# Patient Record
Sex: MALE | Race: BLACK OR AFRICAN AMERICAN | NOT HISPANIC OR LATINO | Employment: PART TIME | URBAN - METROPOLITAN AREA
[De-identification: names, ages, dates, MRNs, and addresses within clinical notes are randomized per-mention and may not be internally consistent; named-entity substitution may affect disease eponyms.]

---

## 2019-06-10 PROBLEM — D57.1 SICKLE CELL ANEMIA: Status: ACTIVE | Noted: 2019-06-10

## 2019-06-13 PROBLEM — M25.511 ACUTE PAIN OF RIGHT SHOULDER DUE TO TRAUMA: Status: ACTIVE | Noted: 2019-06-13

## 2019-06-13 PROBLEM — G89.11 ACUTE PAIN OF RIGHT SHOULDER DUE TO TRAUMA: Status: ACTIVE | Noted: 2019-06-13

## 2019-06-19 PROBLEM — M25.511 ACUTE PAIN OF RIGHT SHOULDER DUE TO TRAUMA: Status: RESOLVED | Noted: 2019-06-13 | Resolved: 2019-06-19

## 2019-06-19 PROBLEM — G89.11 ACUTE PAIN OF RIGHT SHOULDER DUE TO TRAUMA: Status: RESOLVED | Noted: 2019-06-13 | Resolved: 2019-06-19

## 2019-07-07 ENCOUNTER — HOSPITAL ENCOUNTER (INPATIENT)
Facility: HOSPITAL | Age: 24
LOS: 1 days | Discharge: HOME | DRG: 812 | End: 2019-07-08
Attending: EMERGENCY MEDICINE | Admitting: INTERNAL MEDICINE
Payer: MEDICAID

## 2019-07-07 DIAGNOSIS — Z79.64 ON HYDROXYUREA THERAPY: ICD-10-CM

## 2019-07-07 DIAGNOSIS — M79.605 BILATERAL LOWER EXTREMITY PAIN: ICD-10-CM

## 2019-07-07 DIAGNOSIS — M25.559 HIP PAIN: ICD-10-CM

## 2019-07-07 DIAGNOSIS — M87.052 AVASCULAR NECROSIS OF BONES OF BOTH HIPS: Chronic | ICD-10-CM

## 2019-07-07 DIAGNOSIS — M87.051 AVASCULAR NECROSIS OF BONES OF BOTH HIPS: Chronic | ICD-10-CM

## 2019-07-07 DIAGNOSIS — J45.20 MILD INTERMITTENT ASTHMA WITHOUT COMPLICATION: Chronic | ICD-10-CM

## 2019-07-07 DIAGNOSIS — D57.00 SICKLE CELL CRISIS: Primary | ICD-10-CM

## 2019-07-07 DIAGNOSIS — M25.551 RIGHT HIP PAIN: ICD-10-CM

## 2019-07-07 DIAGNOSIS — D57.00 SICKLE CELL ANEMIA WITH PAIN: ICD-10-CM

## 2019-07-07 DIAGNOSIS — M79.604 BILATERAL LOWER EXTREMITY PAIN: ICD-10-CM

## 2019-07-07 DIAGNOSIS — F11.10 NARCOTIC ABUSE: ICD-10-CM

## 2019-07-07 DIAGNOSIS — M25.551 PAIN OF RIGHT HIP JOINT: ICD-10-CM

## 2019-07-07 DIAGNOSIS — Z76.5 DRUG-SEEKING BEHAVIOR: ICD-10-CM

## 2019-07-07 LAB
ALBUMIN SERPL BCP-MCNC: 4 G/DL (ref 3.5–5.2)
ALP SERPL-CCNC: 55 U/L (ref 55–135)
ALT SERPL W/O P-5'-P-CCNC: 12 U/L (ref 10–44)
ANION GAP SERPL CALC-SCNC: 11 MMOL/L (ref 8–16)
ANISOCYTOSIS BLD QL SMEAR: SLIGHT
AST SERPL-CCNC: 21 U/L (ref 10–40)
BASOPHILS # BLD AUTO: 0.11 K/UL (ref 0–0.2)
BASOPHILS NFR BLD: 0.9 % (ref 0–1.9)
BILIRUB SERPL-MCNC: 3.2 MG/DL (ref 0.1–1)
BUN SERPL-MCNC: 5 MG/DL (ref 6–20)
BURR CELLS BLD QL SMEAR: ABNORMAL
CALCIUM SERPL-MCNC: 9 MG/DL (ref 8.7–10.5)
CHLORIDE SERPL-SCNC: 109 MMOL/L (ref 95–110)
CO2 SERPL-SCNC: 20 MMOL/L (ref 23–29)
CREAT SERPL-MCNC: 0.8 MG/DL (ref 0.5–1.4)
DACRYOCYTES BLD QL SMEAR: ABNORMAL
DIFFERENTIAL METHOD: ABNORMAL
EOSINOPHIL # BLD AUTO: 0.7 K/UL (ref 0–0.5)
EOSINOPHIL NFR BLD: 5.4 % (ref 0–8)
ERYTHROCYTE [DISTWIDTH] IN BLOOD BY AUTOMATED COUNT: 22.5 % (ref 11.5–14.5)
EST. GFR  (AFRICAN AMERICAN): >60 ML/MIN/1.73 M^2
EST. GFR  (NON AFRICAN AMERICAN): >60 ML/MIN/1.73 M^2
GIANT PLATELETS BLD QL SMEAR: PRESENT
GLUCOSE SERPL-MCNC: 68 MG/DL (ref 70–110)
HCT VFR BLD AUTO: 31.4 % (ref 40–54)
HGB BLD-MCNC: 10.1 G/DL (ref 14–18)
HYPOCHROMIA BLD QL SMEAR: ABNORMAL
IMM GRANULOCYTES # BLD AUTO: 0.21 K/UL (ref 0–0.04)
IMM GRANULOCYTES NFR BLD AUTO: 1.7 % (ref 0–0.5)
LDH SERPL L TO P-CCNC: 335 U/L (ref 110–260)
LYMPHOCYTES # BLD AUTO: 3.5 K/UL (ref 1–4.8)
LYMPHOCYTES NFR BLD: 28.6 % (ref 18–48)
MCH RBC QN AUTO: 29.4 PG (ref 27–31)
MCHC RBC AUTO-ENTMCNC: 32.2 G/DL (ref 32–36)
MCV RBC AUTO: 92 FL (ref 82–98)
MONOCYTES # BLD AUTO: 1.3 K/UL (ref 0.3–1)
MONOCYTES NFR BLD: 10.9 % (ref 4–15)
NEUTROPHILS # BLD AUTO: 6.4 K/UL (ref 1.8–7.7)
NEUTROPHILS NFR BLD: 52.5 % (ref 38–73)
NRBC BLD-RTO: 1 /100 WBC
OVALOCYTES BLD QL SMEAR: ABNORMAL
PLATELET # BLD AUTO: 299 K/UL (ref 150–350)
PLATELET BLD QL SMEAR: ABNORMAL
PMV BLD AUTO: 9.8 FL (ref 9.2–12.9)
POIKILOCYTOSIS BLD QL SMEAR: SLIGHT
POLYCHROMASIA BLD QL SMEAR: ABNORMAL
POTASSIUM SERPL-SCNC: 4.5 MMOL/L (ref 3.5–5.1)
PROT SERPL-MCNC: 7.3 G/DL (ref 6–8.4)
RBC # BLD AUTO: 3.43 M/UL (ref 4.6–6.2)
RETICS/RBC NFR AUTO: 11.9 % (ref 0.4–2)
SICKLE CELLS BLD QL SMEAR: ABNORMAL
SODIUM SERPL-SCNC: 140 MMOL/L (ref 136–145)
TARGETS BLD QL SMEAR: ABNORMAL
WBC # BLD AUTO: 12.27 K/UL (ref 3.9–12.7)

## 2019-07-07 PROCEDURE — 99285 EMERGENCY DEPT VISIT HI MDM: CPT | Mod: 25

## 2019-07-07 PROCEDURE — 25000003 PHARM REV CODE 250: Performed by: STUDENT IN AN ORGANIZED HEALTH CARE EDUCATION/TRAINING PROGRAM

## 2019-07-07 PROCEDURE — 83615 LACTATE (LD) (LDH) ENZYME: CPT

## 2019-07-07 PROCEDURE — 96375 TX/PRO/DX INJ NEW DRUG ADDON: CPT

## 2019-07-07 PROCEDURE — 99223 PR INITIAL HOSPITAL CARE,LEVL III: ICD-10-PCS | Mod: ,,, | Performed by: INTERNAL MEDICINE

## 2019-07-07 PROCEDURE — 99285 EMERGENCY DEPT VISIT HI MDM: CPT | Mod: ,,, | Performed by: EMERGENCY MEDICINE

## 2019-07-07 PROCEDURE — 85025 COMPLETE CBC W/AUTO DIFF WBC: CPT

## 2019-07-07 PROCEDURE — 11000001 HC ACUTE MED/SURG PRIVATE ROOM

## 2019-07-07 PROCEDURE — 96376 TX/PRO/DX INJ SAME DRUG ADON: CPT

## 2019-07-07 PROCEDURE — 99285 PR EMERGENCY DEPT VISIT,LEVEL V: ICD-10-PCS | Mod: ,,, | Performed by: EMERGENCY MEDICINE

## 2019-07-07 PROCEDURE — 25000003 PHARM REV CODE 250: Performed by: EMERGENCY MEDICINE

## 2019-07-07 PROCEDURE — 99223 1ST HOSP IP/OBS HIGH 75: CPT | Mod: ,,, | Performed by: INTERNAL MEDICINE

## 2019-07-07 PROCEDURE — 96374 THER/PROPH/DIAG INJ IV PUSH: CPT

## 2019-07-07 PROCEDURE — 80053 COMPREHEN METABOLIC PANEL: CPT

## 2019-07-07 PROCEDURE — 85045 AUTOMATED RETICULOCYTE COUNT: CPT

## 2019-07-07 PROCEDURE — 63600175 PHARM REV CODE 636 W HCPCS: Performed by: EMERGENCY MEDICINE

## 2019-07-07 RX ORDER — MORPHINE SULFATE 15 MG/1
30 TABLET, FILM COATED, EXTENDED RELEASE ORAL EVERY 12 HOURS
Status: DISCONTINUED | OUTPATIENT
Start: 2019-07-08 | End: 2019-07-07

## 2019-07-07 RX ORDER — DIPHENHYDRAMINE HCL 25 MG
25 CAPSULE ORAL EVERY 6 HOURS PRN
Status: DISCONTINUED | OUTPATIENT
Start: 2019-07-07 | End: 2019-07-08 | Stop reason: HOSPADM

## 2019-07-07 RX ORDER — MORPHINE SULFATE 15 MG/1
15 TABLET, FILM COATED, EXTENDED RELEASE ORAL EVERY 12 HOURS
Status: DISCONTINUED | OUTPATIENT
Start: 2019-07-07 | End: 2019-07-07

## 2019-07-07 RX ORDER — ENOXAPARIN SODIUM 100 MG/ML
40 INJECTION SUBCUTANEOUS EVERY 24 HOURS
Status: DISCONTINUED | OUTPATIENT
Start: 2019-07-07 | End: 2019-07-08 | Stop reason: HOSPADM

## 2019-07-07 RX ORDER — AMOXICILLIN 250 MG
1 CAPSULE ORAL 2 TIMES DAILY
Status: DISCONTINUED | OUTPATIENT
Start: 2019-07-07 | End: 2019-07-08 | Stop reason: HOSPADM

## 2019-07-07 RX ORDER — DIPHENHYDRAMINE HYDROCHLORIDE 50 MG/ML
12.5 INJECTION INTRAMUSCULAR; INTRAVENOUS
Status: COMPLETED | OUTPATIENT
Start: 2019-07-07 | End: 2019-07-07

## 2019-07-07 RX ORDER — HYDROMORPHONE HYDROCHLORIDE 1 MG/ML
2 INJECTION, SOLUTION INTRAMUSCULAR; INTRAVENOUS; SUBCUTANEOUS
Status: COMPLETED | OUTPATIENT
Start: 2019-07-07 | End: 2019-07-07

## 2019-07-07 RX ORDER — SODIUM CHLORIDE 450 MG/100ML
1000 INJECTION, SOLUTION INTRAVENOUS
Status: COMPLETED | OUTPATIENT
Start: 2019-07-07 | End: 2019-07-07

## 2019-07-07 RX ORDER — SODIUM CHLORIDE 0.9 % (FLUSH) 0.9 %
10 SYRINGE (ML) INJECTION
Status: DISCONTINUED | OUTPATIENT
Start: 2019-07-07 | End: 2019-07-08 | Stop reason: HOSPADM

## 2019-07-07 RX ORDER — SODIUM CHLORIDE, SODIUM LACTATE, POTASSIUM CHLORIDE, CALCIUM CHLORIDE 600; 310; 30; 20 MG/100ML; MG/100ML; MG/100ML; MG/100ML
INJECTION, SOLUTION INTRAVENOUS CONTINUOUS
Status: DISCONTINUED | OUTPATIENT
Start: 2019-07-07 | End: 2019-07-08 | Stop reason: HOSPADM

## 2019-07-07 RX ORDER — ONDANSETRON 2 MG/ML
4 INJECTION INTRAMUSCULAR; INTRAVENOUS
Status: COMPLETED | OUTPATIENT
Start: 2019-07-07 | End: 2019-07-07

## 2019-07-07 RX ORDER — FOLIC ACID 1 MG/1
1 TABLET ORAL DAILY
Status: DISCONTINUED | OUTPATIENT
Start: 2019-07-08 | End: 2019-07-08 | Stop reason: HOSPADM

## 2019-07-07 RX ORDER — MORPHINE SULFATE 15 MG/1
30 TABLET, FILM COATED, EXTENDED RELEASE ORAL EVERY 12 HOURS
Status: DISCONTINUED | OUTPATIENT
Start: 2019-07-07 | End: 2019-07-08

## 2019-07-07 RX ORDER — HYDROXYUREA 500 MG/1
1500 CAPSULE ORAL DAILY
Status: DISCONTINUED | OUTPATIENT
Start: 2019-07-08 | End: 2019-07-08 | Stop reason: HOSPADM

## 2019-07-07 RX ADMIN — HYDROMORPHONE HYDROCHLORIDE 2 MG: 1 INJECTION, SOLUTION INTRAMUSCULAR; INTRAVENOUS; SUBCUTANEOUS at 03:07

## 2019-07-07 RX ADMIN — SODIUM CHLORIDE 1000 ML: 0.45 INJECTION, SOLUTION INTRAVENOUS at 09:07

## 2019-07-07 RX ADMIN — MORPHINE SULFATE 30 MG: 15 TABLET, EXTENDED RELEASE ORAL at 08:07

## 2019-07-07 RX ADMIN — SODIUM CHLORIDE, SODIUM LACTATE, POTASSIUM CHLORIDE, AND CALCIUM CHLORIDE: .6; .31; .03; .02 INJECTION, SOLUTION INTRAVENOUS at 06:07

## 2019-07-07 RX ADMIN — HYDROMORPHONE HYDROCHLORIDE 2 MG: 1 INJECTION, SOLUTION INTRAMUSCULAR; INTRAVENOUS; SUBCUTANEOUS at 09:07

## 2019-07-07 RX ADMIN — DIPHENHYDRAMINE HYDROCHLORIDE 25 MG: 25 CAPSULE ORAL at 08:07

## 2019-07-07 RX ADMIN — HYDROMORPHONE HYDROCHLORIDE 2 MG: 1 INJECTION, SOLUTION INTRAMUSCULAR; INTRAVENOUS; SUBCUTANEOUS at 12:07

## 2019-07-07 RX ADMIN — HYDROMORPHONE HYDROCHLORIDE 2 MG: 1 INJECTION, SOLUTION INTRAMUSCULAR; INTRAVENOUS; SUBCUTANEOUS at 11:07

## 2019-07-07 RX ADMIN — DIPHENHYDRAMINE HYDROCHLORIDE 12.5 MG: 50 INJECTION, SOLUTION INTRAMUSCULAR; INTRAVENOUS at 09:07

## 2019-07-07 RX ADMIN — ONDANSETRON 4 MG: 2 INJECTION INTRAMUSCULAR; INTRAVENOUS at 09:07

## 2019-07-07 RX ADMIN — DIPHENHYDRAMINE HYDROCHLORIDE 12.5 MG: 50 INJECTION, SOLUTION INTRAMUSCULAR; INTRAVENOUS at 12:07

## 2019-07-07 NOTE — ASSESSMENT & PLAN NOTE
Patient was taking Ms-Contin at home along with hydroxyurea 500 mg/folic acid and has been having uncontrolled severe pain in his right hip X 3 days which was limiting his ROM and was hearing a popping sound as he moved his right hip. It was accompanied by generalized body pain.   -Continue Hyrdoxy urea/FA  -Start oral Morphine 30mg q12   -Oral Benedryl 25 mg Prn for allergic reaction to morphine  - monitor for pain  -Consult Hematology- appreciate reccomendations

## 2019-07-07 NOTE — ASSESSMENT & PLAN NOTE
Patient was taking Ms-Contin at home along with hydroxyurea 500 mg/folic acid and has been having uncontrolled severe pain in his right hip X 3 days which was limiting his ROM and was hearing a popping sound as he moved his right hip. It was accompanied by generalized body pain.   -Continue Hyrdoxy urea/FA  -Start oral Morphine 30mg q12   -Oral Benedryl 25 mg Prn for allergic reaction to morphine  - monitor for pain

## 2019-07-07 NOTE — ED NOTES
.  Patient identifiers for Paddy Stevenson 24 y.o. male checked and correct.  Chief Complaint   Patient presents with    Sickle Cell Pain Crisis     Past Medical History:   Diagnosis Date    Asthma     AVN (avascular necrosis of bone)     Encounter for blood transfusion     Irregular heart rhythm 2010    Mild intermittent asthma without complication 11/16/2015    Seasonal allergies     Sickle cell anemia     Stroke      Allergies reported:   Review of patient's allergies indicates:   Allergen Reactions    Adhesive Itching    Contrast media Shortness Of Breath    Rocephin [ceftriaxone] Hives    Toradol [ketorolac] Shortness Of Breath    Vancomycin analogues Shortness Of Breath    Fentanyl Hives    Morphine Hives     Needs to get benadryl with it         LOC: Patient is awake, alert, and aware of environment with an appropriate affect. Patient is oriented x 3 and speaking appropriately.  APPEARANCE: Patient resting comfortably and in no acute distress. Patient is clean and well groomed, patient's clothing is properly fastened.  SKIN: The skin is warm and dry. Patient has normal skin turgor and moist mucus membranes. Skin is intact; no bruising or breakdown noted.  MUSKULOSKELETAL: Patient is moving all extremities well, no obvious deformities noted. Pulses intact. Pt reports generalized body pain, worse in R hip, no injury pt reports pain is from sickle cell pain x 3 days, took MS contin around 3 am.   RESPIRATORY: Airway is open and patent. Respirations are spontaneous and non-labored with normal effort and rate, BBS=clear  CARDIAC: Patient has a normal rate and rhythm.No peripheral edema noted.   ABDOMEN: No distention noted. Bowel sounds active in all 4 quadrants. Soft and non-tender upon palpation.  NEUROLOGICAL: GCS 15. PERRL. Facial expression is symmetrical. Hand grasps are equal bilaterally. Normal sensation in all extremities when touched with finger.

## 2019-07-07 NOTE — HPI
23 Y/O M with known Sickle cell disease on home pain medication and hydroxyurea, history of CVA, avascular necrosis s/p left hip replacement reports atraumatic, gradual worsening of generalized pain more pronounced in his right hip X 3 d (since his last ED visit) with no associated numbness or weakness. he c/o hearing a popping sound from his right hip with any movement. He reports symptoms typical to his previous sickle cell disease flare-up pain. He has had multiple visits to multiple hospitals and EDs in the last 2-3 months for similar complaints. He has been taking MS-Contin at home for his pain and says his doctor at [facility] prescribes his pain medication. He basically is here for getting his pain under control. He has been taking his folic acid/hydroxyurea at home.  He denies any headache, dizziness or confusion. He denies any chest pain or shortness of breath or cough. He denies any recent illness, fever/chills.

## 2019-07-07 NOTE — ASSESSMENT & PLAN NOTE
Patient was taking Ms-Contin at home along with hydroxyurea 500 mg/folic acid and has been having uncontrolled severe pain in his right hip X 3 days which was limiting his ROM and was hearing a popping sound as he moved his right hip. It was accompanied by generalized body pain.   -Continue Hyrdoxy urea/FA  -start IV hydration with LR 150ml/hr continuous infusion  -Start oral Morphine 30mg q12   -Incentive spirometry PRN  -Oral Benedryl 25 mg Prn for allergic reaction to morphine  - monitor for pain  -Consult Hematology- appreciate recomendations

## 2019-07-07 NOTE — SUBJECTIVE & OBJECTIVE
Past Medical History:   Diagnosis Date    Asthma     AVN (avascular necrosis of bone)     Encounter for blood transfusion     Irregular heart rhythm 2010    Malingering 3/11/2016    Mild intermittent asthma without complication 11/16/2015    Seasonal allergies     Sickle cell anemia     Stroke        Past Surgical History:   Procedure Laterality Date    ABDOMINAL SURGERY      CHOLECYSTECTOMY      HIP SURGERY Left 2014    Hip Decompression    OTHER SURGICAL HISTORY      left rotater cuff repair    PORTACATH PLACEMENT      portacath removed   2/2014    SHOULDER ARTHROSCOPY W/ SUBACROMIAL DECOMPRESSION AND DISTAL CLAVICLE EXCISION         Review of patient's allergies indicates:   Allergen Reactions    Adhesive Itching    Contrast media Shortness Of Breath    Rocephin [ceftriaxone] Hives    Toradol [ketorolac] Shortness Of Breath    Vancomycin analogues Shortness Of Breath    Fentanyl Hives       No current facility-administered medications on file prior to encounter.      Current Outpatient Medications on File Prior to Encounter   Medication Sig    folic acid (FOLVITE) 1 MG tablet Take 1 mg by mouth once daily.    hydroxyurea (HYDREA) 500 mg Cap Take 1,500 mg by mouth once daily.    oxyCODONE-acetaminophen (PERCOCET)  mg per tablet Take 1 tablet by mouth every 6 (six) hours as needed.     Family History     Problem Relation (Age of Onset)    Diabetes Father    Mental illness Brother    Sickle cell anemia Mother        Tobacco Use    Smoking status: Never Smoker    Smokeless tobacco: Never Used   Substance and Sexual Activity    Alcohol use: No    Drug use: No    Sexual activity: Yes     Partners: Female     Birth control/protection: Condom     Review of Systems   Constitutional: Negative for appetite change and fever.   Respiratory: Negative for cough and shortness of breath.    Cardiovascular: Negative for chest pain and leg swelling.   Gastrointestinal: Negative for nausea and  vomiting.   Musculoskeletal: Positive for myalgias.        Generalized body aches  Pain more pronounced in his right hip   Neurological: Negative for dizziness.     Objective:     Vital Signs (Most Recent):  Temp: 98.4 °F (36.9 °C) (07/07/19 1543)  Pulse: 60 (07/07/19 1548)  Resp: 18 (07/07/19 1548)  BP: 114/60 (07/07/19 1548)  SpO2: 99 % (07/07/19 1548) Vital Signs (24h Range):  Temp:  [97.9 °F (36.6 °C)-98.4 °F (36.9 °C)] 98.4 °F (36.9 °C)  Pulse:  [58-80] 60  Resp:  [12-18] 18  SpO2:  [97 %-100 %] 99 %  BP: (112-115)/(59-61) 114/60     Weight: 72.6 kg (160 lb)  Body mass index is 23.63 kg/m².    Physical Exam   Constitutional: He is oriented to person, place, and time. He appears well-developed and well-nourished.   Eyes: Conjunctivae are normal.   Neck:   Limited movements consistent with his generalized body pain   Cardiovascular: Normal rate and normal heart sounds.   Pulmonary/Chest: Effort normal and breath sounds normal.   Abdominal: Soft. He exhibits no distension.   Musculoskeletal: He exhibits tenderness (B/L UE and LE).   Severely decreased range of movement in his right hip and moderately decreased ROM on left hip, B/L shoulder joints    Neurological: He is alert and oriented to person, place, and time.   Skin: Skin is warm and dry.           Significant Labs:   CBC:   Recent Labs   Lab 07/07/19  1519   WBC 12.27   HGB 10.1*   HCT 31.4*        CMP:   Recent Labs   Lab 07/07/19  1534      K 4.5      CO2 20*   GLU 68*   BUN 5*   CREATININE 0.8   CALCIUM 9.0   PROT 7.3   ALBUMIN 4.0   BILITOT 3.2*   ALKPHOS 55   AST 21   ALT 12   ANIONGAP 11   EGFRNONAA >60.0     Lactic Acid: No results for input(s): LACTATE in the last 48 hours.  POCT Glucose: No results for input(s): POCTGLUCOSE in the last 48 hours.  All pertinent labs within the past 24 hours have been reviewed.    Significant Imaging: No imaging ordered yet

## 2019-07-07 NOTE — H&P
[facility] Hospital Medicine  History & Physical    Patient Name: Paddy Stevenson  MRN: [id number]  Admission Date: 7/7/2019  Attending Physician: Elliott Beckwith MD   Primary Care Provider: Primary Doctor [facility] Medicine Team: [facility] 3 Jared Gama MD     Patient information was obtained from patient and ER records.     Subjective:     Principal Problem:Sickle cell anemia with pain    Chief Complaint:   Chief Complaint   Patient presents with    Sickle Cell Pain Crisis        HPI: 23 Y/O M with known Sickle cell disease on home pain medication and hydroxyurea, history of CVA, avascular necrosis s/p left hip replacement reports atraumatic, gradual worsening of generalized pain more pronounced in his right hip X 3 d (since his last ED visit) with no associated numbness or weakness.  he c/o hearing a popping sound from his right hip with any movement. He reports symptoms typical to his previous sickle cell disease flare-up pain. He has had multiple visits to multiple hospitals and EDs in the last 2-3 months for similar complaints. He has been taking MS-Contin at home for his pain and says his doctor at [facility] prescribes his pain medication. He basically is here for getting his pain under control. He has been taking his folic acid/hydroxyurea at home.  He denies any headache, dizziness or confusion. He denies any chest pain or shortness of breath or cough. He denies any recent illness, fever/chills.     No new subjective & objective note has been filed under this hospital service since the last note was generated.    Assessment/Plan:     Sickle cell crisis  Patient was taking Ms-Contin at home along with hydroxyurea 500 mg/folic acid and has been having uncontrolled severe pain in his right hip X 3 days which was limiting his ROM and was hearing a popping sound as he moved his right hip. It was accompanied by generalized body pain.   -Continue Hyrdoxy urea/FA  -start IV  hydration with LR 150ml/hr continuous infusion  -Start oral Morphine 30mg q12   -Incentive spirometry PRN  -Oral Benedryl 25 mg Prn for allergic reaction to morphine  - monitor for pain  -Consult Hematology- appreciate recomendations        Sickle cell anemia  See Sickle cell crisis above      Mild intermittent asthma without complication  Patient does not take any home medications      VTE Risk Mitigation (From admission, onward)        Ordered     enoxaparin injection 40 mg  Daily      07/07/19 5960             Jared Gama MD  Department of Hospital Medicine   [facility]

## 2019-07-07 NOTE — HOSPITAL COURSE
Admitted to [facility] with sickle cell crisis pain not controlled by his home medications and possible AVN of right hip. Consulted Hematology. As per the attending provider's note-   Malingering / Drug Seeking Behavior  7-8-19:  Patient was admitted to hospital on 7-7-19. Stated that he was taking MS Contin 45 mg BID PRN prescribed by Dr. Rei Greer. He stated he refilled this last month.  He also stated that he was taking hydroxyurea, refilled last 2 months.  He gave us 2 pharmacies ([facility] and [facility] in [place]).  We checked both pharmacies.  [facility] had one fill of MS Contin 15 mg tablet for 6 tablets on 5/17/19 (no other meds filled).  [facility].   check confirmed the same MS Contin fill only.  He was lying to us.  Apparently he has been getting pain meds through numerous ED visits.  DC to home 7-8-19 with .

## 2019-07-07 NOTE — ED TRIAGE NOTES
Pt presents with generalized pain worse in R hip x 3 days, pt reports pain is from sickle cell pain.

## 2019-07-07 NOTE — ED PROVIDER NOTES
Encounter Date: 7/7/2019       History     Chief Complaint   Patient presents with    Sickle Cell Pain Crisis     HPI   23 Y/O M with know SS disease, history of avascular necrosis status post left hip replacement reports atraumatic, gradual worsening of pain to bilateral knees and right hip with no associated numbness or weakness. He denies any swelling. He reports symptoms typical to his previous sickle cell disease flare-up pain. He denies any headache, dizziness, visual changes neck pain or neck stiffness. He denies any recent illness, fever/chills or any cough, dyspnea, dyspnea on exertion, chest pain, chest pain on exertion or any other back/abdominal complaints. He reports p.o. intake and BMs within normal limits with no black or bloody stool.    Review of patient's allergies indicates:   Allergen Reactions    Adhesive Itching    Contrast media Shortness Of Breath    Rocephin [ceftriaxone] Hives    Toradol [ketorolac] Shortness Of Breath    Vancomycin analogues Shortness Of Breath    Fentanyl Hives    Morphine Hives     Needs to get benadryl with it     Past Medical History:   Diagnosis Date    Asthma     AVN (avascular necrosis of bone)     Encounter for blood transfusion     Irregular heart rhythm 2010    Mild intermittent asthma without complication 11/16/2015    Seasonal allergies     Sickle cell anemia     Stroke      Past Surgical History:   Procedure Laterality Date    ABDOMINAL SURGERY      CHOLECYSTECTOMY      HIP SURGERY Left 2014    Hip Decompression    OTHER SURGICAL HISTORY      left rotater cuff repair    PORTACATH PLACEMENT      portacath removed   2/2014    SHOULDER ARTHROSCOPY W/ SUBACROMIAL DECOMPRESSION AND DISTAL CLAVICLE EXCISION       Family History   Problem Relation Age of Onset    Diabetes Father     Mental illness Brother     Sickle cell anemia Mother      Social History     Tobacco Use    Smoking status: Never Smoker    Smokeless tobacco: Never Used    Substance Use Topics    Alcohol use: No    Drug use: No     Review of Systems  Constitutional: Negative for fever, chills, fatigues   HENT: Negative for sore throat, nasal congestion, visual changes, voice changes  Respiratory: Negative for shortness of breath, cough, Hx of clots   Cardiovascular: Negative for chest pain, palpitation, orthopnea  Gastrointestinal: Negative for diarrhea, nausea and vomiting.   Genitourinary: Negative for dysuria, discharge or high risk sexual encounters  Musculoskeletal: Positive for arthralgias (right hip pain). Negative for back pain.        + bilateral leg pain   Skin: Negative for rash, tears/injuries  Neurological: Negative for weakness, numbness or tremors  Hematological: Does not bruise/bleed easily.   All other systems reviewed and are negative.  Physical Exam     Initial Vitals [07/07/19 0905]   BP Pulse Resp Temp SpO2   (!) 115/59 80 17 97.9 °F (36.6 °C) 97 %      MAP       --         Physical Exam  Nursing note and vitals reviewed.  Constitutional: He appears well-developed. No distress.   HENT: Head: Normocephalic.  Mouth/Throat: Oropharynx is clear and moist.   Eyes: Anicteric; Conjunctivae are anicteric and non-injected. PERRL; EOMI.  Neck: Neck supple with FROM and no meningismus  Cardiovascular: Atraumatic thorax; Normal rate, regular rhythm, normal heart sounds and intact distal pulses. Exam reveals no gallop and no friction rub.  No murmur heard.  Pulmonary/Chest: Breath sounds normal. No respiratory distress. He has no wheezes. He has no rhonchi. He has no rales. He exhibits no tenderness.   Abdominal: +Old healed Lap Scars; Soft. Bowel sounds are normal. He exhibits no distension. There is no tenderness. There is no rebound and no guarding.   Musculoskeletal: +DRM to R hip 2/2 pain; + Right hip tenderness to palpation with no crepitus.   Neurological: AAOx3 with no focal deficits.  Skin: +diffuse tattoos; Skin is warm. Capillary refill takes less than 2  seconds. No rash noted.     ED Course   Procedures  Labs Reviewed - No data to display       Imaging Results    None          Medical Decision Making:   History:   Old Medical Records: I decided to obtain old medical records.  Initial Assessment:   AFEBRILE, ATRAUMATIC AND HEMODYNAMICALLY STABLE MALE WITH NO FOCAL NEUROLOGICAL DEFICITS presents with signs and symptoms of typical right hip and lower extremity pain. No appreciated trauma or asymmetry/edema that would indicate thrombosis either arterial or venous.  Will treat with analgesia and closely monitor his symptom improvement.  No chest pain or respiratory complaints that would indicate acute chest syndrome in today's visit.  Nonetheless will closely monitor while in the ED.  ____________________  Kimo Hannon MD, [facility]  Emergency Medicine Staff  9:36 AM 7/7/2019    F/U:  Patient continues in pain despite initial dose of analgesia.  Will provide 2nd and 3rd opioid all monitoring his pain. He does report subjective improvement, but no complete resolution.  Will closely continue monitoring his symptom resolution.  ____________________  Kimo Hannon MD, [facility]  Emergency Medicine Staff  12:26 PM 7/7/2019                          Clinical Impression:       ICD-10-CM ICD-9-CM   1. Sickle cell crisis D57.00 282.62   2. Right hip pain M25.551 719.45   3. Bilateral lower extremity pain M79.604 729.5    M79.605    4. Hip pain M25.559 719.45                                Clay Hannon MD  07/07/19 1958

## 2019-07-08 VITALS
TEMPERATURE: 98 F | BODY MASS INDEX: 23.7 KG/M2 | OXYGEN SATURATION: 96 % | HEART RATE: 69 BPM | SYSTOLIC BLOOD PRESSURE: 133 MMHG | WEIGHT: 160 LBS | HEIGHT: 69 IN | DIASTOLIC BLOOD PRESSURE: 60 MMHG | RESPIRATION RATE: 17 BRPM

## 2019-07-08 PROBLEM — Z76.5 MALINGERING: Status: ACTIVE | Noted: 2019-07-08

## 2019-07-08 LAB
BASOPHILS # BLD AUTO: 0.1 K/UL (ref 0–0.2)
BASOPHILS NFR BLD: 0.7 % (ref 0–1.9)
DIFFERENTIAL METHOD: ABNORMAL
EOSINOPHIL # BLD AUTO: 0.8 K/UL (ref 0–0.5)
EOSINOPHIL NFR BLD: 5.6 % (ref 0–8)
ERYTHROCYTE [DISTWIDTH] IN BLOOD BY AUTOMATED COUNT: 22 % (ref 11.5–14.5)
HCT VFR BLD AUTO: 30 % (ref 40–54)
HGB BLD-MCNC: 10 G/DL (ref 14–18)
IMM GRANULOCYTES # BLD AUTO: 0.35 K/UL (ref 0–0.04)
IMM GRANULOCYTES NFR BLD AUTO: 2.6 % (ref 0–0.5)
LYMPHOCYTES # BLD AUTO: 3.5 K/UL (ref 1–4.8)
LYMPHOCYTES NFR BLD: 25.9 % (ref 18–48)
MCH RBC QN AUTO: 29.6 PG (ref 27–31)
MCHC RBC AUTO-ENTMCNC: 33.3 G/DL (ref 32–36)
MCV RBC AUTO: 89 FL (ref 82–98)
MONOCYTES # BLD AUTO: 1.4 K/UL (ref 0.3–1)
MONOCYTES NFR BLD: 10.4 % (ref 4–15)
NEUTROPHILS # BLD AUTO: 7.4 K/UL (ref 1.8–7.7)
NEUTROPHILS NFR BLD: 54.8 % (ref 38–73)
NRBC BLD-RTO: 1 /100 WBC
PLATELET # BLD AUTO: 336 K/UL (ref 150–350)
PMV BLD AUTO: 10.6 FL (ref 9.2–12.9)
RBC # BLD AUTO: 3.38 M/UL (ref 4.6–6.2)
WBC # BLD AUTO: 13.46 K/UL (ref 3.9–12.7)

## 2019-07-08 PROCEDURE — 36415 COLL VENOUS BLD VENIPUNCTURE: CPT

## 2019-07-08 PROCEDURE — 25000003 PHARM REV CODE 250: Performed by: STUDENT IN AN ORGANIZED HEALTH CARE EDUCATION/TRAINING PROGRAM

## 2019-07-08 PROCEDURE — 63600175 PHARM REV CODE 636 W HCPCS: Performed by: STUDENT IN AN ORGANIZED HEALTH CARE EDUCATION/TRAINING PROGRAM

## 2019-07-08 PROCEDURE — 99239 PR HOSPITAL DISCHARGE DAY,>30 MIN: ICD-10-PCS | Mod: ,,, | Performed by: INTERNAL MEDICINE

## 2019-07-08 PROCEDURE — 99239 HOSP IP/OBS DSCHRG MGMT >30: CPT | Mod: ,,, | Performed by: INTERNAL MEDICINE

## 2019-07-08 PROCEDURE — 85025 COMPLETE CBC W/AUTO DIFF WBC: CPT

## 2019-07-08 RX ORDER — HYDROMORPHONE HYDROCHLORIDE 1 MG/ML
1 INJECTION, SOLUTION INTRAMUSCULAR; INTRAVENOUS; SUBCUTANEOUS ONCE
Status: COMPLETED | OUTPATIENT
Start: 2019-07-08 | End: 2019-07-08

## 2019-07-08 RX ORDER — OXYCODONE HYDROCHLORIDE 5 MG/1
5 TABLET ORAL EVERY 4 HOURS PRN
Status: DISCONTINUED | OUTPATIENT
Start: 2019-07-08 | End: 2019-07-08 | Stop reason: HOSPADM

## 2019-07-08 RX ADMIN — DIPHENHYDRAMINE HYDROCHLORIDE 25 MG: 25 CAPSULE ORAL at 08:07

## 2019-07-08 RX ADMIN — HYDROXYUREA 1500 MG: 500 CAPSULE ORAL at 08:07

## 2019-07-08 RX ADMIN — FOLIC ACID 1 MG: 1 TABLET ORAL at 08:07

## 2019-07-08 RX ADMIN — MORPHINE SULFATE 30 MG: 15 TABLET, EXTENDED RELEASE ORAL at 08:07

## 2019-07-08 RX ADMIN — OXYCODONE HYDROCHLORIDE 5 MG: 5 TABLET ORAL at 09:07

## 2019-07-08 RX ADMIN — HYDROMORPHONE HYDROCHLORIDE 1 MG: 1 INJECTION, SOLUTION INTRAMUSCULAR; INTRAVENOUS; SUBCUTANEOUS at 04:07

## 2019-07-08 RX ADMIN — SODIUM CHLORIDE, SODIUM LACTATE, POTASSIUM CHLORIDE, AND CALCIUM CHLORIDE: .6; .31; .03; .02 INJECTION, SOLUTION INTRAVENOUS at 09:07

## 2019-07-08 NOTE — SUBJECTIVE & OBJECTIVE
Oncology Treatment Plan:   [No treatment plan]    Medications:  Continuous Infusions:   lactated ringers 150 mL/hr at 07/08/19 0908     Scheduled Meds:   enoxaparin  40 mg Subcutaneous Daily    folic acid  1 mg Oral Daily    hydroxyurea  1,500 mg Oral Daily    morphine  30 mg Oral Q12H    senna-docusate 8.6-50 mg  1 tablet Oral BID     PRN Meds:diphenhydrAMINE, oxyCODONE, sodium chloride 0.9%     Review of patient's allergies indicates:   Allergen Reactions    Adhesive Itching    Contrast media Shortness Of Breath    Rocephin [ceftriaxone] Hives    Toradol [ketorolac] Shortness Of Breath    Vancomycin analogues Shortness Of Breath    Fentanyl Hives        Past Medical History:   Diagnosis Date    Asthma     AVN (avascular necrosis of bone)     Encounter for blood transfusion     Irregular heart rhythm 2010    Malingering 3/11/2016    Mild intermittent asthma without complication 11/16/2015    Seasonal allergies     Sickle cell anemia     Stroke      Past Surgical History:   Procedure Laterality Date    ABDOMINAL SURGERY      CHOLECYSTECTOMY      HIP SURGERY Left 2014    Hip Decompression    OTHER SURGICAL HISTORY      left rotater cuff repair    PORTACATH PLACEMENT      portacath removed   2/2014    SHOULDER ARTHROSCOPY W/ SUBACROMIAL DECOMPRESSION AND DISTAL CLAVICLE EXCISION       Family History     Problem Relation (Age of Onset)    Diabetes Father    Mental illness Brother    Sickle cell anemia Mother        Tobacco Use    Smoking status: Never Smoker    Smokeless tobacco: Never Used   Substance and Sexual Activity    Alcohol use: No    Drug use: No    Sexual activity: Yes     Partners: Female     Birth control/protection: Condom       Review of Systems   Constitutional: Negative for chills and fever.   HENT: Negative for congestion and sore throat.    Eyes: Negative for photophobia and visual disturbance.   Respiratory: Negative for cough and shortness of breath.     Cardiovascular: Negative for chest pain and leg swelling.   Gastrointestinal: Negative for abdominal pain and nausea.   Genitourinary: Negative for difficulty urinating and dysuria.   Musculoskeletal: Positive for arthralgias and back pain.   Neurological: Negative for dizziness and headaches.   Psychiatric/Behavioral: Negative for agitation and confusion.     Objective:     Vital Signs (Most Recent):  Temp: 97.7 °F (36.5 °C) (07/08/19 0753)  Pulse: 60 (07/08/19 0753)  Resp: 16 (07/08/19 0753)  BP: 120/63 (07/08/19 0753)  SpO2: 99 % (07/08/19 0753) Vital Signs (24h Range):  Temp:  [97.6 °F (36.4 °C)-98.4 °F (36.9 °C)] 97.7 °F (36.5 °C)  Pulse:  [57-68] 60  Resp:  [12-18] 16  SpO2:  [94 %-100 %] 99 %  BP: (108-120)/(54-63) 120/63     Weight: 72.6 kg (160 lb)  Body mass index is 23.63 kg/m².  Body surface area is 1.88 meters squared.      Intake/Output Summary (Last 24 hours) at 7/8/2019 1007  Last data filed at 7/7/2019 1656  Gross per 24 hour   Intake 1000 ml   Output --   Net 1000 ml       Physical Exam   Constitutional: He is oriented to person, place, and time. He appears well-developed. No distress.   HENT:   Head: Normocephalic and atraumatic.   Eyes: EOM are normal.   Neck: Normal range of motion. Neck supple.   Cardiovascular: Normal rate and regular rhythm.   Pulmonary/Chest: Effort normal and breath sounds normal. No respiratory distress.   Abdominal: Soft. He exhibits no distension. There is no tenderness.   Musculoskeletal: He exhibits tenderness. He exhibits no edema.   R hip and leg ROM limited due to pain   Neurological: He is alert and oriented to person, place, and time.   Skin: Skin is warm and dry.   Psychiatric: He has a normal mood and affect. His behavior is normal.       Significant Labs:   CBC:   Recent Labs   Lab 07/07/19  1519 07/08/19  0655   WBC 12.27 13.46*   HGB 10.1* 10.0*   HCT 31.4* 30.0*    336   , CMP:   Recent Labs   Lab 07/07/19  1534      K 4.5      CO2 20*    GLU 68*   BUN 5*   CREATININE 0.8   CALCIUM 9.0   PROT 7.3   ALBUMIN 4.0   BILITOT 3.2*   ALKPHOS 55   AST 21   ALT 12   ANIONGAP 11   EGFRNONAA >60.0   , Reticulocytes:   Recent Labs   Lab 07/07/19  1519   RETIC 11.9*       Diagnostic Results:  X-Ray Hip (07/07/19):  - Slight sclerotic change of the right femoral head suggesting AVN similar to previous exam. No evidence of subchondral collapse  - Post left hip total arthroplasty with stable, satisfactory alignment.    CXR (07/07/19):  - The lungs are clear, with normal appearance of pulmonary vasculature and no pleural effusion or pneumothorax.  - The cardiac silhouette is normal in size. The hilar and mediastinal contours are unremarkable.  - Bones are intact.

## 2019-07-08 NOTE — CONSULTS
"[facility]  Hematology/Oncology  Consult Note    Patient Name: Paddy Stevenson  MRN: [id number]  Admission Date: 7/7/2019  Hospital Length of Stay: 1 days  Code Status: Full Code   Attending Provider: No att. providers found  Consulting Provider: Daylin Ugalde MD  Primary Care Physician: Primary Doctor No  Principal Problem:Sickle cell anemia with pain    Inpatient consult to Hematology  Consult performed by: Daylin Ugalde MD  Consult ordered by: Jared Gama MD        Subjective:     HPI:  Mr. Stevenson is a 24 year old man with medical history of sickle cell disease, avascular necrosis s/p left hip replacement (~2 years ago) who presented to the ED on 07/07/19 with right hip pain. He recently was seen in the ED on 07/03/19 with right hip pain and was discharged. He states that the pain started about 3 days ago in his right hip and radiated to the rest of his body including his legs, arms, back. He denies recent trauma. He says he noticed "clicking" and "popping" while walking, and it continued to get worse to the point where he couldn't bear weight on that right hip. His home regimen of MS Contin 45mg BID was not alleviating the pain. He says he has been trying heat packs and heat pads, which have not been helpful as well. He denies fevers, chills, chest pain, SOB, cough, numbness, tingling. He reports taking his Hydroxyurea and folic acid everyday. He follows with a Hematologist at [facility].    In the ED, patient received about 6mg IV Dilaudid. CXR was negative for acute abnormality. Right hip X-ray demonstrated "slight sclerotic change of the right femoral head suggesting AVN similar to previous exam". He is on continuous fluids. He reports that current pain regimen is not working for his pain.     Hematology was consulted for concern of AVN given patient's right hip pain and X-ray findings.     Oncology Treatment Plan:   [No treatment plan]    Medications:  Continuous Infusions:   lactated " ringers 150 mL/hr at 07/08/19 0908     Scheduled Meds:   enoxaparin  40 mg Subcutaneous Daily    folic acid  1 mg Oral Daily    hydroxyurea  1,500 mg Oral Daily    morphine  30 mg Oral Q12H    senna-docusate 8.6-50 mg  1 tablet Oral BID     PRN Meds:diphenhydrAMINE, oxyCODONE, sodium chloride 0.9%     Review of patient's allergies indicates:   Allergen Reactions    Adhesive Itching    Contrast media Shortness Of Breath    Rocephin [ceftriaxone] Hives    Toradol [ketorolac] Shortness Of Breath    Vancomycin analogues Shortness Of Breath    Fentanyl Hives        Past Medical History:   Diagnosis Date    Asthma     AVN (avascular necrosis of bone)     Encounter for blood transfusion     Irregular heart rhythm 2010    Malingering 3/11/2016    Mild intermittent asthma without complication 11/16/2015    Seasonal allergies     Sickle cell anemia     Stroke      Past Surgical History:   Procedure Laterality Date    ABDOMINAL SURGERY      CHOLECYSTECTOMY      HIP SURGERY Left 2014    Hip Decompression    OTHER SURGICAL HISTORY      left rotater cuff repair    PORTACATH PLACEMENT      portacath removed   2/2014    SHOULDER ARTHROSCOPY W/ SUBACROMIAL DECOMPRESSION AND DISTAL CLAVICLE EXCISION       Family History     Problem Relation (Age of Onset)    Diabetes Father    Mental illness Brother    Sickle cell anemia Mother        Tobacco Use    Smoking status: Never Smoker    Smokeless tobacco: Never Used   Substance and Sexual Activity    Alcohol use: No    Drug use: No    Sexual activity: Yes     Partners: Female     Birth control/protection: Condom       Review of Systems   Constitutional: Negative for chills and fever.   HENT: Negative for congestion and sore throat.    Eyes: Negative for photophobia and visual disturbance.   Respiratory: Negative for cough and shortness of breath.    Cardiovascular: Negative for chest pain and leg swelling.   Gastrointestinal: Negative for abdominal pain and  nausea.   Genitourinary: Negative for difficulty urinating and dysuria.   Musculoskeletal: Positive for arthralgias and back pain.   Neurological: Negative for dizziness and headaches.   Psychiatric/Behavioral: Negative for agitation and confusion.     Objective:     Vital Signs (Most Recent):  Temp: 97.7 °F (36.5 °C) (07/08/19 0753)  Pulse: 60 (07/08/19 0753)  Resp: 16 (07/08/19 0753)  BP: 120/63 (07/08/19 0753)  SpO2: 99 % (07/08/19 0753) Vital Signs (24h Range):  Temp:  [97.6 °F (36.4 °C)-98.4 °F (36.9 °C)] 97.7 °F (36.5 °C)  Pulse:  [57-68] 60  Resp:  [12-18] 16  SpO2:  [94 %-100 %] 99 %  BP: (108-120)/(54-63) 120/63     Weight: 72.6 kg (160 lb)  Body mass index is 23.63 kg/m².  Body surface area is 1.88 meters squared.      Intake/Output Summary (Last 24 hours) at 7/8/2019 1007  Last data filed at 7/7/2019 1656  Gross per 24 hour   Intake 1000 ml   Output --   Net 1000 ml       Physical Exam   Constitutional: He is oriented to person, place, and time. He appears well-developed. No distress.   HENT:   Head: Normocephalic and atraumatic.   Eyes: EOM are normal.   Neck: Normal range of motion. Neck supple.   Cardiovascular: Normal rate and regular rhythm.   Pulmonary/Chest: Effort normal and breath sounds normal. No respiratory distress.   Abdominal: Soft. He exhibits no distension. There is no tenderness.   Musculoskeletal: He exhibits tenderness. He exhibits no edema.   R hip and leg ROM limited due to pain   Neurological: He is alert and oriented to person, place, and time.   Skin: Skin is warm and dry.   Psychiatric: He has a normal mood and affect. His behavior is normal.       Significant Labs:   CBC:   Recent Labs   Lab 07/07/19  1519 07/08/19  0655   WBC 12.27 13.46*   HGB 10.1* 10.0*   HCT 31.4* 30.0*    336   , CMP:   Recent Labs   Lab 07/07/19  1534      K 4.5      CO2 20*   GLU 68*   BUN 5*   CREATININE 0.8   CALCIUM 9.0   PROT 7.3   ALBUMIN 4.0   BILITOT 3.2*   ALKPHOS 55   AST 21    ALT 12   ANIONGAP 11   EGFRNONAA >60.0   , Reticulocytes:   Recent Labs   Lab 07/07/19  1519   RETIC 11.9*       Diagnostic Results:  X-Ray Hip (07/07/19):  - Slight sclerotic change of the right femoral head suggesting AVN similar to previous exam. No evidence of subchondral collapse  - Post left hip total arthroplasty with stable, satisfactory alignment.    CXR (07/07/19):  - The lungs are clear, with normal appearance of pulmonary vasculature and no pleural effusion or pneumothorax.  - The cardiac silhouette is normal in size. The hilar and mediastinal contours are unremarkable.  - Bones are intact.    Assessment/Plan:     Sickle cell pain crisis  Patient is a 24 year old man with sickle cell disease who presented with right hip pain. X-ray showed findings concerning for AVN. Hematology was consulted for assistance in regards to further management.     Per primary team, patient was lying about re-filling pain medications and going from ED to ED receive them. He was discharged home before Hematology staff could assess patient.    Recommendations:  - Please have patient follow up closely with Hematologist at [facility]  - If concern for AVN remains, recommend further imaging w/ MRI as this is the more sensitive imaging modality. If findings consistent with AVN, would recommend Orthopedic Surgery consultation.    Patient was discharged prior to Hematology staff evaluation.    Thank you for your consult. I will sign off. Please contact us if you have any additional questions.       Daylin Ugalde MD PGY2  Hematology/Oncology  [facility]

## 2019-07-08 NOTE — PLAN OF CARE
07/08/19 1440   Final Note   Assessment Type Final Discharge Note   Anticipated Discharge Disposition Home   What phone number can be called within the next 1-3 days to see how you are doing after discharge? [phone]   Right Care Referral Info   Post Acute Recommendation No Care

## 2019-07-08 NOTE — ASSESSMENT & PLAN NOTE
Patient is a 24 year old man with sickle cell disease who presents with right hip pain, concerning for avascular necrosis. Right hip x-ray suggested changes consistent with AVN. Patient has been hemodynamically stable.    Hematology was consulted for concern of avascular necrosis and further management options.    Recommendations:  - Please obtain MRI right hip as this imaging is more sensitive than others for detecting AVN  - Please continue maintaining good hydration and pain control

## 2019-07-08 NOTE — PROGRESS NOTES
Written and verbal discharge instructions given with teach back. IV discontinued with catheter intact and hemostasis obtained. NO visi, tele, or I-pad in room. Patient denied wheelchair transportation and Dc'd with all belongings.

## 2019-07-08 NOTE — SUBJECTIVE & OBJECTIVE
Interval History: Severe generalized pain overnight (10/10). Received 1mg IV Dilaudid at 4.00 am. Started him on PRN oxycodone IR 5mg q4. Awaiting Hematology input. Persistent difficulty moving his right hip. Denies SOB, Confusion, Chest pain. His H/H is low but stable      Review of Systems   Constitutional: Negative for chills, fatigue and fever.   Respiratory: Negative for shortness of breath.    Gastrointestinal: Negative for abdominal pain, constipation, diarrhea, nausea and vomiting.   Musculoskeletal: Positive for back pain and neck pain.        Right hip pain limiting his ROM, generalized body pain   Psychiatric/Behavioral: Negative for confusion.     Objective:     Vital Signs (Most Recent):  Temp: 97.7 °F (36.5 °C) (07/08/19 0753)  Pulse: 60 (07/08/19 0753)  Resp: 16 (07/08/19 0753)  BP: 120/63 (07/08/19 0753)  SpO2: 99 % (07/08/19 0753) Vital Signs (24h Range):  Temp:  [97.6 °F (36.4 °C)-98.4 °F (36.9 °C)] 97.7 °F (36.5 °C)  Pulse:  [57-68] 60  Resp:  [12-18] 16  SpO2:  [94 %-100 %] 99 %  BP: (108-120)/(54-63) 120/63     Weight: 72.6 kg (160 lb)  Body mass index is 23.63 kg/m².    Intake/Output Summary (Last 24 hours) at 7/8/2019 0916  Last data filed at 7/7/2019 1656  Gross per 24 hour   Intake 1000 ml   Output --   Net 1000 ml      Physical Exam   Constitutional: He is oriented to person, place, and time. He appears well-developed and well-nourished.   HENT:   Head: Normocephalic and atraumatic.   Eyes: Conjunctivae are normal.   Neck:   Decreased ROM on both sides   Cardiovascular: Normal rate and normal heart sounds.   Pulmonary/Chest: Effort normal and breath sounds normal. He exhibits no tenderness.   Abdominal: Soft. Bowel sounds are normal. He exhibits no distension. There is no tenderness.   Musculoskeletal:   Severely decreased ROM on right hip, decreased ROM on both shoulders and left hip   Neurological: He is alert and oriented to person, place, and time.       Significant Labs:   CBC:    Recent Labs   Lab 07/07/19  1519 07/08/19  0655   WBC 12.27 13.46*   HGB 10.1* 10.0*   HCT 31.4* 30.0*    336     CMP:   Recent Labs   Lab 07/07/19  1534      K 4.5      CO2 20*   GLU 68*   BUN 5*   CREATININE 0.8   CALCIUM 9.0   PROT 7.3   ALBUMIN 4.0   BILITOT 3.2*   ALKPHOS 55   AST 21   ALT 12   ANIONGAP 11   EGFRNONAA >60.0     All pertinent labs within the past 24 hours have been reviewed.    Significant Imaging: I have reviewed all pertinent imaging results/findings within the past 24 hours.

## 2019-07-08 NOTE — PLAN OF CARE
SW following for D/C needs. SW is in communication with patient's CM, and patient's Care Team - IM3. SW will continue to follow.      07/08/19 0913   Post-Acute Status   Post-Acute Authorization Other   Other Status No Post-Acute Service Needs     Jackie Johnson LMSW   - [facility]  Ext. [phone]

## 2019-07-08 NOTE — PLAN OF CARE
CM at bedside to discuss discharge planning assessment.   Patient lives with his mother in a 1-story home with no steps to entrance.  Independent with ADL and does not use medical equipment.CM name and phone # written on board and explained CM services during patient's stay in hospital.   My Health packet discussed and left with patient.       07/08/19 1023   Discharge Assessment   Assessment Type Discharge Planning Assessment   Confirmed/corrected address and phone number on facesheet? Yes   Assessment information obtained from? Patient   Expected Length of Stay (days) 3   Communicated expected length of stay with patient/caregiver yes   Prior to hospitilization cognitive status: Alert/Oriented   Prior to hospitalization functional status: Independent   Current cognitive status: Alert/Oriented   Current Functional Status: Independent   Facility Arrived From: home   Lives With parent(s)   Able to Return to Prior Arrangements yes   Is patient able to care for self after discharge? Yes   Who are your caregiver(s) and their phone number(s)? self   Patient's perception of discharge disposition home or selfcare   Readmission Within the Last 30 Days previous discharge plan unsuccessful   If yes, most [place] [facility]    Patient currently being followed by outpatient case management? No   Patient currently receives any other outside agency services? No   Equipment Currently Used at Home none   Do you have any problems affording any of your prescribed medications? No   Is the patient taking medications as prescribed? yes   Does the patient have transportation home? Yes   Transportation Anticipated family or friend will provide   Dialysis Name and Scheduled days n/a   Does the patient receive services at the Coumadin Clinic? No   Discharge Plan A Home with family   Discharge Plan B Home with family;Home Health   DME Needed Upon Discharge    (TBD)   Patient/Family in Agreement with Plan yes   Readmission  Questionnaire   At the time of your discharge, did someone talk to you about what your health problems were? Yes   At the time of discharge, did someone talk to you about what to watch out for regarding worsening of your health problem? Yes   At the time of discharge, did someone talk to you about what to do if you experienced worsening of your health problem? Yes   At the time of discharge, did someone talk to you about which medication to take when you left the hospital and which ones to stop taking? Yes   At the time of discharge, did someone talk to you about when and where to follow up with a doctor after you left the hospital? Yes   What do you believe caused you to be sick enough to be re-admitted? pain   How often do you need to have someone help you when you read instructions, pamphlets, or other written material from your doctor or pharmacy? Never   Do you have problems taking your medications as prescribed? No   Do you have any problems affording any of  your prescribed medications? No   Do you have problems obtaining/receiving your medications? No   Does the patient have transportation to healthcare appointments? Yes   Living Arrangements house   Does the patient have family/friends to help with healtcare needs after discharge? yes   Are you currently feeling confused? No   Are you currently having problems thinking? No   Are you currently having memory problems? No     Primary Doctor No  None    [email or web address] [address] [place] [address] [address] [facility] [address]  [address] [address]  Phone: [phone] Fax: [phone]    Extended Emergency Contact Information  Primary Emergency Contact: Marcella Casanova  Address: [address] [place] [address] of [facility]  Home Phone: [phone]  Mobile Phone: [phone]  Relation: Mother

## 2019-07-08 NOTE — MEDICAL/APP STUDENT
Subjective:       Patient ID: Paddy Stevenson is a 24 y.o. male.    Chief Complaint: Sickle Cell Pain Crisis    Paddy Stevenson is our 25 yo sickle cell patient who has come in for sickle cell crisis.    He currently feels 10/10 pain. The pain is the most severe in his right hip, and he cannot move his right hip. He wants to scream, but he can't. He would like IV dilaudid. He feels like people don't care about him in the hospital.         Review of Systems   Constitutional: Positive for activity change.   Respiratory: Negative for chest tightness and shortness of breath.    Cardiovascular: Negative for chest pain.   Musculoskeletal:        Pain in right hip and reduced ROS. Generalized body pain.       Objective:      Physical Exam   Constitutional: He is oriented to person, place, and time.   HENT:   Head: Normocephalic and atraumatic.   Cardiovascular: Normal rate, regular rhythm and normal heart sounds.   Pulmonary/Chest: Effort normal and breath sounds normal.   Abdominal: He exhibits no distension. There is tenderness. There is no guarding.   Tenderness on light palpation   Neurological: He is alert and oriented to person, place, and time.   Skin: Skin is warm and dry.   Psychiatric: He has a normal mood and affect.       Assessment:       1. Sickle cell crisis    2. Right hip pain    3. Bilateral lower extremity pain    4. Hip pain        Plan:         1. Sickle cell crisis  - Continue hydroxyurea/folic acid 1500  - IVF with  ml/hr  - Oral morphine 30 mg q12h, oxycodone 5 mg PRN q4h  - Oral benadryl 25 mg prn for allergy to morphine  - Discharge soon with a prescription of 21 percocet to pharmacy. - Follow up with doctor at [facility].

## 2019-07-08 NOTE — PROGRESS NOTES
Malingering / Drug Seeking Behavior  7-8-19:  Patient was admitted to hospital on 7-7-19. Stated that he was taking MS Contin 45 mg BID PRN prescribed by Dr. Rei Greer. He stated he refilled this last month.  He also stated that he was taking hydroxyurea, refilled last 2 months.  He gave us 2 pharmacies ([facility] and [facility] in [place]).  We checked both pharmacies.  [facility] had one fill of MS Contin 15 mg tablet for 6 tablets on 5/17/19 (no other meds filled).  [facility] no fill there.   check confirmed the same MS Contin fill only.  He was lying to us.  Apparently he has been getting pain meds through numerous ED visits.  DC to home 7-8-19 with .

## 2019-07-08 NOTE — ASSESSMENT & PLAN NOTE
Patient was taking Ms-Contin at home along with hydroxyurea 500 mg/folic acid and has been having uncontrolled severe pain in his right hip X 3 days which was limiting his ROM and was hearing a popping sound as he moved his right hip. It was accompanied by generalized body pain. His Hgb and Hct is low but stable.  -Continue Hyrdoxy urea/FA  -start IV hydration with LR 150ml/hr continuous infusion  -Start oral Morphine 30mg q12   -Incentive spirometry PRN-  -Oral Benedryl 25 mg Prn for allergic reaction to morphine  - monitor for pain  -Consult Hematology- appreciate recommendations  -Started on Oxycodone IR 5mg q4 PRN

## 2019-07-08 NOTE — HPI
"Mr. Stevenson is a 24 year old man with medical history of sickle cell disease, avascular necrosis s/p left hip replacement (~2 years ago) who presented to the ED on 07/07/19 with right hip pain. He recently was seen in the ED on 07/03/19 with right hip pain and was discharged. He states that the pain started about 3 days ago in his right hip and radiated to the rest of his body including his legs, arms, back. He denies recent trauma. He says he noticed "clicking" and "popping" while walking, and it continued to get worse to the point where he couldn't bear weight on that right hip. His home regimen of MS Contin 45mg BID was not alleviating the pain. He says he has been trying heat packs and heat pads, which have not been helpful as well. He denies fevers, chills, chest pain, SOB, cough, numbness, tingling. He reports taking his Hydroxyurea and folic acid everyday. He follows with a Hematologist at [facility].    In the ED, patient received about 6mg IV Dilaudid. CXR was negative for acute abnormality. Right hip X-ray demonstrated "slight sclerotic change of the right femoral head suggesting AVN similar to previous exam". He is on continuous fluids. He reports that current pain regimen is not working for his pain.     Hematology was consulted for concern of AVN given patient's right hip pain and X-ray findings.   "

## 2019-07-09 NOTE — DISCHARGE SUMMARY
[facility] Hospital Medicine  Discharge Summary      Patient Name: Paddy Stevenson  MRN: [id number]  Admission Date: 7/7/2019  Hospital Length of Stay: 1 days  Discharge Date and Time: 7/8/2019  3:02 PM  Attending Physician: Marcella salcido. providers found   Discharging Provider: Jared Gama MD  Primary Care Provider: Primary Doctor Marcella  [facility] Medicine Team: [facility] 3 Jared Gama MD    HPI:   25 Y/O M with known Sickle cell disease on home pain medication and hydroxyurea, history of CVA, avascular necrosis s/p left hip replacement reports atraumatic, gradual worsening of generalized pain more pronounced in his right hip X 3 d (since his last ED visit) with no associated numbness or weakness.  he c/o hearing a popping sound from his right hip with any movement. He reports symptoms typical to his previous sickle cell disease flare-up pain. He has had multiple visits to multiple hospitals and EDs in the last 2-3 months for similar complaints. He has been taking MS-Contin at home for his pain and says his doctor at [facility] prescribes his pain medication. He basically is here for getting his pain under control. He has been taking his folic acid/hydroxyurea at home.  He denies any headache, dizziness or confusion. He denies any chest pain or shortness of breath or cough. He denies any recent illness, fever/chills.     * No surgery found *      Hospital Course:   Admitted to [facility] with sickle cell crisis pain not controlled by his home medications and possible AVN of right hip. Consulted Hematology. As per the attending provider's note-   Malingering / Drug Seeking Behavior  7-8-19:  Patient was admitted to hospital on 7-7-19. Stated that he was taking MS Contin 45 mg BID PRN prescribed by Dr. Rei Greer. He stated he refilled this last month.  He also stated that he was taking hydroxyurea, refilled last 2 months.  He gave us 2 pharmacies ([facility] and [facility] in [place]).  We  checked both pharmacies.  [facility] had one fill of MS Contin 15 mg tablet for 6 tablets on 5/17/19 (no other meds filled).  [facility] no fill there.   check confirmed the same MS Contin fill only.  He was lying to us.  Apparently he has been getting pain meds through numerous ED visits.  DC to home 7-8-19 with .           Consults:   Consults (From admission, onward)        Status Ordering Provider     Inpatient consult to Hematology  Once     Provider:  (Not yet assigned)    SILVIA Gutierrez          * Sickle cell anemia with pain  Patient was taking Ms-Contin at home along with hydroxyurea 500 mg/folic acid and has been having uncontrolled severe pain in his right hip X 3 days which was limiting his ROM and was hearing a popping sound as he moved his right hip. It was accompanied by generalized body pain. His Hgb and Hct is low but stable.  -Continue Hyrdoxy urea/FA  -start IV hydration with LR 150ml/hr continuous infusion  -Start oral Morphine 30mg q12   -Incentive spirometry PRN-  -Oral Benedryl 25 mg Prn for allergic reaction to morphine  - monitor for pain  -Consult Hematology- appreciate recommendations  -Started on Oxycodone IR 5mg q4 PRN      Sickle cell anemia  See Sickle cell anemia with pain        Final Active Diagnoses:    Diagnosis Date Noted POA    PRINCIPAL PROBLEM:  Sickle cell anemia with pain [D57.00] 03/11/2016 Yes    Malingering [Z76.5] 07/08/2019 Not Applicable    Bilateral lower extremity pain [M79.604, M79.605]  Yes    Pain of right hip joint [M25.551]  Yes    On hydroxyurea therapy [Z79.899] 07/07/2019 Not Applicable    Sickle cell anemia [D57.1] 06/10/2019 Yes    Mild intermittent asthma without complication [J45.20] 03/20/2016 Yes     Chronic    Avascular necrosis of bones of both hips [M87.051, M87.052] 03/20/2016 Yes     Chronic    Drug-seeking behavior [Z76.5] 03/11/2016 Yes    Narcotic abuse [F11.10] 03/11/2016 Yes    Sickle cell crisis  [D57.00] 03/06/2016 Yes    Sickle cell pain crisis [D57.00] 09/06/2015 Yes      Problems Resolved During this Admission:       Discharged Condition: good    Disposition: Home or Self Care    Follow Up:    Patient Instructions:   No discharge procedures on file.    Significant Diagnostic Studies: Labs: All labs within the past 24 hours have been reviewed    Pending Diagnostic Studies:     None         Medications:  Reconciled Home Medications:      Medication List      CONTINUE taking these medications    folic acid 1 MG tablet  Commonly known as:  FOLVITE  Take 1 mg by mouth once daily.     hydroxyurea 500 mg Cap  Commonly known as:  HYDREA  Take 1,500 mg by mouth once daily.        STOP taking these medications    oxyCODONE-acetaminophen  mg per tablet  Commonly known as:  PERCOCET            Indwelling Lines/Drains at time of discharge:   Lines/Drains/Airways          None          Time spent on the discharge of patient: 36 minutes  Patient was seen and examined on the date of discharge and determined to be suitable for discharge.         Jared Gama MD  Department of Hospital Medicine  [facility]

## 2019-07-16 ENCOUNTER — HOSPITAL ENCOUNTER (INPATIENT)
Facility: HOSPITAL | Age: 24
LOS: 9 days | Discharge: HOME | DRG: 812 | End: 2019-07-25
Attending: EMERGENCY MEDICINE | Admitting: EMERGENCY MEDICINE
Payer: MEDICAID

## 2019-07-16 DIAGNOSIS — M87.052 AVASCULAR NECROSIS OF BONES OF BOTH HIPS: Chronic | ICD-10-CM

## 2019-07-16 DIAGNOSIS — Z76.5 DRUG-SEEKING BEHAVIOR: ICD-10-CM

## 2019-07-16 DIAGNOSIS — J45.20 MILD INTERMITTENT ASTHMA WITHOUT COMPLICATION: Chronic | ICD-10-CM

## 2019-07-16 DIAGNOSIS — M25.559 HIP PAIN: ICD-10-CM

## 2019-07-16 DIAGNOSIS — D57.00 SICKLE CELL PAIN CRISIS: Primary | ICD-10-CM

## 2019-07-16 DIAGNOSIS — D72.829 LEUKOCYTOSIS, UNSPECIFIED TYPE: ICD-10-CM

## 2019-07-16 DIAGNOSIS — M87.051 AVASCULAR NECROSIS OF BONES OF BOTH HIPS: Chronic | ICD-10-CM

## 2019-07-16 PROCEDURE — 99285 PR EMERGENCY DEPT VISIT,LEVEL V: ICD-10-PCS | Mod: ,,, | Performed by: PHYSICIAN ASSISTANT

## 2019-07-16 PROCEDURE — 96374 THER/PROPH/DIAG INJ IV PUSH: CPT

## 2019-07-16 PROCEDURE — 99285 EMERGENCY DEPT VISIT HI MDM: CPT | Mod: ,,, | Performed by: PHYSICIAN ASSISTANT

## 2019-07-16 PROCEDURE — 99285 EMERGENCY DEPT VISIT HI MDM: CPT | Mod: 25

## 2019-07-16 PROCEDURE — 12000002 HC ACUTE/MED SURGE SEMI-PRIVATE ROOM

## 2019-07-16 PROCEDURE — 96376 TX/PRO/DX INJ SAME DRUG ADON: CPT

## 2019-07-17 LAB
ALBUMIN SERPL BCP-MCNC: 4.1 G/DL (ref 3.5–5.2)
ALP SERPL-CCNC: 62 U/L (ref 55–135)
ALT SERPL W/O P-5'-P-CCNC: 25 U/L (ref 10–44)
AMPHET+METHAMPHET UR QL: NEGATIVE
ANION GAP SERPL CALC-SCNC: 10 MMOL/L (ref 8–16)
AST SERPL-CCNC: 32 U/L (ref 10–40)
BARBITURATES UR QL SCN>200 NG/ML: NEGATIVE
BASOPHILS # BLD AUTO: 0.11 K/UL (ref 0–0.2)
BASOPHILS NFR BLD: 1 % (ref 0–1.9)
BENZODIAZ UR QL SCN>200 NG/ML: NEGATIVE
BILIRUB SERPL-MCNC: 2.2 MG/DL (ref 0.1–1)
BUN SERPL-MCNC: 7 MG/DL (ref 6–20)
BZE UR QL SCN: NEGATIVE
CALCIUM SERPL-MCNC: 9.5 MG/DL (ref 8.7–10.5)
CANNABINOIDS UR QL SCN: NEGATIVE
CHLORIDE SERPL-SCNC: 110 MMOL/L (ref 95–110)
CO2 SERPL-SCNC: 20 MMOL/L (ref 23–29)
CREAT SERPL-MCNC: 0.8 MG/DL (ref 0.5–1.4)
CREAT UR-MCNC: 122 MG/DL (ref 23–375)
CRP SERPL-MCNC: 10.3 MG/L (ref 0–8.2)
DIFFERENTIAL METHOD: ABNORMAL
EOSINOPHIL # BLD AUTO: 0.6 K/UL (ref 0–0.5)
EOSINOPHIL NFR BLD: 5 % (ref 0–8)
ERYTHROCYTE [DISTWIDTH] IN BLOOD BY AUTOMATED COUNT: 22.5 % (ref 11.5–14.5)
ERYTHROCYTE [SEDIMENTATION RATE] IN BLOOD BY WESTERGREN METHOD: 7 MM/HR (ref 0–23)
EST. GFR  (AFRICAN AMERICAN): >60 ML/MIN/1.73 M^2
EST. GFR  (NON AFRICAN AMERICAN): >60 ML/MIN/1.73 M^2
GLUCOSE SERPL-MCNC: 88 MG/DL (ref 70–110)
HAPTOGLOB SERPL-MCNC: <10 MG/DL (ref 30–250)
HCT VFR BLD AUTO: 29.7 % (ref 40–54)
HGB BLD-MCNC: 9.9 G/DL (ref 14–18)
HGB S BLD QL SOLY: POSITIVE
IMM GRANULOCYTES # BLD AUTO: 0.25 K/UL (ref 0–0.04)
IMM GRANULOCYTES NFR BLD AUTO: 2.2 % (ref 0–0.5)
LDH SERPL L TO P-CCNC: 314 U/L (ref 110–260)
LYMPHOCYTES # BLD AUTO: 4 K/UL (ref 1–4.8)
LYMPHOCYTES NFR BLD: 34.7 % (ref 18–48)
MAGNESIUM SERPL-MCNC: 2 MG/DL (ref 1.6–2.6)
MCH RBC QN AUTO: 29.7 PG (ref 27–31)
MCHC RBC AUTO-ENTMCNC: 33.3 G/DL (ref 32–36)
MCV RBC AUTO: 89 FL (ref 82–98)
METHADONE UR QL SCN>300 NG/ML: NEGATIVE
MONOCYTES # BLD AUTO: 1.3 K/UL (ref 0.3–1)
MONOCYTES NFR BLD: 10.9 % (ref 4–15)
NEUTROPHILS # BLD AUTO: 5.3 K/UL (ref 1.8–7.7)
NEUTROPHILS NFR BLD: 46.2 % (ref 38–73)
NRBC BLD-RTO: 1 /100 WBC
OPIATES UR QL SCN: NORMAL
PCP UR QL SCN>25 NG/ML: NEGATIVE
PLATELET # BLD AUTO: 315 K/UL (ref 150–350)
PMV BLD AUTO: 9.8 FL (ref 9.2–12.9)
POTASSIUM SERPL-SCNC: 3.7 MMOL/L (ref 3.5–5.1)
PROT SERPL-MCNC: 7.4 G/DL (ref 6–8.4)
RBC # BLD AUTO: 3.33 M/UL (ref 4.6–6.2)
RETICS/RBC NFR AUTO: 13 % (ref 0.4–2)
SODIUM SERPL-SCNC: 140 MMOL/L (ref 136–145)
TOXICOLOGY INFORMATION: NORMAL
WBC # BLD AUTO: 11.45 K/UL (ref 3.9–12.7)

## 2019-07-17 PROCEDURE — 85660 RBC SICKLE CELL TEST: CPT

## 2019-07-17 PROCEDURE — 63600175 PHARM REV CODE 636 W HCPCS: Performed by: PHYSICIAN ASSISTANT

## 2019-07-17 PROCEDURE — 99223 1ST HOSP IP/OBS HIGH 75: CPT | Mod: ,,, | Performed by: PHYSICIAN ASSISTANT

## 2019-07-17 PROCEDURE — 99223 PR INITIAL HOSPITAL CARE,LEVL III: ICD-10-PCS | Mod: ,,, | Performed by: PHYSICIAN ASSISTANT

## 2019-07-17 PROCEDURE — 85652 RBC SED RATE AUTOMATED: CPT

## 2019-07-17 PROCEDURE — 25000003 PHARM REV CODE 250: Performed by: PHYSICIAN ASSISTANT

## 2019-07-17 PROCEDURE — 86140 C-REACTIVE PROTEIN: CPT

## 2019-07-17 PROCEDURE — 83010 ASSAY OF HAPTOGLOBIN QUANT: CPT

## 2019-07-17 PROCEDURE — 83735 ASSAY OF MAGNESIUM: CPT

## 2019-07-17 PROCEDURE — S5010 5% DEXTROSE AND 0.45% SALINE: HCPCS | Performed by: PHYSICIAN ASSISTANT

## 2019-07-17 PROCEDURE — 80307 DRUG TEST PRSMV CHEM ANLYZR: CPT

## 2019-07-17 PROCEDURE — 83615 LACTATE (LD) (LDH) ENZYME: CPT

## 2019-07-17 PROCEDURE — 85045 AUTOMATED RETICULOCYTE COUNT: CPT

## 2019-07-17 PROCEDURE — 80053 COMPREHEN METABOLIC PANEL: CPT

## 2019-07-17 PROCEDURE — 85025 COMPLETE CBC W/AUTO DIFF WBC: CPT

## 2019-07-17 PROCEDURE — 11000001 HC ACUTE MED/SURG PRIVATE ROOM

## 2019-07-17 RX ORDER — MORPHINE SULFATE 4 MG/ML
8 INJECTION, SOLUTION INTRAMUSCULAR; INTRAVENOUS
Status: DISCONTINUED | OUTPATIENT
Start: 2019-07-17 | End: 2019-07-17

## 2019-07-17 RX ORDER — NALOXONE HCL 0.4 MG/ML
0.02 VIAL (ML) INJECTION
Status: DISCONTINUED | OUTPATIENT
Start: 2019-07-17 | End: 2019-07-25 | Stop reason: HOSPADM

## 2019-07-17 RX ORDER — ACETAMINOPHEN 325 MG/1
650 TABLET ORAL EVERY 8 HOURS
Status: DISCONTINUED | OUTPATIENT
Start: 2019-07-17 | End: 2019-07-25 | Stop reason: HOSPADM

## 2019-07-17 RX ORDER — FOLIC ACID 1 MG/1
1 TABLET ORAL DAILY
Status: DISCONTINUED | OUTPATIENT
Start: 2019-07-17 | End: 2019-07-25 | Stop reason: HOSPADM

## 2019-07-17 RX ORDER — DEXTROSE MONOHYDRATE AND SODIUM CHLORIDE 5; .45 G/100ML; G/100ML
INJECTION, SOLUTION INTRAVENOUS CONTINUOUS
Status: DISCONTINUED | OUTPATIENT
Start: 2019-07-17 | End: 2019-07-17

## 2019-07-17 RX ORDER — ONDANSETRON 2 MG/ML
4 INJECTION INTRAMUSCULAR; INTRAVENOUS EVERY 12 HOURS PRN
Status: DISCONTINUED | OUTPATIENT
Start: 2019-07-17 | End: 2019-07-25 | Stop reason: HOSPADM

## 2019-07-17 RX ORDER — PROMETHAZINE HYDROCHLORIDE 12.5 MG/1
12.5 TABLET ORAL EVERY 6 HOURS PRN
Status: DISCONTINUED | OUTPATIENT
Start: 2019-07-17 | End: 2019-07-25 | Stop reason: HOSPADM

## 2019-07-17 RX ORDER — SODIUM CHLORIDE 0.9 % (FLUSH) 0.9 %
10 SYRINGE (ML) INJECTION
Status: DISCONTINUED | OUTPATIENT
Start: 2019-07-17 | End: 2019-07-25 | Stop reason: HOSPADM

## 2019-07-17 RX ORDER — HYDROMORPHONE HYDROCHLORIDE 1 MG/ML
1 INJECTION, SOLUTION INTRAMUSCULAR; INTRAVENOUS; SUBCUTANEOUS
Status: DISPENSED | OUTPATIENT
Start: 2019-07-17 | End: 2019-07-18

## 2019-07-17 RX ORDER — GADOBUTROL 604.72 MG/ML
7 INJECTION INTRAVENOUS
Status: DISCONTINUED | OUTPATIENT
Start: 2019-07-17 | End: 2019-07-25 | Stop reason: HOSPADM

## 2019-07-17 RX ORDER — HYDROMORPHONE HYDROCHLORIDE 1 MG/ML
2 INJECTION, SOLUTION INTRAMUSCULAR; INTRAVENOUS; SUBCUTANEOUS
Status: COMPLETED | OUTPATIENT
Start: 2019-07-17 | End: 2019-07-17

## 2019-07-17 RX ORDER — AMOXICILLIN 250 MG
1 CAPSULE ORAL 2 TIMES DAILY
Status: DISCONTINUED | OUTPATIENT
Start: 2019-07-17 | End: 2019-07-25 | Stop reason: HOSPADM

## 2019-07-17 RX ORDER — SODIUM CHLORIDE, SODIUM LACTATE, POTASSIUM CHLORIDE, CALCIUM CHLORIDE 600; 310; 30; 20 MG/100ML; MG/100ML; MG/100ML; MG/100ML
INJECTION, SOLUTION INTRAVENOUS CONTINUOUS
Status: ACTIVE | OUTPATIENT
Start: 2019-07-17 | End: 2019-07-18

## 2019-07-17 RX ADMIN — HYDROMORPHONE HYDROCHLORIDE 1 MG: 1 INJECTION, SOLUTION INTRAMUSCULAR; INTRAVENOUS; SUBCUTANEOUS at 11:07

## 2019-07-17 RX ADMIN — ACETAMINOPHEN 650 MG: 325 TABLET ORAL at 01:07

## 2019-07-17 RX ADMIN — HYDROMORPHONE HYDROCHLORIDE 2 MG: 1 INJECTION, SOLUTION INTRAMUSCULAR; INTRAVENOUS; SUBCUTANEOUS at 03:07

## 2019-07-17 RX ADMIN — HYDROMORPHONE HYDROCHLORIDE 1 MG: 1 INJECTION, SOLUTION INTRAMUSCULAR; INTRAVENOUS; SUBCUTANEOUS at 05:07

## 2019-07-17 RX ADMIN — SODIUM CHLORIDE, SODIUM LACTATE, POTASSIUM CHLORIDE, AND CALCIUM CHLORIDE: .6; .31; .03; .02 INJECTION, SOLUTION INTRAVENOUS at 01:07

## 2019-07-17 RX ADMIN — ACETAMINOPHEN 650 MG: 325 TABLET ORAL at 08:07

## 2019-07-17 RX ADMIN — DEXTROSE AND SODIUM CHLORIDE: 5; .45 INJECTION, SOLUTION INTRAVENOUS at 08:07

## 2019-07-17 RX ADMIN — FOLIC ACID 1 MG: 1 TABLET ORAL at 08:07

## 2019-07-17 RX ADMIN — HYDROMORPHONE HYDROCHLORIDE 1 MG: 1 INJECTION, SOLUTION INTRAMUSCULAR; INTRAVENOUS; SUBCUTANEOUS at 08:07

## 2019-07-17 RX ADMIN — SENNOSIDES,DOCUSATE SODIUM 1 TABLET: 8.6; 5 TABLET, FILM COATED ORAL at 08:07

## 2019-07-17 RX ADMIN — HYDROMORPHONE HYDROCHLORIDE 2 MG: 1 INJECTION, SOLUTION INTRAMUSCULAR; INTRAVENOUS; SUBCUTANEOUS at 01:07

## 2019-07-17 RX ADMIN — HYDROMORPHONE HYDROCHLORIDE 1 MG: 1 INJECTION, SOLUTION INTRAMUSCULAR; INTRAVENOUS; SUBCUTANEOUS at 02:07

## 2019-07-17 RX ADMIN — SODIUM CHLORIDE, SODIUM LACTATE, POTASSIUM CHLORIDE, AND CALCIUM CHLORIDE: .6; .31; .03; .02 INJECTION, SOLUTION INTRAVENOUS at 09:07

## 2019-07-17 NOTE — H&P
"[facility] Hospital Medicine  History & Physical    Patient Name: Paddy Stevenson  MRN: [id number]  Admission Date: 7/16/2019  Attending Physician: Keily Canchola MD   Primary Care Provider: [facility] Medicine Team: [facility] Y Alexa Servin PA-C     Patient information was obtained from patient, past medical records and ER records.     Subjective:     Principal Problem:<principal problem not specified>    Chief Complaint:   Chief Complaint   Patient presents with    Sickle Cell Pain Crisis     Pt arrives c/o sickle cell pain x 3 days. Pt c/o pelvic pain and lower extremity pain.        HPI: 25 y/o M with PMH sickle cell anemia and AVN of bilateral hips s/p L hip replacement 2 years ago presents c/o R hip, back, and thigh pain. The pain started 6 days ago but has progressively worsened since onset. Pain is described as aching/throbbing. Pt has taken MS Contin 45 mg as prescribed by his hematologist (Dr. Minaya at [facility]) without relief of pain. He also has tried heat/ice and hot showers with no relief. Pt reports the right sided pain is similar to pain he experiences with left AVN prior to hip replacement. Typical sickle cell crisis pain is generalized pain (not localized), which he is not experiencing at this time. He does report working yesterday at [employer] requiring excessive standing and climbing of ladders. He has seen 2 different orthopedic surgeons regarding AVN with both awaiting further progression of AVN to operate. Pt reports last MRI in [place]. Per records (12/2018), MRI showed avascular necrosis of the right femoral head without subchondral collapse. He follows with hematology regularly, last appointment 2 weeks ago but reports he has to get a new hematologist because hematologist has "too many patients." He reports compliance with hydroxyurea.  He denies fever/chills, N/V/D, abdominal pain, CP, SOB, dizziness, recent illness or sick " contacts, and alcohol use.     Of note, pt has flag in chart due to suspected drug seeking and malingering behavior dating back to 2015. Pt seen at 4 different facilities on 7 different occasions for complaints of pain since 7/1 as listed below. He reports last MS contin prescribed by hematologist in June. He has 38 pills remaining. Denies daily use (only uses when he needs it) and reports going several days without medication when not in pain. Pt last admitted to [facility] on 7/7/19 for sickle cell anemia with pain. Hematology consulted but pt discharged before seen. Discharged on 7/8/19 with .      7/1: [facility]  7/2: [facility]  7/3: [facility]  7/7: [facility]  7/7: [facility]  7/13: [facility]  7/15: [facility]    In the ED: VSS. Afebrile without leukocytosis. Hgb 9.9 (baseline), retic (13) and Tbili (2.2) mildly elevated. R hip XR with no acute fracture.  Diffuse sclerosis proximal right femur, unchanged. Pt refused morphine, states that he is allergic and gets hives. Given dilaudid with temporary improvement of pain. PO fluids encouraged (ED had difficulty obtaining IV).      Past Medical History:   Diagnosis Date    Asthma     AVN (avascular necrosis of bone)     Encounter for blood transfusion     Irregular heart rhythm 2010    Malingering 3/11/2016    Mild intermittent asthma without complication 11/16/2015    Seasonal allergies     Sickle cell anemia     Stroke        Past Surgical History:   Procedure Laterality Date    ABDOMINAL SURGERY      CHOLECYSTECTOMY      HIP SURGERY Left 2014    Hip Decompression    OTHER SURGICAL HISTORY      left rotater cuff repair    PORTACATH PLACEMENT      portacath removed   2/2014    SHOULDER ARTHROSCOPY W/ SUBACROMIAL DECOMPRESSION AND DISTAL CLAVICLE EXCISION         Review of patient's allergies indicates:   Allergen Reactions    Adhesive Itching    Contrast media Shortness  Of Breath    Rocephin [ceftriaxone] Hives    Sulfa (sulfonamide antibiotics) Anaphylaxis    Toradol [ketorolac] Shortness Of Breath    Vancomycin analogues Shortness Of Breath    Fentanyl Hives       No current facility-administered medications on file prior to encounter.      Current Outpatient Medications on File Prior to Encounter   Medication Sig    folic acid (FOLVITE) 1 MG tablet Take 1 mg by mouth once daily.    hydroxyurea (HYDREA) 500 mg Cap Take 1,500 mg by mouth once daily.    morphine sulfate (MS CONTIN ORAL) Take 45 mg by mouth 2 (two) times daily as needed.     Family History     Problem Relation (Age of Onset)    Diabetes Father    Mental illness Brother    Sickle cell anemia Mother        Tobacco Use    Smoking status: Never Smoker    Smokeless tobacco: Never Used   Substance and Sexual Activity    Alcohol use: No    Drug use: Yes     Types: Marijuana    Sexual activity: Yes     Partners: Female     Birth control/protection: Condom     Review of Systems   Constitutional: Positive for activity change. Negative for chills, fatigue, fever and unexpected weight change.   HENT: Negative for congestion, drooling, sore throat and trouble swallowing.    Eyes: Negative for photophobia, pain, redness and visual disturbance.   Respiratory: Negative for cough and shortness of breath.    Cardiovascular: Negative for chest pain, palpitations and leg swelling.   Gastrointestinal: Negative for abdominal distention, abdominal pain, constipation, diarrhea, nausea and vomiting.   Endocrine: Negative for cold intolerance, heat intolerance, polydipsia, polyphagia and polyuria.   Genitourinary: Negative for difficulty urinating, dysuria, frequency and hematuria.   Musculoskeletal: Positive for arthralgias, back pain, gait problem and myalgias. Negative for joint swelling and neck pain.   Skin: Negative for color change, pallor, rash and wound.   Neurological: Negative for dizziness, syncope, speech difficulty  and light-headedness.   Psychiatric/Behavioral: Negative for agitation, confusion and decreased concentration. The patient is not nervous/anxious.      Objective:     Vital Signs (Most Recent):  Temp: 97.4 °F (36.3 °C) (07/16/19 2336)  Pulse: 72 (07/17/19 1010)  Resp: 14 (07/17/19 1010)  BP: (!) 105/56 (07/17/19 1010)  SpO2: 96 % (07/17/19 1010) Vital Signs (24h Range):  Temp:  [97.4 °F (36.3 °C)] 97.4 °F (36.3 °C)  Pulse:  [58-94] 72  Resp:  [14-22] 14  SpO2:  [96 %-99 %] 96 %  BP: (105-130)/(56-80) 105/56        There is no height or weight on file to calculate BMI.    Physical Exam   Constitutional: He is oriented to person, place, and time. He appears well-developed and well-nourished. He appears distressed (in pain, worse with movement).   HENT:   Head: Normocephalic and atraumatic.   Right Ear: External ear normal.   Left Ear: External ear normal.   Eyes: Pupils are equal, round, and reactive to light. Conjunctivae and EOM are normal.   Neck: Normal range of motion. Neck supple.   Cardiovascular: Normal rate and regular rhythm.   No murmur heard.  Pulmonary/Chest: Effort normal and breath sounds normal. No respiratory distress. He has no wheezes.   Abdominal: Soft. Bowel sounds are normal. He exhibits no distension. There is no tenderness.   Musculoskeletal: He exhibits tenderness. He exhibits no edema.   Significant right-sided lower back pain with waist flexion  Unable to fully access ROM and strength of RLE due to pain on exam  ROM of LLE limited due to pain, pain elicited with hip flexion   Full ROM and strength of b/l RUE     Neurological: He is alert and oriented to person, place, and time. No cranial nerve deficit.   Skin: Skin is warm and dry. He is not diaphoretic. No erythema.   Psychiatric: He has a normal mood and affect. His behavior is normal. Thought content normal.         CRANIAL NERVES     CN III, IV, VI   Pupils are equal, round, and reactive to light.  Extraocular motions are normal.         Significant Labs: All pertinent labs within the past 24 hours have been reviewed.    Significant Imaging: I have reviewed all pertinent imaging results/findings within the past 24 hours.    Assessment/Plan:     Avascular necrosis of bones of both hips  S/p L hip replacement ~2 years ago  MRI R hip (12/2018) at OSH: Avascular necrosis of the right femoral head without subchondral collapse. 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  R hip xray: No acute fracture.  Diffuse sclerosis proximal right femur, unchanged.  Pt reports discussing AVN with 2 orthopedic surgeons with no intent to operate at this time until, awaiting worsening of condition.  ESR/CRP: 7/10.3, low suspicion for infection  Repeat MRI R. hip  Last PT session June of last year. PT/OT eval.     Mild intermittent asthma without complication  Sats 98-99% on RA  Controlled  Will order prn breathing treatments      Drug-seeking behavior  See above, does appear patient in mild crisis  Drug screen ordered  ER visits July 2019 7/1: [facility]  7/2: [facility]  7/3: [facility]  7/7: [facility]  7/7: [facility]  7/13: [facility]  7/15: [facility]  7/16: [facility]    Sickle cell pain crisis  Retic count elevated at 13  LDH elevated at 314, T bili elevated at 2.2  Hgb S, Haptoglobin added on  Hgb baseline 8-10, on admission Hgb 9.9  Based on elevated LDH and T bili, appears patient in mild crisis. Will proceed with sickle cell pain management and protocol.       VTE Risk Mitigation (From admission, onward)        Ordered     IP VTE LOW RISK PATIENT  Once      07/17/19 0732     Place BONG hose  Until discontinued      07/17/19 0732             Alexa Servin PA-C  Department of Hospital Medicine   [facility]

## 2019-07-17 NOTE — SUBJECTIVE & OBJECTIVE
Past Medical History:   Diagnosis Date    Asthma     AVN (avascular necrosis of bone)     Encounter for blood transfusion     Irregular heart rhythm 2010    Malingering 3/11/2016    Mild intermittent asthma without complication 11/16/2015    Seasonal allergies     Sickle cell anemia     Stroke        Past Surgical History:   Procedure Laterality Date    ABDOMINAL SURGERY      CHOLECYSTECTOMY      HIP SURGERY Left 2014    Hip Decompression    OTHER SURGICAL HISTORY      left rotater cuff repair    PORTACATH PLACEMENT      portacath removed   2/2014    SHOULDER ARTHROSCOPY W/ SUBACROMIAL DECOMPRESSION AND DISTAL CLAVICLE EXCISION         Review of patient's allergies indicates:   Allergen Reactions    Adhesive Itching    Contrast media Shortness Of Breath    Rocephin [ceftriaxone] Hives    Sulfa (sulfonamide antibiotics) Anaphylaxis    Toradol [ketorolac] Shortness Of Breath    Vancomycin analogues Shortness Of Breath    Fentanyl Hives       No current facility-administered medications on file prior to encounter.      Current Outpatient Medications on File Prior to Encounter   Medication Sig    folic acid (FOLVITE) 1 MG tablet Take 1 mg by mouth once daily.    hydroxyurea (HYDREA) 500 mg Cap Take 1,500 mg by mouth once daily.    morphine sulfate (MS CONTIN ORAL) Take 45 mg by mouth 2 (two) times daily as needed.     Family History     Problem Relation (Age of Onset)    Diabetes Father    Mental illness Brother    Sickle cell anemia Mother        Tobacco Use    Smoking status: Never Smoker    Smokeless tobacco: Never Used   Substance and Sexual Activity    Alcohol use: No    Drug use: Yes     Types: Marijuana    Sexual activity: Yes     Partners: Female     Birth control/protection: Condom     Review of Systems   Constitutional: Positive for activity change. Negative for chills, fatigue, fever and unexpected weight change.   HENT: Negative for congestion, drooling, sore throat and trouble  swallowing.    Eyes: Negative for photophobia, pain, redness and visual disturbance.   Respiratory: Negative for cough and shortness of breath.    Cardiovascular: Negative for chest pain, palpitations and leg swelling.   Gastrointestinal: Negative for abdominal distention, abdominal pain, constipation, diarrhea, nausea and vomiting.   Endocrine: Negative for cold intolerance, heat intolerance, polydipsia, polyphagia and polyuria.   Genitourinary: Negative for difficulty urinating, dysuria, frequency and hematuria.   Musculoskeletal: Positive for arthralgias, back pain, gait problem and myalgias. Negative for joint swelling and neck pain.   Skin: Negative for color change, pallor, rash and wound.   Neurological: Negative for dizziness, syncope, speech difficulty and light-headedness.   Psychiatric/Behavioral: Negative for agitation, confusion and decreased concentration. The patient is not nervous/anxious.      Objective:     Vital Signs (Most Recent):  Temp: 97.4 °F (36.3 °C) (07/16/19 2336)  Pulse: 72 (07/17/19 1010)  Resp: 14 (07/17/19 1010)  BP: (!) 105/56 (07/17/19 1010)  SpO2: 96 % (07/17/19 1010) Vital Signs (24h Range):  Temp:  [97.4 °F (36.3 °C)] 97.4 °F (36.3 °C)  Pulse:  [58-94] 72  Resp:  [14-22] 14  SpO2:  [96 %-99 %] 96 %  BP: (105-130)/(56-80) 105/56        There is no height or weight on file to calculate BMI.    Physical Exam   Constitutional: He is oriented to person, place, and time. He appears well-developed and well-nourished. He appears distressed (in pain, worse with movement).   HENT:   Head: Normocephalic and atraumatic.   Right Ear: External ear normal.   Left Ear: External ear normal.   Eyes: Pupils are equal, round, and reactive to light. Conjunctivae and EOM are normal.   Neck: Normal range of motion. Neck supple.   Cardiovascular: Normal rate and regular rhythm.   No murmur heard.  Pulmonary/Chest: Effort normal and breath sounds normal. No respiratory distress. He has no wheezes.    Abdominal: Soft. Bowel sounds are normal. He exhibits no distension. There is no tenderness.   Musculoskeletal: He exhibits tenderness. He exhibits no edema.   Significant right-sided lower back pain with waist flexion  Unable to fully access ROM and strength of RLE due to pain on exam  ROM of LLE limited due to pain, pain elicited with hip flexion   Full ROM and strength of b/l RUE     Neurological: He is alert and oriented to person, place, and time. No cranial nerve deficit.   Skin: Skin is warm and dry. He is not diaphoretic. No erythema.   Psychiatric: He has a normal mood and affect. His behavior is normal. Thought content normal.         CRANIAL NERVES     CN III, IV, VI   Pupils are equal, round, and reactive to light.  Extraocular motions are normal.        Significant Labs: All pertinent labs within the past 24 hours have been reviewed.    Significant Imaging: I have reviewed all pertinent imaging results/findings within the past 24 hours.

## 2019-07-17 NOTE — ED NOTES
While waiting for transport pt taken to MRI by MRI nurse, pt then to go to room. Gaby BOUDREAUX on 6th floor aware.

## 2019-07-17 NOTE — ASSESSMENT & PLAN NOTE
S/p L hip replacement ~2 years ago  MRI R hip (12/2018) at OSH: Avascular necrosis of the right femoral head without subchondral collapse. 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  R hip xray: No acute fracture.  Diffuse sclerosis proximal right femur, unchanged.  Pt reports discussing AVN with 2 orthopedic surgeons with no intent to operate at this time until, awaiting worsening of condition.  ESR/CRP: 7/10.3, low suspicion for infection  Repeat MRI R. hip  Last PT session June of last year. PT/OT eval.

## 2019-07-17 NOTE — ASSESSMENT & PLAN NOTE
See above, does appear patient in mild crisis  Drug screen ordered  ER visits July 2019 7/1: [facility]  7/2: [facility]  7/3: [facility]  7/7: [facility]  7/7: [facility]  7/13: [facility]  7/15: [facility]  7/16: [facility]

## 2019-07-17 NOTE — PLAN OF CARE
Problem: Adult Inpatient Plan of Care  Goal: Plan of Care Review  Outcome: Ongoing (interventions implemented as appropriate)  POC reviewed with pt. AAO x4.  C/o pain relieved with PRN pain meds. Pt remained free from falls. Questions and concerns addressed. Pt progressing towards goals. Will continue to monitor. See flow sheets for full assessment and VS

## 2019-07-17 NOTE — ASSESSMENT & PLAN NOTE
Retic count elevated at 13  LDH elevated at 314, T bili elevated at 2.2  Hgb S, Haptoglobin added on  Hgb baseline 8-10, on admission Hgb 9.9  Based on elevated LDH and T bili, appears patient in mild crisis. Will proceed with sickle cell pain management and protocol.

## 2019-07-17 NOTE — ED PROVIDER NOTES
Encounter Date: 7/16/2019       History     Chief Complaint   Patient presents with    Sickle Cell Pain Crisis     Pt arrives c/o sickle cell pain x 3 days. Pt c/o pelvic pain and lower extremity pain.     24-year-old male with sickle cell anemia and avascular necrosis of the right hip presents for back, bilateral leg and hip pain. Patient reports he has been in pain for weeks, however worse today.  He has been taking MS Contin at home without relief.  Hip pain is made worse with weight-bearing.  He reports associated difficulty ambulating.  He denies chest pain, shortness of breath, fevers, abdominal pain or nausea/vomiting.        Review of patient's allergies indicates:   Allergen Reactions    Adhesive Itching    Contrast media Shortness Of Breath    Rocephin [ceftriaxone] Hives    Sulfa (sulfonamide antibiotics) Anaphylaxis    Toradol [ketorolac] Shortness Of Breath    Vancomycin analogues Shortness Of Breath    Fentanyl Hives     Past Medical History:   Diagnosis Date    Asthma     AVN (avascular necrosis of bone)     Encounter for blood transfusion     Irregular heart rhythm 2010    Malingering 3/11/2016    Mild intermittent asthma without complication 11/16/2015    Seasonal allergies     Sickle cell anemia     Stroke      Past Surgical History:   Procedure Laterality Date    ABDOMINAL SURGERY      CHOLECYSTECTOMY      HIP SURGERY Left 2014    Hip Decompression    OTHER SURGICAL HISTORY      left rotater cuff repair    PORTACATH PLACEMENT      portacath removed   2/2014    SHOULDER ARTHROSCOPY W/ SUBACROMIAL DECOMPRESSION AND DISTAL CLAVICLE EXCISION       Family History   Problem Relation Age of Onset    Diabetes Father     Mental illness Brother     Sickle cell anemia Mother      Social History     Tobacco Use    Smoking status: Never Smoker    Smokeless tobacco: Never Used   Substance Use Topics    Alcohol use: No    Drug use: Yes     Types: Marijuana     Review of Systems    Constitutional: Negative for fatigue and fever.   Respiratory: Negative for cough and shortness of breath.    Cardiovascular: Negative for chest pain and palpitations.   Gastrointestinal: Negative for abdominal pain, diarrhea, nausea and vomiting.   Endocrine: Negative for polydipsia and polyuria.   Genitourinary: Negative for dysuria and hematuria.   Musculoskeletal: Positive for arthralgias, back pain, gait problem and myalgias. Negative for joint swelling, neck pain and neck stiffness.   Skin: Negative for color change, pallor, rash and wound.   Neurological: Negative for weakness, light-headedness, numbness and headaches.   Hematological: Negative for adenopathy. Does not bruise/bleed easily.       Physical Exam     Initial Vitals [07/16/19 2336]   BP Pulse Resp Temp SpO2   130/80 94 (!) 22 97.4 °F (36.3 °C) 98 %      MAP       --         Physical Exam    Vitals reviewed.  Constitutional: He appears well-developed and well-nourished. He is not diaphoretic. No distress.   HENT:   Head: Normocephalic and atraumatic.   Eyes: EOM are normal. Pupils are equal, round, and reactive to light. No scleral icterus.   Neck: Normal range of motion. Neck supple.   Cardiovascular: Normal rate, regular rhythm, normal heart sounds and intact distal pulses. Exam reveals no gallop and no friction rub.    No murmur heard.  Pulmonary/Chest: Breath sounds normal. No respiratory distress. He has no wheezes. He has no rhonchi. He has no rales. He exhibits no tenderness.   Abdominal: Soft. Bowel sounds are normal. He exhibits no distension. There is no tenderness.   Musculoskeletal: He exhibits tenderness.        Right hip: He exhibits decreased range of motion, tenderness and bony tenderness. He exhibits normal strength, no swelling, no crepitus, no deformity and no laceration.        Left hip: Normal.        Right knee: Normal.        Left knee: Normal.        Cervical back: Normal.        Thoracic back: Normal.        Lumbar back:  He exhibits tenderness and pain. He exhibits normal range of motion, no bony tenderness, no swelling, no edema, no deformity, no laceration, no spasm and normal pulse.        Right upper leg: He exhibits tenderness. He exhibits no bony tenderness, no swelling, no edema, no deformity and no laceration.        Left upper leg: He exhibits tenderness. He exhibits no bony tenderness, no swelling, no edema, no deformity and no laceration.   Bilateral lumbar paraspinous muscles exquisitely tender palpation. No midline tenderness   Neurological: He is alert and oriented to person, place, and time. He has normal strength.   Skin: Skin is warm and dry.   Psychiatric: He has a normal mood and affect.         ED Course   Procedures  Labs Reviewed   CBC W/ AUTO DIFFERENTIAL - Abnormal; Notable for the following components:       Result Value    RBC 3.33 (*)     Hemoglobin 9.9 (*)     Hematocrit 29.7 (*)     RDW 22.5 (*)     Immature Granulocytes 2.2 (*)     Immature Grans (Abs) 0.25 (*)     Mono # 1.3 (*)     Eos # 0.6 (*)     nRBC 1 (*)     All other components within normal limits   COMPREHENSIVE METABOLIC PANEL - Abnormal; Notable for the following components:    CO2 20 (*)     Total Bilirubin 2.2 (*)     All other components within normal limits   RETICULOCYTES - Abnormal; Notable for the following components:    Retic 13.0 (*)     All other components within normal limits          Imaging Results          X-Ray Hip 2 View Right (Final result)  Result time 07/17/19 01:16:24    Final result by Angel Joiner MD (07/17/19 01:16:24)                 Impression:      No acute fracture.  Diffuse sclerosis proximal right femur, unchanged.    No significant change from prior study.      Electronically signed by: Angel Joiner MD  Date:    07/17/2019  Time:    01:16             Narrative:    EXAMINATION:  XR HIP 2 VIEW RIGHT    CLINICAL HISTORY:  Pain in unspecified hip    TECHNIQUE:  AP pelvis and frog leg lateral view of the  right hip were performed.    COMPARISON:  July 7, 2019.    FINDINGS:  No fracture or dislocation of the right hip.  Diffuse sclerosis of the proximal right femur similar to prior study.  Right femoral head remains spherical.  Hip cartilage space is maintained.  Partially imaged left hip long-stem prosthesis.                                 Medical Decision Making:   History:   Old Medical Records: I decided to obtain old medical records.  Clinical Tests:   Lab Tests: Ordered and Reviewed  Radiological Study: Ordered and Reviewed       APC / Resident Notes:   24-year-old male for sickle cell pain crisis and right hip pain. Per chart review, patient has a history of malingering and drug-seeking and has had multiple ED visits at multiple different facilities over the past several weeks as listed below:    7/1: [facility]  7/2: [facility]  7/3: [facility]  7/7: [facility]  7/7: [facility]  7/13: [facility]  7/15: [facility]    His vitals are normal he appears uncomfortable.  Differential diagnosis includes vaso-occlusive crisis, avascular necrosis, malingering.  Will check labs, give fluids, morphine and reassess.    Patient refusing morphine, states that he is allergic and gets hives.  Will switch to Dilaudid.    Patient reports improvement pain after Dilaudid.  Difficulty maintaining IV access, unable to administer fluids IV.  Patient is drinking p.o. Fluids    Labs notable for mild anemia with hemoglobin of 9.9.  Reticulocyte elevated to 13.  T bili 2.2, CO2 mildly decreased at 20.  Hip x-ray is unchanged from prior. Patient states pain is returning.  Will give additional dose Dilaudid and discuss admission.  Patient states he feels he needs to be admitted.  I do have some concerns that the patient is malingering or drug-seeking, however given possibility of true pain crisis will admit for further management.  Patient comfortable with admission.  I  discussed this patient with my supervising physician.    Velma Nguyen PA-C                   Clinical Impression:       ICD-10-CM ICD-9-CM   1. Sickle cell pain crisis D57.00 282.62   2. Hip pain M25.559 719.45         Disposition:   Disposition: Admitted  Condition: Fair                        Velma Nguyen PA-C  07/17/19 0456

## 2019-07-17 NOTE — HPI
"25 y/o M with PMH sickle cell anemia and AVN of bilateral hips s/p L hip replacement 2 years ago presents c/o R hip, back, and thigh pain. The pain started 6 days ago but has progressively worsened since onset. Pain is described as aching/throbbing. Pt has taken MS Contin 45 mg as prescribed by his hematologist (Dr. Minaya at [facility]) without relief of pain. He also has tried heat/ice and hot showers with no relief. Pt reports the right sided pain is similar to pain he experiences with left AVN prior to hip replacement. Typical sickle cell crisis pain is generalized pain (not localized), which he is not experiencing at this time. He does report working yesterday at [employer] requiring excessive standing and climbing of ladders. He has seen 2 different orthopedic surgeons regarding AVN with both awaiting further progression of AVN to operate. Pt reports last MRI in [place]. Per records (12/2018), MRI showed avascular necrosis of the right femoral head without subchondral collapse. He follows with hematology regularly, last appointment 2 weeks ago but reports he has to get a new hematologist because hematologist has "too many patients." He reports compliance with hydroxyurea.  He denies fever/chills, N/V/D, abdominal pain, CP, SOB, dizziness, recent illness or sick contacts, and alcohol use.     Of note, pt has flag in chart due to suspected drug seeking and malingering behavior dating back to 2015. Pt seen at 4 different facilities on 7 different occasions for complaints of pain since 7/1 as listed below. He reports last MS contin prescribed by hematologist in June. He has 38 pills remaining. Denies daily use (only uses when he needs it) and reports going several days without medication when not in pain. Pt [facility] 7/[facility]19 for sickle cell anemia with pain. Hematology consulted but pt discharged before seen. Discharged on 7/8/19 with .      7/1: [facility]  7/2: " [facility]  7/3: [facility]  7/7: [facility]  7/7: [facility]  7/13: [facility]  7/15: [facility]    In the ED: VSS. Afebrile without leukocytosis. Hgb 9.9 (baseline), retic (13) and Tbili (2.2) mildly elevated. R hip XR with no acute fracture.  Diffuse sclerosis proximal right femur, unchanged. Pt refused morphine, states that he is allergic and gets hives. Given dilaudid with temporary improvement of pain. PO fluids encouraged (ED had difficulty obtaining IV).

## 2019-07-18 LAB
ALBUMIN SERPL BCP-MCNC: 3.7 G/DL (ref 3.5–5.2)
ALP SERPL-CCNC: 60 U/L (ref 55–135)
ALT SERPL W/O P-5'-P-CCNC: 18 U/L (ref 10–44)
ANION GAP SERPL CALC-SCNC: 10 MMOL/L (ref 8–16)
ANISOCYTOSIS BLD QL SMEAR: SLIGHT
AST SERPL-CCNC: 29 U/L (ref 10–40)
BASOPHILS # BLD AUTO: 0.12 K/UL (ref 0–0.2)
BASOPHILS NFR BLD: 1.1 % (ref 0–1.9)
BILIRUB SERPL-MCNC: 2.3 MG/DL (ref 0.1–1)
BUN SERPL-MCNC: 6 MG/DL (ref 6–20)
CALCIUM SERPL-MCNC: 9.4 MG/DL (ref 8.7–10.5)
CHLORIDE SERPL-SCNC: 108 MMOL/L (ref 95–110)
CO2 SERPL-SCNC: 21 MMOL/L (ref 23–29)
CREAT SERPL-MCNC: 0.8 MG/DL (ref 0.5–1.4)
DIFFERENTIAL METHOD: ABNORMAL
EOSINOPHIL # BLD AUTO: 0.7 K/UL (ref 0–0.5)
EOSINOPHIL NFR BLD: 6.5 % (ref 0–8)
ERYTHROCYTE [DISTWIDTH] IN BLOOD BY AUTOMATED COUNT: 22 % (ref 11.5–14.5)
EST. GFR  (AFRICAN AMERICAN): >60 ML/MIN/1.73 M^2
EST. GFR  (NON AFRICAN AMERICAN): >60 ML/MIN/1.73 M^2
GLUCOSE SERPL-MCNC: 88 MG/DL (ref 70–110)
HCT VFR BLD AUTO: 28.7 % (ref 40–54)
HGB BLD-MCNC: 9.8 G/DL (ref 14–18)
HOWELL-JOLLY BOD BLD QL SMEAR: ABNORMAL
IMM GRANULOCYTES # BLD AUTO: 0.28 K/UL (ref 0–0.04)
IMM GRANULOCYTES NFR BLD AUTO: 2.6 % (ref 0–0.5)
LYMPHOCYTES # BLD AUTO: 3.3 K/UL (ref 1–4.8)
LYMPHOCYTES NFR BLD: 30.5 % (ref 18–48)
MCH RBC QN AUTO: 29.3 PG (ref 27–31)
MCHC RBC AUTO-ENTMCNC: 34.1 G/DL (ref 32–36)
MCV RBC AUTO: 86 FL (ref 82–98)
MONOCYTES # BLD AUTO: 1.1 K/UL (ref 0.3–1)
MONOCYTES NFR BLD: 10.4 % (ref 4–15)
NEUTROPHILS # BLD AUTO: 5.2 K/UL (ref 1.8–7.7)
NEUTROPHILS NFR BLD: 48.9 % (ref 38–73)
NRBC BLD-RTO: 1 /100 WBC
PAPPENHEIMER BOD BLD QL SMEAR: PRESENT
PLATELET # BLD AUTO: ABNORMAL K/UL (ref 150–350)
PLATELET BLD QL SMEAR: ABNORMAL
PMV BLD AUTO: ABNORMAL FL (ref 9.2–12.9)
POLYCHROMASIA BLD QL SMEAR: ABNORMAL
POTASSIUM SERPL-SCNC: 4.4 MMOL/L (ref 3.5–5.1)
PROT SERPL-MCNC: 6.7 G/DL (ref 6–8.4)
RBC # BLD AUTO: 3.34 M/UL (ref 4.6–6.2)
RETICS/RBC NFR AUTO: 14.6 % (ref 0.4–2)
SODIUM SERPL-SCNC: 139 MMOL/L (ref 136–145)
WBC # BLD AUTO: 10.66 K/UL (ref 3.9–12.7)

## 2019-07-18 PROCEDURE — 97161 PT EVAL LOW COMPLEX 20 MIN: CPT

## 2019-07-18 PROCEDURE — 80053 COMPREHEN METABOLIC PANEL: CPT

## 2019-07-18 PROCEDURE — 99900038 HC OT GENERIC THERAPY SCREENING (STAT)

## 2019-07-18 PROCEDURE — 63600175 PHARM REV CODE 636 W HCPCS: Performed by: PHYSICIAN ASSISTANT

## 2019-07-18 PROCEDURE — 25000003 PHARM REV CODE 250: Performed by: PHYSICIAN ASSISTANT

## 2019-07-18 PROCEDURE — 36415 COLL VENOUS BLD VENIPUNCTURE: CPT

## 2019-07-18 PROCEDURE — 99233 SBSQ HOSP IP/OBS HIGH 50: CPT | Mod: ,,, | Performed by: PHYSICIAN ASSISTANT

## 2019-07-18 PROCEDURE — 85045 AUTOMATED RETICULOCYTE COUNT: CPT

## 2019-07-18 PROCEDURE — 85025 COMPLETE CBC W/AUTO DIFF WBC: CPT

## 2019-07-18 PROCEDURE — 11000001 HC ACUTE MED/SURG PRIVATE ROOM

## 2019-07-18 PROCEDURE — 99233 PR SUBSEQUENT HOSPITAL CARE,LEVL III: ICD-10-PCS | Mod: ,,, | Performed by: PHYSICIAN ASSISTANT

## 2019-07-18 RX ORDER — MORPHINE SULFATE 30 MG/1
30 TABLET, FILM COATED, EXTENDED RELEASE ORAL EVERY 12 HOURS
Status: DISCONTINUED | OUTPATIENT
Start: 2019-07-18 | End: 2019-07-19

## 2019-07-18 RX ORDER — HYDROMORPHONE HYDROCHLORIDE 1 MG/ML
1 INJECTION, SOLUTION INTRAMUSCULAR; INTRAVENOUS; SUBCUTANEOUS
Status: DISCONTINUED | OUTPATIENT
Start: 2019-07-18 | End: 2019-07-19

## 2019-07-18 RX ORDER — MORPHINE SULFATE 15 MG/1
15 TABLET ORAL EVERY 6 HOURS PRN
Status: DISCONTINUED | OUTPATIENT
Start: 2019-07-18 | End: 2019-07-18

## 2019-07-18 RX ADMIN — MORPHINE SULFATE 30 MG: 30 TABLET, EXTENDED RELEASE ORAL at 09:07

## 2019-07-18 RX ADMIN — HYDROMORPHONE HYDROCHLORIDE 1 MG: 1 INJECTION, SOLUTION INTRAMUSCULAR; INTRAVENOUS; SUBCUTANEOUS at 01:07

## 2019-07-18 RX ADMIN — FOLIC ACID 1 MG: 1 TABLET ORAL at 08:07

## 2019-07-18 RX ADMIN — ACETAMINOPHEN 650 MG: 325 TABLET ORAL at 01:07

## 2019-07-18 RX ADMIN — HYDROMORPHONE HYDROCHLORIDE 1 MG: 1 INJECTION, SOLUTION INTRAMUSCULAR; INTRAVENOUS; SUBCUTANEOUS at 05:07

## 2019-07-18 RX ADMIN — ACETAMINOPHEN 650 MG: 325 TABLET ORAL at 09:07

## 2019-07-18 RX ADMIN — SODIUM CHLORIDE, SODIUM LACTATE, POTASSIUM CHLORIDE, AND CALCIUM CHLORIDE: .6; .31; .03; .02 INJECTION, SOLUTION INTRAVENOUS at 05:07

## 2019-07-18 RX ADMIN — SODIUM CHLORIDE, SODIUM LACTATE, POTASSIUM CHLORIDE, AND CALCIUM CHLORIDE: .6; .31; .03; .02 INJECTION, SOLUTION INTRAVENOUS at 01:07

## 2019-07-18 RX ADMIN — HYDROMORPHONE HYDROCHLORIDE 1 MG: 1 INJECTION, SOLUTION INTRAMUSCULAR; INTRAVENOUS; SUBCUTANEOUS at 08:07

## 2019-07-18 RX ADMIN — ACETAMINOPHEN 650 MG: 325 TABLET ORAL at 05:07

## 2019-07-18 RX ADMIN — SENNOSIDES,DOCUSATE SODIUM 1 TABLET: 8.6; 5 TABLET, FILM COATED ORAL at 08:07

## 2019-07-18 RX ADMIN — HYDROMORPHONE HYDROCHLORIDE 1 MG: 1 INJECTION, SOLUTION INTRAMUSCULAR; INTRAVENOUS; SUBCUTANEOUS at 02:07

## 2019-07-18 RX ADMIN — MORPHINE SULFATE 30 MG: 30 TABLET, EXTENDED RELEASE ORAL at 12:07

## 2019-07-18 NOTE — PT/OT/SLP EVAL
Occupational Therapy   Evaluation and Discharge Note    Name: Paddy Stevenson  MRN: [id number]  Admitting Diagnosis:  Sickle cell pain crisis      Recommendations:     Discharge Recommendations: home  Discharge Equipment Recommendations:  none  Barriers to discharge:  None    Assessment:     Paddy Stevenson is a 24 y.o. male with a medical diagnosis of Sickle cell pain crisis. At this time, patient is functioning at their prior level of function and does not require further acute OT services.     Plan:     During this hospitalization, patient does not require further acute OT services.  Please re-consult if situation changes.    · Plan of Care Reviewed with: patient    Subjective     Chief Complaint: Pain  Patient/Family Comments/goals: Medical management and discharge.       Occupational Profile:  Living Environment Comment: Pt lives with 2 roommates in a 2 story apartment with 4 FAUSTO, bed on 2nd floor (14 steps to 2nd floor). 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 Resulted in immediate pain, co-workers assisted him home where he gradually became more immobile at home until having to call EMS. 2 roommates work during the day and can't assist him.     Equipment Currently Used at Home: none  DME owned (not currently used): none        Pain/Comfort:  · Pain Rating 1: 0/10  · Pain Rating Post-Intervention 1: 0/10    Patients cultural, spiritual, Anabaptist conflicts given the current situation: no    Objective:     Communicated with: RN prior to session.  Patient found HOB elevated with peripheral IV upon OT entry to room.    General Precautions: Standard, fall   Orthopedic Precautions:N/A   Braces: N/A     Occupational Performance:      Cognitive/Visual Perceptual:  Completely cognitively intact adult w/ no glasses worn or present during eval.       Physical Exam:  Physically capable to resume  normal activities of daily living.       [facility] 6 Click ADL:  AMPAC Total Score: 24    Treatment & Education:  -Pt edu on OT role/POC  -Importance of OOB activity with staff assistance  -Safety during functional t/f and mobility  - White board updated  - Multiple self care tasks/functional mobility completed-- assistance level noted above  - All questions/concerns answered within OT scope of practice    Education:    Patient left HOB elevated with all lines intact, call button in reach and RN notified    GOALS:   Multidisciplinary Problems     Occupational Therapy Goals     Not on file                History:     Past Medical History:   Diagnosis Date    Asthma     AVN (avascular necrosis of bone)     Encounter for blood transfusion     Irregular heart rhythm 2010    Malingering 3/11/2016    Mild intermittent asthma without complication 11/16/2015    Seasonal allergies     Sickle cell anemia     Stroke        Past Surgical History:   Procedure Laterality Date    ABDOMINAL SURGERY      CHOLECYSTECTOMY      HIP SURGERY Left 2014    Hip Decompression    OTHER SURGICAL HISTORY      left rotater cuff repair    PORTACATH PLACEMENT      portacath removed   2/2014    SHOULDER ARTHROSCOPY W/ SUBACROMIAL DECOMPRESSION AND DISTAL CLAVICLE EXCISION         Time Tracking:     OT Date of Treatment: 07/18/19  OT Start Time: 1304  OT Stop Time: 1307  OT Total Time (min): 3 min    Billable Minutes:Total Time 0 mins billable    Renny Brian, OT  7/18/2019

## 2019-07-18 NOTE — HOSPITAL COURSE
Pt admitted for sickle cell crisis and R hip pain.  LDH, T bili, HgbS all elevated and low haptoglobin consistent with sickle cell crisis.  Pt does have strong history of drug seeking behavior.  PT/OT ordered and home no needs (very limited participation).  MRI R hip focal area of avascular necrosis involving at least 50% of the articular surface.  No imaging findings to suggest subchondral plate collapse.  Additional focal area of avascular necrosis within the right iliac crest and possibly within the right proximal femoral shaft, the latter which is not well evaluated secondary to partial visualization.  Osseous stigmata of sickle cell disease, similar when compared to multiple previous exams.  MS Contin bid, dilaudid prn, and multimodal management for pain.  Day 3 patient reported hitting his R hip while walking to the bathroom, no falls.  Resistant to weaning IV pain medications.  WBC increased, CXR and UA ordered without noted infection, trended down the following day. Day 5-6 sickle cell lab improvement. Pain out of proportion to exam findings. Proceeded with weaning IV pain medications. Discharging home on home pain medications. Instructed to f/u with PCP, Hematology, and Orthopedics.

## 2019-07-18 NOTE — ASSESSMENT & PLAN NOTE
Retic count elevated at 13  LDH elevated at 314, T bili elevated at 2.2  Hgb S, Haptoglobin added on  Hgb baseline 8-10, on admission Hgb 9.9  Based on elevated LDH and T bili, appears patient in mild crisis. Will proceed with sickle cell pain management and protocol.   7/18 Dr. Canchola at bedside today.  Reviewed with pt we will schedule MS Contin and continue dilaudid 1 mg prn (hold for sedation) with plan to wean IV pain medication tomorrow.

## 2019-07-18 NOTE — PLAN OF CARE
Payor: MEDICAID / Plan: [facility] COMMUNITY PLAN [facility] ([place] MEDICAID) / Product Type: Managed Medicaid /      [id number] - [place] - [address] [facility] [address]  [address] [address]  Phone: [phone] Fax: [phone]         07/18/19 1324   Discharge Assessment   Assessment Type Discharge Planning Assessment   Confirmed/corrected address and phone number on facesheet? Yes   Assessment information obtained from? Patient   Expected Length of Stay (days) 3   Prior to hospitilization cognitive status: Alert/Oriented   Prior to hospitalization functional status: Independent   Current cognitive status: Alert/Oriented   Current Functional Status: Independent   Lives With parent(s)   Able to Return to Prior Arrangements yes   Is patient able to care for self after discharge? Yes   Who are your caregiver(s) and their phone number(s)? Mother Marcella [phone]   Patient's perception of discharge disposition home or selfcare   Readmission Within the Last 30 Days unable to assess   Patient currently being followed by outpatient case management? No   Patient currently receives any other outside agency services? No   Equipment Currently Used at Home none   Do you have any problems affording any of your prescribed medications? No   Is the patient taking medications as prescribed? yes   Does the patient have transportation home? Yes   Transportation Anticipated family or friend will provide   Does the patient receive services at the [facility]? No   Discharge Plan A Home;Home with family   Discharge Plan B Home   DME Needed Upon Discharge  none

## 2019-07-18 NOTE — PLAN OF CARE
Problem: Physical Therapy Goal  Goal: Physical Therapy Goal  Outcome: Outcome(s) achieved Date Met: 07/18/19  No goals set, pt does not require additional acute PT services at this time.     Pt educated on asking medical staff for PT consult if changes in functional status occurs.     - Jean Hopson, PT, DPT  7/18/2019

## 2019-07-18 NOTE — PROGRESS NOTES
Reported to Med Team Judith DRIVER, That Platelets clumped and were not conclusive. Pt refused re-collect. Will continue to monitor patient.

## 2019-07-18 NOTE — PT/OT/SLP EVAL
"Physical Therapy Evaluation and Discharge Note    Patient Name:  Paddy Stevenson   MRN:  [id number]    Recommendations:     Discharge Recommendations:  home   Discharge Equipment Recommendations: none   Barriers to discharge: pain limiting functional mobility.     Assessment:     Paddy Stevenson is a 24 y.o. male admitted with a medical diagnosis of <principal problem not specified>. At this time, patient is functioning at their prior level of function and does not require further acute PT services. Upon entry into the room pt reports 9/10 pain reporting this is chronic. Pt states "my nurse is talking to my doctor because they decided to discontinue my pain meds. I'm like a 9/10 right now so it's highly unlikely I'll get up right now". Pt states he has been able to access bathroom at baseline levels only limited by pain. Pt given opportunity to have therapy attempt formal OOB assessment on a later date. Pt declined this offer stating "I really don't think I need that. It's the pain, that's all it is".     Recent Surgery: * No surgery found *      Plan:     During this hospitalization, patient does not require further acute PT services.  Please re-consult if situation changes.      Subjective     Chief Complaint: 9/10 R hip pain  Patient/Family Comments/goals: decrease pain  Pain/Comfort:  · Pain Rating 1: 9/10  · Location - Side 1: Right  · Location 1: hip    Patients cultural, spiritual, Taoism conflicts given the current situation: no    Living Environment:  Pt lives with mother and father in [facility] with 0 FAUSTO.   Prior to admission, patients level of function was reported Ind.  Equipment used at home: walker, rolling, rollator, bedside commode, wheelchair, crutches, axillary.  DME owned (not currently used): none.  Upon discharge, patient will have assistance from family.    Objective:     Communicated with RN prior to session.  Patient found HOB elevated with   upon PT entry to room.    General Precautions: " Standard, fall   Orthopedic Precautions:N/A   Braces: N/A     Functional Mobility:  · Declined all OOB activity on this date    AM-PAC 6 CLICK MOBILITY  Total Score:        Therapeutic Activities and Exercises:   - Pt educated on:   -PT roles, expectations, and POC    -Safety with mobility   -Benefits of OOB activities to increase strength and functional mobility    -Discharge recommendations     AM-PAC 6 CLICK MOBILITY  Total Score:      Patient left HOB elevated with call button in reach.    GOALS:   Multidisciplinary Problems     Physical Therapy Goals     Not on file          Multidisciplinary Problems (Resolved)        Problem: Physical Therapy Goal    Goal Priority Disciplines Outcome Goal Variances Interventions   Physical Therapy Goal   (Resolved)     PT, PT/OT Outcome(s) achieved                     History:     Past Medical History:   Diagnosis Date    Asthma     AVN (avascular necrosis of bone)     Encounter for blood transfusion     Irregular heart rhythm 2010    Malingering 3/11/2016    Mild intermittent asthma without complication 11/16/2015    Seasonal allergies     Sickle cell anemia     Stroke        Past Surgical History:   Procedure Laterality Date    ABDOMINAL SURGERY      CHOLECYSTECTOMY      HIP SURGERY Left 2014    Hip Decompression    OTHER SURGICAL HISTORY      left rotater cuff repair    PORTACATH PLACEMENT      portacath removed   2/2014    SHOULDER ARTHROSCOPY W/ SUBACROMIAL DECOMPRESSION AND DISTAL CLAVICLE EXCISION         Time Tracking:     PT Received On: 07/18/19  PT Start Time: 0930     PT Stop Time: 0937  PT Total Time (min): 7 min     Billable Minutes: Evaluation 7      Eyal Hopson, PT  07/18/2019

## 2019-07-18 NOTE — PLAN OF CARE
Problem: Adult Inpatient Plan of Care  Goal: Plan of Care Review  Outcome: Ongoing (interventions implemented as appropriate)  POC reviewed with pt. AAO x4.  C/o pain relieved with PRN and scheduled pain medicine. IVF infusing @ 100ml/hr. Pt remained free from falls. Questions and concerns addressed. Pt progressing towards goals. Will continue to monitor. See flow sheets for full assessment and VS

## 2019-07-18 NOTE — ASSESSMENT & PLAN NOTE
S/p L hip replacement ~2 years ago  MRI R hip (12/2018) at [facility]: Avascular necrosis of the right femoral head without subchondral collapse. 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  R hip xray: No acute fracture.  Diffuse sclerosis proximal right femur, unchanged.  Pt reports discussing AVN with 2 orthopedic surgeons with no intent to operate at this time until, awaiting worsening of condition.  ESR/CRP: 7/10.3, low suspicion for infection  Repeat MRI R. hip  Last PT session June of last year. PT/OT eval: no needs, refused OT eval.

## 2019-07-18 NOTE — PROGRESS NOTES
"[facility] Hospital Medicine  Progress Note    Patient Name: Paddy Stevenson  MRN: [id number]  Patient Class: IP- Inpatient   Admission Date: 7/16/2019  Length of Stay: 1 days  Attending Physician: Keily Canchola MD  Primary Care Provider: Primary Doctor [facility] Medicine Team: [facility] Y Alexa Servin PA-C    Subjective:     Principal Problem:Sickle cell pain crisis      HPI:  23 y/o M with PMH sickle cell anemia and AVN of bilateral hips s/p L hip replacement 2 years ago presents c/o R hip, back, and thigh pain. The pain started 6 days ago but has progressively worsened since onset. Pain is described as aching/throbbing. Pt has taken MS Contin 45 mg as prescribed by his hematologist (Dr. Minaya at [facility]) without relief of pain. He also has tried heat/ice and hot showers with no relief. Pt reports the right sided pain is similar to pain he experiences with left AVN prior to hip replacement. Typical sickle cell crisis pain is generalized pain (not localized), which he is not experiencing at this time. He does report working yesterday at [employer] requiring excessive standing and climbing of ladders. He has seen 2 different orthopedic surgeons regarding AVN with both awaiting further progression of AVN to operate. Pt reports last MRI in [place]. Per records (12/2018), MRI showed avascular necrosis of the right femoral head without subchondral collapse. He follows with hematology regularly, last appointment 2 weeks ago but reports he has to get a new hematologist because hematologist has "too many patients." He reports compliance with hydroxyurea.  He denies fever/chills, N/V/D, abdominal pain, CP, SOB, dizziness, recent illness or sick contacts, and alcohol use.     Of note, pt has flag in chart due to suspected drug seeking and malingering behavior dating back to 2015. Pt seen at 4 different facilities on 7 different occasions for complaints of pain since 7/1 as listed " below. He reports last MS contin prescribed by hematologist in June. He has 38 pills remaining. Denies daily use (only uses when he needs it) and reports going several days without medication when not in pain. Pt [place] [facility] [place] for sickle cell anemia with pain. Hematology consulted but pt discharged before seen. Discharged on 7/8/19 with .      7/1: [facility]  7/2: [facility]  7/3: [facility]  7/7: [facility]  7/7: [facility]  7/13: [facility]  7/15: [facility]    In the ED: VSS. Afebrile without leukocytosis. Hgb 9.9 (baseline), retic (13) and Tbili (2.2) mildly elevated. R hip XR with no acute fracture.  Diffuse sclerosis proximal right femur, unchanged. Pt refused morphine, states that he is allergic and gets hives. Given dilaudid with temporary improvement of pain. PO fluids encouraged (ED had difficulty obtaining IV).      Overview/Hospital Course:  Pt admitted for sickle cell crisis and R hip pain.  LDH, T bili, HgbS all elevated and low haptoglobin consistent with sickle cell crisis.  Pt does have strong history of drug seeking behavior.  PT/OT ordered and home no needs.  MRI R hip focal area of avascular necrosis involving at least 50% of the articular surface.  No imaging findings to suggest subchondral plate collapse.  Additional focal area of avascular necrosis within the right iliac crest and possibly within the right proximal femoral shaft, the latter which is not well evaluated secondary to partial visualization.  Osseous stigmata of sickle cell disease, similar when compared to multiple previous exams.  MS Contin bid and dilaudid prn.    Interval History:  no acute events overnight, no new complaints.  Dr. Canchola at bedside today.  Discussed with pt we will schedule MS Contin and continue dilaudid 1 mg q 3 hrs prn with plan to wean IV dosing tomorrow.    Review of Systems   Constitutional: Positive for  activity change. Negative for chills, fatigue, fever and unexpected weight change.   HENT: Negative for congestion, drooling, sore throat and trouble swallowing.    Respiratory: Negative for cough, chest tightness, shortness of breath and wheezing.    Cardiovascular: Negative for chest pain, palpitations and leg swelling.   Gastrointestinal: Negative for abdominal distention, abdominal pain, constipation, diarrhea, nausea and vomiting.   Genitourinary: Negative for difficulty urinating, dysuria, frequency and hematuria.   Musculoskeletal: Positive for arthralgias, back pain, gait problem and myalgias. Negative for joint swelling and neck pain.   Skin: Negative for color change, pallor, rash and wound.   Neurological: Negative for dizziness, syncope, speech difficulty and light-headedness.   Psychiatric/Behavioral: Negative for agitation, confusion and decreased concentration. The patient is not nervous/anxious.      Objective:     Vital Signs (Most Recent):  Temp: 98 °F (36.7 °C) (07/18/19 1139)  Pulse: 79 (07/18/19 1139)  Resp: 18 (07/18/19 1139)  BP: (!) 116/58 (07/18/19 1139)  SpO2: 100 % (07/18/19 1139) Vital Signs (24h Range):  Temp:  [96.5 °F (35.8 °C)-98 °F (36.7 °C)] 98 °F (36.7 °C)  Pulse:  [60-89] 79  Resp:  [18-20] 18  SpO2:  [97 %-100 %] 100 %  BP: (106-132)/(54-73) 116/58        There is no height or weight on file to calculate BMI.    Intake/Output Summary (Last 24 hours) at 7/18/2019 1433  Last data filed at 7/18/2019 1205  Gross per 24 hour   Intake 1940 ml   Output 1950 ml   Net -10 ml      Physical Exam   Constitutional: He is oriented to person, place, and time. He appears well-developed and well-nourished. No distress.   HENT:   Head: Normocephalic and atraumatic.   Right Ear: External ear normal.   Left Ear: External ear normal.   Eyes: Pupils are equal, round, and reactive to light. Conjunctivae and EOM are normal.   Neck: Normal range of motion. Neck supple.   Cardiovascular: Normal rate and  regular rhythm.   Pulmonary/Chest: Effort normal and breath sounds normal. He has no wheezes.   Abdominal: Soft. Bowel sounds are normal. He exhibits no distension. There is no tenderness.   Musculoskeletal: He exhibits tenderness. He exhibits no edema.   Significant right-sided lower back pain with waist flexion  Unable to fully access ROM and strength of RLE due to pain on exam  ROM of LLE limited due to pain, pain elicited with hip flexion   Full ROM and strength of b/l RUE     Neurological: He is alert and oriented to person, place, and time. No cranial nerve deficit.   Skin: Skin is warm and dry. He is not diaphoretic. No erythema.   Psychiatric: He has a normal mood and affect. His behavior is normal. Thought content normal.       Significant Labs: All pertinent labs within the past 24 hours have been reviewed.    Significant Imaging: I have reviewed all pertinent imaging results/findings within the past 24 hours.      Assessment/Plan:      * Sickle cell pain crisis  Retic count elevated at 13  LDH elevated at 314, T bili elevated at 2.2  Hgb S, Haptoglobin added on  Hgb baseline 8-10, on admission Hgb 9.9  Based on elevated LDH and T bili, appears patient in mild crisis. Will proceed with sickle cell pain management and protocol.   7/18 Dr. Canchola at bedside today.  Reviewed with pt we will schedule MS Contin and continue dilaudid 1 mg prn (hold for sedation) with plan to wean IV pain medication tomorrow.    Avascular necrosis of bones of both hips  S/p L hip replacement ~2 years ago  MRI R hip (12/2018) at OSH: Avascular necrosis of the right femoral head without subchondral collapse. 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  R hip xray: No acute fracture.  Diffuse sclerosis proximal right femur, unchanged.  Pt reports discussing AVN with 2 orthopedic surgeons with no intent to operate at this time until, awaiting worsening of condition.  ESR/CRP: 7/10.3, low suspicion for infection  Repeat  MRI R. hip  Last PT session June of last year. PT/OT eval: no needs, refused OT eval.    Drug-seeking behavior  See above, does appear patient in mild crisis  Drug screen ordered: presumptive positive for opioids  ER visits July 2019 7/1: [facility]  7/2: [facility]  7/3: [facility]  7/7: [facility]  7/7: [facility]  7/13: [facility]  7/15: [facility]  7/16: [facility]    Mild intermittent asthma without complication  Sats 98-99% on RA  Controlled  Will order prn breathing treatments        VTE Risk Mitigation (From admission, onward)        Ordered     IP VTE LOW RISK PATIENT  Once      07/17/19 0732     Place BONG hose  Until discontinued      07/17/19 0732                Alexa Servin PA-C  Department of Hospital Medicine   [facility]

## 2019-07-19 LAB
ALBUMIN SERPL BCP-MCNC: 3.8 G/DL (ref 3.5–5.2)
ALP SERPL-CCNC: 65 U/L (ref 55–135)
ALT SERPL W/O P-5'-P-CCNC: 18 U/L (ref 10–44)
ANION GAP SERPL CALC-SCNC: 10 MMOL/L (ref 8–16)
ANISOCYTOSIS BLD QL SMEAR: SLIGHT
AST SERPL-CCNC: 26 U/L (ref 10–40)
BASOPHILS # BLD AUTO: 0.14 K/UL (ref 0–0.2)
BASOPHILS NFR BLD: 1 % (ref 0–1.9)
BILIRUB SERPL-MCNC: 3.5 MG/DL (ref 0.1–1)
BILIRUB UR QL STRIP: NEGATIVE
BUN SERPL-MCNC: 8 MG/DL (ref 6–20)
CALCIUM SERPL-MCNC: 9.2 MG/DL (ref 8.7–10.5)
CHLORIDE SERPL-SCNC: 107 MMOL/L (ref 95–110)
CLARITY UR REFRACT.AUTO: CLEAR
CO2 SERPL-SCNC: 24 MMOL/L (ref 23–29)
COLOR UR AUTO: YELLOW
CREAT SERPL-MCNC: 0.8 MG/DL (ref 0.5–1.4)
DIFFERENTIAL METHOD: ABNORMAL
EOSINOPHIL # BLD AUTO: 0.8 K/UL (ref 0–0.5)
EOSINOPHIL NFR BLD: 5.3 % (ref 0–8)
ERYTHROCYTE [DISTWIDTH] IN BLOOD BY AUTOMATED COUNT: 22.5 % (ref 11.5–14.5)
EST. GFR  (AFRICAN AMERICAN): >60 ML/MIN/1.73 M^2
EST. GFR  (NON AFRICAN AMERICAN): >60 ML/MIN/1.73 M^2
GLUCOSE SERPL-MCNC: 88 MG/DL (ref 70–110)
GLUCOSE UR QL STRIP: NEGATIVE
HCT VFR BLD AUTO: 29.5 % (ref 40–54)
HGB BLD-MCNC: 9.9 G/DL (ref 14–18)
HGB UR QL STRIP: NEGATIVE
HOWELL-JOLLY BOD BLD QL SMEAR: ABNORMAL
HYPOCHROMIA BLD QL SMEAR: ABNORMAL
IMM GRANULOCYTES # BLD AUTO: 0.52 K/UL (ref 0–0.04)
IMM GRANULOCYTES NFR BLD AUTO: 3.7 % (ref 0–0.5)
KETONES UR QL STRIP: NEGATIVE
LEUKOCYTE ESTERASE UR QL STRIP: NEGATIVE
LYMPHOCYTES # BLD AUTO: 2.8 K/UL (ref 1–4.8)
LYMPHOCYTES NFR BLD: 20 % (ref 18–48)
MCH RBC QN AUTO: 30.1 PG (ref 27–31)
MCHC RBC AUTO-ENTMCNC: 33.6 G/DL (ref 32–36)
MCV RBC AUTO: 90 FL (ref 82–98)
MONOCYTES # BLD AUTO: 1.2 K/UL (ref 0.3–1)
MONOCYTES NFR BLD: 8.4 % (ref 4–15)
NEUTROPHILS # BLD AUTO: 8.7 K/UL (ref 1.8–7.7)
NEUTROPHILS NFR BLD: 61.6 % (ref 38–73)
NITRITE UR QL STRIP: NEGATIVE
NRBC BLD-RTO: 1 /100 WBC
OVALOCYTES BLD QL SMEAR: ABNORMAL
PAPPENHEIMER BOD BLD QL SMEAR: PRESENT
PH UR STRIP: 7 [PH] (ref 5–8)
PLATELET # BLD AUTO: 319 K/UL (ref 150–350)
PMV BLD AUTO: 10.3 FL (ref 9.2–12.9)
POIKILOCYTOSIS BLD QL SMEAR: SLIGHT
POLYCHROMASIA BLD QL SMEAR: ABNORMAL
POTASSIUM SERPL-SCNC: 3.6 MMOL/L (ref 3.5–5.1)
PROCALCITONIN SERPL IA-MCNC: 0.27 NG/ML
PROT SERPL-MCNC: 7 G/DL (ref 6–8.4)
PROT UR QL STRIP: NEGATIVE
RBC # BLD AUTO: 3.29 M/UL (ref 4.6–6.2)
RETICS/RBC NFR AUTO: 12.1 % (ref 0.4–2)
SICKLE CELLS BLD QL SMEAR: ABNORMAL
SODIUM SERPL-SCNC: 141 MMOL/L (ref 136–145)
SP GR UR STRIP: 1.01 (ref 1–1.03)
TARGETS BLD QL SMEAR: ABNORMAL
URN SPEC COLLECT METH UR: NORMAL
WBC # BLD AUTO: 14.12 K/UL (ref 3.9–12.7)

## 2019-07-19 PROCEDURE — 63600175 PHARM REV CODE 636 W HCPCS: Performed by: PHYSICIAN ASSISTANT

## 2019-07-19 PROCEDURE — 99233 SBSQ HOSP IP/OBS HIGH 50: CPT | Mod: ,,, | Performed by: PHYSICIAN ASSISTANT

## 2019-07-19 PROCEDURE — 99233 PR SUBSEQUENT HOSPITAL CARE,LEVL III: ICD-10-PCS | Mod: ,,, | Performed by: PHYSICIAN ASSISTANT

## 2019-07-19 PROCEDURE — 80053 COMPREHEN METABOLIC PANEL: CPT

## 2019-07-19 PROCEDURE — 81003 URINALYSIS AUTO W/O SCOPE: CPT

## 2019-07-19 PROCEDURE — 85025 COMPLETE CBC W/AUTO DIFF WBC: CPT

## 2019-07-19 PROCEDURE — 94761 N-INVAS EAR/PLS OXIMETRY MLT: CPT

## 2019-07-19 PROCEDURE — 36415 COLL VENOUS BLD VENIPUNCTURE: CPT

## 2019-07-19 PROCEDURE — 11000001 HC ACUTE MED/SURG PRIVATE ROOM

## 2019-07-19 PROCEDURE — 25000003 PHARM REV CODE 250: Performed by: PHYSICIAN ASSISTANT

## 2019-07-19 PROCEDURE — 85045 AUTOMATED RETICULOCYTE COUNT: CPT

## 2019-07-19 PROCEDURE — 84145 PROCALCITONIN (PCT): CPT

## 2019-07-19 RX ORDER — SODIUM CHLORIDE 9 MG/ML
INJECTION, SOLUTION INTRAVENOUS CONTINUOUS
Status: DISCONTINUED | OUTPATIENT
Start: 2019-07-19 | End: 2019-07-25 | Stop reason: HOSPADM

## 2019-07-19 RX ORDER — HYDROMORPHONE HYDROCHLORIDE 1 MG/ML
1 INJECTION, SOLUTION INTRAMUSCULAR; INTRAVENOUS; SUBCUTANEOUS EVERY 6 HOURS PRN
Status: DISCONTINUED | OUTPATIENT
Start: 2019-07-19 | End: 2019-07-20

## 2019-07-19 RX ADMIN — MORPHINE SULFATE 45 MG: 30 TABLET, EXTENDED RELEASE ORAL at 09:07

## 2019-07-19 RX ADMIN — ACETAMINOPHEN 650 MG: 325 TABLET ORAL at 05:07

## 2019-07-19 RX ADMIN — HYDROMORPHONE HYDROCHLORIDE 1 MG: 1 INJECTION, SOLUTION INTRAMUSCULAR; INTRAVENOUS; SUBCUTANEOUS at 05:07

## 2019-07-19 RX ADMIN — HYDROMORPHONE HYDROCHLORIDE 1 MG: 1 INJECTION, SOLUTION INTRAMUSCULAR; INTRAVENOUS; SUBCUTANEOUS at 11:07

## 2019-07-19 RX ADMIN — ACETAMINOPHEN 650 MG: 325 TABLET ORAL at 01:07

## 2019-07-19 RX ADMIN — ACETAMINOPHEN 650 MG: 325 TABLET ORAL at 09:07

## 2019-07-19 RX ADMIN — SODIUM CHLORIDE: 0.9 INJECTION, SOLUTION INTRAVENOUS at 05:07

## 2019-07-19 RX ADMIN — FOLIC ACID 1 MG: 1 TABLET ORAL at 09:07

## 2019-07-19 RX ADMIN — HYDROMORPHONE HYDROCHLORIDE 1 MG: 1 INJECTION, SOLUTION INTRAMUSCULAR; INTRAVENOUS; SUBCUTANEOUS at 02:07

## 2019-07-19 RX ADMIN — SODIUM CHLORIDE: 0.9 INJECTION, SOLUTION INTRAVENOUS at 08:07

## 2019-07-19 NOTE — SUBJECTIVE & OBJECTIVE
Interval History: overnight pt reports hitting his R hip while on the way to the bathroom, he did not fall, however his pain is worse today b/c of this.  He is resistant to weaning IV pain medications, discussed increasing dose of oral pain medication.  Explained again rationale of managing pain from multiple angles (IV, po, scheduled tylenol).  Pt reports he has tried gabapentin in the past and does not want to try again.    Review of Systems   Constitutional: Positive for activity change. Negative for chills, fatigue, fever and unexpected weight change.   HENT: Negative for congestion, drooling, sore throat and trouble swallowing.    Respiratory: Negative for cough, chest tightness, shortness of breath and wheezing.    Cardiovascular: Negative for chest pain, palpitations and leg swelling.   Gastrointestinal: Negative for abdominal distention, abdominal pain, constipation, diarrhea, nausea and vomiting.   Genitourinary: Negative for difficulty urinating, dysuria, frequency and hematuria.   Musculoskeletal: Positive for arthralgias, back pain, gait problem and myalgias. Negative for joint swelling and neck pain.   Skin: Negative for color change, pallor, rash and wound.   Neurological: Negative for dizziness, syncope, speech difficulty and light-headedness.   Psychiatric/Behavioral: Negative for agitation, confusion and decreased concentration. The patient is not nervous/anxious.      Objective:     Vital Signs (Most Recent):  Temp: 98 °F (36.7 °C) (07/19/19 0755)  Pulse: 70 (07/19/19 0755)  Resp: 18 (07/19/19 0755)  BP: 118/67 (07/19/19 0755)  SpO2: (!) 93 % (07/19/19 0755) Vital Signs (24h Range):  Temp:  [96.3 °F (35.7 °C)-98.3 °F (36.8 °C)] 98 °F (36.7 °C)  Pulse:  [62-79] 70  Resp:  [18-20] 18  SpO2:  [93 %-100 %] 93 %  BP: (116-126)/(58-71) 118/67     Weight: 76.8 kg (169 lb 6.4 oz)  Body mass index is 25.02 kg/m².    Intake/Output Summary (Last 24 hours) at 7/19/2019 0958  Last data filed at 7/19/2019  0229  Gross per 24 hour   Intake 750 ml   Output 1850 ml   Net -1100 ml      Physical Exam   Constitutional: He is oriented to person, place, and time. He appears well-developed and well-nourished. No distress.   HENT:   Head: Normocephalic and atraumatic.   Right Ear: External ear normal.   Left Ear: External ear normal.   Eyes: Pupils are equal, round, and reactive to light. Conjunctivae and EOM are normal.   Neck: Normal range of motion. Neck supple.   Cardiovascular: Normal rate and regular rhythm.   Pulmonary/Chest: Effort normal and breath sounds normal. He has no wheezes.   Abdominal: Soft. Bowel sounds are normal. He exhibits no distension. There is no tenderness.   Musculoskeletal: He exhibits tenderness. He exhibits no edema.   Significant right-sided lower back pain with waist flexion  Unable to fully access ROM and strength of RLE due to pain on exam  Full ROM and strength of b/l UE     Neurological: He is alert and oriented to person, place, and time. No cranial nerve deficit.   Skin: Skin is warm and dry. He is not diaphoretic. No erythema.   Psychiatric: He has a normal mood and affect. His behavior is normal. Thought content normal.       Significant Labs: All pertinent labs within the past 24 hours have been reviewed.    Significant Imaging: I have reviewed all pertinent imaging results/findings within the past 24 hours.

## 2019-07-19 NOTE — PROGRESS NOTES
"[facility] Hospital Medicine  Progress Note    Patient Name: Paddy Stevenson  MRN: [id number]  Patient Class: IP- Inpatient   Admission Date: 7/16/2019  Length of Stay: 2 days  Attending Physician: Keily Canchola MD  Primary Care Provider: Primary Doctor [facility] Medicine Team: [facility] MED Y Alexa Servin PA-C    Subjective:     Principal Problem:Sickle cell pain crisis      HPI:  25 y/o M with PMH sickle cell anemia and AVN of bilateral hips s/p L hip replacement 2 years ago presents c/o R hip, back, and thigh pain. The pain started 6 days ago but has progressively worsened since onset. Pain is described as aching/throbbing. Pt has taken MS Contin 45 mg as prescribed by his hematologist (Dr. Minaya at [facility]) without relief of pain. He also has tried heat/ice and hot showers with no relief. Pt reports the right sided pain is similar to pain he experiences with left AVN prior to hip replacement. Typical sickle cell crisis pain is generalized pain (not localized), which he is not experiencing at this time. He does report working yesterday at [employer] requiring excessive standing and climbing of ladders. He has seen 2 different orthopedic surgeons regarding AVN with both awaiting further progression of AVN to operate. Pt reports last MRI in [place]. Per records (12/2018), MRI showed avascular necrosis of the right femoral head without subchondral collapse. He follows with hematology regularly, last appointment 2 weeks ago but reports he has to get a new hematologist because hematologist has "too many patients." He reports compliance with hydroxyurea.  He denies fever/chills, N/V/D, abdominal pain, CP, SOB, dizziness, recent illness or sick contacts, and alcohol use.     Of note, pt has flag in chart due to suspected drug seeking and malingering behavior dating back to 2015. Pt seen at 4 different facilities on 7 different occasions for complaints of pain since 7/1 as listed " below. He reports last MS contin prescribed by hematologist in June. He has 38 pills remaining. Denies daily use (only uses when he needs it) and reports going several days without medication when not in pain. Pt last admitted to [facility] on 7/7/19 for sickle cell anemia with pain. Hematology consulted but pt discharged before seen. Discharged on 7/8/19 with .      7/1: [facility]  7/2: [facility]  7/3: [facility]  7/7: [facility]  7/7: [facility]  7/13: [facility]  7/15: [facility]    In the ED: VSS. Afebrile without leukocytosis. Hgb 9.9 (baseline), retic (13) and Tbili (2.2) mildly elevated. R hip XR with no acute fracture.  Diffuse sclerosis proximal right femur, unchanged. Pt refused morphine, states that he is allergic and gets hives. Given dilaudid with temporary improvement of pain. PO fluids encouraged (ED had difficulty obtaining IV).      Overview/Hospital Course:  Pt admitted for sickle cell crisis and R hip pain.  LDH, T bili, HgbS all elevated and low haptoglobin consistent with sickle cell crisis.  Pt does have strong history of drug seeking behavior.  PT/OT ordered and home no needs (very limited participation).  MRI R hip focal area of avascular necrosis involving at least 50% of the articular surface.  No imaging findings to suggest subchondral plate collapse.  Additional focal area of avascular necrosis within the right iliac crest and possibly within the right proximal femoral shaft, the latter which is not well evaluated secondary to partial visualization.  Osseous stigmata of sickle cell disease, similar when compared to multiple previous exams.  MS Contin bid and dilaudid prn.  Day 3 patient reported hitting his R hip while walking to the bathroom, no falls.  Resistant to weaning IV pain medications.  WBC increased, CXR and UA ordered.    Interval History: overnight pt reports hitting his R hip while on the way to the  bathroom, he did not fall, however his pain is worse today b/c of this.  He is resistant to weaning IV pain medications, discussed increasing dose of oral pain medication.  Explained again rationale of managing pain from multiple angles (IV, po, scheduled tylenol).  Pt reports he has tried gabapentin in the past and does not want to try again.    Review of Systems   Constitutional: Positive for activity change. Negative for chills, fatigue, fever and unexpected weight change.   HENT: Negative for congestion, drooling, sore throat and trouble swallowing.    Respiratory: Negative for cough, chest tightness, shortness of breath and wheezing.    Cardiovascular: Negative for chest pain, palpitations and leg swelling.   Gastrointestinal: Negative for abdominal distention, abdominal pain, constipation, diarrhea, nausea and vomiting.   Genitourinary: Negative for difficulty urinating, dysuria, frequency and hematuria.   Musculoskeletal: Positive for arthralgias, back pain, gait problem and myalgias. Negative for joint swelling and neck pain.   Skin: Negative for color change, pallor, rash and wound.   Neurological: Negative for dizziness, syncope, speech difficulty and light-headedness.   Psychiatric/Behavioral: Negative for agitation, confusion and decreased concentration. The patient is not nervous/anxious.      Objective:     Vital Signs (Most Recent):  Temp: 98 °F (36.7 °C) (07/19/19 0755)  Pulse: 70 (07/19/19 0755)  Resp: 18 (07/19/19 0755)  BP: 118/67 (07/19/19 0755)  SpO2: (!) 93 % (07/19/19 0755) Vital Signs (24h Range):  Temp:  [96.3 °F (35.7 °C)-98.3 °F (36.8 °C)] 98 °F (36.7 °C)  Pulse:  [62-79] 70  Resp:  [18-20] 18  SpO2:  [93 %-100 %] 93 %  BP: (116-126)/(58-71) 118/67     Weight: 76.8 kg (169 lb 6.4 oz)  Body mass index is 25.02 kg/m².    Intake/Output Summary (Last 24 hours) at 7/19/2019 0928  Last data filed at 7/19/2019 0229  Gross per 24 hour   Intake 750 ml   Output 1850 ml   Net -1100 ml      Physical  Exam   Constitutional: He is oriented to person, place, and time. He appears well-developed and well-nourished. No distress.   HENT:   Head: Normocephalic and atraumatic.   Right Ear: External ear normal.   Left Ear: External ear normal.   Eyes: Pupils are equal, round, and reactive to light. Conjunctivae and EOM are normal.   Neck: Normal range of motion. Neck supple.   Cardiovascular: Normal rate and regular rhythm.   Pulmonary/Chest: Effort normal and breath sounds normal. He has no wheezes.   Abdominal: Soft. Bowel sounds are normal. He exhibits no distension. There is no tenderness.   Musculoskeletal: He exhibits tenderness. He exhibits no edema.   Significant right-sided lower back pain with waist flexion  Unable to fully access ROM and strength of RLE due to pain on exam  Full ROM and strength of b/l UE     Neurological: He is alert and oriented to person, place, and time. No cranial nerve deficit.   Skin: Skin is warm and dry. He is not diaphoretic. No erythema.   Psychiatric: He has a normal mood and affect. His behavior is normal. Thought content normal.       Significant Labs: All pertinent labs within the past 24 hours have been reviewed.    Significant Imaging: I have reviewed all pertinent imaging results/findings within the past 24 hours.      Assessment/Plan:      * Sickle cell pain crisis  Retic count elevated at 13  LDH elevated at 314, T bili elevated at 2.2  Hgb S, Haptoglobin added on  Hgb baseline 8-10, on admission Hgb 9.9  Based on elevated LDH and T bili, appears patient in mild crisis. Will proceed with sickle cell pain management and protocol.   7/18 Dr. Cnachola at bedside today.  Reviewed with pt we will schedule MS Contin and continue dilaudid 1 mg prn (hold for sedation) with plan to wean IV pain medication tomorrow.  Very limited participation with PT/OT.  Pt hit R hip overnight while walking to the bathroom, no falls.  7/19 increased MS contin from 30 to 45 mg bid (home dose) and  decreased dilaudid 1 mg from q 3 hrs prn to q 6 hrs prn.  Scheduled tylenol.  Refused gabapentin (no relief after 6 month trial in the past) and lidocaine patches (allergic)    Avascular necrosis of bones of both hips  S/p L hip replacement ~2 years ago  MRI R hip (12/2018) at OSH: Avascular necrosis of the right femoral head without subchondral collapse. 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  R hip xray: No acute fracture.  Diffuse sclerosis proximal right femur, unchanged.  Pt reports discussing AVN with 2 orthopedic surgeons with no intent to operate at this time until, awaiting worsening of condition.  ESR/CRP: 7/10.3, low suspicion for infection  Repeat MRI R. hip  Last PT session June of last year. PT/OT eval: no needs, refused OT eval.    Leukocytosis  WBC 10.66->14.12, left shift, procal slightly elevated  Ordered CXR and UA  No chest pain      Drug-seeking behavior  See above, does appear patient in mild crisis  Drug screen ordered: presumptive positive for opioids  ER visits July 2019 7/1: [facility]  7/2: [facility]  7/3: [facility]  7/7: [facility]  7/7: [facility]  7/13: [facility]  7/15: [facility]  7/16: [facility]    Mild intermittent asthma without complication  Sats 98-99% on RA  Controlled  Will order prn breathing treatments        VTE Risk Mitigation (From admission, onward)        Ordered     IP VTE LOW RISK PATIENT  Once      07/17/19 0732     Place BONG hose  Until discontinued      07/17/19 0732                Alexa Servin PA-C  Department of Hospital Medicine   [facility]

## 2019-07-19 NOTE — PLAN OF CARE
Per hudalejandra, pt is medically stable for discharge and will need to follow w/[facility] for a primary care appointment.       07/19/19 1022   Post-Acute Status   Post-Acute Authorization Other   Other Status No Post-Acute Service Needs  (Pt is ready for discharge w/no needs by SW)

## 2019-07-19 NOTE — ASSESSMENT & PLAN NOTE
See above, does appear patient in mild crisis  Drug screen ordered: presumptive positive for opioids  ER visits July 2019 7/1: [facility]  7/2: [facility]  7/3: [facility]  7/7: [facility]  7/7: [facility]  7/13: [facility]  7/15: [facility]  7/16: [facility]

## 2019-07-19 NOTE — ASSESSMENT & PLAN NOTE
Retic count elevated at 13  LDH elevated at 314, T bili elevated at 2.2  Hgb S, Haptoglobin added on  Hgb baseline 8-10, on admission Hgb 9.9  Based on elevated LDH and T bili, appears patient in mild crisis. Will proceed with sickle cell pain management and protocol.   7/18 Dr. Canchola at bedside today.  Reviewed with pt we will schedule MS Contin and continue dilaudid 1 mg prn (hold for sedation) with plan to wean IV pain medication tomorrow.  Very limited participation with PT/OT.  Pt hit R hip overnight while walking to the bathroom, no falls.  7/19 increased MS contin from 30 to 45 mg bid (home dose) and decreased dilaudid 1 mg from q 3 hrs prn to q 6 hrs prn.  Scheduled tylenol.  Refused gabapentin (no relief after 6 month trial in the past) and lidocaine patches (allergic)

## 2019-07-20 LAB
ALBUMIN SERPL BCP-MCNC: 3.5 G/DL (ref 3.5–5.2)
ALP SERPL-CCNC: 60 U/L (ref 55–135)
ALT SERPL W/O P-5'-P-CCNC: 18 U/L (ref 10–44)
ANION GAP SERPL CALC-SCNC: 9 MMOL/L (ref 8–16)
ANISOCYTOSIS BLD QL SMEAR: SLIGHT
AST SERPL-CCNC: 34 U/L (ref 10–40)
BASOPHILS # BLD AUTO: 0.1 K/UL (ref 0–0.2)
BASOPHILS NFR BLD: 0.9 % (ref 0–1.9)
BILIRUB SERPL-MCNC: 2.6 MG/DL (ref 0.1–1)
BUN SERPL-MCNC: 6 MG/DL (ref 6–20)
BURR CELLS BLD QL SMEAR: ABNORMAL
CALCIUM SERPL-MCNC: 9.2 MG/DL (ref 8.7–10.5)
CHLORIDE SERPL-SCNC: 110 MMOL/L (ref 95–110)
CO2 SERPL-SCNC: 22 MMOL/L (ref 23–29)
CREAT SERPL-MCNC: 0.8 MG/DL (ref 0.5–1.4)
DACRYOCYTES BLD QL SMEAR: ABNORMAL
DIFFERENTIAL METHOD: ABNORMAL
EOSINOPHIL # BLD AUTO: 0.7 K/UL (ref 0–0.5)
EOSINOPHIL NFR BLD: 6.1 % (ref 0–8)
ERYTHROCYTE [DISTWIDTH] IN BLOOD BY AUTOMATED COUNT: 23.1 % (ref 11.5–14.5)
EST. GFR  (AFRICAN AMERICAN): >60 ML/MIN/1.73 M^2
EST. GFR  (NON AFRICAN AMERICAN): >60 ML/MIN/1.73 M^2
GLUCOSE SERPL-MCNC: 111 MG/DL (ref 70–110)
HCT VFR BLD AUTO: 26.7 % (ref 40–54)
HGB BLD-MCNC: 8.9 G/DL (ref 14–18)
HOWELL-JOLLY BOD BLD QL SMEAR: ABNORMAL
HYPOCHROMIA BLD QL SMEAR: ABNORMAL
IMM GRANULOCYTES # BLD AUTO: 0.45 K/UL (ref 0–0.04)
IMM GRANULOCYTES NFR BLD AUTO: 4.2 % (ref 0–0.5)
LYMPHOCYTES # BLD AUTO: 2.8 K/UL (ref 1–4.8)
LYMPHOCYTES NFR BLD: 26.2 % (ref 18–48)
MCH RBC QN AUTO: 29.9 PG (ref 27–31)
MCHC RBC AUTO-ENTMCNC: 33.3 G/DL (ref 32–36)
MCV RBC AUTO: 90 FL (ref 82–98)
MONOCYTES # BLD AUTO: 0.9 K/UL (ref 0.3–1)
MONOCYTES NFR BLD: 8.5 % (ref 4–15)
NEUTROPHILS # BLD AUTO: 5.9 K/UL (ref 1.8–7.7)
NEUTROPHILS NFR BLD: 54.1 % (ref 38–73)
NRBC BLD-RTO: 2 /100 WBC
OVALOCYTES BLD QL SMEAR: ABNORMAL
PAPPENHEIMER BOD BLD QL SMEAR: PRESENT
PLATELET # BLD AUTO: 232 K/UL (ref 150–350)
PLATELET BLD QL SMEAR: ABNORMAL
PMV BLD AUTO: 11 FL (ref 9.2–12.9)
POIKILOCYTOSIS BLD QL SMEAR: SLIGHT
POLYCHROMASIA BLD QL SMEAR: ABNORMAL
POTASSIUM SERPL-SCNC: 4 MMOL/L (ref 3.5–5.1)
PROT SERPL-MCNC: 6.6 G/DL (ref 6–8.4)
RBC # BLD AUTO: 2.98 M/UL (ref 4.6–6.2)
RETICS/RBC NFR AUTO: 16.5 % (ref 0.4–2)
SCHISTOCYTES BLD QL SMEAR: ABNORMAL
SCHISTOCYTES BLD QL SMEAR: PRESENT
SICKLE CELLS BLD QL SMEAR: ABNORMAL
SODIUM SERPL-SCNC: 141 MMOL/L (ref 136–145)
SPHEROCYTES BLD QL SMEAR: ABNORMAL
TARGETS BLD QL SMEAR: ABNORMAL
WBC # BLD AUTO: 10.82 K/UL (ref 3.9–12.7)

## 2019-07-20 PROCEDURE — 63600175 PHARM REV CODE 636 W HCPCS: Performed by: PHYSICIAN ASSISTANT

## 2019-07-20 PROCEDURE — 36415 COLL VENOUS BLD VENIPUNCTURE: CPT

## 2019-07-20 PROCEDURE — 99233 PR SUBSEQUENT HOSPITAL CARE,LEVL III: ICD-10-PCS | Mod: ,,, | Performed by: PHYSICIAN ASSISTANT

## 2019-07-20 PROCEDURE — 25000003 PHARM REV CODE 250: Performed by: PHYSICIAN ASSISTANT

## 2019-07-20 PROCEDURE — 85045 AUTOMATED RETICULOCYTE COUNT: CPT

## 2019-07-20 PROCEDURE — 85025 COMPLETE CBC W/AUTO DIFF WBC: CPT

## 2019-07-20 PROCEDURE — 80053 COMPREHEN METABOLIC PANEL: CPT

## 2019-07-20 PROCEDURE — 99233 SBSQ HOSP IP/OBS HIGH 50: CPT | Mod: ,,, | Performed by: PHYSICIAN ASSISTANT

## 2019-07-20 PROCEDURE — 11000001 HC ACUTE MED/SURG PRIVATE ROOM

## 2019-07-20 RX ORDER — HYDROMORPHONE HYDROCHLORIDE 1 MG/ML
1 INJECTION, SOLUTION INTRAMUSCULAR; INTRAVENOUS; SUBCUTANEOUS EVERY 8 HOURS PRN
Status: DISCONTINUED | OUTPATIENT
Start: 2019-07-20 | End: 2019-07-20

## 2019-07-20 RX ORDER — IBUPROFEN 400 MG/1
400 TABLET ORAL EVERY 6 HOURS
Status: DISCONTINUED | OUTPATIENT
Start: 2019-07-20 | End: 2019-07-25 | Stop reason: HOSPADM

## 2019-07-20 RX ORDER — HYDROMORPHONE HYDROCHLORIDE 1 MG/ML
1 INJECTION, SOLUTION INTRAMUSCULAR; INTRAVENOUS; SUBCUTANEOUS EVERY 4 HOURS PRN
Status: DISCONTINUED | OUTPATIENT
Start: 2019-07-20 | End: 2019-07-21

## 2019-07-20 RX ADMIN — ACETAMINOPHEN 650 MG: 325 TABLET ORAL at 09:07

## 2019-07-20 RX ADMIN — IBUPROFEN 400 MG: 400 TABLET, FILM COATED ORAL at 11:07

## 2019-07-20 RX ADMIN — SODIUM CHLORIDE: 0.9 INJECTION, SOLUTION INTRAVENOUS at 06:07

## 2019-07-20 RX ADMIN — HYDROMORPHONE HYDROCHLORIDE 1 MG: 1 INJECTION, SOLUTION INTRAMUSCULAR; INTRAVENOUS; SUBCUTANEOUS at 02:07

## 2019-07-20 RX ADMIN — HYDROMORPHONE HYDROCHLORIDE 1 MG: 1 INJECTION, SOLUTION INTRAMUSCULAR; INTRAVENOUS; SUBCUTANEOUS at 06:07

## 2019-07-20 RX ADMIN — FOLIC ACID 1 MG: 1 TABLET ORAL at 09:07

## 2019-07-20 RX ADMIN — SODIUM CHLORIDE: 0.9 INJECTION, SOLUTION INTRAVENOUS at 01:07

## 2019-07-20 RX ADMIN — SODIUM CHLORIDE: 0.9 INJECTION, SOLUTION INTRAVENOUS at 10:07

## 2019-07-20 RX ADMIN — ACETAMINOPHEN 650 MG: 325 TABLET ORAL at 02:07

## 2019-07-20 RX ADMIN — MORPHINE SULFATE 45 MG: 30 TABLET, EXTENDED RELEASE ORAL at 09:07

## 2019-07-20 RX ADMIN — IBUPROFEN 400 MG: 400 TABLET, FILM COATED ORAL at 06:07

## 2019-07-20 RX ADMIN — HYDROMORPHONE HYDROCHLORIDE 1 MG: 1 INJECTION, SOLUTION INTRAMUSCULAR; INTRAVENOUS; SUBCUTANEOUS at 10:07

## 2019-07-20 NOTE — PLAN OF CARE
Problem: Adult Inpatient Plan of Care  Goal: Plan of Care Review  POC reviewed with pt. AAO x4.  C/o pain controlled with pain PRN and scheduled pain medicine. IVF infusing.  Pt remained free from falls. Questions and concerns addressed. Pt progressing towards goals. Will continue to monitor. See flow sheets for full assessment and VS

## 2019-07-20 NOTE — SUBJECTIVE & OBJECTIVE
"Interval History: Pt sound asleep upon arrival without noted distress. Once woken up, pt gave appearance of distress and pain presenting as grunting and fist clenching. Pt continues to discuss dissatisfaction with pain management with night team even after lengthy discussion yesterday about discussing complaints with the day provider between 7 am- 7 pm. Pt reports hip pain is mildly improved but current regimen is not enough to resolve pain to a point of daily functioning. His desired regimen that has worked in the past is dilaudid 2 mg every 3 hours. He states he did not reveal this upon admission and for the past 3 days because he did not want to appear "pushy and demanding." Despite reported persistent severe pain, pt refuses scheduled tylenol. Discussed multimodal therapy with pt and encouraged compliance. Pt ambulating to the restroom (unwitnessed) but screams out in severe pain with R knee flexion. Discussed pain management regimen again with patient and emphasized importance of contacting day shift for complaints. RN present for discussion of care plan.  Tachycardic at 137 noted last night increasing MEWS score alerting rapid response but repeat pulse in the 80s.     Review of Systems   Constitutional: Positive for activity change. Negative for chills, fatigue, fever and unexpected weight change.   HENT: Negative for congestion, drooling, sore throat and trouble swallowing.    Respiratory: Negative for cough, chest tightness, shortness of breath and wheezing.    Cardiovascular: Negative for chest pain, palpitations and leg swelling.   Gastrointestinal: Negative for abdominal distention, abdominal pain, constipation, diarrhea, nausea and vomiting.   Genitourinary: Negative for difficulty urinating, dysuria, frequency and hematuria.   Musculoskeletal: Positive for arthralgias, back pain, gait problem and myalgias. Negative for joint swelling and neck pain.   Skin: Negative for color change, pallor, rash and " wound.   Neurological: Negative for dizziness, syncope, speech difficulty and light-headedness.   Psychiatric/Behavioral: Negative for agitation, confusion and decreased concentration. The patient is not nervous/anxious.      Objective:     Vital Signs (Most Recent):  Temp: 98.2 °F (36.8 °C) (07/20/19 0731)  Pulse: 70 (07/20/19 0731)  Resp: 18 (07/20/19 0731)  BP: (!) 115/56 (07/20/19 0731)  SpO2: 97 % (07/20/19 0731) Vital Signs (24h Range):  Temp:  [96.4 °F (35.8 °C)-98.9 °F (37.2 °C)] 98.2 °F (36.8 °C)  Pulse:  [67-84] 70  Resp:  [18-20] 18  SpO2:  [93 %-97 %] 97 %  BP: (115-129)/(56-70) 115/56     Weight: 76.8 kg (169 lb 6.4 oz)  Body mass index is 25.02 kg/m².    Intake/Output Summary (Last 24 hours) at 7/20/2019 1008  Last data filed at 7/20/2019 0630  Gross per 24 hour   Intake 450 ml   Output 1200 ml   Net -750 ml      Physical Exam   Constitutional: He is oriented to person, place, and time. He appears well-developed and well-nourished. No distress.   HENT:   Head: Normocephalic and atraumatic.   Right Ear: External ear normal.   Left Ear: External ear normal.   Eyes: Pupils are equal, round, and reactive to light. Conjunctivae and EOM are normal.   Neck: Normal range of motion. Neck supple.   Cardiovascular: Normal rate and regular rhythm.   Pulmonary/Chest: Effort normal and breath sounds normal. He has no wheezes.   Abdominal: Soft. Bowel sounds are normal. He exhibits no distension. There is no tenderness.   Musculoskeletal: He exhibits tenderness. He exhibits no edema.   Significant right-sided lower back pain with waist flexion  Unable to fully access ROM and strength of RLE due to pain on exam - Pt screams out in pain with passive R knee flexion but knee flexes without difficulty   Full ROM and strength of b/l UE     Neurological: He is alert and oriented to person, place, and time. No cranial nerve deficit.   Skin: Skin is warm and dry. He is not diaphoretic. No erythema.   Psychiatric: He has a  normal mood and affect. His behavior is normal. Thought content normal.       Significant Labs: All pertinent labs within the past 24 hours have been reviewed.    Significant Imaging: I have reviewed all pertinent imaging results/findings within the past 24 hours.

## 2019-07-20 NOTE — ASSESSMENT & PLAN NOTE
WBC 10.66->14.12, left shift, procal slightly elevated  Ordered CXR and UA - no signs of infection  No chest pain  Continue to monitor

## 2019-07-20 NOTE — CARE UPDATE
"RAPID RESPONSE NURSE PROACTIVE ROUNDING NOTE     Time of Visit: 0120    Admit Date: 2019  LOS: 3  Code Status: Full Code   Date of Visit: 2019  : 1995  Age: 24 y.o.  Sex: male  Race: Black or   Bed: [address] [place] [address] A:   MRN: [id number]  Was the patient discharged from an ICU this admission? no   Was the patient discharged from a PACU within last 24 hours?  no  Did the patient receive conscious sedation/general anesthesia in last 24 hours?  no  Was the patient in the ED within the past 24 hours?  no  Was the patient started on NIPPV within the past 24 hours?  no  Attending Physician: Keily Canchola MD  Primary Service: [facility] MED Y    ASSESSMENT     Diagnosis: Sickle cell pain crisis    Abnormal Vital Signs: /61   Pulse (!) 137   Temp 96.4 °F (35.8 °C)   Resp (!) 24   Ht 5' 9" (1.753 m)   Wt 76.8 kg (169 lb 6.4 oz)   SpO2 100%   BMI 25.02 kg/m²      Clinical Issues: Circulatory    Patient  has a past medical history of Asthma, AVN (avascular necrosis of bone), Encounter for blood transfusion, Irregular heart rhythm, Malingering, Mild intermittent asthma without complication, Seasonal allergies, Sickle cell anemia, and Stroke.    Proactive rounding for MEWS 6, Hr 137. Pt resting quietly. BS RN VS re-check HR 80. No acute distress     INTERVENTIONS/ RECOMMENDATIONS     None    Discussed plan of care with Pranav BOUDREAUX.    PHYSICIAN ESCALATION     Yes/No  no    Orders received and case discussed with NA.    Disposition: Remain in room 653.    FOLLOW-UP     Call back the Rapid Response Nurse, Osiris Rivas RN at [phone] for additional questions or concerns.          "

## 2019-07-20 NOTE — PLAN OF CARE
Problem: Adult Inpatient Plan of Care  Goal: Plan of Care Review  Outcome: Ongoing (interventions implemented as appropriate)     07/20/19 0624   Plan of Care Review   Plan of Care Reviewed With patient     Pain management ongoing, IVF ongoing at 125 ml/hr, pain meds administered as requested, all precautions maintained, will continue to monitor pt.

## 2019-07-20 NOTE — PROGRESS NOTES
"[facility] Hospital Medicine  Progress Note    Patient Name: Paddy Stevenson  MRN: [id number]  Patient Class: IP- Inpatient   Admission Date: 7/16/2019  Length of Stay: 3 days  Attending Physician: Keily Canchola MD  Primary Care Provider: Primary Doctor [facility] Medicine Team: [facility] MED Y Alexa Servin PA-C    Subjective:     Principal Problem:Sickle cell pain crisis      HPI:  23 y/o M with PMH sickle cell anemia and AVN of bilateral hips s/p L hip replacement 2 years ago presents c/o R hip, back, and thigh pain. The pain started 6 days ago but has progressively worsened since onset. Pain is described as aching/throbbing. Pt has taken MS Contin 45 mg as prescribed by his hematologist (Dr. Minaya at [facility]) without relief of pain. He also has tried heat/ice and hot showers with no relief. Pt reports the right sided pain is similar to pain he experiences with left AVN prior to hip replacement. Typical sickle cell crisis pain is generalized pain (not localized), which he is not experiencing at this time. He does report working yesterday at [employer] requiring excessive standing and climbing of ladders. He has seen 2 different orthopedic surgeons regarding AVN with both awaiting further progression of AVN to operate. Pt reports last MRI in [place]. Per records (12/2018), MRI showed avascular necrosis of the right femoral head without subchondral collapse. He follows with hematology regularly, last appointment 2 weeks ago but reports he has to get a new hematologist because hematologist has "too many patients." He reports compliance with hydroxyurea.  He denies fever/chills, N/V/D, abdominal pain, CP, SOB, dizziness, recent illness or sick contacts, and alcohol use.     Of note, pt has flag in chart due to suspected drug seeking and malingering behavior dating back to 2015. Pt seen at 4 different facilities on 7 different occasions for complaints of pain since 7/1 as listed " below. He reports last MS contin prescribed by hematologist in June. He has 38 pills remaining. Denies daily use (only uses when he needs it) and reports going several days without medication when not in pain. Pt last admitted to [facility] on 7/7/19 for sickle cell anemia with pain. Hematology consulted but pt discharged before seen. Discharged on 7/8/19 with .      7/1: [facility]  7/2: [facility]  7/3:   7/7: [facility]  7/7: [facility]  7/13: [facility]  7/15: [facility]    In the ED: VSS. Afebrile without leukocytosis. Hgb 9.9 (baseline), retic (13) and Tbili (2.2) mildly elevated. R hip XR with no acute fracture.  Diffuse sclerosis proximal right femur, unchanged. Pt refused morphine, states that he is allergic and gets hives. Given dilaudid with temporary improvement of pain. PO fluids encouraged (ED had difficulty obtaining IV).      Overview/Hospital Course:  Pt admitted for sickle cell crisis and R hip pain.  LDH, T bili, HgbS all elevated and low haptoglobin consistent with sickle cell crisis.  Pt does have strong history of drug seeking behavior.  PT/OT ordered and home no needs (very limited participation).  MRI R hip focal area of avascular necrosis involving at least 50% of the articular surface.  No imaging findings to suggest subchondral plate collapse.  Additional focal area of avascular necrosis within the right iliac crest and possibly within the right proximal femoral shaft, the latter which is not well evaluated secondary to partial visualization.  Osseous stigmata of sickle cell disease, similar when compared to multiple previous exams.  MS Contin bid and dilaudid prn.  Day 3 patient reported hitting his R hip while walking to the bathroom, no falls.  Resistant to weaning IV pain medications.  WBC increased, CXR and UA ordered without noted infection, trended down the following day.    Interval History: Pt  "sound asleep upon arrival without noted distress. Once woken up, pt gave appearance of distress and pain presenting as grunting and fist clenching. Pt continues to discuss dissatisfaction with pain management with night team even after lengthy discussion yesterday about discussing complaints with the day provider between 7 am- 7 pm. Pt reports hip pain is mildly improved but current regimen is not enough to resolve pain to a point of daily functioning. His desired regimen that has worked in the past is dilaudid 2 mg every 3 hours. He states he did not reveal this upon admission and for the past 3 days because he did not want to appear "pushy and demanding." Despite reported persistent severe pain, pt refuses scheduled tylenol. Discussed multimodal therapy with pt and encouraged compliance. Pt ambulating to the restroom (unwitnessed) but screams out in severe pain with R knee flexion. Discussed pain management regimen again with patient and emphasized importance of contacting day shift for complaints. RN present for discussion of care plan.  Tachycardic at 137 noted last night increasing MEWS score alerting rapid response but repeat pulse in the 80s.     Review of Systems   Constitutional: Positive for activity change. Negative for chills, fatigue, fever and unexpected weight change.   HENT: Negative for congestion, drooling, sore throat and trouble swallowing.    Respiratory: Negative for cough, chest tightness, shortness of breath and wheezing.    Cardiovascular: Negative for chest pain, palpitations and leg swelling.   Gastrointestinal: Negative for abdominal distention, abdominal pain, constipation, diarrhea, nausea and vomiting.   Genitourinary: Negative for difficulty urinating, dysuria, frequency and hematuria.   Musculoskeletal: Positive for arthralgias, back pain, gait problem and myalgias. Negative for joint swelling and neck pain.   Skin: Negative for color change, pallor, rash and wound.   Neurological: " Negative for dizziness, syncope, speech difficulty and light-headedness.   Psychiatric/Behavioral: Negative for agitation, confusion and decreased concentration. The patient is not nervous/anxious.      Objective:     Vital Signs (Most Recent):  Temp: 98.2 °F (36.8 °C) (07/20/19 0731)  Pulse: 70 (07/20/19 0731)  Resp: 18 (07/20/19 0731)  BP: (!) 115/56 (07/20/19 0731)  SpO2: 97 % (07/20/19 0731) Vital Signs (24h Range):  Temp:  [96.4 °F (35.8 °C)-98.9 °F (37.2 °C)] 98.2 °F (36.8 °C)  Pulse:  [67-84] 70  Resp:  [18-20] 18  SpO2:  [93 %-97 %] 97 %  BP: (115-129)/(56-70) 115/56     Weight: 76.8 kg (169 lb 6.4 oz)  Body mass index is 25.02 kg/m².    Intake/Output Summary (Last 24 hours) at 7/20/2019 1008  Last data filed at 7/20/2019 0630  Gross per 24 hour   Intake 450 ml   Output 1200 ml   Net -750 ml      Physical Exam   Constitutional: He is oriented to person, place, and time. He appears well-developed and well-nourished. No distress.   HENT:   Head: Normocephalic and atraumatic.   Right Ear: External ear normal.   Left Ear: External ear normal.   Eyes: Pupils are equal, round, and reactive to light. Conjunctivae and EOM are normal.   Neck: Normal range of motion. Neck supple.   Cardiovascular: Normal rate and regular rhythm.   Pulmonary/Chest: Effort normal and breath sounds normal. He has no wheezes.   Abdominal: Soft. Bowel sounds are normal. He exhibits no distension. There is no tenderness.   Musculoskeletal: He exhibits tenderness. He exhibits no edema.   Significant right-sided lower back pain with waist flexion  Unable to fully access ROM and strength of RLE due to pain on exam - Pt screams out in pain with passive R knee flexion but knee flexes without difficulty   Full ROM and strength of b/l UE     Neurological: He is alert and oriented to person, place, and time. No cranial nerve deficit.   Skin: Skin is warm and dry. He is not diaphoretic. No erythema.   Psychiatric: He has a normal mood and affect.  "His behavior is normal. Thought content normal.       Significant Labs: All pertinent labs within the past 24 hours have been reviewed.    Significant Imaging: I have reviewed all pertinent imaging results/findings within the past 24 hours.      Assessment/Plan:      * Sickle cell pain crisis  Retic count elevated at 13  LDH elevated at 314, T bili elevated at 2.2  Hgb S, Haptoglobin added on  Hgb baseline 8-10, on admission Hgb 9.9  Based on elevated LDH and T bili, appears patient in mild crisis. Will proceed with sickle cell pain management and protocol.   7/18 Dr. Canchola at bedside today.  Reviewed with pt we will schedule MS Contin and continue dilaudid 1 mg prn (hold for sedation) with plan to wean IV pain medication tomorrow.  Very limited participation with PT/OT.  Pt hit R hip overnight while walking to the bathroom, no falls.  7/19 increased MS contin from 30 to 45 mg bid (home dose) and decreased dilaudid 1 mg from q 3 hrs prn to q 6 hrs prn.  Scheduled tylenol.  Refused gabapentin (no relief after 6 month trial in the past) and lidocaine patches (allergic)  7/20 See interval history for documentation of discussion of care plan with RN present.  Pt reports hip pain mildly improved but still experiences pain "everywhere else" to the point of not being able to return to daily functioning. MS contin 45 mg bid continued and dilaudid 1 mg increased to q 4 hrs prn. Pt has been refusing scheduled tylenol. Added ibuprofen for multimodal therapy, encouraged compliance.    Avascular necrosis of bones of both hips  S/p L hip replacement ~2 years ago  MRI R hip (12/2018) at OSH: Avascular necrosis of the right femoral head without subchondral collapse. 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  R hip xray: No acute fracture.  Diffuse sclerosis proximal right femur, unchanged.  Pt reports discussing AVN with 2 orthopedic surgeons with no intent to operate at this time until, awaiting worsening of " condition.  ESR/CRP: 7/10.3, low suspicion for infection  Repeat MRI R. hip  Last PT session June of last year. PT/OT eval: no needs, refused OT eval.    Leukocytosis  WBC 10.66->14.12, left shift, procal slightly elevated  Ordered CXR and UA - no signs of infection  No chest pain  Continue to monitor    Drug-seeking behavior  See above, does appear patient in mild crisis  Drug screen ordered: presumptive positive for opioids  ER visits July 2019 7/1: [facility]  7/2: [facility]  [place]: [place]  7/7: [facility]  7/7: [facility]  7/13: [facility]  7/15: [facility]  7/16: [facility]    Mild intermittent asthma without complication  Sats 98-99% on RA  Controlled  Will order prn breathing treatments        VTE Risk Mitigation (From admission, onward)        Ordered     IP VTE LOW RISK PATIENT  Once      07/17/19 0732     Place BONG hose  Until discontinued      07/17/19 0732                Alexa Servin PA-C  Department of Hospital Medicine   [facility]

## 2019-07-20 NOTE — ASSESSMENT & PLAN NOTE
See above, does appear patient in mild crisis  Drug screen ordered: presumptive positive for opioids  ER visits July 2019 7/1: [facility]  7/2: [facility]  7/3: [facility]  7/7: [facility]  7/7: [facility]  7/13: [facility]  7/15: [facility]  7/16: [facility]

## 2019-07-20 NOTE — ASSESSMENT & PLAN NOTE
"Retic count elevated at 13  LDH elevated at 314, T bili elevated at 2.2  Hgb S, Haptoglobin added on  Hgb baseline 8-10, on admission Hgb 9.9  Based on elevated LDH and T bili, appears patient in mild crisis. Will proceed with sickle cell pain management and protocol.   7/18 Dr. Canchola at bedside today.  Reviewed with pt we will schedule MS Contin and continue dilaudid 1 mg prn (hold for sedation) with plan to wean IV pain medication tomorrow.  Very limited participation with PT/OT.  Pt hit R hip overnight while walking to the bathroom, no falls.  7/19 increased MS contin from 30 to 45 mg bid (home dose) and decreased dilaudid 1 mg from q 3 hrs prn to q 6 hrs prn.  Scheduled tylenol.  Refused gabapentin (no relief after 6 month trial in the past) and lidocaine patches (allergic)  7/20 See interval history for documentation of discussion of care plan with RN present.  Pt reports hip pain mildly improved but still experiences pain "everywhere else" to the point of not being able to return to daily functioning. MS contin 45 mg bid continued and dilaudid 1 mg increased to q 4 hrs prn. Pt has been refusing scheduled tylenol. Added ibuprofen for multimodal therapy, encouraged compliance.  "

## 2019-07-21 LAB
ALBUMIN SERPL BCP-MCNC: 3.7 G/DL (ref 3.5–5.2)
ALP SERPL-CCNC: 58 U/L (ref 55–135)
ALT SERPL W/O P-5'-P-CCNC: 19 U/L (ref 10–44)
ANION GAP SERPL CALC-SCNC: 10 MMOL/L (ref 8–16)
AST SERPL-CCNC: 33 U/L (ref 10–40)
BASOPHILS # BLD AUTO: 0.11 K/UL (ref 0–0.2)
BASOPHILS NFR BLD: 1 % (ref 0–1.9)
BILIRUB SERPL-MCNC: 2.3 MG/DL (ref 0.1–1)
BUN SERPL-MCNC: 7 MG/DL (ref 6–20)
CALCIUM SERPL-MCNC: 8.8 MG/DL (ref 8.7–10.5)
CHLORIDE SERPL-SCNC: 109 MMOL/L (ref 95–110)
CO2 SERPL-SCNC: 21 MMOL/L (ref 23–29)
CREAT SERPL-MCNC: 0.7 MG/DL (ref 0.5–1.4)
DIFFERENTIAL METHOD: ABNORMAL
EOSINOPHIL # BLD AUTO: 0.9 K/UL (ref 0–0.5)
EOSINOPHIL NFR BLD: 8 % (ref 0–8)
ERYTHROCYTE [DISTWIDTH] IN BLOOD BY AUTOMATED COUNT: 23.3 % (ref 11.5–14.5)
EST. GFR  (AFRICAN AMERICAN): >60 ML/MIN/1.73 M^2
EST. GFR  (NON AFRICAN AMERICAN): >60 ML/MIN/1.73 M^2
GLUCOSE SERPL-MCNC: 95 MG/DL (ref 70–110)
HCT VFR BLD AUTO: 27.7 % (ref 40–54)
HGB BLD-MCNC: 9.2 G/DL (ref 14–18)
IMM GRANULOCYTES # BLD AUTO: 0.5 K/UL (ref 0–0.04)
IMM GRANULOCYTES NFR BLD AUTO: 4.4 % (ref 0–0.5)
LYMPHOCYTES # BLD AUTO: 3.3 K/UL (ref 1–4.8)
LYMPHOCYTES NFR BLD: 29 % (ref 18–48)
MCH RBC QN AUTO: 29.6 PG (ref 27–31)
MCHC RBC AUTO-ENTMCNC: 33.2 G/DL (ref 32–36)
MCV RBC AUTO: 89 FL (ref 82–98)
MONOCYTES # BLD AUTO: 1.3 K/UL (ref 0.3–1)
MONOCYTES NFR BLD: 11.1 % (ref 4–15)
NEUTROPHILS # BLD AUTO: 5.3 K/UL (ref 1.8–7.7)
NEUTROPHILS NFR BLD: 46.5 % (ref 38–73)
NRBC BLD-RTO: 2 /100 WBC
PLATELET # BLD AUTO: 294 K/UL (ref 150–350)
PMV BLD AUTO: 10.8 FL (ref 9.2–12.9)
POTASSIUM SERPL-SCNC: 4.2 MMOL/L (ref 3.5–5.1)
PROT SERPL-MCNC: 6.5 G/DL (ref 6–8.4)
RBC # BLD AUTO: 3.11 M/UL (ref 4.6–6.2)
SODIUM SERPL-SCNC: 140 MMOL/L (ref 136–145)
WBC # BLD AUTO: 11.47 K/UL (ref 3.9–12.7)

## 2019-07-21 PROCEDURE — 36415 COLL VENOUS BLD VENIPUNCTURE: CPT

## 2019-07-21 PROCEDURE — 80053 COMPREHEN METABOLIC PANEL: CPT

## 2019-07-21 PROCEDURE — 25000003 PHARM REV CODE 250: Performed by: PHYSICIAN ASSISTANT

## 2019-07-21 PROCEDURE — 99233 SBSQ HOSP IP/OBS HIGH 50: CPT | Mod: ,,, | Performed by: PHYSICIAN ASSISTANT

## 2019-07-21 PROCEDURE — 85025 COMPLETE CBC W/AUTO DIFF WBC: CPT

## 2019-07-21 PROCEDURE — 99233 PR SUBSEQUENT HOSPITAL CARE,LEVL III: ICD-10-PCS | Mod: ,,, | Performed by: PHYSICIAN ASSISTANT

## 2019-07-21 PROCEDURE — 11000001 HC ACUTE MED/SURG PRIVATE ROOM

## 2019-07-21 PROCEDURE — 63600175 PHARM REV CODE 636 W HCPCS: Performed by: PHYSICIAN ASSISTANT

## 2019-07-21 RX ORDER — HYDROMORPHONE HYDROCHLORIDE 1 MG/ML
1 INJECTION, SOLUTION INTRAMUSCULAR; INTRAVENOUS; SUBCUTANEOUS EVERY 6 HOURS PRN
Status: DISCONTINUED | OUTPATIENT
Start: 2019-07-21 | End: 2019-07-22

## 2019-07-21 RX ADMIN — FOLIC ACID 1 MG: 1 TABLET ORAL at 09:07

## 2019-07-21 RX ADMIN — HYDROMORPHONE HYDROCHLORIDE 1 MG: 1 INJECTION, SOLUTION INTRAMUSCULAR; INTRAVENOUS; SUBCUTANEOUS at 12:07

## 2019-07-21 RX ADMIN — IBUPROFEN 400 MG: 400 TABLET, FILM COATED ORAL at 11:07

## 2019-07-21 RX ADMIN — SODIUM CHLORIDE: 0.9 INJECTION, SOLUTION INTRAVENOUS at 02:07

## 2019-07-21 RX ADMIN — IBUPROFEN 400 MG: 400 TABLET, FILM COATED ORAL at 12:07

## 2019-07-21 RX ADMIN — IBUPROFEN 400 MG: 400 TABLET, FILM COATED ORAL at 06:07

## 2019-07-21 RX ADMIN — MORPHINE SULFATE 45 MG: 30 TABLET, EXTENDED RELEASE ORAL at 09:07

## 2019-07-21 RX ADMIN — SODIUM CHLORIDE: 0.9 INJECTION, SOLUTION INTRAVENOUS at 04:07

## 2019-07-21 RX ADMIN — HYDROMORPHONE HYDROCHLORIDE 1 MG: 1 INJECTION, SOLUTION INTRAMUSCULAR; INTRAVENOUS; SUBCUTANEOUS at 06:07

## 2019-07-21 RX ADMIN — ACETAMINOPHEN 650 MG: 325 TABLET ORAL at 06:07

## 2019-07-21 RX ADMIN — ACETAMINOPHEN 650 MG: 325 TABLET ORAL at 02:07

## 2019-07-21 RX ADMIN — ACETAMINOPHEN 650 MG: 325 TABLET ORAL at 10:07

## 2019-07-21 RX ADMIN — HYDROMORPHONE HYDROCHLORIDE 1 MG: 1 INJECTION, SOLUTION INTRAMUSCULAR; INTRAVENOUS; SUBCUTANEOUS at 02:07

## 2019-07-21 NOTE — CARE UPDATE
Patient resting in bed without distress,awake, alert most of shift, medicated per doctor order for pain, see mars, no significant change in condition, void per urinal, clear yellow urine without odor,ambulated to bathroom PRN, personal iatems and call light within reach, safety maintain and ongoing.

## 2019-07-21 NOTE — PLAN OF CARE
Problem: Adult Inpatient Plan of Care  Goal: Plan of Care Review  Outcome: Ongoing (interventions implemented as appropriate)     07/20/19 7367   Plan of Care Review   Plan of Care Reviewed With patient   Patient still states pain 9 on scale 0-10,no distress observed, awake, alert, oriented x 4, no significant change in condition, pain management still on going, void per bathroom, iv fluid ns infusing at 125 cc / hr without difficulty, persona litems and call light with in reach,bed at lowest position, wheels locked, side rails up x 2,will continue to monitor.

## 2019-07-21 NOTE — ASSESSMENT & PLAN NOTE
See above, does appear patient in mild crisis  Drug screen ordered: presumptive positive for opioids  ER visits July 2019 7/1: [facility]  7/2: [facility]  7/3: [facility]  7/7: [facility]  7/7: [facility]  7/13: [facility]  7/15: [facility]  7/16: [facility]

## 2019-07-21 NOTE — PLAN OF CARE
Problem: Fall Injury Risk  Goal: Absence of Fall and Fall-Related Injury    Intervention: Identify and Manage Contributors to Fall Injury Risk     07/21/19 1810   Manage Acute Allergic Reaction   Medication Review/Management medications reviewed   Identify and Manage Contributors to Fall Injury Risk   Self-Care Promotion independence encouraged;BADL personal objects within reach         Problem: Adult Inpatient Plan of Care  Goal: Plan of Care Review  Outcome: Ongoing (interventions implemented as appropriate)  Medicated with PRN pain meds, able to make needs known, no distress noted, will continue to monitor, IV fluids infusing continuous.

## 2019-07-21 NOTE — PROGRESS NOTES
"[facility] Hospital Medicine  Progress Note    Patient Name: Paddy Stevenson  MRN: [id number]  Patient Class: IP- Inpatient   Admission Date: 7/16/2019  Length of Stay: 4 days  Attending Physician: Keily Canchola MD  Primary Care Provider: Primary Doctor [facility] Medicine Team: [facility] MED Y Alexa Servin PA-C    Subjective:     Principal Problem:Sickle cell pain crisis      HPI:  25 y/o M with PMH sickle cell anemia and AVN of bilateral hips s/p L hip replacement 2 years ago presents c/o R hip, back, and thigh pain. The pain started 6 days ago but has progressively worsened since onset. Pain is described as aching/throbbing. Pt has taken MS Contin 45 mg as prescribed by his hematologist ([place]. [place]) without relief of pain. He also has tried heat/ice and hot showers with no relief. Pt reports the right sided pain is similar to pain he experiences with left AVN prior to hip replacement. Typical sickle cell crisis pain is generalized pain (not localized), which he is not experiencing at this time. He does report working yesterday at [employer] requiring excessive standing and climbing of ladders. He has seen 2 different orthopedic surgeons regarding AVN with both awaiting further progression of AVN to operate. Pt reports last MRI in [place]. Per records (12/2018), MRI showed avascular necrosis of the right femoral head without subchondral collapse. He follows with hematology regularly, last appointment 2 weeks ago but reports he has to get a new hematologist because hematologist has "too many patients." He reports compliance with hydroxyurea.  He denies fever/chills, N/V/D, abdominal pain, CP, SOB, dizziness, recent illness or sick contacts, and alcohol use.     Of note, pt has flag in chart due to suspected drug seeking and malingering behavior dating back to 2015. Pt seen at 4 different facilities on 7 different occasions for complaints of pain since 7/1 as listed " below. He reports last MS contin prescribed by hematologist in June. He has 38 pills remaining. Denies daily use (only uses when he needs it) and reports going several days without medication when not in pain. [place] [facility] [place] [facility] [place]19 for sickle cell anemia with pain. Hematology consulted but pt discharged before seen. Discharged on 7/8/19 with .      7/1: [facility]  7/2: [facility]  7/3: [facility]  7/7: [facility]  7/7: [facility]  7/13: [facility]  7/15: [facility]    In the ED: VSS. Afebrile without leukocytosis. Hgb 9.9 (baseline), retic (13) and Tbili (2.2) mildly elevated. R hip XR with no acute fracture.  Diffuse sclerosis proximal right femur, unchanged. Pt refused morphine, states that he is allergic and gets hives. Given dilaudid with temporary improvement of pain. PO fluids encouraged (ED had difficulty obtaining IV).      Overview/Hospital Course:  Pt admitted for sickle cell crisis and R hip pain.  LDH, T bili, HgbS all elevated and low haptoglobin consistent with sickle cell crisis.  Pt does have strong history of drug seeking behavior.  PT/OT ordered and home no needs (very limited participation).  MRI R hip focal area of avascular necrosis involving at least 50% of the articular surface.  No imaging findings to suggest subchondral plate collapse.  Additional focal area of avascular necrosis within the right iliac crest and possibly within the right proximal femoral shaft, the latter which is not well evaluated secondary to partial visualization.  Osseous stigmata of sickle cell disease, similar when compared to multiple previous exams.  MS Contin bid, dilaudid prn, and multimodal management for pain.  Day 3 patient reported hitting his R hip while walking to the bathroom, no falls.  Resistant to weaning IV pain medications.  WBC increased, CXR and UA ordered without noted infection, trended down the following  day. Day 5 sickle cell lab improvement. Pain out of proportion to exam findings. Proceeded with weaning IV pain medications.    Interval History: Pt sleeping upon arrival to the room in no apparent distress. Limited participation in today's exam. Pt does not open eyes to speak with provider and provides little verbal response to questions. Pain out of proportion to physical exam findings. Pt screams out in pain with very light touch to R. Hip despite having sheets covering R hip throughout evaluation without any discomfort. Pt not requiring assistant with ambulation to restroom. Sickle cell labs have improved. Discussed weaning IV pain medications to dilaudid 1 mg q6 PRN, pt did not react. He reports multimodal therapy has not lessened the pain. Reiterated the time frame to discuss concerns with provider regarding pain management. Spoke with pt's nurse to discuss plan of care.      Review of Systems   Constitutional: Positive for activity change. Negative for chills, fatigue, fever and unexpected weight change.   HENT: Negative for congestion, drooling, sore throat and trouble swallowing.    Respiratory: Negative for cough, chest tightness, shortness of breath and wheezing.    Cardiovascular: Negative for chest pain, palpitations and leg swelling.   Gastrointestinal: Negative for abdominal distention, abdominal pain, constipation, diarrhea, nausea and vomiting.   Genitourinary: Negative for difficulty urinating, dysuria, frequency and hematuria.   Musculoskeletal: Positive for arthralgias, back pain, gait problem and myalgias. Negative for joint swelling and neck pain.   Skin: Negative for color change, pallor, rash and wound.   Neurological: Negative for dizziness, syncope, speech difficulty and light-headedness.   Psychiatric/Behavioral: Negative for agitation, confusion and decreased concentration. The patient is not nervous/anxious.      Objective:     Vital Signs (Most Recent):  Temp: 97.7 °F (36.5 °C)  (07/21/19 0758)  Pulse: (!) 55 (07/21/19 0758)  Resp: 16 (07/21/19 0758)  BP: (!) 107/59 (07/21/19 0758)  SpO2: (!) 94 % (07/21/19 0758) Vital Signs (24h Range):  Temp:  [96 °F (35.6 °C)-98.1 °F (36.7 °C)] 97.7 °F (36.5 °C)  Pulse:  [55-74] 55  Resp:  [16-18] 16  SpO2:  [93 %-96 %] 94 %  BP: (107-129)/(59-83) 107/59     Weight: 76.8 kg (169 lb 6.4 oz)  Body mass index is 25.02 kg/m².    Intake/Output Summary (Last 24 hours) at 7/21/2019 0916  Last data filed at 7/21/2019 0633  Gross per 24 hour   Intake 625 ml   Output 2470 ml   Net -1845 ml      Physical Exam   Constitutional: He is oriented to person, place, and time. He appears well-developed and well-nourished. No distress.   HENT:   Head: Normocephalic and atraumatic.   Right Ear: External ear normal.   Left Ear: External ear normal.   Eyes: Pupils are equal, round, and reactive to light. Conjunctivae and EOM are normal.   Neck: Normal range of motion. Neck supple.   Cardiovascular: Normal rate and regular rhythm.   Pulmonary/Chest: Effort normal and breath sounds normal. He has no wheezes.   Abdominal: Soft. Bowel sounds are normal. He exhibits no distension. There is no tenderness.   Musculoskeletal: He exhibits tenderness. He exhibits no edema.   Significant right-sided lower back pain with waist flexion  Unable to fully access ROM and strength of RLE due to pain on exam - Pt screams out in pain with passive R knee flexion but knee flexes without difficulty   Full ROM and strength of b/l UE  R. hip pain out of proportion to exam finding - pt screams out in pain with light touch     Neurological: He is alert and oriented to person, place, and time. No cranial nerve deficit.   Skin: Skin is warm and dry. He is not diaphoretic. No erythema.   Psychiatric: He has a normal mood and affect. His behavior is normal. Thought content normal.       Significant Labs: All pertinent labs within the past 24 hours have been reviewed.    Significant Imaging: I have reviewed  "all pertinent imaging results/findings within the past 24 hours.      Assessment/Plan:      * Sickle cell pain crisis  Retic count elevated at 13  LDH elevated at 314, T bili elevated at 2.2  Hgb S, Haptoglobin added on  Hgb baseline 8-10, on admission Hgb 9.9  Based on elevated LDH and T bili, appears patient in mild crisis. Will proceed with sickle cell pain management and protocol.   7/18 Dr. Canchola at bedside today.  Reviewed with pt we will schedule MS Contin and continue dilaudid 1 mg prn (hold for sedation) with plan to wean IV pain medication tomorrow.  Very limited participation with PT/OT.  Pt hit R hip overnight while walking to the bathroom, no falls.  7/19 increased MS contin from 30 to 45 mg bid (home dose) and decreased dilaudid 1 mg from q 3 hrs prn to q 6 hrs prn.  Scheduled tylenol.  Refused gabapentin (no relief after 6 month trial in the past) and lidocaine patches (allergic)  7/20 See interval history for documentation of discussion of care plan with RN present.  Pt reports hip pain mildly improved but still experiences pain "everywhere else" to the point of not being able to return to daily functioning. MS contin 45 mg bid continued and dilaudid 1 mg increased to q 4 hrs prn. Pt has been refusing scheduled tylenol. Added ibuprofen for multimodal therapy, encouraged compliance.  7/21 See interval history documentation. Pain out of proportion to exam findings. Sickle cell labs have improved. Will proceed with weaning of IV pain medication - dilaudid 1 mg q6 PRN     Avascular necrosis of bones of both hips  S/p L hip replacement ~2 years ago  MRI R hip (12/2018) at OSH: Avascular necrosis of the right femoral head without subchondral collapse. 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  R hip xray: No acute fracture.  Diffuse sclerosis proximal right femur, unchanged.  Pt reports discussing AVN with 2 orthopedic surgeons with no intent to operate at this time until, awaiting " worsening of condition.  ESR/CRP: 7/10.3, low suspicion for infection  Repeat MRI R. hip  Last PT session June of last year. PT/OT eval: no needs, refused OT eval.    Leukocytosis  WBC 10.66->14.12, left shift, procal slightly elevated  Ordered CXR and UA - no signs of infection  No chest pain  Continue to monitor    Drug-seeking behavior  See above, does appear patient in mild crisis  Drug screen ordered: presumptive positive for opioids  ER visits July 2019 7/1: [facility]  7/2: [facility]  7/3: [facility]  7/7: [facility]  7/7: [facility]  7/13: [facility]  7/15: [facility]  7/16: [facility]    Mild intermittent asthma without complication  Sats 98-99% on RA  Controlled  Will order prn breathing treatments        VTE Risk Mitigation (From admission, onward)        Ordered     IP VTE LOW RISK PATIENT  Once      07/17/19 0732     Place BONG hose  Until discontinued      07/17/19 0732                Alexa Servin PA-C  Department of Hospital Medicine   [facility]

## 2019-07-21 NOTE — ASSESSMENT & PLAN NOTE
"Retic count elevated at 13  LDH elevated at 314, T bili elevated at 2.2  Hgb S, Haptoglobin added on  Hgb baseline 8-10, on admission Hgb 9.9  Based on elevated LDH and T bili, appears patient in mild crisis. Will proceed with sickle cell pain management and protocol.   7/18 Dr. Canchola at bedside today.  Reviewed with pt we will schedule MS Contin and continue dilaudid 1 mg prn (hold for sedation) with plan to wean IV pain medication tomorrow.  Very limited participation with PT/OT.  Pt hit R hip overnight while walking to the bathroom, no falls.  7/19 increased MS contin from 30 to 45 mg bid (home dose) and decreased dilaudid 1 mg from q 3 hrs prn to q 6 hrs prn.  Scheduled tylenol.  Refused gabapentin (no relief after 6 month trial in the past) and lidocaine patches (allergic)  7/20 See interval history for documentation of discussion of care plan with RN present.  Pt reports hip pain mildly improved but still experiences pain "everywhere else" to the point of not being able to return to daily functioning. MS contin 45 mg bid continued and dilaudid 1 mg increased to q 4 hrs prn. Pt has been refusing scheduled tylenol. Added ibuprofen for multimodal therapy, encouraged compliance.  7/21 See interval history documentation. Pain out of proportion to exam findings. Sickle cell labs have improved. Will proceed with weaning of IV pain medication - dilaudid 1 mg q6 PRN   "

## 2019-07-21 NOTE — SUBJECTIVE & OBJECTIVE
Interval History: Pt sleeping upon arrival to the room in no apparent distress. Limited participation in today's exam. Pt does not open eyes to speak with provider and provides little verbal response to questions. Pain out of proportion to physical exam findings. Pt screams out in pain with very light touch to R. Hip despite having sheets covering R hip throughout evaluation without any discomfort. Pt not requiring assistant with ambulation to restroom. Sickle cell labs have improved. Discussed weaning IV pain medications to dilaudid 1 mg q6 PRN, pt did not react. He reports multimodal therapy has not lessened the pain. Reiterated the time frame to discuss concerns with provider regarding pain management. Spoke with pt's nurse to discuss plan of care.      Review of Systems   Constitutional: Positive for activity change. Negative for chills, fatigue, fever and unexpected weight change.   HENT: Negative for congestion, drooling, sore throat and trouble swallowing.    Respiratory: Negative for cough, chest tightness, shortness of breath and wheezing.    Cardiovascular: Negative for chest pain, palpitations and leg swelling.   Gastrointestinal: Negative for abdominal distention, abdominal pain, constipation, diarrhea, nausea and vomiting.   Genitourinary: Negative for difficulty urinating, dysuria, frequency and hematuria.   Musculoskeletal: Positive for arthralgias, back pain, gait problem and myalgias. Negative for joint swelling and neck pain.   Skin: Negative for color change, pallor, rash and wound.   Neurological: Negative for dizziness, syncope, speech difficulty and light-headedness.   Psychiatric/Behavioral: Negative for agitation, confusion and decreased concentration. The patient is not nervous/anxious.      Objective:     Vital Signs (Most Recent):  Temp: 97.7 °F (36.5 °C) (07/21/19 0758)  Pulse: (!) 55 (07/21/19 0758)  Resp: 16 (07/21/19 0758)  BP: (!) 107/59 (07/21/19 0758)  SpO2: (!) 94 % (07/21/19 0758)  Vital Signs (24h Range):  Temp:  [96 °F (35.6 °C)-98.1 °F (36.7 °C)] 97.7 °F (36.5 °C)  Pulse:  [55-74] 55  Resp:  [16-18] 16  SpO2:  [93 %-96 %] 94 %  BP: (107-129)/(59-83) 107/59     Weight: 76.8 kg (169 lb 6.4 oz)  Body mass index is 25.02 kg/m².    Intake/Output Summary (Last 24 hours) at 7/21/2019 0916  Last data filed at 7/21/2019 0633  Gross per 24 hour   Intake 625 ml   Output 2470 ml   Net -1845 ml      Physical Exam   Constitutional: He is oriented to person, place, and time. He appears well-developed and well-nourished. No distress.   HENT:   Head: Normocephalic and atraumatic.   Right Ear: External ear normal.   Left Ear: External ear normal.   Eyes: Pupils are equal, round, and reactive to light. Conjunctivae and EOM are normal.   Neck: Normal range of motion. Neck supple.   Cardiovascular: Normal rate and regular rhythm.   Pulmonary/Chest: Effort normal and breath sounds normal. He has no wheezes.   Abdominal: Soft. Bowel sounds are normal. He exhibits no distension. There is no tenderness.   Musculoskeletal: He exhibits tenderness. He exhibits no edema.   Significant right-sided lower back pain with waist flexion  Unable to fully access ROM and strength of RLE due to pain on exam - Pt screams out in pain with passive R knee flexion but knee flexes without difficulty   Full ROM and strength of b/l UE  R. hip pain out of proportion to exam finding - pt screams out in pain with light touch     Neurological: He is alert and oriented to person, place, and time. No cranial nerve deficit.   Skin: Skin is warm and dry. He is not diaphoretic. No erythema.   Psychiatric: He has a normal mood and affect. His behavior is normal. Thought content normal.       Significant Labs: All pertinent labs within the past 24 hours have been reviewed.    Significant Imaging: I have reviewed all pertinent imaging results/findings within the past 24 hours.

## 2019-07-22 LAB
ALBUMIN SERPL BCP-MCNC: 3.4 G/DL (ref 3.5–5.2)
ALP SERPL-CCNC: 56 U/L (ref 55–135)
ALT SERPL W/O P-5'-P-CCNC: 18 U/L (ref 10–44)
ANION GAP SERPL CALC-SCNC: 10 MMOL/L (ref 8–16)
ANISOCYTOSIS BLD QL SMEAR: ABNORMAL
AST SERPL-CCNC: 26 U/L (ref 10–40)
BASOPHILS # BLD AUTO: 0.1 K/UL (ref 0–0.2)
BASOPHILS NFR BLD: 0.8 % (ref 0–1.9)
BILIRUB SERPL-MCNC: 2.7 MG/DL (ref 0.1–1)
BUN SERPL-MCNC: 7 MG/DL (ref 6–20)
CALCIUM SERPL-MCNC: 8.9 MG/DL (ref 8.7–10.5)
CHLORIDE SERPL-SCNC: 110 MMOL/L (ref 95–110)
CO2 SERPL-SCNC: 21 MMOL/L (ref 23–29)
CREAT SERPL-MCNC: 0.7 MG/DL (ref 0.5–1.4)
DIFFERENTIAL METHOD: ABNORMAL
EOSINOPHIL # BLD AUTO: 0.9 K/UL (ref 0–0.5)
EOSINOPHIL NFR BLD: 7 % (ref 0–8)
ERYTHROCYTE [DISTWIDTH] IN BLOOD BY AUTOMATED COUNT: 22.4 % (ref 11.5–14.5)
EST. GFR  (AFRICAN AMERICAN): >60 ML/MIN/1.73 M^2
EST. GFR  (NON AFRICAN AMERICAN): >60 ML/MIN/1.73 M^2
GLUCOSE SERPL-MCNC: 86 MG/DL (ref 70–110)
HAPTOGLOB SERPL-MCNC: <10 MG/DL (ref 30–250)
HCT VFR BLD AUTO: 26.1 % (ref 40–54)
HGB BLD-MCNC: 8.9 G/DL (ref 14–18)
HOWELL-JOLLY BOD BLD QL SMEAR: ABNORMAL
HYPOCHROMIA BLD QL SMEAR: ABNORMAL
IMM GRANULOCYTES # BLD AUTO: 0.54 K/UL (ref 0–0.04)
IMM GRANULOCYTES NFR BLD AUTO: 4.3 % (ref 0–0.5)
LYMPHOCYTES # BLD AUTO: 3.1 K/UL (ref 1–4.8)
LYMPHOCYTES NFR BLD: 24.5 % (ref 18–48)
MCH RBC QN AUTO: 29.6 PG (ref 27–31)
MCHC RBC AUTO-ENTMCNC: 34.1 G/DL (ref 32–36)
MCV RBC AUTO: 87 FL (ref 82–98)
MONOCYTES # BLD AUTO: 1.3 K/UL (ref 0.3–1)
MONOCYTES NFR BLD: 10.4 % (ref 4–15)
NEUTROPHILS # BLD AUTO: 6.6 K/UL (ref 1.8–7.7)
NEUTROPHILS NFR BLD: 53 % (ref 38–73)
NRBC BLD-RTO: 3 /100 WBC
PAPPENHEIMER BOD BLD QL SMEAR: PRESENT
PLATELET # BLD AUTO: 287 K/UL (ref 150–350)
PLATELET BLD QL SMEAR: ABNORMAL
PMV BLD AUTO: 11.3 FL (ref 9.2–12.9)
POIKILOCYTOSIS BLD QL SMEAR: SLIGHT
POLYCHROMASIA BLD QL SMEAR: ABNORMAL
POTASSIUM SERPL-SCNC: 3.9 MMOL/L (ref 3.5–5.1)
PROT SERPL-MCNC: 6.3 G/DL (ref 6–8.4)
RBC # BLD AUTO: 3.01 M/UL (ref 4.6–6.2)
RETICS/RBC NFR AUTO: 17.6 % (ref 0.4–2)
SICKLE CELLS BLD QL SMEAR: ABNORMAL
SODIUM SERPL-SCNC: 141 MMOL/L (ref 136–145)
WBC # BLD AUTO: 12.43 K/UL (ref 3.9–12.7)

## 2019-07-22 PROCEDURE — 36415 COLL VENOUS BLD VENIPUNCTURE: CPT

## 2019-07-22 PROCEDURE — 85045 AUTOMATED RETICULOCYTE COUNT: CPT

## 2019-07-22 PROCEDURE — 85025 COMPLETE CBC W/AUTO DIFF WBC: CPT

## 2019-07-22 PROCEDURE — 25000003 PHARM REV CODE 250: Performed by: PHYSICIAN ASSISTANT

## 2019-07-22 PROCEDURE — 83010 ASSAY OF HAPTOGLOBIN QUANT: CPT

## 2019-07-22 PROCEDURE — 99233 SBSQ HOSP IP/OBS HIGH 50: CPT | Mod: ,,, | Performed by: PHYSICIAN ASSISTANT

## 2019-07-22 PROCEDURE — 11000001 HC ACUTE MED/SURG PRIVATE ROOM

## 2019-07-22 PROCEDURE — 80053 COMPREHEN METABOLIC PANEL: CPT

## 2019-07-22 PROCEDURE — 99233 PR SUBSEQUENT HOSPITAL CARE,LEVL III: ICD-10-PCS | Mod: ,,, | Performed by: PHYSICIAN ASSISTANT

## 2019-07-22 PROCEDURE — 63600175 PHARM REV CODE 636 W HCPCS: Performed by: PHYSICIAN ASSISTANT

## 2019-07-22 RX ORDER — HYDROMORPHONE HYDROCHLORIDE 1 MG/ML
1 INJECTION, SOLUTION INTRAMUSCULAR; INTRAVENOUS; SUBCUTANEOUS EVERY 8 HOURS PRN
Status: DISCONTINUED | OUTPATIENT
Start: 2019-07-22 | End: 2019-07-23

## 2019-07-22 RX ADMIN — SODIUM CHLORIDE: 0.9 INJECTION, SOLUTION INTRAVENOUS at 08:07

## 2019-07-22 RX ADMIN — HYDROMORPHONE HYDROCHLORIDE 1 MG: 1 INJECTION, SOLUTION INTRAMUSCULAR; INTRAVENOUS; SUBCUTANEOUS at 01:07

## 2019-07-22 RX ADMIN — ACETAMINOPHEN 650 MG: 325 TABLET ORAL at 11:07

## 2019-07-22 RX ADMIN — IBUPROFEN 400 MG: 400 TABLET, FILM COATED ORAL at 01:07

## 2019-07-22 RX ADMIN — MORPHINE SULFATE 45 MG: 30 TABLET, EXTENDED RELEASE ORAL at 08:07

## 2019-07-22 RX ADMIN — FOLIC ACID 1 MG: 1 TABLET ORAL at 09:07

## 2019-07-22 RX ADMIN — HYDROMORPHONE HYDROCHLORIDE 1 MG: 1 INJECTION, SOLUTION INTRAMUSCULAR; INTRAVENOUS; SUBCUTANEOUS at 12:07

## 2019-07-22 RX ADMIN — ACETAMINOPHEN 650 MG: 325 TABLET ORAL at 06:07

## 2019-07-22 RX ADMIN — HYDROMORPHONE HYDROCHLORIDE 1 MG: 1 INJECTION, SOLUTION INTRAMUSCULAR; INTRAVENOUS; SUBCUTANEOUS at 06:07

## 2019-07-22 RX ADMIN — HYDROMORPHONE HYDROCHLORIDE 1 MG: 1 INJECTION, SOLUTION INTRAMUSCULAR; INTRAVENOUS; SUBCUTANEOUS at 08:07

## 2019-07-22 RX ADMIN — IBUPROFEN 400 MG: 400 TABLET, FILM COATED ORAL at 11:07

## 2019-07-22 RX ADMIN — SENNOSIDES,DOCUSATE SODIUM 1 TABLET: 8.6; 5 TABLET, FILM COATED ORAL at 09:07

## 2019-07-22 RX ADMIN — IBUPROFEN 400 MG: 400 TABLET, FILM COATED ORAL at 06:07

## 2019-07-22 RX ADMIN — MORPHINE SULFATE 45 MG: 30 TABLET, EXTENDED RELEASE ORAL at 09:07

## 2019-07-22 RX ADMIN — SODIUM CHLORIDE: 0.9 INJECTION, SOLUTION INTRAVENOUS at 12:07

## 2019-07-22 RX ADMIN — ACETAMINOPHEN 650 MG: 325 TABLET ORAL at 01:07

## 2019-07-22 NOTE — SUBJECTIVE & OBJECTIVE
Interval History: no acute events overnight.  Pt would not speak to provider or open eyes this morning.  Pt in bed with iphone on listening to extremely loud music/video.  RN documented overnight pt able to ambulate to the bathroom without assistance.  The only response the patient would provide was a slight shaking no of the head when asked if he felt ready to be discharged.  I told pt the plan for today which is decreasing dilaudid from q 6 prn to q 8 prn with plan to discharge tomorrow.  I asked pt if he would like me to come back later in the afternoon and no response was provided.    Review of Systems   Unable to perform ROS: Other   Pt would not respond to questions, see interval history    Objective:     Vital Signs (Most Recent):  Temp: 97.5 °F (36.4 °C) (07/22/19 0454)  Pulse: 61 (07/22/19 0454)  Resp: 18 (07/22/19 0454)  BP: 115/65 (07/22/19 0454)  SpO2: 96 % (07/22/19 0454) Vital Signs (24h Range):  Temp:  [97.5 °F (36.4 °C)-97.8 °F (36.6 °C)] 97.5 °F (36.4 °C)  Pulse:  [61-76] 61  Resp:  [18] 18  SpO2:  [96 %-99 %] 96 %  BP: (115-137)/(63-74) 115/65     Weight: 76.8 kg (169 lb 6.4 oz)  Body mass index is 25.02 kg/m².    Intake/Output Summary (Last 24 hours) at 7/22/2019 0927  Last data filed at 7/22/2019 0626  Gross per 24 hour   Intake 920 ml   Output 1980 ml   Net -1060 ml      Physical Exam   Nursing note and vitals reviewed.  Pt would not participate in physical exam    Significant Labs: All pertinent labs within the past 24 hours have been reviewed.    Significant Imaging: I have reviewed all pertinent imaging results/findings within the past 24 hours.

## 2019-07-22 NOTE — PLAN OF CARE
IMY team continues to work on pt's pain mgmt - OWEN 7/23 and pt will d/c to home.     07/22/19 9327   Discharge Reassessment   Assessment Type Discharge Planning Reassessment   Discharge Plan A Home with family   Discharge Plan B Home with family;Home Health   DME Needed Upon Discharge  none   Anticipated Discharge Disposition Home   Post-Acute Status   Post-Acute Authorization Other   Other Status No Post-Acute Service Needs   Discharge Delays None known at this time

## 2019-07-22 NOTE — PROGRESS NOTES
"[facility] Hospital Medicine  Progress Note    Patient Name: Paddy Stevenson  MRN: [id number]  Patient Class: IP- Inpatient   Admission Date: 7/16/2019  Length of Stay: 5 days  Attending Physician: Keily Canchola MD  Primary Care Provider: [facility] Medicine Team: [facility] MED Y Alexa Servin PA-C    Subjective:     Principal Problem:Sickle cell pain crisis      HPI:  25 y/o M with PMH sickle cell anemia and AVN of bilateral hips s/p L hip replacement 2 years ago presents c/o R hip, back, and thigh pain. The pain started 6 days ago but has progressively worsened since onset. Pain is described as aching/throbbing. Pt has taken MS Contin 45 mg as prescribed by his hematologist (Dr. Minaya at [facility]) without relief of pain. He also has tried heat/ice and hot showers with no relief. Pt reports the right sided pain is similar to pain he experiences with left AVN prior to hip replacement. Typical sickle cell crisis pain is generalized pain (not localized), which he is not experiencing at this time. He does report working yesterday at [employer] requiring excessive standing and climbing of ladders. He has seen 2 different orthopedic surgeons regarding AVN with both awaiting further progression of AVN to operate. Pt reports last MRI in [place]. Per records (12/2018), MRI showed avascular necrosis of the right femoral head without subchondral collapse. He follows with hematology regularly, last appointment 2 weeks ago but reports he has to get a new hematologist because hematologist has "too many patients." He reports compliance with hydroxyurea.  He denies fever/chills, N/V/D, abdominal pain, CP, SOB, dizziness, recent illness or sick contacts, and alcohol use.     Of note, pt has flag in chart due to suspected drug seeking and malingering behavior dating back to 2015. Pt seen at 4 different facilities on 7 different occasions for complaints of pain since 7/1 as listed " below. He reports last MS contin prescribed by hematologist in June. He has 38 pills remaining. Denies daily use (only uses when he needs it) and reports going several days without medication when not in pain. [place] [facility] [place] for sickle cell anemia with pain. Hematology consulted but pt discharged before seen. Discharged on 7/8/19 with .      7/1: [facility]  7/2: [facility]  7/3: [facility]  7/7: [facility]  7/7: [facility]  7/13: [facility]  7/15: [facility]    In the ED: VSS. Afebrile without leukocytosis. Hgb 9.9 (baseline), retic (13) and Tbili (2.2) mildly elevated. R hip XR with no acute fracture.  Diffuse sclerosis proximal right femur, unchanged. Pt refused morphine, states that he is allergic and gets hives. Given dilaudid with temporary improvement of pain. PO fluids encouraged (ED had difficulty obtaining IV).      Overview/Hospital Course:  Pt admitted for sickle cell crisis and R hip pain.  LDH, T bili, HgbS all elevated and low haptoglobin consistent with sickle cell crisis.  Pt does have strong history of drug seeking behavior.  PT/OT ordered and home no needs (very limited participation).  MRI R hip focal area of avascular necrosis involving at least 50% of the articular surface.  No imaging findings to suggest subchondral plate collapse.  Additional focal area of avascular necrosis within the right iliac crest and possibly within the right proximal femoral shaft, the latter which is not well evaluated secondary to partial visualization.  Osseous stigmata of sickle cell disease, similar when compared to multiple previous exams.  MS Contin bid, dilaudid prn, and multimodal management for pain.  Day 3 patient reported hitting his R hip while walking to the bathroom, no falls.  Resistant to weaning IV pain medications.  WBC increased, CXR and UA ordered without noted infection, trended down the following  day. Day 5-6 sickle cell lab improvement. Pain out of proportion to exam findings. Proceeded with weaning IV pain medications.    Interval History: no acute events overnight.  Pt would not speak to provider or open eyes this morning.  Pt in bed with iphone on listening to extremely loud music/video.  RN documented overnight pt able to ambulate to the bathroom without assistance.  The only response the patient would provide was a slight shaking no of the head when asked if he felt ready to be discharged.  I told pt the plan for today which is decreasing dilaudid from q 6 prn to q 8 prn with plan to discharge tomorrow.  I asked pt if he would like me to come back later in the afternoon and no response was provided.    Review of Systems   Unable to perform ROS: Other   Pt would not respond to questions, see interval history    Objective:     Vital Signs (Most Recent):  Temp: 97.5 °F (36.4 °C) (07/22/19 0454)  Pulse: 61 (07/22/19 0454)  Resp: 18 (07/22/19 0454)  BP: 115/65 (07/22/19 0454)  SpO2: 96 % (07/22/19 0454) Vital Signs (24h Range):  Temp:  [97.5 °F (36.4 °C)-97.8 °F (36.6 °C)] 97.5 °F (36.4 °C)  Pulse:  [61-76] 61  Resp:  [18] 18  SpO2:  [96 %-99 %] 96 %  BP: (115-137)/(63-74) 115/65     Weight: 76.8 kg (169 lb 6.4 oz)  Body mass index is 25.02 kg/m².    Intake/Output Summary (Last 24 hours) at 7/22/2019 0927  Last data filed at 7/22/2019 0626  Gross per 24 hour   Intake 920 ml   Output 1980 ml   Net -1060 ml      Physical Exam   Nursing note and vitals reviewed.  Pt would not participate in physical exam    Significant Labs: All pertinent labs within the past 24 hours have been reviewed.    Significant Imaging: I have reviewed all pertinent imaging results/findings within the past 24 hours.      Assessment/Plan:      * Sickle cell pain crisis  Retic count elevated at 13  LDH elevated at 314, T bili elevated at 2.2  Hgb S, Haptoglobin added on  Hgb baseline 8-10, on admission Hgb 9.9  Based on elevated LDH and  "T bili, appears patient in mild crisis. Will proceed with sickle cell pain management and protocol.   7/18 Dr. Canchola at bedside today.  Reviewed with pt we will schedule MS Contin and continue dilaudid 1 mg prn (hold for sedation) with plan to wean IV pain medication tomorrow.  Very limited participation with PT/OT.  Pt hit R hip overnight while walking to the bathroom, no falls.  7/19 increased MS contin from 30 to 45 mg bid (home dose) and decreased dilaudid 1 mg from q 3 hrs prn to q 6 hrs prn.  Scheduled tylenol.  Refused gabapentin (no relief after 6 month trial in the past) and lidocaine patches (allergic)  7/20 See interval history for documentation of discussion of care plan with RN present.  Pt reports hip pain mildly improved but still experiences pain "everywhere else" to the point of not being able to return to daily functioning. MS contin 45 mg bid continued and dilaudid 1 mg increased to q 4 hrs prn. Pt has been refusing scheduled tylenol. Added ibuprofen for multimodal therapy, encouraged compliance.  7/21-7/22 See interval history documentation. Pain out of proportion to exam findings. Sickle cell labs have improved. Will proceed with weaning of IV pain medication - dilaudid 1 mg q6 PRN to 1 mg q8 prn.    Avascular necrosis of bones of both hips  S/p L hip replacement ~2 years ago  MRI R hip (12/2018) at OSH: Avascular necrosis of the right femoral head without subchondral collapse. 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  R hip xray: No acute fracture.  Diffuse sclerosis proximal right femur, unchanged.  Pt reports discussing AVN with 2 orthopedic surgeons with no intent to operate at this time until, awaiting worsening of condition.  ESR/CRP: 7/10.3, low suspicion for infection  Repeat MRI R. hip  Last PT session June of last year. PT/OT eval: no needs, refused OT eval.    Leukocytosis  WBC 10.66->14.12, left shift, procal slightly elevated  Ordered CXR-no signs of infection, UA " pending collection  No chest pain  Continue to monitor    Drug-seeking behavior  See above, on admission did appear patient in mild crisis  Drug screen ordered: presumptive positive for opioids    ER visits July 2019:  7/1: [facility]  7/2: [facility]  7/3: [facility]  7/7: [facility]  7/7: [facility]  7/13: [facility]  7/15: [facility]  7/16: [facility]    Mild intermittent asthma without complication  Sats 98-99% on RA  Controlled  Will order prn breathing treatments        VTE Risk Mitigation (From admission, onward)        Ordered     IP VTE LOW RISK PATIENT  Once      07/17/19 0732     Place BONG hose  Until discontinued      07/17/19 0732                Alexa Servin PA-C  Department of Hospital Medicine   [facility]

## 2019-07-22 NOTE — ASSESSMENT & PLAN NOTE
WBC 10.66->14.12, left shift, procal slightly elevated  Ordered CXR-no signs of infection, UA pending collection  No chest pain  Continue to monitor

## 2019-07-22 NOTE — PLAN OF CARE
Problem: Adult Inpatient Plan of Care  Goal: Plan of Care Review  Outcome: Ongoing (interventions implemented as appropriate)     07/22/19 2326   Plan of Care Review   Plan of Care Reviewed With patient     Pain management ongoing, IVF ongoing at 125 ml/hr, pain meds administered as requested, pt ambulated to bathroom safely, all precautions maintained, will continue to monitor pt.

## 2019-07-22 NOTE — ASSESSMENT & PLAN NOTE
"Retic count elevated at 13  LDH elevated at 314, T bili elevated at 2.2  Hgb S, Haptoglobin added on  Hgb baseline 8-10, on admission Hgb 9.9  Based on elevated LDH and T bili, appears patient in mild crisis. Will proceed with sickle cell pain management and protocol.   7/18 Dr. Canchola at bedside today.  Reviewed with pt we will schedule MS Contin and continue dilaudid 1 mg prn (hold for sedation) with plan to wean IV pain medication tomorrow.  Very limited participation with PT/OT.  Pt hit R hip overnight while walking to the bathroom, no falls.  7/19 increased MS contin from 30 to 45 mg bid (home dose) and decreased dilaudid 1 mg from q 3 hrs prn to q 6 hrs prn.  Scheduled tylenol.  Refused gabapentin (no relief after 6 month trial in the past) and lidocaine patches (allergic)  7/20 See interval history for documentation of discussion of care plan with RN present.  Pt reports hip pain mildly improved but still experiences pain "everywhere else" to the point of not being able to return to daily functioning. MS contin 45 mg bid continued and dilaudid 1 mg increased to q 4 hrs prn. Pt has been refusing scheduled tylenol. Added ibuprofen for multimodal therapy, encouraged compliance.  7/21-7/22 See interval history documentation. Pain out of proportion to exam findings. Sickle cell labs have improved. Will proceed with weaning of IV pain medication - dilaudid 1 mg q6 PRN to 1 mg q8 prn.  "

## 2019-07-22 NOTE — NURSING
Patient lying in bed AAO, unlabored breathing, reviewed and verbalized plan of care, right arm IV intact and infusing, skin intact. WCTM

## 2019-07-22 NOTE — ASSESSMENT & PLAN NOTE
See above, on admission did appear patient in mild crisis  Drug screen ordered: presumptive positive for opioids    ER visits July 2019:  7/1: [facility]  7/2: [facility]  7/3: [facility]  7/7: [facility]  7/7: [facility]  7/13: [facility]  7/15: [facility]  7/16: [facility]

## 2019-07-23 PROCEDURE — 11000001 HC ACUTE MED/SURG PRIVATE ROOM

## 2019-07-23 PROCEDURE — 63600175 PHARM REV CODE 636 W HCPCS: Performed by: NURSE PRACTITIONER

## 2019-07-23 PROCEDURE — 99232 SBSQ HOSP IP/OBS MODERATE 35: CPT | Mod: ,,, | Performed by: NURSE PRACTITIONER

## 2019-07-23 PROCEDURE — 25000003 PHARM REV CODE 250: Performed by: PHYSICIAN ASSISTANT

## 2019-07-23 PROCEDURE — 63600175 PHARM REV CODE 636 W HCPCS: Performed by: PHYSICIAN ASSISTANT

## 2019-07-23 PROCEDURE — 99232 PR SUBSEQUENT HOSPITAL CARE,LEVL II: ICD-10-PCS | Mod: ,,, | Performed by: NURSE PRACTITIONER

## 2019-07-23 RX ORDER — HYDROMORPHONE HYDROCHLORIDE 2 MG/ML
2 INJECTION, SOLUTION INTRAMUSCULAR; INTRAVENOUS; SUBCUTANEOUS EVERY 4 HOURS PRN
Status: DISCONTINUED | OUTPATIENT
Start: 2019-07-23 | End: 2019-07-24

## 2019-07-23 RX ORDER — HYDROMORPHONE HYDROCHLORIDE 1 MG/ML
1 INJECTION, SOLUTION INTRAMUSCULAR; INTRAVENOUS; SUBCUTANEOUS EVERY 6 HOURS PRN
Status: DISPENSED | OUTPATIENT
Start: 2019-07-24 | End: 2019-07-25

## 2019-07-23 RX ORDER — HYDROMORPHONE HYDROCHLORIDE 1 MG/ML
1 INJECTION, SOLUTION INTRAMUSCULAR; INTRAVENOUS; SUBCUTANEOUS EVERY 4 HOURS PRN
Status: DISCONTINUED | OUTPATIENT
Start: 2019-07-24 | End: 2019-07-24

## 2019-07-23 RX ADMIN — FOLIC ACID 1 MG: 1 TABLET ORAL at 08:07

## 2019-07-23 RX ADMIN — SODIUM CHLORIDE: 0.9 INJECTION, SOLUTION INTRAVENOUS at 07:07

## 2019-07-23 RX ADMIN — MORPHINE SULFATE 45 MG: 30 TABLET, EXTENDED RELEASE ORAL at 09:07

## 2019-07-23 RX ADMIN — HYDROMORPHONE HYDROCHLORIDE 2 MG: 2 INJECTION INTRAMUSCULAR; INTRAVENOUS; SUBCUTANEOUS at 09:07

## 2019-07-23 RX ADMIN — HYDROMORPHONE HYDROCHLORIDE 1 MG: 1 INJECTION, SOLUTION INTRAMUSCULAR; INTRAVENOUS; SUBCUTANEOUS at 04:07

## 2019-07-23 RX ADMIN — IBUPROFEN 400 MG: 400 TABLET, FILM COATED ORAL at 05:07

## 2019-07-23 RX ADMIN — SENNOSIDES,DOCUSATE SODIUM 1 TABLET: 8.6; 5 TABLET, FILM COATED ORAL at 09:07

## 2019-07-23 RX ADMIN — HYDROMORPHONE HYDROCHLORIDE 1 MG: 1 INJECTION, SOLUTION INTRAMUSCULAR; INTRAVENOUS; SUBCUTANEOUS at 01:07

## 2019-07-23 RX ADMIN — IBUPROFEN 400 MG: 400 TABLET, FILM COATED ORAL at 12:07

## 2019-07-23 RX ADMIN — ACETAMINOPHEN 650 MG: 325 TABLET ORAL at 09:07

## 2019-07-23 RX ADMIN — MORPHINE SULFATE 45 MG: 30 TABLET, EXTENDED RELEASE ORAL at 08:07

## 2019-07-23 RX ADMIN — ACETAMINOPHEN 650 MG: 325 TABLET ORAL at 01:07

## 2019-07-23 RX ADMIN — HYDROMORPHONE HYDROCHLORIDE 2 MG: 2 INJECTION INTRAMUSCULAR; INTRAVENOUS; SUBCUTANEOUS at 05:07

## 2019-07-23 RX ADMIN — ACETAMINOPHEN 650 MG: 325 TABLET ORAL at 05:07

## 2019-07-23 RX ADMIN — SODIUM CHLORIDE: 0.9 INJECTION, SOLUTION INTRAVENOUS at 03:07

## 2019-07-23 NOTE — PLAN OF CARE
Problem: Fall Injury Risk  Goal: Absence of Fall and Fall-Related Injury  Outcome: Ongoing (interventions implemented as appropriate)  Meds given as ordered toelrated well. C/o generalized pain. PRN pain meds given as needed. No signs or symptoms of distress/discomfort noted. Alert and oriented. Safety maintained. Will continue to monitor.

## 2019-07-23 NOTE — NURSING
"Patient stated,"I am not having any labs drawn until the doctors address my pain." Med team Y has been notified  "

## 2019-07-23 NOTE — PLAN OF CARE
Problem: Adult Inpatient Plan of Care  Goal: Plan of Care Review  Outcome: Ongoing (interventions implemented as appropriate)  Pt continues to request pain medication as needed. Most relief given through IV Dilaudid. Patient is awake and alert x4. No apparent distress or discomfort. Denies SOB or difficulty breathing. Free from falls and injury. Call light placed within reach. Will continue to monitor

## 2019-07-24 PROCEDURE — 25000003 PHARM REV CODE 250: Performed by: PHYSICIAN ASSISTANT

## 2019-07-24 PROCEDURE — 63600175 PHARM REV CODE 636 W HCPCS: Performed by: NURSE PRACTITIONER

## 2019-07-24 PROCEDURE — 99232 PR SUBSEQUENT HOSPITAL CARE,LEVL II: ICD-10-PCS | Mod: ,,, | Performed by: NURSE PRACTITIONER

## 2019-07-24 PROCEDURE — 11000001 HC ACUTE MED/SURG PRIVATE ROOM

## 2019-07-24 PROCEDURE — 63600175 PHARM REV CODE 636 W HCPCS: Performed by: PHYSICIAN ASSISTANT

## 2019-07-24 PROCEDURE — 99232 SBSQ HOSP IP/OBS MODERATE 35: CPT | Mod: ,,, | Performed by: NURSE PRACTITIONER

## 2019-07-24 RX ORDER — HYDROMORPHONE HYDROCHLORIDE 1 MG/ML
1 INJECTION, SOLUTION INTRAMUSCULAR; INTRAVENOUS; SUBCUTANEOUS EVERY 8 HOURS PRN
Status: DISCONTINUED | OUTPATIENT
Start: 2019-07-25 | End: 2019-07-25 | Stop reason: HOSPADM

## 2019-07-24 RX ADMIN — HYDROMORPHONE HYDROCHLORIDE 1 MG: 1 INJECTION, SOLUTION INTRAMUSCULAR; INTRAVENOUS; SUBCUTANEOUS at 06:07

## 2019-07-24 RX ADMIN — HYDROMORPHONE HYDROCHLORIDE 1 MG: 1 INJECTION, SOLUTION INTRAMUSCULAR; INTRAVENOUS; SUBCUTANEOUS at 08:07

## 2019-07-24 RX ADMIN — FOLIC ACID 1 MG: 1 TABLET ORAL at 08:07

## 2019-07-24 RX ADMIN — SODIUM CHLORIDE: 0.9 INJECTION, SOLUTION INTRAVENOUS at 06:07

## 2019-07-24 RX ADMIN — IBUPROFEN 400 MG: 400 TABLET, FILM COATED ORAL at 12:07

## 2019-07-24 RX ADMIN — IBUPROFEN 400 MG: 400 TABLET, FILM COATED ORAL at 01:07

## 2019-07-24 RX ADMIN — IBUPROFEN 400 MG: 400 TABLET, FILM COATED ORAL at 05:07

## 2019-07-24 RX ADMIN — SODIUM CHLORIDE: 0.9 INJECTION, SOLUTION INTRAVENOUS at 11:07

## 2019-07-24 RX ADMIN — HYDROMORPHONE HYDROCHLORIDE 1 MG: 1 INJECTION, SOLUTION INTRAMUSCULAR; INTRAVENOUS; SUBCUTANEOUS at 10:07

## 2019-07-24 RX ADMIN — ACETAMINOPHEN 650 MG: 325 TABLET ORAL at 01:07

## 2019-07-24 RX ADMIN — MORPHINE SULFATE 45 MG: 30 TABLET, EXTENDED RELEASE ORAL at 08:07

## 2019-07-24 RX ADMIN — HYDROMORPHONE HYDROCHLORIDE 1 MG: 1 INJECTION, SOLUTION INTRAMUSCULAR; INTRAVENOUS; SUBCUTANEOUS at 02:07

## 2019-07-24 RX ADMIN — ACETAMINOPHEN 650 MG: 325 TABLET ORAL at 10:07

## 2019-07-24 RX ADMIN — HYDROMORPHONE HYDROCHLORIDE 2 MG: 2 INJECTION INTRAMUSCULAR; INTRAVENOUS; SUBCUTANEOUS at 01:07

## 2019-07-24 NOTE — PLAN OF CARE
Problem: Fall Injury Risk  Goal: Absence of Fall and Fall-Related Injury  Outcome: Ongoing (interventions implemented as appropriate)  Meds given as ordered tolerated well. C/O generalized pain PRN pain meds given as needed. No signs or symptoms of distress/discomfort noted. Refused all labs. Ambulated to restroom independently. Will continue to monitor.

## 2019-07-24 NOTE — PLAN OF CARE
Problem: Adult Inpatient Plan of Care  Goal: Plan of Care Review  Outcome: Ongoing (interventions implemented as appropriate)  Pt AAOx4, calm and cooperative to care. Showed the understanding of POC. Pain management in progress with PO scheduled med and PRN IV dilaudid with effect. Administered on timely manner. Continuous IVF NS@125cc / hr via PIV. Voids well. VSS. No s/s of distress noted.

## 2019-07-24 NOTE — SUBJECTIVE & OBJECTIVE
Interval History: Patient describes unrelieved or controlled pain since admission. Reports pain is 10/10. Spoke in depth of AVN to right hip which he sees and orhtopedic surgeon in [place], last seen prior to 7/4 and has f/u visit in 2 weeks to determined procedure. Also reports 10/10 generalized body aches. Discussed pain medications in depth (outpatient therapy and prior inpatient narcotic therapy). He explained that dilaudid 2mg q4 hrs works the best with decrease in dosage and frequency. He also describes his typical sickle course is about 5 days. Patient in agreement of plan of dilaudid 2mg q4 prn for 12 hours only with a decrease to 1mg q4prn and reevaluate in AM. Patient agrees and feels that should get his pain uncontrol to start weaning. Plan to discharge in 1-2 days and patient feels that he should be ready at that point.    Review of Systems   Constitutional: Positive for fatigue. Negative for appetite change, chills and fever.   HENT: Negative for trouble swallowing.    Respiratory: Negative for cough, chest tightness, shortness of breath and wheezing.    Cardiovascular: Negative for chest pain, palpitations and leg swelling.   Gastrointestinal: Negative for abdominal pain, constipation, diarrhea and nausea.   Genitourinary: Negative for difficulty urinating, frequency and urgency.   Musculoskeletal: Positive for arthralgias and myalgias.        Right hip pain from known AVN. Generalized body pains and aches   Skin: Negative for rash.   Neurological: Positive for weakness. Negative for dizziness, light-headedness and headaches.   Psychiatric/Behavioral: Negative for sleep disturbance.       Objective:     Vital Signs (Most Recent):  Temp: 97.7 °F (36.5 °C) (07/23/19 1900)  Pulse: 62 (07/23/19 1900)  Resp: 16 (07/23/19 1900)  BP: 117/67 (07/23/19 1900)  SpO2: 96 % (07/23/19 1900) Vital Signs (24h Range):  Temp:  [97.4 °F (36.3 °C)-98.4 °F (36.9 °C)] 97.7 °F (36.5 °C)  Pulse:  [53-62] 62  Resp:  [16]  16  SpO2:  [95 %-98 %] 96 %  BP: (116-133)/(59-76) 117/67     Weight: 76.8 kg (169 lb 6.4 oz)  Body mass index is 25.02 kg/m².    Intake/Output Summary (Last 24 hours) at 7/23/2019 2149  Last data filed at 7/23/2019 2100  Gross per 24 hour   Intake --   Output 2025 ml   Net -2025 ml      Physical Exam   Constitutional: He is oriented to person, place, and time. He appears well-developed and well-nourished. No distress.   Cardiovascular: Normal rate, regular rhythm and normal heart sounds. Exam reveals no gallop and no friction rub.   No murmur heard.  Pulmonary/Chest: Effort normal and breath sounds normal. No respiratory distress. He has no wheezes. He has no rales.   Abdominal: Soft. Bowel sounds are normal. He exhibits no distension. There is generalized tenderness.   Musculoskeletal: Normal range of motion. He exhibits tenderness. He exhibits no edema.   Right hip tenderness on palpation  Significant right-sided lower back pain with waist flexion  Unable to fully access ROM and strength of RLE due to pain on exam - Full ROM and strength of b/l UE   Neurological: He is alert and oriented to person, place, and time.   Skin: Skin is warm and dry. No rash noted. He is not diaphoretic. No cyanosis. Nails show no clubbing.   Psychiatric: He has a normal mood and affect. His behavior is normal.   Nursing note and vitals reviewed.      Significant Labs: All pertinent labs within the past 24 hours have been reviewed.    Significant Imaging: I have reviewed all pertinent imaging results/findings within the past 24 hours.

## 2019-07-24 NOTE — PROGRESS NOTES
"[facility] Hospital Medicine  Progress Note    Patient Name: Paddy Stevenson  MRN: [id number]  Patient Class: IP- Inpatient   Admission Date: 7/16/2019  Length of Stay: 6 days  Attending Physician: Mickey Saenz MD  Primary Care Provider: [facility] Medicine Team: [facility] Ivis Hanks NP    Subjective:     Principal Problem:Sickle cell pain crisis      HPI:  23 y/o M with PMH sickle cell anemia and AVN of bilateral hips s/p L hip replacement 2 years ago presents c/o R hip, back, and thigh pain. The pain started 6 days ago but has progressively worsened since onset. Pain is described as aching/throbbing. Pt has taken MS Contin 45 mg as prescribed by his hematologist [place]) without relief of pain. He also has tried heat/ice and hot showers with no relief. Pt reports the right sided pain is similar to pain he experiences with left AVN prior to hip replacement. Typical sickle cell crisis pain is generalized pain (not localized), which he is not experiencing at this time. He does report working yesterday at [employer] requiring excessive standing and climbing of ladders. He has seen 2 different orthopedic surgeons regarding AVN with both awaiting further progression of AVN to operate. Pt reports last MRI in [place]. Per records (12/2018), MRI showed avascular necrosis of the right femoral head without subchondral collapse. He follows with hematology regularly, last appointment 2 weeks ago but reports he has to get a new hematologist because hematologist has "too many patients." He reports compliance with hydroxyurea.  He denies fever/chills, N/V/D, abdominal pain, CP, SOB, dizziness, recent illness or sick contacts, and alcohol use.     Of note, pt has flag in chart due to suspected drug seeking and malingering behavior dating back to 2015. Pt seen at 4 different facilities on 7 different occasions for complaints of pain since 7/1 as listed " below. He reports last MS contin prescribed by hematologist in June. He has 38 pills remaining. Denies daily use (only uses when he needs it) and reports going several days without medication when not in pain. Pt [facility] [place] [facility]19 for sickle cell anemia with pain. Hematology consulted but pt discharged before seen. Discharged on 7/8/19 with .      7/1: [facility]  7/2: [facility]  7/3: [facility]  7/7: [facility]  7/7: [facility]  7/13: [facility]  7/15: [facility]    In the ED: VSS. Afebrile without leukocytosis. Hgb 9.9 (baseline), retic (13) and Tbili (2.2) mildly elevated. R hip XR with no acute fracture.  Diffuse sclerosis proximal right femur, unchanged. Pt refused morphine, states that he is allergic and gets hives. Given dilaudid with temporary improvement of pain. PO fluids encouraged (ED had difficulty obtaining IV).      Overview/Hospital Course:  Pt admitted for sickle cell crisis and R hip pain.  LDH, T bili, HgbS all elevated and low haptoglobin consistent with sickle cell crisis.  Pt does have strong history of drug seeking behavior.  PT/OT ordered and home no needs (very limited participation).  MRI R hip focal area of avascular necrosis involving at least 50% of the articular surface.  No imaging findings to suggest subchondral plate collapse.  Additional focal area of avascular necrosis within the right iliac crest and possibly within the right proximal femoral shaft, the latter which is not well evaluated secondary to partial visualization.  Osseous stigmata of sickle cell disease, similar when compared to multiple previous exams.  MS Contin bid, dilaudid prn, and multimodal management for pain.  Day 3 patient reported hitting his R hip while walking to the bathroom, no falls.  Resistant to weaning IV pain medications.  WBC increased, CXR and UA ordered without noted infection, trended down the following  day. Day 5-6 sickle cell lab improvement. Pain out of proportion to exam findings. Proceeded with weaning IV pain medications.    Interval History: Patient describes unrelieved or controlled pain since admission. Reports pain is 10/10. Spoke in depth of AVN to right hip which he sees and orhtopedic surgeon in [place], last seen prior to 7/4 and has f/u visit in 2 weeks to determined procedure. Also reports 10/10 generalized body aches. Discussed pain medications in depth (outpatient therapy and prior inpatient narcotic therapy). He explained that dilaudid 2mg q4 hrs works the best with decrease in dosage and frequency. He also describes his typical sickle course is about 5 days. Patient in agreement of plan of dilaudid 2mg q4 prn for 12 hours only with a decrease to 1mg q4prn and reevaluate in AM. Patient agrees and feels that should get his pain uncontrol to start weaning. Plan to discharge in 1-2 days and patient feels that he should be ready at that point.    Review of Systems   Constitutional: Positive for fatigue. Negative for appetite change, chills and fever.   HENT: Negative for trouble swallowing.    Respiratory: Negative for cough, chest tightness, shortness of breath and wheezing.    Cardiovascular: Negative for chest pain, palpitations and leg swelling.   Gastrointestinal: Negative for abdominal pain, constipation, diarrhea and nausea.   Genitourinary: Negative for difficulty urinating, frequency and urgency.   Musculoskeletal: Positive for arthralgias and myalgias.        Right hip pain from known AVN. Generalized body pains and aches   Skin: Negative for rash.   Neurological: Positive for weakness. Negative for dizziness, light-headedness and headaches.   Psychiatric/Behavioral: Negative for sleep disturbance.       Objective:     Vital Signs (Most Recent):  Temp: 97.7 °F (36.5 °C) (07/23/19 1900)  Pulse: 62 (07/23/19 1900)  Resp: 16 (07/23/19 1900)  BP: 117/67 (07/23/19 1900)  SpO2: 96 % (07/23/19  1900) Vital Signs (24h Range):  Temp:  [97.4 °F (36.3 °C)-98.4 °F (36.9 °C)] 97.7 °F (36.5 °C)  Pulse:  [53-62] 62  Resp:  [16] 16  SpO2:  [95 %-98 %] 96 %  BP: (116-133)/(59-76) 117/67     Weight: 76.8 kg (169 lb 6.4 oz)  Body mass index is 25.02 kg/m².    Intake/Output Summary (Last 24 hours) at 7/23/2019 2149  Last data filed at 7/23/2019 2100  Gross per 24 hour   Intake --   Output 2025 ml   Net -2025 ml      Physical Exam   Constitutional: He is oriented to person, place, and time. He appears well-developed and well-nourished. No distress.   Cardiovascular: Normal rate, regular rhythm and normal heart sounds. Exam reveals no gallop and no friction rub.   No murmur heard.  Pulmonary/Chest: Effort normal and breath sounds normal. No respiratory distress. He has no wheezes. He has no rales.   Abdominal: Soft. Bowel sounds are normal. He exhibits no distension. There is generalized tenderness.   Musculoskeletal: Normal range of motion. He exhibits tenderness. He exhibits no edema.   Right hip tenderness on palpation  Significant right-sided lower back pain with waist flexion  Unable to fully access ROM and strength of RLE due to pain on exam - Full ROM and strength of b/l UE   Neurological: He is alert and oriented to person, place, and time.   Skin: Skin is warm and dry. No rash noted. He is not diaphoretic. No cyanosis. Nails show no clubbing.   Psychiatric: He has a normal mood and affect. His behavior is normal.   Nursing note and vitals reviewed.      Significant Labs: All pertinent labs within the past 24 hours have been reviewed.    Significant Imaging: I have reviewed all pertinent imaging results/findings within the past 24 hours.      Assessment/Plan:      * Sickle cell pain crisis  Retic count elevated at 13  LDH elevated at 314, T bili elevated at 2.2  Hgb S, Haptoglobin added on  Hgb baseline 8-10, on admission Hgb 9.9  Based on elevated LDH and T bili, appears patient in mild crisis. Will proceed with  "sickle cell pain management and protocol.   7/18 Dr. Canchola at bedside today.  Reviewed with pt we will schedule MS Contin and continue dilaudid 1 mg prn (hold for sedation) with plan to wean IV pain medication tomorrow.  Very limited participation with PT/OT.  Pt hit R hip overnight while walking to the bathroom, no falls.  7/19 increased MS contin from 30 to 45 mg bid (home dose) and decreased dilaudid 1 mg from q 3 hrs prn to q 6 hrs prn.  Scheduled tylenol.  Refused gabapentin (no relief after 6 month trial in the past) and lidocaine patches (allergic)  7/20 See interval history for documentation of discussion of care plan with RN present.  Pt reports hip pain mildly improved but still experiences pain "everywhere else" to the point of not being able to return to daily functioning. MS contin 45 mg bid continued and dilaudid 1 mg increased to q 4 hrs prn. Pt has been refusing scheduled tylenol. Added ibuprofen for multimodal therapy, encouraged compliance.  7/21-7/22 See interval history documentation. Pain out of proportion to exam findings. Sickle cell labs have improved. Will proceed with weaning of IV pain medication - dilaudid 1 mg q6 PRN to 1 mg q8 prn.    Avascular necrosis of bones of both hips  S/p L hip replacement ~2 years ago  MRI R hip (12/2018) at OSH: Avascular necrosis of the right femoral head without subchondral collapse. 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  R hip xray: No acute fracture.  Diffuse sclerosis proximal right femur, unchanged.  Pt reports discussing AVN with 2 orthopedic surgeons with no intent to operate at this time until, awaiting worsening of condition.  ESR/CRP: 7/10.3, low suspicion for infection  Repeat MRI R. hip  Last PT session June of last year. PT/OT eval: no needs, refused OT eval.    Mild intermittent asthma without complication  Sats 98-99% on RA  Controlled  Will order prn breathing treatments    Drug-seeking behavior  See above, on admission " did appear patient in mild crisis  Drug screen ordered: presumptive positive for opioids    ER visits July 2019:  7/1: [facility]  7/2: [facility]  7/3: [facility]  7/7: [facility]  7/7: [facility]  7/13: [facility]  7/15: [facility]  7/16: [facility]    Leukocytosis  WBC 10.66->14.12, left shift, procal slightly elevated  Ordered CXR-no signs of infection, UA pending collection  No chest pain  Continue to monitor      VTE Risk Mitigation (From admission, onward)        Ordered     IP VTE LOW RISK PATIENT  Once      07/17/19 0732     Place BONG hose  Until discontinued      07/17/19 0732          Ivis Hanks NP  Department of Hospital Medicine   [facility]

## 2019-07-24 NOTE — PROGRESS NOTES
"[facility] Hospital Medicine  Progress Note    Patient Name: Paddy Stevenson  MRN: [id number]  Patient Class: IP- Inpatient   Admission Date: 7/16/2019  Length of Stay: 7 days  Attending Physician: Mickey Saenz MD  Primary Care Provider: [facility] Medicine Team: [facility] Y Ivis Hanks NP    Subjective:     Principal Problem:Sickle cell pain crisis      HPI:  23 y/o M with PMH sickle cell anemia and AVN of bilateral hips s/p L hip replacement 2 years ago presents c/o R hip, back, and thigh pain. The pain started 6 days ago but has progressively worsened since onset. Pain is described as aching/throbbing. Pt has taken MS Contin 45 mg as prescribed by his hematologist (Dr. Minaya at [facility]) without relief of pain. He also has tried heat/ice and hot showers with no relief. Pt reports the right sided pain is similar to pain he experiences with left AVN prior to hip replacement. Typical sickle cell crisis pain is generalized pain (not localized), which he is not experiencing at this time. He does report working yesterday at [employer] requiring excessive standing and climbing of ladders. He has seen 2 different orthopedic surgeons regarding AVN with both awaiting further progression of AVN to operate. Pt reports last MRI in [place]. Per records (12/2018), MRI showed avascular necrosis of the right femoral head without subchondral collapse. He follows with hematology regularly, last appointment 2 weeks ago but reports he has to get a new hematologist because hematologist has "too many patients." He reports compliance with hydroxyurea.  He denies fever/chills, N/V/D, abdominal pain, CP, SOB, dizziness, recent illness or sick contacts, and alcohol use.     Of note, pt has flag in chart due to suspected drug seeking and malingering behavior dating back to 2015. Pt seen at 4 different facilities on 7 different occasions for complaints of pain since 7/1 as listed " below. He reports last MS contin prescribed by hematologist in June. He has 38 pills remaining. Denies daily use (only uses when he needs it) and reports going several days without medication when not in pain. Pt last admitted to [facility] on 7/7/19 for sickle cell anemia with pain. Hematology consulted but pt discharged before seen. Discharged on 7/8/19 with .      7/1: [facility]  7/2: [facility]  7/3: [facility]  7/7: [facility]  7/7: [facility]  7/13: [facility]  7/15: [facility]    In the ED: VSS. Afebrile without leukocytosis. Hgb 9.9 (baseline), retic (13) and Tbili (2.2) mildly elevated. R hip XR with no acute fracture.  Diffuse sclerosis proximal right femur, unchanged. Pt refused morphine, states that he is allergic and gets hives. Given dilaudid with temporary improvement of pain. PO fluids encouraged (ED had difficulty obtaining IV).      Overview/Hospital Course:  Pt admitted for sickle cell crisis and R hip pain.  LDH, T bili, HgbS all elevated and low haptoglobin consistent with sickle cell crisis.  Pt does have strong history of drug seeking behavior.  PT/OT ordered and home no needs (very limited participation).  MRI R hip focal area of avascular necrosis involving at least 50% of the articular surface.  No imaging findings to suggest subchondral plate collapse.  Additional focal area of avascular necrosis within the right iliac crest and possibly within the right proximal femoral shaft, the latter which is not well evaluated secondary to partial visualization.  Osseous stigmata of sickle cell disease, similar when compared to multiple previous exams.  MS Contin bid, dilaudid prn, and multimodal management for pain.  Day 3 patient reported hitting his R hip while walking to the bathroom, no falls.  Resistant to weaning IV pain medications.  WBC increased, CXR and UA ordered without noted infection, trended down the following  day. Day 5-6 sickle cell lab improvement. Pain out of proportion to exam findings. Proceeded with weaning IV pain medications.    Interval History: Patient reports some improvement in pain with today. Continue to wean dilaudid and plan for discharge in AM. Patient agreeable with plan.     Review of Systems   Constitutional: Positive for fatigue. Negative for appetite change, chills and fever.   HENT: Negative for trouble swallowing.    Respiratory: Negative for cough, chest tightness, shortness of breath and wheezing.    Cardiovascular: Negative for chest pain, palpitations and leg swelling.   Gastrointestinal: Negative for abdominal pain, constipation, diarrhea and nausea.   Genitourinary: Negative for difficulty urinating, frequency and urgency.   Musculoskeletal: Positive for arthralgias and myalgias.        Right hip pain from known AVN. Generalized body pains and aches   Skin: Negative for rash.   Neurological: Positive for weakness. Negative for dizziness, light-headedness and headaches.   Psychiatric/Behavioral: Negative for sleep disturbance.       Objective:     Vital Signs (Most Recent):  Temp: 98.3 °F (36.8 °C) (07/24/19 1208)  Pulse: (!) 55 (07/24/19 1208)  Resp: 17 (07/24/19 0710)  BP: 132/71 (07/24/19 1208)  SpO2: (!) 94 % (07/24/19 1208) Vital Signs (24h Range):  Temp:  [96.5 °F (35.8 °C)-98.4 °F (36.9 °C)] 98.3 °F (36.8 °C)  Pulse:  [55-73] 55  Resp:  [16-18] 17  SpO2:  [92 %-100 %] 94 %  BP: (107-132)/(67-71) 132/71     Weight: 76.8 kg (169 lb 6.4 oz)  Body mass index is 25.02 kg/m².    Intake/Output Summary (Last 24 hours) at 7/24/2019 1327  Last data filed at 7/24/2019 0628  Gross per 24 hour   Intake 300 ml   Output 1600 ml   Net -1300 ml      Physical Exam   Constitutional: He is oriented to person, place, and time. He appears well-developed and well-nourished. No distress.   Cardiovascular: Normal rate, regular rhythm and normal heart sounds. Exam reveals no gallop and no friction rub.   No  murmur heard.  Pulmonary/Chest: Effort normal and breath sounds normal. No respiratory distress. He has no wheezes. He has no rales.   Abdominal: Soft. Bowel sounds are normal. He exhibits no distension. There is generalized tenderness.   Musculoskeletal: Normal range of motion. He exhibits tenderness. He exhibits no edema.   Right hip tenderness on palpation  Significant right-sided lower back pain with waist flexion  Unable to fully access ROM and strength of RLE due to pain on exam - Full ROM and strength of b/l UE   Neurological: He is alert and oriented to person, place, and time.   Skin: Skin is warm and dry. No rash noted. He is not diaphoretic. No cyanosis. Nails show no clubbing.   Psychiatric: He has a normal mood and affect. His behavior is normal.   Nursing note and vitals reviewed.      Significant Labs: All pertinent labs within the past 24 hours have been reviewed.    Significant Imaging: I have reviewed all pertinent imaging results/findings within the past 24 hours.      Assessment/Plan:      * Sickle cell pain crisis  Retic count elevated at 13  LDH elevated at 314, T bili elevated at 2.2  Hgb S, Haptoglobin added on  Hgb baseline 8-10, on admission Hgb 9.9  Based on elevated LDH and T bili, appears patient in mild crisis. Will proceed with sickle cell pain management and protocol.   7/18 Dr. Canchola at bedside today.  Reviewed with pt we will schedule MS Contin and continue dilaudid 1 mg prn (hold for sedation) with plan to wean IV pain medication tomorrow.  Very limited participation with PT/OT.  Pt hit R hip overnight while walking to the bathroom, no falls.  7/19 increased MS contin from 30 to 45 mg bid (home dose) and decreased dilaudid 1 mg from q 3 hrs prn to q 6 hrs prn.  Scheduled tylenol.  Refused gabapentin (no relief after 6 month trial in the past) and lidocaine patches (allergic)  7/20 See interval history for documentation of discussion of care plan with RN present.  Pt reports hip  "pain mildly improved but still experiences pain "everywhere else" to the point of not being able to return to daily functioning. MS contin 45 mg bid continued and dilaudid 1 mg increased to q 4 hrs prn. Pt has been refusing scheduled tylenol. Added ibuprofen for multimodal therapy, encouraged compliance.  7/21-7/22 See interval history documentation. Pain out of proportion to exam findings. Sickle cell labs have improved. Will proceed with weaning of IV pain medication - dilaudid 1 mg q6 PRN to 1 mg q8 prn.    Avascular necrosis of bones of both hips  S/p L hip replacement ~2 years ago  MRI R hip (12/2018) at OSH: Avascular necrosis of the right femoral head without subchondral collapse. 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  R hip xray: No acute fracture.  Diffuse sclerosis proximal right femur, unchanged.  Pt reports discussing AVN with 2 orthopedic surgeons with no intent to operate at this time until, awaiting worsening of condition.  ESR/CRP: 7/10.3, low suspicion for infection  Repeat MRI R. hip  Last PT session June of last year. PT/OT eval: no needs, refused OT eval.    Mild intermittent asthma without complication  Sats 98-99% on RA  Controlled  Will order prn breathing treatments    Drug-seeking behavior  See above, on admission did appear patient in mild crisis  Drug screen ordered: presumptive positive for opioids    ER visits July 2019:  7/1: [facility]  7/2: [facility]  7/3: [facility]  7/7: [facility]  7/7: [facility]  7/13: [facility]  7/15: [facility]  7/16: [facility]    Leukocytosis  WBC 10.66->14.12, left shift, procal slightly elevated  Ordered CXR-no signs of infection, UA pending collection  No chest pain  Continue to monitor      VTE Risk Mitigation (From admission, onward)        Ordered     IP VTE LOW RISK PATIENT  Once      07/17/19 0732     Place BONG hose  Until discontinued      07/17/19 0732    "         Ivis Hanks NP  Department of Hospital Medicine   [facility]

## 2019-07-24 NOTE — ASSESSMENT & PLAN NOTE
See above, on admission did appear patient in mild crisis  Drug screen ordered: presumptive positive for opioids    ER visits July 2019:  7/1: [facility]  7/2: [facility]  7/3: [facility]  7/7: [facility]  7/7: [facility]  7/13: [facility]  7/15: [facility]  7/16: [facility]

## 2019-07-24 NOTE — ASSESSMENT & PLAN NOTE
See above, on admission did appear patient in mild crisis  Drug screen ordered: presumptive positive for opioids    ER visits July 2019:  7/1: [facility]  7/2: [facility]  7/3: [facility]  7/7: [facility]  7/7: [facility]  7/13: [facility]  7/15: [facility]  7/16: [facility]

## 2019-07-24 NOTE — SUBJECTIVE & OBJECTIVE
Interval History: Patient reports some improvement in pain with today. Continue to wean dilaudid and plan for discharge in AM. Patient agreeable with plan.     Review of Systems   Constitutional: Positive for fatigue. Negative for appetite change, chills and fever.   HENT: Negative for trouble swallowing.    Respiratory: Negative for cough, chest tightness, shortness of breath and wheezing.    Cardiovascular: Negative for chest pain, palpitations and leg swelling.   Gastrointestinal: Negative for abdominal pain, constipation, diarrhea and nausea.   Genitourinary: Negative for difficulty urinating, frequency and urgency.   Musculoskeletal: Positive for arthralgias and myalgias.        Right hip pain from known AVN. Generalized body pains and aches   Skin: Negative for rash.   Neurological: Positive for weakness. Negative for dizziness, light-headedness and headaches.   Psychiatric/Behavioral: Negative for sleep disturbance.       Objective:     Vital Signs (Most Recent):  Temp: 98.3 °F (36.8 °C) (07/24/19 1208)  Pulse: (!) 55 (07/24/19 1208)  Resp: 17 (07/24/19 0710)  BP: 132/71 (07/24/19 1208)  SpO2: (!) 94 % (07/24/19 1208) Vital Signs (24h Range):  Temp:  [96.5 °F (35.8 °C)-98.4 °F (36.9 °C)] 98.3 °F (36.8 °C)  Pulse:  [55-73] 55  Resp:  [16-18] 17  SpO2:  [92 %-100 %] 94 %  BP: (107-132)/(67-71) 132/71     Weight: 76.8 kg (169 lb 6.4 oz)  Body mass index is 25.02 kg/m².    Intake/Output Summary (Last 24 hours) at 7/24/2019 1327  Last data filed at 7/24/2019 0628  Gross per 24 hour   Intake 300 ml   Output 1600 ml   Net -1300 ml      Physical Exam   Constitutional: He is oriented to person, place, and time. He appears well-developed and well-nourished. No distress.   Cardiovascular: Normal rate, regular rhythm and normal heart sounds. Exam reveals no gallop and no friction rub.   No murmur heard.  Pulmonary/Chest: Effort normal and breath sounds normal. No respiratory distress. He has no wheezes. He has no rales.    Abdominal: Soft. Bowel sounds are normal. He exhibits no distension. There is generalized tenderness.   Musculoskeletal: Normal range of motion. He exhibits tenderness. He exhibits no edema.   Right hip tenderness on palpation  Significant right-sided lower back pain with waist flexion  Unable to fully access ROM and strength of RLE due to pain on exam - Full ROM and strength of b/l UE   Neurological: He is alert and oriented to person, place, and time.   Skin: Skin is warm and dry. No rash noted. He is not diaphoretic. No cyanosis. Nails show no clubbing.   Psychiatric: He has a normal mood and affect. His behavior is normal.   Nursing note and vitals reviewed.      Significant Labs: All pertinent labs within the past 24 hours have been reviewed.    Significant Imaging: I have reviewed all pertinent imaging results/findings within the past 24 hours.

## 2019-07-25 VITALS
BODY MASS INDEX: 25.09 KG/M2 | WEIGHT: 169.38 LBS | RESPIRATION RATE: 18 BRPM | HEIGHT: 69 IN | DIASTOLIC BLOOD PRESSURE: 72 MMHG | SYSTOLIC BLOOD PRESSURE: 125 MMHG | HEART RATE: 71 BPM | OXYGEN SATURATION: 98 % | TEMPERATURE: 98 F

## 2019-07-25 LAB
ALBUMIN SERPL BCP-MCNC: 3.6 G/DL (ref 3.5–5.2)
ALP SERPL-CCNC: 69 U/L (ref 55–135)
ALT SERPL W/O P-5'-P-CCNC: 19 U/L (ref 10–44)
ANION GAP SERPL CALC-SCNC: 9 MMOL/L (ref 8–16)
AST SERPL-CCNC: 38 U/L (ref 10–40)
BILIRUB SERPL-MCNC: 3.7 MG/DL (ref 0.1–1)
BUN SERPL-MCNC: 9 MG/DL (ref 6–20)
CALCIUM SERPL-MCNC: 9.1 MG/DL (ref 8.7–10.5)
CHLORIDE SERPL-SCNC: 111 MMOL/L (ref 95–110)
CO2 SERPL-SCNC: 22 MMOL/L (ref 23–29)
CREAT SERPL-MCNC: 0.8 MG/DL (ref 0.5–1.4)
EST. GFR  (AFRICAN AMERICAN): >60 ML/MIN/1.73 M^2
EST. GFR  (NON AFRICAN AMERICAN): >60 ML/MIN/1.73 M^2
GLUCOSE SERPL-MCNC: 84 MG/DL (ref 70–110)
POTASSIUM SERPL-SCNC: 4.1 MMOL/L (ref 3.5–5.1)
PROT SERPL-MCNC: 6.6 G/DL (ref 6–8.4)
SODIUM SERPL-SCNC: 142 MMOL/L (ref 136–145)

## 2019-07-25 PROCEDURE — 99238 HOSP IP/OBS DSCHRG MGMT 30/<: CPT | Mod: ,,, | Performed by: NURSE PRACTITIONER

## 2019-07-25 PROCEDURE — 99238 PR HOSPITAL DISCHARGE DAY,<30 MIN: ICD-10-PCS | Mod: ,,, | Performed by: NURSE PRACTITIONER

## 2019-07-25 PROCEDURE — 63600175 PHARM REV CODE 636 W HCPCS: Performed by: NURSE PRACTITIONER

## 2019-07-25 PROCEDURE — 36415 COLL VENOUS BLD VENIPUNCTURE: CPT

## 2019-07-25 PROCEDURE — 25000003 PHARM REV CODE 250: Performed by: PHYSICIAN ASSISTANT

## 2019-07-25 PROCEDURE — 80053 COMPREHEN METABOLIC PANEL: CPT

## 2019-07-25 RX ADMIN — HYDROMORPHONE HYDROCHLORIDE 1 MG: 1 INJECTION, SOLUTION INTRAMUSCULAR; INTRAVENOUS; SUBCUTANEOUS at 10:07

## 2019-07-25 RX ADMIN — FOLIC ACID 1 MG: 1 TABLET ORAL at 08:07

## 2019-07-25 RX ADMIN — MORPHINE SULFATE 45 MG: 30 TABLET, EXTENDED RELEASE ORAL at 08:07

## 2019-07-25 RX ADMIN — IBUPROFEN 400 MG: 400 TABLET, FILM COATED ORAL at 12:07

## 2019-07-25 RX ADMIN — ACETAMINOPHEN 650 MG: 325 TABLET ORAL at 02:07

## 2019-07-25 RX ADMIN — HYDROMORPHONE HYDROCHLORIDE 1 MG: 1 INJECTION, SOLUTION INTRAMUSCULAR; INTRAVENOUS; SUBCUTANEOUS at 02:07

## 2019-07-25 NOTE — ASSESSMENT & PLAN NOTE
See above, on admission did appear patient in mild crisis  Drug screen ordered: presumptive positive for opioids    ER visits July 2019:  7/1: [facility]  7/2: [facility]  7/3: [facility]  7/7: [facility]  7/7: [facility]  7/13: [facility]  7/15: [facility]  7/16: [facility]

## 2019-07-25 NOTE — PLAN OF CARE
Problem: Adult Inpatient Plan of Care  Goal: Plan of Care Review  Outcome: Ongoing (interventions implemented as appropriate)  Pt AAOx4, showed the understanding of poc. Pain management continues. Weaning off from IV dilaudid in progress. Pt understood. Continuous iv ns continues. Able to ambulate ad calixto. No s/s of acute distress noted. Call light within easy reach. No c/o voiced.

## 2019-07-25 NOTE — DISCHARGE SUMMARY
"[facility] Hospital Medicine  Discharge Summary      Patient Name: Paddy Stevenson  MRN: [id number]  Admission Date: 7/16/2019  Hospital Length of Stay: 8 days  Discharge Date and Time:  07/25/2019 5:10 PM  Attending Physician: Mickey Saenz MD   Discharging Provider: Ivis Hanks NP  Primary Care Provider: Primary Doctor [facility] Medicine Team: [facility] [place] Ivis Hanks NP    HPI:   23 y/o M with PMH sickle cell anemia and AVN of bilateral hips s/p L hip replacement 2 years ago presents c/o R hip, back, and thigh pain. The pain started 6 days ago but has progressively worsened since onset. Pain is described as aching/throbbing. Pt has taken MS Contin 45 mg as prescribed by his hematologist (Dr. Minaya at [facility]) without relief of pain. He also has tried heat/ice and hot showers with no relief. Pt reports the right sided pain is similar to pain he experiences with left AVN prior to hip replacement. Typical sickle cell crisis pain is generalized pain (not localized), which he is not experiencing at this time. He does report working yesterday at [employer] requiring excessive standing and climbing of ladders. He has seen 2 different orthopedic surgeons regarding AVN with both awaiting further progression of AVN to operate. Pt reports last MRI in [place]. Per records (12/2018), MRI showed avascular necrosis of the right femoral head without subchondral collapse. He follows with hematology regularly, last appointment 2 weeks ago but reports he has to get a new hematologist because hematologist has "too many patients." He reports compliance with hydroxyurea.  He denies fever/chills, N/V/D, abdominal pain, CP, SOB, dizziness, recent illness or sick contacts, and alcohol use.     Of note, pt has flag in chart due to suspected drug seeking and malingering behavior dating back to 2015. Pt seen at 4 different facilities on 7 different occasions for complaints of pain since 7/1 as " listed below. He reports last MS contin prescribed by hematologist in June. He has 38 pills remaining. Denies daily use (only uses when he needs it) and reports going several days without medication when not in pain. Pt [facility] 7[facility]19 for sickle cell anemia with pain. Hematology consulted but pt discharged before seen. Discharged on 7/8/19 with .      7/1: [facility]  7/2: [facility]  7/3: [facility]  7/7: [facility]  7/7: [facility]  7/13: [facility]  7/15: [facility]    In the ED: VSS. Afebrile without leukocytosis. Hgb 9.9 (baseline), retic (13) and Tbili (2.2) mildly elevated. R hip XR with no acute fracture.  Diffuse sclerosis proximal right femur, unchanged. Pt refused morphine, states that he is allergic and gets hives. Given dilaudid with temporary improvement of pain. PO fluids encouraged (ED had difficulty obtaining IV).      * No surgery found *      Hospital Course:   Pt admitted for sickle cell crisis and R hip pain.  LDH, T bili, HgbS all elevated and low haptoglobin consistent with sickle cell crisis.  Pt does have strong history of drug seeking behavior.  PT/OT ordered and home no needs (very limited participation).  MRI R hip focal area of avascular necrosis involving at least 50% of the articular surface.  No imaging findings to suggest subchondral plate collapse.  Additional focal area of avascular necrosis within the right iliac crest and possibly within the right proximal femoral shaft, the latter which is not well evaluated secondary to partial visualization.  Osseous stigmata of sickle cell disease, similar when compared to multiple previous exams.  MS Contin bid, dilaudid prn, and multimodal management for pain.  Day 3 patient reported hitting his R hip while walking to the bathroom, no falls.  Resistant to weaning IV pain medications.  WBC increased, CXR and UA ordered without noted infection,  "trended down the following day. Day 5-6 sickle cell lab improvement. Pain out of proportion to exam findings. Proceeded with weaning IV pain medications. Discharging home on home pain medications. Instructed to f/u with PCP, Hematology, and Orthopedics.      Consults:   Consults (From admission, onward)        Status Ordering Provider     Inpatient consult to PICC team (UNIQUE)  Once     Provider:  (Not yet assigned)    Completed ALLISON SIERRA          * Sickle cell pain crisis  Retic count elevated at 13  LDH elevated at 314, T bili elevated at 2.2  Hgb S, Haptoglobin added on  Hgb baseline 8-10, on admission Hgb 9.9  Based on elevated LDH and T bili, appears patient in mild crisis. Will proceed with sickle cell pain management and protocol.   7/18 Dr. Canchola at bedside today.  Reviewed with pt we will schedule MS Contin and continue dilaudid 1 mg prn (hold for sedation) with plan to wean IV pain medication tomorrow.  Very limited participation with PT/OT.  Pt hit R hip overnight while walking to the bathroom, no falls.  7/19 increased MS contin from 30 to 45 mg bid (home dose) and decreased dilaudid 1 mg from q 3 hrs prn to q 6 hrs prn.  Scheduled tylenol.  Refused gabapentin (no relief after 6 month trial in the past) and lidocaine patches (allergic)  7/20 See interval history for documentation of discussion of care plan with RN present.  Pt reports hip pain mildly improved but still experiences pain "everywhere else" to the point of not being able to return to daily functioning. MS contin 45 mg bid continued and dilaudid 1 mg increased to q 4 hrs prn. Pt has been refusing scheduled tylenol. Added ibuprofen for multimodal therapy, encouraged compliance.  7/21-7/22 See interval history documentation. Pain out of proportion to exam findings. Sickle cell labs have improved. Will proceed with weaning of IV pain medication - dilaudid 1 mg q6 PRN to 1 mg q8 prn.    Day of discharge patient sitting at 90 degree " "angle without back support in bed. Upon walking into room patient straighten sheet and lied down unassisted and controlled without apparent discomfort. (which is marked improvement since yesterday patient was unable to turn in bed without grimacing). Once discussed discharging home today patient then chose not to participate in discussion and remain silent. Asked if he had any questions? Reply "Can I still get my iv pain medication before I leave since it is due at 10 o'clock".     Avascular necrosis of bones of both hips  S/p L hip replacement ~2 years ago  MRI R hip (12/2018) at OSH: Avascular necrosis of the right femoral head without subchondral collapse. 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  R hip xray: No acute fracture.  Diffuse sclerosis proximal right femur, unchanged.  Pt reports discussing AVN with 2 orthopedic surgeons with no intent to operate at this time until, awaiting worsening of condition.  ESR/CRP: 7/10.3, low suspicion for infection  Repeat MRI R. hip  Last PT session June of last year. PT/OT eval: no needs, refused OT eval.    Mild intermittent asthma without complication  Sats 98-99% on RA  Controlled  Will order prn breathing treatments    Drug-seeking behavior  See above, on admission did appear patient in mild crisis  Drug screen ordered: presumptive positive for opioids    ER visits July 2019:  7/1: [facility]  7/2: [facility]  7/3: [facility]  7/7: [facility]  7/7: [facility]  7/13: [facility]  7/15: [facility]  7/16: [facility]    Leukocytosis  WBC 10.66->14.12, left shift, procal slightly elevated  Ordered CXR-no signs of infection, UA pending collection  No chest pain  Continue to monitor      Final Active Diagnoses:    Diagnosis Date Noted POA    PRINCIPAL PROBLEM:  Sickle cell pain crisis [D57.00] 09/06/2015 Yes    Mild intermittent asthma without complication [J45.20] 03/20/2016 Yes     Chronic    " Avascular necrosis of bones of both hips [M87.051, M87.052] 03/20/2016 Yes     Chronic    Drug-seeking behavior [Z76.5] 03/11/2016 Yes    Leukocytosis [D72.829] 01/19/2016 Unknown      Problems Resolved During this Admission:       Discharged Condition: stable    Disposition: Home or Self Care    Follow Up:  Follow-up Information     Primary Doctor No.               Patient Instructions:      Diet Adult Regular     Notify your health care provider if you experience any of the following:  temperature >100.4     Notify your health care provider if you experience any of the following:  persistent nausea and vomiting or diarrhea     Notify your health care provider if you experience any of the following:  severe uncontrolled pain     Notify your health care provider if you experience any of the following:  difficulty breathing or increased cough     Notify your health care provider if you experience any of the following:  severe persistent headache     Notify your health care provider if you experience any of the following:  persistent dizziness, light-headedness, or visual disturbances     Notify your health care provider if you experience any of the following:  increased confusion or weakness     Activity as tolerated       Significant Diagnostic Studies: Labs:   CMP   Recent Labs   Lab 07/25/19  0820      K 4.1   *   CO2 22*   GLU 84   BUN 9   CREATININE 0.8   CALCIUM 9.1   PROT 6.6   ALBUMIN 3.6   BILITOT 3.7*   ALKPHOS 69   AST 38   ALT 19   ANIONGAP 9   ESTGFRAFRICA >60.0   EGFRNONAA >60.0       Medications:  Reconciled Home Medications:      Medication List      CONTINUE taking these medications    folic acid 1 MG tablet  Commonly known as:  FOLVITE  Take 1 mg by mouth once daily.     MS CONTIN ORAL  Take 45 mg by mouth 2 (two) times daily as needed.        STOP taking these medications    hydroxyurea 500 mg Cap  Commonly known as:  HYDREA            Indwelling Lines/Drains at time of discharge:    Lines/Drains/Airways          None          Time spent on the discharge of patient: 25 minutes  Patient was seen and examined on the date of discharge and determined to be suitable for discharge.         Ivis Hanks NP  Department of Hospital Medicine  [facility]

## 2019-07-25 NOTE — PLAN OF CARE
Pt is expected to discharge today; Pt has been advised by SHIRA Langston, to make an appt w/[facility] or [facility].  No discharge planning needs set up @ this time.  Pt has requested transportation as he has no one to pick him up from the hospital.  Transportation has been arranged for 3:30pm through [facility] [id number].     07/25/19 1039   Post-Acute Status   Other Status No Post-Acute Service Needs  (Pt is expected to discharge today w/no  community needs in place.  )

## 2019-07-25 NOTE — ASSESSMENT & PLAN NOTE
"Retic count elevated at 13  LDH elevated at 314, T bili elevated at 2.2  Hgb S, Haptoglobin added on  Hgb baseline 8-10, on admission Hgb 9.9  Based on elevated LDH and T bili, appears patient in mild crisis. Will proceed with sickle cell pain management and protocol.   7/18 Dr. Canchola at bedside today.  Reviewed with pt we will schedule MS Contin and continue dilaudid 1 mg prn (hold for sedation) with plan to wean IV pain medication tomorrow.  Very limited participation with PT/OT.  Pt hit R hip overnight while walking to the bathroom, no falls.  7/19 increased MS contin from 30 to 45 mg bid (home dose) and decreased dilaudid 1 mg from q 3 hrs prn to q 6 hrs prn.  Scheduled tylenol.  Refused gabapentin (no relief after 6 month trial in the past) and lidocaine patches (allergic)  7/20 See interval history for documentation of discussion of care plan with RN present.  Pt reports hip pain mildly improved but still experiences pain "everywhere else" to the point of not being able to return to daily functioning. MS contin 45 mg bid continued and dilaudid 1 mg increased to q 4 hrs prn. Pt has been refusing scheduled tylenol. Added ibuprofen for multimodal therapy, encouraged compliance.  7/21-7/22 See interval history documentation. Pain out of proportion to exam findings. Sickle cell labs have improved. Will proceed with weaning of IV pain medication - dilaudid 1 mg q6 PRN to 1 mg q8 prn.    Day of discharge patient sitting at 90 degree angle without back support in bed. Upon walking into room patient straighten sheet and lied down unassisted and controlled without apparent discomfort. (which is marked improvement since yesterday patient was unable to turn in bed without grimacing). Once discussed discharging home today patient then chose not to participate in discussion and remain silent. Asked if he had any questions? Reply "Can I still get my iv pain medication before I leave since it is due at 10 o'clock".   "

## 2019-07-26 ENCOUNTER — HOSPITAL ENCOUNTER (INPATIENT)
Facility: HOSPITAL | Age: 24
LOS: 6 days | Discharge: LEFT BEFORE DISCHARGE | DRG: 812 | End: 2019-08-01
Attending: EMERGENCY MEDICINE | Admitting: EMERGENCY MEDICINE
Payer: MEDICAID

## 2019-07-26 DIAGNOSIS — D57.00 VASOOCCLUSIVE SICKLE CELL CRISIS: Primary | ICD-10-CM

## 2019-07-26 LAB
BASOPHILS # BLD AUTO: 0.13 K/UL (ref 0–0.2)
BASOPHILS NFR BLD: 1 % (ref 0–1.9)
DIFFERENTIAL METHOD: ABNORMAL
EOSINOPHIL # BLD AUTO: 0.7 K/UL (ref 0–0.5)
EOSINOPHIL NFR BLD: 5.5 % (ref 0–8)
ERYTHROCYTE [DISTWIDTH] IN BLOOD BY AUTOMATED COUNT: 23.6 % (ref 11.5–14.5)
HAPTOGLOB SERPL-MCNC: <10 MG/DL (ref 30–250)
HCT VFR BLD AUTO: 27.2 % (ref 40–54)
HGB BLD-MCNC: 8.7 G/DL (ref 14–18)
IMM GRANULOCYTES # BLD AUTO: 0.5 K/UL (ref 0–0.04)
IMM GRANULOCYTES NFR BLD AUTO: 3.9 % (ref 0–0.5)
LDH SERPL L TO P-CCNC: 416 U/L (ref 110–260)
LYMPHOCYTES # BLD AUTO: 2.9 K/UL (ref 1–4.8)
LYMPHOCYTES NFR BLD: 22 % (ref 18–48)
MCH RBC QN AUTO: 29.4 PG (ref 27–31)
MCHC RBC AUTO-ENTMCNC: 32 G/DL (ref 32–36)
MCV RBC AUTO: 92 FL (ref 82–98)
MONOCYTES # BLD AUTO: 1.6 K/UL (ref 0.3–1)
MONOCYTES NFR BLD: 12 % (ref 4–15)
NEUTROPHILS # BLD AUTO: 7.2 K/UL (ref 1.8–7.7)
NEUTROPHILS NFR BLD: 55.6 % (ref 38–73)
NRBC BLD-RTO: 3 /100 WBC
PLATELET # BLD AUTO: 202 K/UL (ref 150–350)
PMV BLD AUTO: 11 FL (ref 9.2–12.9)
RBC # BLD AUTO: 2.96 M/UL (ref 4.6–6.2)
RETICS/RBC NFR AUTO: 20 % (ref 0.4–2)
WBC # BLD AUTO: 12.98 K/UL (ref 3.9–12.7)

## 2019-07-26 PROCEDURE — 83615 LACTATE (LD) (LDH) ENZYME: CPT

## 2019-07-26 PROCEDURE — 99222 1ST HOSP IP/OBS MODERATE 55: CPT | Mod: ,,, | Performed by: NURSE PRACTITIONER

## 2019-07-26 PROCEDURE — 99284 EMERGENCY DEPT VISIT MOD MDM: CPT | Mod: ,,, | Performed by: EMERGENCY MEDICINE

## 2019-07-26 PROCEDURE — 63600175 PHARM REV CODE 636 W HCPCS: Performed by: EMERGENCY MEDICINE

## 2019-07-26 PROCEDURE — 85025 COMPLETE CBC W/AUTO DIFF WBC: CPT

## 2019-07-26 PROCEDURE — S5010 5% DEXTROSE AND 0.45% SALINE: HCPCS | Performed by: NURSE PRACTITIONER

## 2019-07-26 PROCEDURE — 99222 PR INITIAL HOSPITAL CARE,LEVL II: ICD-10-PCS | Mod: ,,, | Performed by: NURSE PRACTITIONER

## 2019-07-26 PROCEDURE — 25000003 PHARM REV CODE 250: Performed by: NURSE PRACTITIONER

## 2019-07-26 PROCEDURE — 99284 PR EMERGENCY DEPT VISIT,LEVEL IV: ICD-10-PCS | Mod: ,,, | Performed by: EMERGENCY MEDICINE

## 2019-07-26 PROCEDURE — 83010 ASSAY OF HAPTOGLOBIN QUANT: CPT

## 2019-07-26 PROCEDURE — 96374 THER/PROPH/DIAG INJ IV PUSH: CPT

## 2019-07-26 PROCEDURE — 63600175 PHARM REV CODE 636 W HCPCS: Performed by: NURSE PRACTITIONER

## 2019-07-26 PROCEDURE — 99285 EMERGENCY DEPT VISIT HI MDM: CPT | Mod: 25

## 2019-07-26 PROCEDURE — 25000003 PHARM REV CODE 250: Performed by: EMERGENCY MEDICINE

## 2019-07-26 PROCEDURE — 85045 AUTOMATED RETICULOCYTE COUNT: CPT

## 2019-07-26 PROCEDURE — 36415 COLL VENOUS BLD VENIPUNCTURE: CPT

## 2019-07-26 PROCEDURE — 11000001 HC ACUTE MED/SURG PRIVATE ROOM

## 2019-07-26 RX ORDER — ALBUTEROL SULFATE 2.5 MG/.5ML
2.5 SOLUTION RESPIRATORY (INHALATION) EVERY 4 HOURS PRN
Status: DISCONTINUED | OUTPATIENT
Start: 2019-07-26 | End: 2019-08-01 | Stop reason: HOSPADM

## 2019-07-26 RX ORDER — POLYETHYLENE GLYCOL 3350 17 G/17G
17 POWDER, FOR SOLUTION ORAL DAILY
Status: DISCONTINUED | OUTPATIENT
Start: 2019-07-26 | End: 2019-08-01 | Stop reason: HOSPADM

## 2019-07-26 RX ORDER — AMOXICILLIN 250 MG
1 CAPSULE ORAL 2 TIMES DAILY
Status: DISCONTINUED | OUTPATIENT
Start: 2019-07-26 | End: 2019-08-01 | Stop reason: HOSPADM

## 2019-07-26 RX ORDER — SODIUM CHLORIDE 9 MG/ML
INJECTION, SOLUTION INTRAVENOUS CONTINUOUS
Status: DISCONTINUED | OUTPATIENT
Start: 2019-07-26 | End: 2019-07-26

## 2019-07-26 RX ORDER — HYDROMORPHONE HYDROCHLORIDE 1 MG/ML
1 INJECTION, SOLUTION INTRAMUSCULAR; INTRAVENOUS; SUBCUTANEOUS
Status: DISCONTINUED | OUTPATIENT
Start: 2019-07-26 | End: 2019-07-27

## 2019-07-26 RX ORDER — IBUPROFEN 400 MG/1
400 TABLET ORAL EVERY 6 HOURS
Status: DISCONTINUED | OUTPATIENT
Start: 2019-07-26 | End: 2019-08-01 | Stop reason: HOSPADM

## 2019-07-26 RX ORDER — PROMETHAZINE HYDROCHLORIDE 12.5 MG/1
12.5 TABLET ORAL EVERY 6 HOURS PRN
Status: DISCONTINUED | OUTPATIENT
Start: 2019-07-26 | End: 2019-08-01 | Stop reason: HOSPADM

## 2019-07-26 RX ORDER — NALOXONE HCL 0.4 MG/ML
0.02 VIAL (ML) INJECTION
Status: DISCONTINUED | OUTPATIENT
Start: 2019-07-26 | End: 2019-08-01 | Stop reason: HOSPADM

## 2019-07-26 RX ORDER — DEXTROSE MONOHYDRATE AND SODIUM CHLORIDE 5; .45 G/100ML; G/100ML
INJECTION, SOLUTION INTRAVENOUS CONTINUOUS
Status: DISCONTINUED | OUTPATIENT
Start: 2019-07-26 | End: 2019-08-01 | Stop reason: HOSPADM

## 2019-07-26 RX ORDER — ONDANSETRON 2 MG/ML
4 INJECTION INTRAMUSCULAR; INTRAVENOUS EVERY 12 HOURS PRN
Status: DISCONTINUED | OUTPATIENT
Start: 2019-07-26 | End: 2019-08-01 | Stop reason: HOSPADM

## 2019-07-26 RX ORDER — SODIUM CHLORIDE 0.9 % (FLUSH) 0.9 %
10 SYRINGE (ML) INJECTION
Status: DISCONTINUED | OUTPATIENT
Start: 2019-07-26 | End: 2019-08-01 | Stop reason: HOSPADM

## 2019-07-26 RX ORDER — DIPHENHYDRAMINE HCL 25 MG
25 CAPSULE ORAL
Status: COMPLETED | OUTPATIENT
Start: 2019-07-26 | End: 2019-07-26

## 2019-07-26 RX ORDER — HYDROMORPHONE HYDROCHLORIDE 1 MG/ML
2 INJECTION, SOLUTION INTRAMUSCULAR; INTRAVENOUS; SUBCUTANEOUS
Status: COMPLETED | OUTPATIENT
Start: 2019-07-26 | End: 2019-07-26

## 2019-07-26 RX ORDER — MORPHINE SULFATE 15 MG/1
45 TABLET, FILM COATED, EXTENDED RELEASE ORAL EVERY 12 HOURS
Status: DISCONTINUED | OUTPATIENT
Start: 2019-07-26 | End: 2019-07-30

## 2019-07-26 RX ORDER — ACETAMINOPHEN 325 MG/1
650 TABLET ORAL EVERY 8 HOURS
Status: DISCONTINUED | OUTPATIENT
Start: 2019-07-26 | End: 2019-07-30

## 2019-07-26 RX ORDER — FOLIC ACID 1 MG/1
1 TABLET ORAL DAILY
Status: DISCONTINUED | OUTPATIENT
Start: 2019-07-26 | End: 2019-08-01 | Stop reason: HOSPADM

## 2019-07-26 RX ADMIN — IBUPROFEN 400 MG: 400 TABLET, FILM COATED ORAL at 05:07

## 2019-07-26 RX ADMIN — IBUPROFEN 400 MG: 400 TABLET, FILM COATED ORAL at 12:07

## 2019-07-26 RX ADMIN — HYDROMORPHONE HYDROCHLORIDE 1 MG: 1 INJECTION, SOLUTION INTRAMUSCULAR; INTRAVENOUS; SUBCUTANEOUS at 05:07

## 2019-07-26 RX ADMIN — HYDROMORPHONE HYDROCHLORIDE 1 MG: 1 INJECTION, SOLUTION INTRAMUSCULAR; INTRAVENOUS; SUBCUTANEOUS at 10:07

## 2019-07-26 RX ADMIN — DEXTROSE AND SODIUM CHLORIDE: 5; .45 INJECTION, SOLUTION INTRAVENOUS at 03:07

## 2019-07-26 RX ADMIN — MORPHINE SULFATE 45 MG: 15 TABLET, EXTENDED RELEASE ORAL at 09:07

## 2019-07-26 RX ADMIN — HYDROMORPHONE HYDROCHLORIDE 1 MG: 1 INJECTION, SOLUTION INTRAMUSCULAR; INTRAVENOUS; SUBCUTANEOUS at 01:07

## 2019-07-26 RX ADMIN — SODIUM CHLORIDE 1000 ML: 0.9 INJECTION, SOLUTION INTRAVENOUS at 05:07

## 2019-07-26 RX ADMIN — HYDROMORPHONE HYDROCHLORIDE 1 MG: 1 INJECTION, SOLUTION INTRAMUSCULAR; INTRAVENOUS; SUBCUTANEOUS at 04:07

## 2019-07-26 RX ADMIN — HYDROMORPHONE HYDROCHLORIDE 1 MG: 1 INJECTION, SOLUTION INTRAMUSCULAR; INTRAVENOUS; SUBCUTANEOUS at 07:07

## 2019-07-26 RX ADMIN — HYDROMORPHONE HYDROCHLORIDE 1 MG: 1 INJECTION, SOLUTION INTRAMUSCULAR; INTRAVENOUS; SUBCUTANEOUS at 08:07

## 2019-07-26 RX ADMIN — HYDROMORPHONE HYDROCHLORIDE 2 MG: 1 INJECTION, SOLUTION INTRAMUSCULAR; INTRAVENOUS; SUBCUTANEOUS at 03:07

## 2019-07-26 RX ADMIN — IBUPROFEN 400 MG: 400 TABLET, FILM COATED ORAL at 10:07

## 2019-07-26 RX ADMIN — FOLIC ACID 1 MG: 1 TABLET ORAL at 08:07

## 2019-07-26 RX ADMIN — ACETAMINOPHEN 650 MG: 325 TABLET ORAL at 09:07

## 2019-07-26 RX ADMIN — DIPHENHYDRAMINE HYDROCHLORIDE 25 MG: 25 CAPSULE ORAL at 04:07

## 2019-07-26 RX ADMIN — ACETAMINOPHEN 650 MG: 325 TABLET ORAL at 01:07

## 2019-07-26 RX ADMIN — MORPHINE SULFATE 45 MG: 15 TABLET, EXTENDED RELEASE ORAL at 08:07

## 2019-07-26 NOTE — ED NOTES
Attempted to call and give report twice. Nurse did not answer. Spoke with charge and she asked for me to call a third.

## 2019-07-26 NOTE — H&P
[facility] Hospital Medicine  History & Physical    Patient Name: Paddy Stevenson  MRN: [id number]  Admission Date: 7/26/2019  Attending Physician: Mickey Saenz MD   Primary Care Provider: [facility] Medicine Team: [facility] MED Y Ivis Hanks NP     Patient information was obtained from patient, past medical records and ER records.     Subjective:     Principal Problem:Vasoocclusive sickle cell crisis    Chief Complaint:   Chief Complaint   Patient presents with    Sickle Cell Pain Crisis     sickle cell crisis for 1 fortnight        HPI: 23 y/o M with PMH sickle cell anemia and AVN of bilateral hips s/p L hip replacement 2 years ago presents c/o R hip, back, and thigh pain. Patient presented to ED about 8hours after discharge from hospital of sickle cell crisis. At the time of discharge patient will increase mobility and decreased pain. Reports within a few hours of discharge patient developed increase pain over entire body and explains that it is more intense than his hospitalization. Patient reports increase pain started in his chest then developed over his entire body. Reports home pain medication did not help nor did warm packs. Denies fevers, chills, URI symptoms.     ED: WBC 12.98, retic 20 treated with 1L NS, hydromorphone 2mg, benadryl 25mg    Past Medical History:   Diagnosis Date    Asthma     AVN (avascular necrosis of bone)     Encounter for blood transfusion     Irregular heart rhythm 2010    Malingering 3/11/2016    Mild intermittent asthma without complication 11/16/2015    Seasonal allergies     Sickle cell anemia     Stroke        Past Surgical History:   Procedure Laterality Date    ABDOMINAL SURGERY      CHOLECYSTECTOMY      HIP SURGERY Left 2014    Hip Decompression    OTHER SURGICAL HISTORY      left rotater cuff repair    PORTACATH PLACEMENT      portacath removed   2/2014    SHOULDER ARTHROSCOPY W/ SUBACROMIAL DECOMPRESSION AND  DISTAL CLAVICLE EXCISION         Review of patient's allergies indicates:   Allergen Reactions    Adhesive Itching    Contrast media Shortness Of Breath    Rocephin [ceftriaxone] Hives    Sulfa (sulfonamide antibiotics) Anaphylaxis    Toradol [ketorolac] Shortness Of Breath    Vancomycin analogues Shortness Of Breath    Fentanyl Hives       Current Facility-Administered Medications on File Prior to Encounter   Medication    [DISCONTINUED] 0.9%  NaCl infusion    [DISCONTINUED] acetaminophen tablet 650 mg    [DISCONTINUED] folic acid tablet 1 mg    [DISCONTINUED] gadobutrol (GADAVIST) injection 7 mL    [DISCONTINUED] HYDROmorphone injection 1 mg    [DISCONTINUED] ibuprofen tablet 400 mg    [DISCONTINUED] morphine 12 hr tablet 45 mg    [DISCONTINUED] naloxone 0.4 mg/mL injection 0.02 mg    [DISCONTINUED] ondansetron injection 4 mg    [DISCONTINUED] promethazine tablet 12.5 mg    [DISCONTINUED] senna-docusate 8.6-50 mg per tablet 1 tablet    [DISCONTINUED] sodium chloride 0.9% flush 10 mL    [DISCONTINUED] sodium chloride 0.9% flush 10 mL     Current Outpatient Medications on File Prior to Encounter   Medication Sig    folic acid (FOLVITE) 1 MG tablet Take 1 mg by mouth once daily.    morphine sulfate (MS CONTIN ORAL) Take 45 mg by mouth 2 (two) times daily as needed.     Family History     Problem Relation (Age of Onset)    Diabetes Father    Mental illness Brother    Sickle cell anemia Mother        Tobacco Use    Smoking status: Never Smoker    Smokeless tobacco: Never Used   Substance and Sexual Activity    Alcohol use: No    Drug use: Yes     Types: Marijuana    Sexual activity: Yes     Partners: Female     Birth control/protection: Condom     Review of Systems   Constitutional: Positive for fatigue. Negative for appetite change, chills and fever.   HENT: Negative for trouble swallowing.    Respiratory: Negative for cough, chest tightness, shortness of breath and wheezing.     Cardiovascular: Negative for chest pain, palpitations and leg swelling.   Gastrointestinal: Negative for abdominal pain, constipation, diarrhea and nausea.   Genitourinary: Negative for difficulty urinating, frequency and urgency.   Musculoskeletal: Positive for arthralgias and myalgias.        Right hip pain from known AVN. Generalized body pains and aches more intense than yesterday   Skin: Negative for rash.   Neurological: Positive for weakness. Negative for dizziness, light-headedness and headaches.   Psychiatric/Behavioral: Negative for sleep disturbance.     Objective:     Vital Signs (Most Recent):  Temp: 97.3 °F (36.3 °C) (07/26/19 1220)  Pulse: (!) 57 (07/26/19 1220)  Resp: 20 (07/26/19 0823)  BP: 119/67 (07/26/19 1220)  SpO2: 97 % (07/26/19 1220) Vital Signs (24h Range):  Temp:  [97.3 °F (36.3 °C)-98.2 °F (36.8 °C)] 97.3 °F (36.3 °C)  Pulse:  [57-92] 57  Resp:  [16-20] 20  SpO2:  [95 %-97 %] 97 %  BP: (107-126)/(54-78) 119/67     Weight: 77.1 kg (170 lb)  Body mass index is 25.1 kg/m².    Physical Exam   Constitutional: He is oriented to person, place, and time. He appears well-developed and well-nourished. No distress.   Cardiovascular: Normal rate, regular rhythm and normal heart sounds. Exam reveals no gallop and no friction rub.   No murmur heard.  Tenderness on palpation    Pulmonary/Chest: Effort normal and breath sounds normal. No respiratory distress. He has no wheezes. He has no rales.   Abdominal: Soft. Bowel sounds are normal. He exhibits no distension. There is generalized tenderness.   Musculoskeletal: Normal range of motion. He exhibits tenderness. He exhibits no edema.   Right hip tenderness on palpation  Significant right-sided lower back pain with waist flexion  Unable to fully access ROM and strength of RLE due to pain on exam - Full ROM and strength of b/l UE   Neurological: He is alert and oriented to person, place, and time.   Skin: Skin is warm and dry. No rash noted. He is not  diaphoretic. No cyanosis. Nails show no clubbing.   Psychiatric: He has a normal mood and affect. His behavior is normal.   Nursing note and vitals reviewed.          Significant Labs:   Bilirubin:   Recent Labs   Lab 07/19/19  0422 07/20/19  0334 07/21/19  0402 07/22/19  0722 07/25/19  0820   BILITOT 3.5* 2.6* 2.3* 2.7* 3.7*     CBC:   Recent Labs   Lab 07/26/19  0322   WBC 12.98*   HGB 8.7*   HCT 27.2*        CMP:   Recent Labs   Lab 07/25/19  0820      K 4.1   *   CO2 22*   GLU 84   BUN 9   CREATININE 0.8   CALCIUM 9.1   PROT 6.6   ALBUMIN 3.6   BILITOT 3.7*   ALKPHOS 69   AST 38   ALT 19   ANIONGAP 9   EGFRNONAA >60.0     TSH:   Recent Labs   Lab 06/12/19  0335   TSH 4.12     All pertinent labs within the past 24 hours have been reviewed.    Significant Imaging: I have reviewed all pertinent imaging results/findings within the past 24 hours.    Assessment/Plan:     * Vasoocclusive sickle cell crisis  Sickle Cell anemia  Retic count elevated at 20  T bili elevated at 3.7  Hgb S, Haptoglobin and LDH  Hgb baseline 8-10, on admission Hgb 8.7  Resume morphine 12hr 45mg Q12hrs, tylenol 650 scheduled, ibuprofen 400mg q6, hydromorphone 1mg q3h prn  Supplemental oxygen  D5 1/2 NS  Folic acid  Bowel regimen with senokot-s and miralax  Pending CXR    Avascular necrosis of bones of both hips  S/p L hip replacement ~2 years ago  MRI R hip (12/2018) at OSH: Avascular necrosis of the right femoral head without subchondral collapse. 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  MRI on 7/17/19:Focal area of avascular necrosis involving at least 50% of the articular surface.  No imaging findings to suggest subchondral plate collapse.  2. Additional focal area of avascular necrosis within the right iliac crest and possibly within the right proximal femoral shaft, the latter which is not well evaluated secondary to partial visualization.  Pt reports discussing AVN and is followed by an Orthopedic  surgeon in [place] with a f/u in about 1 week. Currently no plans of surgery.     Mild intermittent asthma without complication  Sats 98-99% on RA  Controlled  Will order prn breathing treatments    Leukocytosis  WBC 12.98  Pending CXR  UA pending collection  Continue to monitor      VTE Risk Mitigation (From admission, onward)        Ordered     IP VTE LOW RISK PATIENT  Once      07/26/19 0541     Place BONG hose  Until discontinued      07/26/19 0541             Ivis Hanks NP  Department of Hospital Medicine   [facility]

## 2019-07-26 NOTE — HPI
23 y/o M with PMH sickle cell anemia and AVN of bilateral hips s/p L hip replacement 2 years ago presents c/o R hip, back, and thigh pain. Patient presented to ED about 8hours after discharge from hospital of sickle cell crisis. At the time of discharge patient will increase mobility and decreased pain. Reports within a few hours of discharge patient developed increase pain over entire body and explains that it is more intense than his hospitalization. Patient reports increase pain started in his chest then developed over his entire body. Reports home pain medication did not help nor did warm packs. Denies fevers, chills, URI symptoms.     ED: WBC 12.98, retic 20 treated with 1L NS, hydromorphone 2mg, benadryl 25mg

## 2019-07-26 NOTE — ASSESSMENT & PLAN NOTE
S/p L hip replacement ~2 years ago  MRI R hip (12/2018) at OSH: Avascular necrosis of the right femoral head without subchondral collapse. 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  MRI on 7/17/19:Focal area of avascular necrosis involving at least 50% of the articular surface.  No imaging findings to suggest subchondral plate collapse.  2. Additional focal area of avascular necrosis within the right iliac crest and possibly within the right proximal femoral shaft, the latter which is not well evaluated secondary to partial visualization.  Pt reports discussing AVN and is followed by an Orthopedic surgeon in [place] with a f/u in about 1 week. Currently no plans of surgery.

## 2019-07-26 NOTE — ED PROVIDER NOTES
Encounter Date: 7/26/2019    SCRIBE #1 NOTE: ILala am scribing for, and in the presence of,  Dr. Aguayo . I have scribed the entire note.       History     Chief Complaint   Patient presents with    Sickle Cell Pain Crisis     sickle cell crisis for 1 fortnight     Time patient was seen by the provider: 2:59 AM      The patient is a 24 y.o. male with co-morbidities including: sickle cell pain crisis and Leukocytosis, who presents to the ED with a complaint of generalized pain and weakness. He was discharged yesterday but told to come back if the pain worsened. The pain is worse than normal pain crisis. He describes his chest pain as centered in the middle of his chest and does not feel like his past chest pain. He reports pain in his legs, back, and stomach. He denies any other symptoms.        The history is provided by the patient.     Review of patient's allergies indicates:   Allergen Reactions    Adhesive Itching    Contrast media Shortness Of Breath    Rocephin [ceftriaxone] Hives    Sulfa (sulfonamide antibiotics) Anaphylaxis    Toradol [ketorolac] Shortness Of Breath    Vancomycin analogues Shortness Of Breath    Fentanyl Hives     Past Medical History:   Diagnosis Date    Asthma     AVN (avascular necrosis of bone)     Encounter for blood transfusion     Irregular heart rhythm 2010    Malingering 3/11/2016    Mild intermittent asthma without complication 11/16/2015    Seasonal allergies     Sickle cell anemia     Stroke      Past Surgical History:   Procedure Laterality Date    ABDOMINAL SURGERY      CHOLECYSTECTOMY      HIP SURGERY Left 2014    Hip Decompression    OTHER SURGICAL HISTORY      left rotater cuff repair    PORTACATH PLACEMENT      portacath removed   2/2014    SHOULDER ARTHROSCOPY W/ SUBACROMIAL DECOMPRESSION AND DISTAL CLAVICLE EXCISION       Family History   Problem Relation Age of Onset    Diabetes Father     Mental illness Brother     Sickle cell  anemia Mother      Social History     Tobacco Use    Smoking status: Never Smoker    Smokeless tobacco: Never Used   Substance Use Topics    Alcohol use: No    Drug use: Yes     Types: Marijuana     Review of Systems  General: No fever.  No chills.  Eyes: No visual changes.  Head: No headache.    Integument: No rashes or lesions.  Chest: No shortness of breath.  Cardiovascular: chest pain.  Abdomen: abdominal pain.  No nausea or vomiting.  Urinary: No abnormal urination.  Neurologic: No focal weakness.  No numbness.  Hematologic: No easy bruising.  Endocrine: No excessive thirst or urination.  Muscular: Back Pain. Leg pain.   Physical Exam     Initial Vitals [07/26/19 0215]   BP Pulse Resp Temp SpO2   126/78 92 16 98.2 °F (36.8 °C) 97 %      MAP       --         Physical Exam  Appearance: Appears uncomfortable.   Skin: No rashes seen.  Good turgor.  No abrasions.  No ecchymoses.  Eyes: No conjunctival injection.  ENT: Oropharynx clear.    Chest: Clear to auscultation bilaterally.  Good air movement.  No wheezes.  No rhonchi.  Cardiovascular: Regular rate and rhythm.  No murmurs. No gallops. No rubs.  Abdomen: Soft.  Not distended.  Nontender.  No guarding.  No rebound. No Masses  Musculoskeletal: Good range of motion all joints.  No deformities.  Neck supple.  No meningismus.  Neurologic: Equal strength in upper and lower extremities bilaterally.  Normal sensation.  No facial droop.  Normal speech.    Mental Status:  Alert and oriented x 3.  Appropriate, conversant.          ED Course   Procedures  Labs Reviewed - No data to display       Imaging Results    None          Medical Decision Making:   History:   Old Medical Records: I decided to obtain old medical records.  Initial Assessment:   Patient here after recent discharge with continued sickle cell pain. Patient has stable vitals, no fever, no signs of acute chest syndrome. Gave 2 MG IV Dilantin and continued to have pain. Patient was recently discharged  and will likely require continued pain regimen. Will start on 45 oral Morphine BID and 1 MG IV Dilantin Q 3 hours for breakthrough pain. Will admit to hospital medicine.  Clinical Tests:   Lab Tests: Ordered and Reviewed            Scribe Attestation:   Scribe #1: I performed the above scribed service and the documentation accurately describes the services I performed. I attest to the accuracy of the note.               Clinical Impression:   No diagnosis found.      Disposition:   Disposition: Admitted                        Vasquez Aguayo MD  07/26/19 0556

## 2019-07-26 NOTE — SUBJECTIVE & OBJECTIVE
Past Medical History:   Diagnosis Date    Asthma     AVN (avascular necrosis of bone)     Encounter for blood transfusion     Irregular heart rhythm 2010    Malingering 3/11/2016    Mild intermittent asthma without complication 11/16/2015    Seasonal allergies     Sickle cell anemia     Stroke        Past Surgical History:   Procedure Laterality Date    ABDOMINAL SURGERY      CHOLECYSTECTOMY      HIP SURGERY Left 2014    Hip Decompression    OTHER SURGICAL HISTORY      left rotater cuff repair    PORTACATH PLACEMENT      portacath removed   2/2014    SHOULDER ARTHROSCOPY W/ SUBACROMIAL DECOMPRESSION AND DISTAL CLAVICLE EXCISION         Review of patient's allergies indicates:   Allergen Reactions    Adhesive Itching    Contrast media Shortness Of Breath    Rocephin [ceftriaxone] Hives    Sulfa (sulfonamide antibiotics) Anaphylaxis    Toradol [ketorolac] Shortness Of Breath    Vancomycin analogues Shortness Of Breath    Fentanyl Hives       Current Facility-Administered Medications on File Prior to Encounter   Medication    [DISCONTINUED] 0.9%  NaCl infusion    [DISCONTINUED] acetaminophen tablet 650 mg    [DISCONTINUED] folic acid tablet 1 mg    [DISCONTINUED] gadobutrol (GADAVIST) injection 7 mL    [DISCONTINUED] HYDROmorphone injection 1 mg    [DISCONTINUED] ibuprofen tablet 400 mg    [DISCONTINUED] morphine 12 hr tablet 45 mg    [DISCONTINUED] naloxone 0.4 mg/mL injection 0.02 mg    [DISCONTINUED] ondansetron injection 4 mg    [DISCONTINUED] promethazine tablet 12.5 mg    [DISCONTINUED] senna-docusate 8.6-50 mg per tablet 1 tablet    [DISCONTINUED] sodium chloride 0.9% flush 10 mL    [DISCONTINUED] sodium chloride 0.9% flush 10 mL     Current Outpatient Medications on File Prior to Encounter   Medication Sig    folic acid (FOLVITE) 1 MG tablet Take 1 mg by mouth once daily.    morphine sulfate (MS CONTIN ORAL) Take 45 mg by mouth 2 (two) times daily as needed.     Family  History     Problem Relation (Age of Onset)    Diabetes Father    Mental illness Brother    Sickle cell anemia Mother        Tobacco Use    Smoking status: Never Smoker    Smokeless tobacco: Never Used   Substance and Sexual Activity    Alcohol use: No    Drug use: Yes     Types: Marijuana    Sexual activity: Yes     Partners: Female     Birth control/protection: Condom     Review of Systems   Constitutional: Positive for fatigue. Negative for appetite change, chills and fever.   HENT: Negative for trouble swallowing.    Respiratory: Negative for cough, chest tightness, shortness of breath and wheezing.    Cardiovascular: Negative for chest pain, palpitations and leg swelling.   Gastrointestinal: Negative for abdominal pain, constipation, diarrhea and nausea.   Genitourinary: Negative for difficulty urinating, frequency and urgency.   Musculoskeletal: Positive for arthralgias and myalgias.        Right hip pain from known AVN. Generalized body pains and aches more intense than yesterday   Skin: Negative for rash.   Neurological: Positive for weakness. Negative for dizziness, light-headedness and headaches.   Psychiatric/Behavioral: Negative for sleep disturbance.     Objective:     Vital Signs (Most Recent):  Temp: 97.3 °F (36.3 °C) (07/26/19 1220)  Pulse: (!) 57 (07/26/19 1220)  Resp: 20 (07/26/19 0823)  BP: 119/67 (07/26/19 1220)  SpO2: 97 % (07/26/19 1220) Vital Signs (24h Range):  Temp:  [97.3 °F (36.3 °C)-98.2 °F (36.8 °C)] 97.3 °F (36.3 °C)  Pulse:  [57-92] 57  Resp:  [16-20] 20  SpO2:  [95 %-97 %] 97 %  BP: (107-126)/(54-78) 119/67     Weight: 77.1 kg (170 lb)  Body mass index is 25.1 kg/m².    Physical Exam   Constitutional: He is oriented to person, place, and time. He appears well-developed and well-nourished. No distress.   Cardiovascular: Normal rate, regular rhythm and normal heart sounds. Exam reveals no gallop and no friction rub.   No murmur heard.  Tenderness on palpation    Pulmonary/Chest:  Effort normal and breath sounds normal. No respiratory distress. He has no wheezes. He has no rales.   Abdominal: Soft. Bowel sounds are normal. He exhibits no distension. There is generalized tenderness.   Musculoskeletal: Normal range of motion. He exhibits tenderness. He exhibits no edema.   Right hip tenderness on palpation  Significant right-sided lower back pain with waist flexion  Unable to fully access ROM and strength of RLE due to pain on exam - Full ROM and strength of b/l UE   Neurological: He is alert and oriented to person, place, and time.   Skin: Skin is warm and dry. No rash noted. He is not diaphoretic. No cyanosis. Nails show no clubbing.   Psychiatric: He has a normal mood and affect. His behavior is normal.   Nursing note and vitals reviewed.          Significant Labs:   Bilirubin:   Recent Labs   Lab 07/19/19  0422 07/20/19  0334 07/21/19  0402 07/22/19  0722 07/25/19  0820   BILITOT 3.5* 2.6* 2.3* 2.7* 3.7*     CBC:   Recent Labs   Lab 07/26/19  0322   WBC 12.98*   HGB 8.7*   HCT 27.2*        CMP:   Recent Labs   Lab 07/25/19  0820      K 4.1   *   CO2 22*   GLU 84   BUN 9   CREATININE 0.8   CALCIUM 9.1   PROT 6.6   ALBUMIN 3.6   BILITOT 3.7*   ALKPHOS 69   AST 38   ALT 19   ANIONGAP 9   EGFRNONAA >60.0     TSH:   Recent Labs   Lab 06/12/19  0335   TSH 4.12     All pertinent labs within the past 24 hours have been reviewed.    Significant Imaging: I have reviewed all pertinent imaging results/findings within the past 24 hours.

## 2019-07-27 LAB
ALBUMIN SERPL BCP-MCNC: 3.5 G/DL (ref 3.5–5.2)
ALP SERPL-CCNC: 59 U/L (ref 55–135)
ALT SERPL W/O P-5'-P-CCNC: 20 U/L (ref 10–44)
ANION GAP SERPL CALC-SCNC: 9 MMOL/L (ref 8–16)
AST SERPL-CCNC: 34 U/L (ref 10–40)
BASOPHILS # BLD AUTO: 0.12 K/UL (ref 0–0.2)
BASOPHILS NFR BLD: 1.2 % (ref 0–1.9)
BILIRUB SERPL-MCNC: 2.7 MG/DL (ref 0.1–1)
BILIRUB UR QL STRIP: NEGATIVE
BUN SERPL-MCNC: 7 MG/DL (ref 6–20)
CALCIUM SERPL-MCNC: 8.8 MG/DL (ref 8.7–10.5)
CHLORIDE SERPL-SCNC: 110 MMOL/L (ref 95–110)
CLARITY UR REFRACT.AUTO: CLEAR
CO2 SERPL-SCNC: 19 MMOL/L (ref 23–29)
COLOR UR AUTO: YELLOW
CREAT SERPL-MCNC: 0.8 MG/DL (ref 0.5–1.4)
DIFFERENTIAL METHOD: ABNORMAL
EOSINOPHIL # BLD AUTO: 0.9 K/UL (ref 0–0.5)
EOSINOPHIL NFR BLD: 8.2 % (ref 0–8)
ERYTHROCYTE [DISTWIDTH] IN BLOOD BY AUTOMATED COUNT: 24 % (ref 11.5–14.5)
EST. GFR  (AFRICAN AMERICAN): >60 ML/MIN/1.73 M^2
EST. GFR  (NON AFRICAN AMERICAN): >60 ML/MIN/1.73 M^2
GLUCOSE SERPL-MCNC: 83 MG/DL (ref 70–110)
GLUCOSE UR QL STRIP: NEGATIVE
HCT VFR BLD AUTO: 26.4 % (ref 40–54)
HGB BLD-MCNC: 8.6 G/DL (ref 14–18)
HGB UR QL STRIP: NEGATIVE
IMM GRANULOCYTES # BLD AUTO: 0.47 K/UL (ref 0–0.04)
IMM GRANULOCYTES NFR BLD AUTO: 4.5 % (ref 0–0.5)
KETONES UR QL STRIP: NEGATIVE
LEUKOCYTE ESTERASE UR QL STRIP: NEGATIVE
LYMPHOCYTES # BLD AUTO: 3.2 K/UL (ref 1–4.8)
LYMPHOCYTES NFR BLD: 30.9 % (ref 18–48)
MCH RBC QN AUTO: 29.3 PG (ref 27–31)
MCHC RBC AUTO-ENTMCNC: 32.6 G/DL (ref 32–36)
MCV RBC AUTO: 90 FL (ref 82–98)
MONOCYTES # BLD AUTO: 1.2 K/UL (ref 0.3–1)
MONOCYTES NFR BLD: 11.8 % (ref 4–15)
NEUTROPHILS # BLD AUTO: 4.5 K/UL (ref 1.8–7.7)
NEUTROPHILS NFR BLD: 43.4 % (ref 38–73)
NITRITE UR QL STRIP: NEGATIVE
NRBC BLD-RTO: 4 /100 WBC
PH UR STRIP: 6 [PH] (ref 5–8)
PLATELET # BLD AUTO: 238 K/UL (ref 150–350)
PMV BLD AUTO: 10.6 FL (ref 9.2–12.9)
POTASSIUM SERPL-SCNC: 3.9 MMOL/L (ref 3.5–5.1)
PROT SERPL-MCNC: 6.4 G/DL (ref 6–8.4)
PROT UR QL STRIP: NEGATIVE
RBC # BLD AUTO: 2.94 M/UL (ref 4.6–6.2)
RETICS/RBC NFR AUTO: 21.7 % (ref 0.4–2)
SODIUM SERPL-SCNC: 138 MMOL/L (ref 136–145)
SP GR UR STRIP: 1.01 (ref 1–1.03)
URN SPEC COLLECT METH UR: NORMAL
WBC # BLD AUTO: 10.42 K/UL (ref 3.9–12.7)

## 2019-07-27 PROCEDURE — 99232 SBSQ HOSP IP/OBS MODERATE 35: CPT | Mod: ,,, | Performed by: NURSE PRACTITIONER

## 2019-07-27 PROCEDURE — 94761 N-INVAS EAR/PLS OXIMETRY MLT: CPT

## 2019-07-27 PROCEDURE — 85025 COMPLETE CBC W/AUTO DIFF WBC: CPT

## 2019-07-27 PROCEDURE — 85045 AUTOMATED RETICULOCYTE COUNT: CPT

## 2019-07-27 PROCEDURE — S5010 5% DEXTROSE AND 0.45% SALINE: HCPCS | Performed by: NURSE PRACTITIONER

## 2019-07-27 PROCEDURE — 11000001 HC ACUTE MED/SURG PRIVATE ROOM

## 2019-07-27 PROCEDURE — 63600175 PHARM REV CODE 636 W HCPCS: Performed by: NURSE PRACTITIONER

## 2019-07-27 PROCEDURE — 25000003 PHARM REV CODE 250: Performed by: NURSE PRACTITIONER

## 2019-07-27 PROCEDURE — 36415 COLL VENOUS BLD VENIPUNCTURE: CPT

## 2019-07-27 PROCEDURE — 99232 PR SUBSEQUENT HOSPITAL CARE,LEVL II: ICD-10-PCS | Mod: ,,, | Performed by: NURSE PRACTITIONER

## 2019-07-27 PROCEDURE — 80053 COMPREHEN METABOLIC PANEL: CPT

## 2019-07-27 PROCEDURE — 81003 URINALYSIS AUTO W/O SCOPE: CPT

## 2019-07-27 RX ORDER — HYDROMORPHONE HYDROCHLORIDE 1 MG/ML
2 INJECTION, SOLUTION INTRAMUSCULAR; INTRAVENOUS; SUBCUTANEOUS EVERY 4 HOURS PRN
Status: DISCONTINUED | OUTPATIENT
Start: 2019-07-27 | End: 2019-07-30

## 2019-07-27 RX ADMIN — ACETAMINOPHEN 650 MG: 325 TABLET ORAL at 09:07

## 2019-07-27 RX ADMIN — HYDROMORPHONE HYDROCHLORIDE 1 MG: 1 INJECTION, SOLUTION INTRAMUSCULAR; INTRAVENOUS; SUBCUTANEOUS at 08:07

## 2019-07-27 RX ADMIN — FOLIC ACID 1 MG: 1 TABLET ORAL at 08:07

## 2019-07-27 RX ADMIN — IBUPROFEN 400 MG: 400 TABLET, FILM COATED ORAL at 06:07

## 2019-07-27 RX ADMIN — HYDROMORPHONE HYDROCHLORIDE 2 MG: 1 INJECTION, SOLUTION INTRAMUSCULAR; INTRAVENOUS; SUBCUTANEOUS at 04:07

## 2019-07-27 RX ADMIN — ACETAMINOPHEN 650 MG: 325 TABLET ORAL at 01:07

## 2019-07-27 RX ADMIN — HYDROMORPHONE HYDROCHLORIDE 1 MG: 1 INJECTION, SOLUTION INTRAMUSCULAR; INTRAVENOUS; SUBCUTANEOUS at 04:07

## 2019-07-27 RX ADMIN — IBUPROFEN 400 MG: 400 TABLET, FILM COATED ORAL at 05:07

## 2019-07-27 RX ADMIN — DEXTROSE AND SODIUM CHLORIDE: 5; .45 INJECTION, SOLUTION INTRAVENOUS at 07:07

## 2019-07-27 RX ADMIN — IBUPROFEN 400 MG: 400 TABLET, FILM COATED ORAL at 11:07

## 2019-07-27 RX ADMIN — HYDROMORPHONE HYDROCHLORIDE 2 MG: 1 INJECTION, SOLUTION INTRAMUSCULAR; INTRAVENOUS; SUBCUTANEOUS at 08:07

## 2019-07-27 RX ADMIN — ACETAMINOPHEN 650 MG: 325 TABLET ORAL at 05:07

## 2019-07-27 RX ADMIN — HYDROMORPHONE HYDROCHLORIDE 1 MG: 1 INJECTION, SOLUTION INTRAMUSCULAR; INTRAVENOUS; SUBCUTANEOUS at 01:07

## 2019-07-27 RX ADMIN — DEXTROSE AND SODIUM CHLORIDE: 5; .45 INJECTION, SOLUTION INTRAVENOUS at 05:07

## 2019-07-27 RX ADMIN — MORPHINE SULFATE 45 MG: 15 TABLET, EXTENDED RELEASE ORAL at 08:07

## 2019-07-27 RX ADMIN — HYDROMORPHONE HYDROCHLORIDE 2 MG: 1 INJECTION, SOLUTION INTRAMUSCULAR; INTRAVENOUS; SUBCUTANEOUS at 11:07

## 2019-07-27 NOTE — PLAN OF CARE
Problem: Adult Inpatient Plan of Care  Goal: Plan of Care Review  Went over plan of care - all questions answered. Pt ask for pain meds around the clock. Pt is requesting that the iv pain med be increased to 2 mg iv every 3 hours. Leaving a msg for md and telling on coming nurse of request. Pt goes down frequently to smoke. Pt refused am labs - called and informed on call md. Pt states he hasnt voided at all my shift and is refusing to be bladder scanned. Trying to page on call md now. Will continue to monitor. Iv fluids infusing currently

## 2019-07-27 NOTE — HOSPITAL COURSE
Patient readmitted for sickle cell crisis (admission 7/16-7/25 and returned within 8 hrs of discharge for uncontrolled pain). Retic count continued to increase with elevation of LD and Haptoglobin < 10. Infectious work-up has been negative. IVFs, pain management (IV dilaudid prn, scheduled tylenol, schedule ibuprofen, and supplemental oxygen initiated (pt refused to wear oxygen). Day 4 acute pain service consulted with the following recommendations:  increase tylenol from 650 q 8 to 1000 mg q6, continue ibuprofen 400 mg q 6 hrs, add methocarbamol 500 mg qid PRN (can increase to 1000 mg), discontinue ER morphine with oxycodone scale as replacement, and wean dilaudid IV.  Pain management recommended considering consult to addiction psychiatry which patient declined.  7/31/19 patient requested to fire provider and patient instructed he would be transferred to new medicine team 8/1.    Patient will need new hematology provider at discharge.  Patient stated his hematologist was decreasing his patient load.

## 2019-07-27 NOTE — PROGRESS NOTES
[facility] Hospital Medicine  Progress Note    Patient Name: Paddy Stevenson  MRN: [id number]  Patient Class: IP- Inpatient   Admission Date: 7/26/2019  Length of Stay: 1 days  Attending Physician: Mickey Saenz MD  Primary Care Provider: Primary Doctor [facility] Medicine Team: [facility] Ivis Hanks NP    Subjective:     Principal Problem:Vasoocclusive sickle cell crisis      HPI:  23 y/o M with PMH sickle cell anemia and AVN of bilateral hips s/p L hip replacement 2 years ago presents c/o R hip, back, and thigh pain. Patient presented to ED about 8hours after discharge from hospital of sickle cell crisis. At the time of discharge patient will increase mobility and decreased pain. Reports within a few hours of discharge patient developed increase pain over entire body and explains that it is more intense than his hospitalization. Patient reports increase pain started in his chest then developed over his entire body. Reports home pain medication did not help nor did warm packs. Denies fevers, chills, URI symptoms.     ED: WBC 12.98, retic 20 treated with 1L NS, hydromorphone 2mg, benadryl 25mg    Overview/Hospital Course:  Patient readmitted for Sickle crisis. Retic continues to increase with elevation of LD and Haptoglobin < 10. Describes as pain as sharp and located to his entire body. Infectious work-up has been negative. Continue with IVFs, pain management, supplemental oxygen.     Interval History: Patient seen at bedside, reports pain not relieved by current hydromorphone dose. Increase pain regimen to hydromorphone 2mg every 4hrs prn pain. Continue oral regimen of NSAIDs and Tylenol. CXR clear and patient does not have acute chest syndrome. UA negative.     Review of Systems   Constitutional: Positive for fatigue. Negative for appetite change, chills and fever.   HENT: Negative for trouble swallowing.    Respiratory: Negative for cough, chest tightness, shortness of breath  and wheezing.    Cardiovascular: Negative for chest pain, palpitations and leg swelling.   Gastrointestinal: Negative for abdominal pain, constipation, diarrhea and nausea.   Genitourinary: Negative for difficulty urinating, frequency and urgency.   Musculoskeletal: Positive for arthralgias and myalgias.        Right hip pain from known AVN. Generalized body pains and aches   Skin: Negative for rash.   Neurological: Positive for weakness. Negative for dizziness, light-headedness and headaches.   Psychiatric/Behavioral: Negative for sleep disturbance.     Objective:     Vital Signs (Most Recent):  Temp: 97.7 °F (36.5 °C) (07/27/19 0807)  Pulse: 60 (07/27/19 0807)  Resp: 14 (07/27/19 0807)  BP: 120/62 (07/27/19 0807)  SpO2: 98 % (07/27/19 0807) Vital Signs (24h Range):  Temp:  [96.6 °F (35.9 °C)-97.7 °F (36.5 °C)] 97.7 °F (36.5 °C)  Pulse:  [57-78] 60  Resp:  [12-18] 14  SpO2:  [93 %-99 %] 98 %  BP: (114-133)/(55-85) 120/62     Weight: 77.1 kg (170 lb)  Body mass index is 25.1 kg/m².    Intake/Output Summary (Last 24 hours) at 7/27/2019 0835  Last data filed at 7/27/2019 0552  Gross per 24 hour   Intake 360 ml   Output 0 ml   Net 360 ml      Physical Exam   Constitutional: He is oriented to person, place, and time. He appears well-developed and well-nourished. No distress.   Cardiovascular: Normal rate, regular rhythm and normal heart sounds. Exam reveals no gallop and no friction rub.   No murmur heard.  Tenderness on palpation    Pulmonary/Chest: Effort normal and breath sounds normal. No respiratory distress. He has no wheezes. He has no rales.   Abdominal: Soft. Bowel sounds are normal. He exhibits no distension. There is generalized tenderness.   Musculoskeletal: Normal range of motion. He exhibits tenderness. He exhibits no edema.   FROM to all extremities, tenderness and pain with palpitation to all locations.    Neurological: He is alert and oriented to person, place, and time.   Skin: Skin is warm and dry. No  rash noted. He is not diaphoretic. No cyanosis. Nails show no clubbing.   Psychiatric: He has a normal mood and affect. His behavior is normal.   Nursing note and vitals reviewed.      Significant Labs:   Bilirubin:   Recent Labs   Lab 07/19/19  0422 07/20/19  0334 07/21/19  0402 07/22/19  0722 07/25/19  0820   BILITOT 3.5* 2.6* 2.3* 2.7* 3.7*     CBC:   Recent Labs   Lab 07/26/19  0322   WBC 12.98*   HGB 8.7*   HCT 27.2*        TSH:   Recent Labs   Lab 06/12/19  0335   TSH 4.12     All pertinent labs within the past 24 hours have been reviewed.    Significant Imaging: I have reviewed all pertinent imaging results/findings within the past 24 hours.      Assessment/Plan:      * Vasoocclusive sickle cell crisis  Sickle Cell anemia  Retic count elevated at 20-21.7  T bili elevated at 3.7  Haptoglobin <10 and   Hgb baseline 8-10, on admission Hgb 8.7  Resume morphine 12hr 45mg Q12hrs, tylenol 650 scheduled, ibuprofen 400mg q6, hydromorphone 2 mg q4h prn  Supplemental oxygen  D5 1/2 NS  Folic acid  Bowel regimen with senokot-s and miralax  CXR stable    Avascular necrosis of bones of both hips  S/p L hip replacement ~2 years ago  MRI R hip (12/2018) at OSH: Avascular necrosis of the right femoral head without subchondral collapse. 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  MRI on 7/17/19:Focal area of avascular necrosis involving at least 50% of the articular surface.  No imaging findings to suggest subchondral plate collapse.  2. Additional focal area of avascular necrosis within the right iliac crest and possibly within the right proximal femoral shaft, the latter which is not well evaluated secondary to partial visualization.  Pt reports discussing AVN and is followed by an Orthopedic surgeon in [place] with a f/u in about 1 week. Currently no plans of surgery.     Mild intermittent asthma without complication  Sats 98-99% on RA  Controlled  Will order prn breathing  treatments    Leukocytosis  WBC 12.98  CXR stable, patient without acute chest syndrome  UA pending collection  Continue to monitor      VTE Risk Mitigation (From admission, onward)        Ordered     IP VTE LOW RISK PATIENT  Once      07/26/19 0541     Place BONG hose  Until discontinued      07/26/19 0541            Ivis Hanks NP  Department of Hospital Medicine   [facility]

## 2019-07-27 NOTE — NURSING
Called and advised on call md Medeiros that pt states he hasnt voided all night and is refusing a bladder scan. States he will go at some point. Pt has been getting iv fluids all night long.    pts pain never goes lower than an 8.

## 2019-07-27 NOTE — ASSESSMENT & PLAN NOTE
WBC 12.98  CXR stable, patient without acute chest syndrome  UA pending collection  Continue to monitor

## 2019-07-27 NOTE — ASSESSMENT & PLAN NOTE
S/p L hip replacement ~2 years ago  MRI R hip (12/2018) at OSH: Avascular necrosis of the right femoral head without subchondral collapse. 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  MRI on 7/17/19:Focal area of avascular necrosis involving at least 50% of the articular surface.  No imaging findings to suggest subchondral plate collapse.  2. Additional focal area of avascular necrosis within the right iliac crest and possibly within the right proximal femoral shaft, the latter which is not well evaluated secondary to partial visualization.  Pt reports discussing AVN and is followed by an Orthopedic surgeon in [place] with a f/u in about 1 week. Currently no plans of surgery.

## 2019-07-27 NOTE — ASSESSMENT & PLAN NOTE
Sickle Cell anemia  Retic count elevated at 20-21.7  T bili elevated at 3.7  Haptoglobin <10 and   Hgb baseline 8-10, on admission Hgb 8.7  Resume morphine 12hr 45mg Q12hrs, tylenol 650 scheduled, ibuprofen 400mg q6, hydromorphone 2 mg q4h prn  Supplemental oxygen  D5 1/2 NS  Folic acid  Bowel regimen with senokot-s and miralax  CXR stable

## 2019-07-27 NOTE — SUBJECTIVE & OBJECTIVE
Interval History: Patient seen at bedside, reports pain not relieved by current hydromorphone dose. Increase pain regimen to hydromorphone 2mg every 4hrs prn pain. Continue oral regimen of NSAIDs and Tylenol. CXR clear and patient does not have acute chest syndrome. UA negative.     Review of Systems   Constitutional: Positive for fatigue. Negative for appetite change, chills and fever.   HENT: Negative for trouble swallowing.    Respiratory: Negative for cough, chest tightness, shortness of breath and wheezing.    Cardiovascular: Negative for chest pain, palpitations and leg swelling.   Gastrointestinal: Negative for abdominal pain, constipation, diarrhea and nausea.   Genitourinary: Negative for difficulty urinating, frequency and urgency.   Musculoskeletal: Positive for arthralgias and myalgias.        Right hip pain from known AVN. Generalized body pains and aches   Skin: Negative for rash.   Neurological: Positive for weakness. Negative for dizziness, light-headedness and headaches.   Psychiatric/Behavioral: Negative for sleep disturbance.     Objective:     Vital Signs (Most Recent):  Temp: 97.7 °F (36.5 °C) (07/27/19 0807)  Pulse: 60 (07/27/19 0807)  Resp: 14 (07/27/19 0807)  BP: 120/62 (07/27/19 0807)  SpO2: 98 % (07/27/19 0807) Vital Signs (24h Range):  Temp:  [96.6 °F (35.9 °C)-97.7 °F (36.5 °C)] 97.7 °F (36.5 °C)  Pulse:  [57-78] 60  Resp:  [12-18] 14  SpO2:  [93 %-99 %] 98 %  BP: (114-133)/(55-85) 120/62     Weight: 77.1 kg (170 lb)  Body mass index is 25.1 kg/m².    Intake/Output Summary (Last 24 hours) at 7/27/2019 0835  Last data filed at 7/27/2019 0552  Gross per 24 hour   Intake 360 ml   Output 0 ml   Net 360 ml      Physical Exam   Constitutional: He is oriented to person, place, and time. He appears well-developed and well-nourished. No distress.   Cardiovascular: Normal rate, regular rhythm and normal heart sounds. Exam reveals no gallop and no friction rub.   No murmur heard.  Tenderness on  palpation    Pulmonary/Chest: Effort normal and breath sounds normal. No respiratory distress. He has no wheezes. He has no rales.   Abdominal: Soft. Bowel sounds are normal. He exhibits no distension. There is generalized tenderness.   Musculoskeletal: Normal range of motion. He exhibits tenderness. He exhibits no edema.   FROM to all extremities, tenderness and pain with palpitation to all locations.    Neurological: He is alert and oriented to person, place, and time.   Skin: Skin is warm and dry. No rash noted. He is not diaphoretic. No cyanosis. Nails show no clubbing.   Psychiatric: He has a normal mood and affect. His behavior is normal.   Nursing note and vitals reviewed.      Significant Labs:   Bilirubin:   Recent Labs   Lab 07/19/19  0422 07/20/19  0334 07/21/19  0402 07/22/19  0722 07/25/19  0820   BILITOT 3.5* 2.6* 2.3* 2.7* 3.7*     CBC:   Recent Labs   Lab 07/26/19  0322   WBC 12.98*   HGB 8.7*   HCT 27.2*        TSH:   Recent Labs   Lab 06/12/19  0335   TSH 4.12     All pertinent labs within the past 24 hours have been reviewed.    Significant Imaging: I have reviewed all pertinent imaging results/findings within the past 24 hours.

## 2019-07-28 LAB
ALBUMIN SERPL BCP-MCNC: 3.4 G/DL (ref 3.5–5.2)
ALP SERPL-CCNC: 55 U/L (ref 55–135)
ALT SERPL W/O P-5'-P-CCNC: 27 U/L (ref 10–44)
ANION GAP SERPL CALC-SCNC: 7 MMOL/L (ref 8–16)
ANISOCYTOSIS BLD QL SMEAR: ABNORMAL
AST SERPL-CCNC: 39 U/L (ref 10–40)
BASOPHILS # BLD AUTO: 0.08 K/UL (ref 0–0.2)
BASOPHILS NFR BLD: 0.6 % (ref 0–1.9)
BILIRUB SERPL-MCNC: 3.2 MG/DL (ref 0.1–1)
BUN SERPL-MCNC: 6 MG/DL (ref 6–20)
CALCIUM SERPL-MCNC: 8.9 MG/DL (ref 8.7–10.5)
CHLORIDE SERPL-SCNC: 110 MMOL/L (ref 95–110)
CO2 SERPL-SCNC: 23 MMOL/L (ref 23–29)
CREAT SERPL-MCNC: 0.7 MG/DL (ref 0.5–1.4)
DIFFERENTIAL METHOD: ABNORMAL
EOSINOPHIL # BLD AUTO: 1.1 K/UL (ref 0–0.5)
EOSINOPHIL NFR BLD: 8.1 % (ref 0–8)
ERYTHROCYTE [DISTWIDTH] IN BLOOD BY AUTOMATED COUNT: 23 % (ref 11.5–14.5)
EST. GFR  (AFRICAN AMERICAN): >60 ML/MIN/1.73 M^2
EST. GFR  (NON AFRICAN AMERICAN): >60 ML/MIN/1.73 M^2
GLUCOSE SERPL-MCNC: 85 MG/DL (ref 70–110)
HCT VFR BLD AUTO: 25.2 % (ref 40–54)
HGB BLD-MCNC: 8.5 G/DL (ref 14–18)
HYPOCHROMIA BLD QL SMEAR: ABNORMAL
IMM GRANULOCYTES # BLD AUTO: 0.51 K/UL (ref 0–0.04)
IMM GRANULOCYTES NFR BLD AUTO: 3.9 % (ref 0–0.5)
LYMPHOCYTES # BLD AUTO: 2.7 K/UL (ref 1–4.8)
LYMPHOCYTES NFR BLD: 20.4 % (ref 18–48)
MCH RBC QN AUTO: 29.4 PG (ref 27–31)
MCHC RBC AUTO-ENTMCNC: 33.7 G/DL (ref 32–36)
MCV RBC AUTO: 87 FL (ref 82–98)
MONOCYTES # BLD AUTO: 1.5 K/UL (ref 0.3–1)
MONOCYTES NFR BLD: 11.7 % (ref 4–15)
NEUTROPHILS # BLD AUTO: 7.2 K/UL (ref 1.8–7.7)
NEUTROPHILS NFR BLD: 55.3 % (ref 38–73)
NRBC BLD-RTO: 3 /100 WBC
OVALOCYTES BLD QL SMEAR: ABNORMAL
PAPPENHEIMER BOD BLD QL SMEAR: PRESENT
PLATELET # BLD AUTO: 272 K/UL (ref 150–350)
PMV BLD AUTO: 10.9 FL (ref 9.2–12.9)
POIKILOCYTOSIS BLD QL SMEAR: SLIGHT
POLYCHROMASIA BLD QL SMEAR: ABNORMAL
POTASSIUM SERPL-SCNC: 3.9 MMOL/L (ref 3.5–5.1)
PROT SERPL-MCNC: 6.2 G/DL (ref 6–8.4)
RBC # BLD AUTO: 2.89 M/UL (ref 4.6–6.2)
RETICS/RBC NFR AUTO: 21.1 % (ref 0.4–2)
SICKLE CELLS BLD QL SMEAR: ABNORMAL
SODIUM SERPL-SCNC: 140 MMOL/L (ref 136–145)
TARGETS BLD QL SMEAR: ABNORMAL
WBC # BLD AUTO: 13 K/UL (ref 3.9–12.7)

## 2019-07-28 PROCEDURE — 99233 SBSQ HOSP IP/OBS HIGH 50: CPT | Mod: ,,, | Performed by: PHYSICIAN ASSISTANT

## 2019-07-28 PROCEDURE — 80053 COMPREHEN METABOLIC PANEL: CPT

## 2019-07-28 PROCEDURE — 63600175 PHARM REV CODE 636 W HCPCS: Performed by: NURSE PRACTITIONER

## 2019-07-28 PROCEDURE — 36415 COLL VENOUS BLD VENIPUNCTURE: CPT

## 2019-07-28 PROCEDURE — 25000003 PHARM REV CODE 250: Performed by: NURSE PRACTITIONER

## 2019-07-28 PROCEDURE — 85045 AUTOMATED RETICULOCYTE COUNT: CPT

## 2019-07-28 PROCEDURE — S5010 5% DEXTROSE AND 0.45% SALINE: HCPCS | Performed by: NURSE PRACTITIONER

## 2019-07-28 PROCEDURE — 99233 PR SUBSEQUENT HOSPITAL CARE,LEVL III: ICD-10-PCS | Mod: ,,, | Performed by: PHYSICIAN ASSISTANT

## 2019-07-28 PROCEDURE — 11000001 HC ACUTE MED/SURG PRIVATE ROOM

## 2019-07-28 PROCEDURE — 85025 COMPLETE CBC W/AUTO DIFF WBC: CPT

## 2019-07-28 RX ADMIN — HYDROMORPHONE HYDROCHLORIDE 2 MG: 1 INJECTION, SOLUTION INTRAMUSCULAR; INTRAVENOUS; SUBCUTANEOUS at 12:07

## 2019-07-28 RX ADMIN — HYDROMORPHONE HYDROCHLORIDE 2 MG: 1 INJECTION, SOLUTION INTRAMUSCULAR; INTRAVENOUS; SUBCUTANEOUS at 04:07

## 2019-07-28 RX ADMIN — HYDROMORPHONE HYDROCHLORIDE 2 MG: 1 INJECTION, SOLUTION INTRAMUSCULAR; INTRAVENOUS; SUBCUTANEOUS at 08:07

## 2019-07-28 RX ADMIN — IBUPROFEN 400 MG: 400 TABLET, FILM COATED ORAL at 06:07

## 2019-07-28 RX ADMIN — DEXTROSE AND SODIUM CHLORIDE: 5; .45 INJECTION, SOLUTION INTRAVENOUS at 08:07

## 2019-07-28 RX ADMIN — DEXTROSE AND SODIUM CHLORIDE: 5; .45 INJECTION, SOLUTION INTRAVENOUS at 05:07

## 2019-07-28 RX ADMIN — MORPHINE SULFATE 45 MG: 15 TABLET, EXTENDED RELEASE ORAL at 10:07

## 2019-07-28 RX ADMIN — MORPHINE SULFATE 45 MG: 15 TABLET, EXTENDED RELEASE ORAL at 08:07

## 2019-07-28 RX ADMIN — FOLIC ACID 1 MG: 1 TABLET ORAL at 08:07

## 2019-07-28 RX ADMIN — ACETAMINOPHEN 650 MG: 325 TABLET ORAL at 05:07

## 2019-07-28 RX ADMIN — DEXTROSE AND SODIUM CHLORIDE: 5; .45 INJECTION, SOLUTION INTRAVENOUS at 09:07

## 2019-07-28 RX ADMIN — DEXTROSE AND SODIUM CHLORIDE: 5; .45 INJECTION, SOLUTION INTRAVENOUS at 12:07

## 2019-07-28 RX ADMIN — IBUPROFEN 400 MG: 400 TABLET, FILM COATED ORAL at 12:07

## 2019-07-28 RX ADMIN — ACETAMINOPHEN 650 MG: 325 TABLET ORAL at 02:07

## 2019-07-28 RX ADMIN — IBUPROFEN 400 MG: 400 TABLET, FILM COATED ORAL at 05:07

## 2019-07-28 RX ADMIN — ACETAMINOPHEN 650 MG: 325 TABLET ORAL at 09:07

## 2019-07-28 NOTE — SUBJECTIVE & OBJECTIVE
Interval History: Patient seen, resting with some discomfort. Reports pain still at 8/10. Continuing supportive care, patient in agreement.    Review of Systems   Constitutional: Positive for fatigue. Negative for fever.   Respiratory: Negative for chest tightness and shortness of breath.    Cardiovascular: Negative for chest pain and palpitations.   Gastrointestinal: Negative for abdominal pain and nausea.   Genitourinary: Negative for difficulty urinating and urgency.   Musculoskeletal: Positive for arthralgias and myalgias.        Right hip pain from known AVN. Generalized body pains and aches   Neurological: Positive for weakness. Negative for headaches.     Objective:     Vital Signs (Most Recent):  Temp: 97.6 °F (36.4 °C) (07/28/19 1133)  Pulse: (!) 57 (07/28/19 1133)  Resp: 18 (07/28/19 0814)  BP: 115/61 (07/28/19 1133)  SpO2: 99 % (07/28/19 1133) Vital Signs (24h Range):  Temp:  [96.3 °F (35.7 °C)-98.1 °F (36.7 °C)] 97.6 °F (36.4 °C)  Pulse:  [57-75] 57  Resp:  [12-18] 18  SpO2:  [96 %-99 %] 99 %  BP: (115-130)/(60-70) 115/61     Weight: 77.1 kg (170 lb)  Body mass index is 25.1 kg/m².    Intake/Output Summary (Last 24 hours) at 7/28/2019 1301  Last data filed at 7/28/2019 1100  Gross per 24 hour   Intake 2000 ml   Output 2650 ml   Net -650 ml      Physical Exam   Constitutional: He is oriented to person, place, and time. He appears well-developed and well-nourished.   Cardiovascular: Normal rate, regular rhythm and normal heart sounds.   Tenderness on palpation    Pulmonary/Chest: Effort normal and breath sounds normal.   Abdominal: Soft. Bowel sounds are normal. There is generalized tenderness.   Musculoskeletal: Normal range of motion. He exhibits tenderness. He exhibits no edema.   FROM to all extremities, tenderness and pain with palpitation to all locations.    Neurological: He is alert and oriented to person, place, and time.   Skin: Skin is warm and dry. No rash noted. No cyanosis. Nails show no  clubbing.   Psychiatric: He has a normal mood and affect. His behavior is normal.   Nursing note and vitals reviewed.      Significant Labs:   Bilirubin:   Recent Labs   Lab 07/21/19  0402 07/22/19  0722 07/25/19  0820 07/27/19  0758 07/28/19  0740   BILITOT 2.3* 2.7* 3.7* 2.7* 3.2*     CBC:   Recent Labs   Lab 07/27/19  0758 07/28/19  0740   WBC 10.42 13.00*   HGB 8.6* 8.5*   HCT 26.4* 25.2*    272     TSH:   Recent Labs   Lab 06/12/19  0335   TSH 4.12     All pertinent labs within the past 24 hours have been reviewed.    Significant Imaging: I have reviewed all pertinent imaging results/findings within the past 24 hours.

## 2019-07-28 NOTE — PROGRESS NOTES
[facility] Hospital Medicine  Progress Note    Patient Name: Paddy Stevenson  MRN: [id number]  Patient Class: IP- Inpatient   Admission Date: 7/26/2019  Length of Stay: 2 days  Attending Physician: Mickey Saenz MD  Primary Care Provider: Primary Doctor [facility] Medicine Team: [facility] Y Kimmie Maher PA-C    Subjective:     Principal Problem:Vasoocclusive sickle cell crisis      HPI:  25 y/o M with PMH sickle cell anemia and AVN of bilateral hips s/p L hip replacement 2 years ago presents c/o R hip, back, and thigh pain. Patient presented to ED about 8hours after discharge from hospital of sickle cell crisis. At the time of discharge patient will increase mobility and decreased pain. Reports within a few hours of discharge patient developed increase pain over entire body and explains that it is more intense than his hospitalization. Patient reports increase pain started in his chest then developed over his entire body. Reports home pain medication did not help nor did warm packs. Denies fevers, chills, URI symptoms.     ED: WBC 12.98, retic 20 treated with 1L NS, hydromorphone 2mg, benadryl 25mg    Overview/Hospital Course:  Patient readmitted for Sickle crisis. Retic continues to increase with elevation of LD and Haptoglobin < 10. Describes as pain as sharp and located to his entire body. Infectious work-up has been negative. Continue with IVFs, pain management, supplemental oxygen.     Interval History: Patient seen, resting with some discomfort. Reports pain still at 8/10. Continuing supportive care, patient in agreement.    Review of Systems   Constitutional: Positive for fatigue. Negative for fever.   Respiratory: Negative for chest tightness and shortness of breath.    Cardiovascular: Negative for chest pain and palpitations.   Gastrointestinal: Negative for abdominal pain and nausea.   Genitourinary: Negative for difficulty urinating and urgency.   Musculoskeletal:  Positive for arthralgias and myalgias.        Right hip pain from known AVN. Generalized body pains and aches   Neurological: Positive for weakness. Negative for headaches.     Objective:     Vital Signs (Most Recent):  Temp: 97.6 °F (36.4 °C) (07/28/19 1133)  Pulse: (!) 57 (07/28/19 1133)  Resp: 18 (07/28/19 0814)  BP: 115/61 (07/28/19 1133)  SpO2: 99 % (07/28/19 1133) Vital Signs (24h Range):  Temp:  [96.3 °F (35.7 °C)-98.1 °F (36.7 °C)] 97.6 °F (36.4 °C)  Pulse:  [57-75] 57  Resp:  [12-18] 18  SpO2:  [96 %-99 %] 99 %  BP: (115-130)/(60-70) 115/61     Weight: 77.1 kg (170 lb)  Body mass index is 25.1 kg/m².    Intake/Output Summary (Last 24 hours) at 7/28/2019 1301  Last data filed at 7/28/2019 1100  Gross per 24 hour   Intake 2000 ml   Output 2650 ml   Net -650 ml      Physical Exam   Constitutional: He is oriented to person, place, and time. He appears well-developed and well-nourished.   Cardiovascular: Normal rate, regular rhythm and normal heart sounds.   Tenderness on palpation    Pulmonary/Chest: Effort normal and breath sounds normal.   Abdominal: Soft. Bowel sounds are normal. There is generalized tenderness.   Musculoskeletal: Normal range of motion. He exhibits tenderness. He exhibits no edema.   FROM to all extremities, tenderness and pain with palpitation to all locations.    Neurological: He is alert and oriented to person, place, and time.   Skin: Skin is warm and dry. No rash noted. No cyanosis. Nails show no clubbing.   Psychiatric: He has a normal mood and affect. His behavior is normal.   Nursing note and vitals reviewed.      Significant Labs:   Bilirubin:   Recent Labs   Lab 07/21/19  0402 07/22/19  0722 07/25/19  0820 07/27/19  0758 07/28/19  0740   BILITOT 2.3* 2.7* 3.7* 2.7* 3.2*     CBC:   Recent Labs   Lab 07/27/19  0758 07/28/19  0740   WBC 10.42 13.00*   HGB 8.6* 8.5*   HCT 26.4* 25.2*    272     TSH:   Recent Labs   Lab 06/12/19  0335   TSH 4.12     All pertinent labs within the  past 24 hours have been reviewed.    Significant Imaging: I have reviewed all pertinent imaging results/findings within the past 24 hours.      Assessment/Plan:      * Vasoocclusive sickle cell crisis  Sickle Cell anemia  Retic count elevated at 20-21.7  T bili elevated at 3.7  Haptoglobin <10 and   Hgb baseline 8-10, on admission Hgb 8.7  Resume morphine 12hr 45mg Q12hrs, tylenol 650 scheduled, ibuprofen 400mg q6, hydromorphone 2 mg q4h prn  Supplemental oxygen  D5 1/2 NS  Folic acid  Bowel regimen with senokot-s and miralax  CXR stable    Avascular necrosis of bones of both hips  S/p L hip replacement ~2 years ago  MRI R hip (12/2018) at OSH: Avascular necrosis of the right femoral head without subchondral collapse. 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  MRI on 7/17/19:Focal area of avascular necrosis involving at least 50% of the articular surface.  No imaging findings to suggest subchondral plate collapse.  2. Additional focal area of avascular necrosis within the right iliac crest and possibly within the right proximal femoral shaft, the latter which is not well evaluated secondary to partial visualization.  Pt reports discussing AVN and is followed by an Orthopedic surgeon in [place] with a f/u in about 1 week. Currently no plans of surgery.     Mild intermittent asthma without complication  Sats 98-99% on RA  Controlled  Will order prn breathing treatments    Leukocytosis  WBC 12.98  CXR stable, patient without acute chest syndrome  UA clear  Continue to monitor      VTE Risk Mitigation (From admission, onward)        Ordered     IP VTE LOW RISK PATIENT  Once      07/26/19 0541     Place BONG hose  Until discontinued      07/26/19 0541                Kimmie Maher PA-C  Department of Hospital Medicine   [facility]

## 2019-07-28 NOTE — PLAN OF CARE
Problem: Adult Inpatient Plan of Care  Goal: Plan of Care Review  Went over plan of care - all questions answered. Pain meds were given around the clock - see mar. He does state that the pain does drop to a 7 with the new increase of pain med amount.  455 am lab person came by - pt states they can come and do them later. Will continue to monitor

## 2019-07-28 NOTE — PLAN OF CARE
Problem: Adult Inpatient Plan of Care  Goal: Plan of Care Review  Outcome: Ongoing (interventions implemented as appropriate)     07/28/19 1620   Plan of Care Review   Plan of Care Reviewed With patient   Pt is A,a,O x 4 . Stable V/s. Pt C/O generalized pain 8/10 during the day and PRN and schedule PRN pain medications given . Pt is able to ambulate in the room and hallway independently , pt remains free of falls and injuries . Pt is on continuous IFV . No significant  changes during the day .

## 2019-07-28 NOTE — ASSESSMENT & PLAN NOTE
S/p L hip replacement ~2 years ago  MRI R hip (12/2018) at OSH: Avascular necrosis of the right femoral head without subchondral collapse. 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  MRI on 7/17/19:Focal area of avascular necrosis involving at least 50% of the articular surface.  No imaging findings to suggest subchondral plate collapse.  2. Additional focal area of avascular necrosis within the right iliac crest and possibly within the right proximal femoral shaft, the latter which is not well evaluated secondary to partial visualization.  Pt reports discussing AVN and is followed by an Orthopedic surgeon in [place] with a f/u in about 1 week. Currently no plans of surgery.

## 2019-07-29 LAB
ALBUMIN SERPL BCP-MCNC: 3.5 G/DL (ref 3.5–5.2)
ALP SERPL-CCNC: 62 U/L (ref 55–135)
ALT SERPL W/O P-5'-P-CCNC: 34 U/L (ref 10–44)
ANION GAP SERPL CALC-SCNC: 8 MMOL/L (ref 8–16)
ANISOCYTOSIS BLD QL SMEAR: ABNORMAL
AST SERPL-CCNC: 36 U/L (ref 10–40)
BASOPHILS # BLD AUTO: 0.08 K/UL (ref 0–0.2)
BASOPHILS NFR BLD: 0.7 % (ref 0–1.9)
BILIRUB SERPL-MCNC: 3.1 MG/DL (ref 0.1–1)
BUN SERPL-MCNC: 6 MG/DL (ref 6–20)
CALCIUM SERPL-MCNC: 8.5 MG/DL (ref 8.7–10.5)
CHLORIDE SERPL-SCNC: 107 MMOL/L (ref 95–110)
CO2 SERPL-SCNC: 22 MMOL/L (ref 23–29)
CREAT SERPL-MCNC: 0.7 MG/DL (ref 0.5–1.4)
DIFFERENTIAL METHOD: ABNORMAL
EOSINOPHIL # BLD AUTO: 1.1 K/UL (ref 0–0.5)
EOSINOPHIL NFR BLD: 9.3 % (ref 0–8)
ERYTHROCYTE [DISTWIDTH] IN BLOOD BY AUTOMATED COUNT: 23.3 % (ref 11.5–14.5)
EST. GFR  (AFRICAN AMERICAN): >60 ML/MIN/1.73 M^2
EST. GFR  (NON AFRICAN AMERICAN): >60 ML/MIN/1.73 M^2
GLUCOSE SERPL-MCNC: 93 MG/DL (ref 70–110)
HCT VFR BLD AUTO: 26.3 % (ref 40–54)
HGB BLD-MCNC: 8.7 G/DL (ref 14–18)
HOWELL-JOLLY BOD BLD QL SMEAR: ABNORMAL
HYPOCHROMIA BLD QL SMEAR: ABNORMAL
IMM GRANULOCYTES # BLD AUTO: 0.48 K/UL (ref 0–0.04)
IMM GRANULOCYTES NFR BLD AUTO: 4 % (ref 0–0.5)
LYMPHOCYTES # BLD AUTO: 2.8 K/UL (ref 1–4.8)
LYMPHOCYTES NFR BLD: 23.2 % (ref 18–48)
MCH RBC QN AUTO: 29.3 PG (ref 27–31)
MCHC RBC AUTO-ENTMCNC: 33.1 G/DL (ref 32–36)
MCV RBC AUTO: 89 FL (ref 82–98)
MONOCYTES # BLD AUTO: 1.7 K/UL (ref 0.3–1)
MONOCYTES NFR BLD: 14.5 % (ref 4–15)
NEUTROPHILS # BLD AUTO: 5.7 K/UL (ref 1.8–7.7)
NEUTROPHILS NFR BLD: 48.3 % (ref 38–73)
NRBC BLD-RTO: 4 /100 WBC
PAPPENHEIMER BOD BLD QL SMEAR: PRESENT
PLATELET # BLD AUTO: 323 K/UL (ref 150–350)
PLATELET BLD QL SMEAR: ABNORMAL
PMV BLD AUTO: 10.8 FL (ref 9.2–12.9)
POIKILOCYTOSIS BLD QL SMEAR: ABNORMAL
POLYCHROMASIA BLD QL SMEAR: ABNORMAL
POTASSIUM SERPL-SCNC: 4 MMOL/L (ref 3.5–5.1)
PROT SERPL-MCNC: 6.6 G/DL (ref 6–8.4)
RBC # BLD AUTO: 2.97 M/UL (ref 4.6–6.2)
SCHISTOCYTES BLD QL SMEAR: PRESENT
SICKLE CELLS BLD QL SMEAR: ABNORMAL
SODIUM SERPL-SCNC: 137 MMOL/L (ref 136–145)
WBC # BLD AUTO: 11.89 K/UL (ref 3.9–12.7)

## 2019-07-29 PROCEDURE — 85025 COMPLETE CBC W/AUTO DIFF WBC: CPT

## 2019-07-29 PROCEDURE — 99232 PR SUBSEQUENT HOSPITAL CARE,LEVL II: ICD-10-PCS | Mod: ,,, | Performed by: NURSE PRACTITIONER

## 2019-07-29 PROCEDURE — S5010 5% DEXTROSE AND 0.45% SALINE: HCPCS | Performed by: NURSE PRACTITIONER

## 2019-07-29 PROCEDURE — 11000001 HC ACUTE MED/SURG PRIVATE ROOM

## 2019-07-29 PROCEDURE — 63600175 PHARM REV CODE 636 W HCPCS: Performed by: NURSE PRACTITIONER

## 2019-07-29 PROCEDURE — 80053 COMPREHEN METABOLIC PANEL: CPT

## 2019-07-29 PROCEDURE — 99232 SBSQ HOSP IP/OBS MODERATE 35: CPT | Mod: ,,, | Performed by: NURSE PRACTITIONER

## 2019-07-29 PROCEDURE — 36415 COLL VENOUS BLD VENIPUNCTURE: CPT

## 2019-07-29 PROCEDURE — 25000003 PHARM REV CODE 250: Performed by: NURSE PRACTITIONER

## 2019-07-29 RX ADMIN — HYDROMORPHONE HYDROCHLORIDE 2 MG: 1 INJECTION, SOLUTION INTRAMUSCULAR; INTRAVENOUS; SUBCUTANEOUS at 08:07

## 2019-07-29 RX ADMIN — HYDROMORPHONE HYDROCHLORIDE 2 MG: 1 INJECTION, SOLUTION INTRAMUSCULAR; INTRAVENOUS; SUBCUTANEOUS at 04:07

## 2019-07-29 RX ADMIN — MORPHINE SULFATE 45 MG: 15 TABLET, EXTENDED RELEASE ORAL at 09:07

## 2019-07-29 RX ADMIN — DEXTROSE AND SODIUM CHLORIDE: 5; .45 INJECTION, SOLUTION INTRAVENOUS at 08:07

## 2019-07-29 RX ADMIN — ACETAMINOPHEN 650 MG: 325 TABLET ORAL at 09:07

## 2019-07-29 RX ADMIN — IBUPROFEN 400 MG: 400 TABLET, FILM COATED ORAL at 12:07

## 2019-07-29 RX ADMIN — HYDROMORPHONE HYDROCHLORIDE 2 MG: 1 INJECTION, SOLUTION INTRAMUSCULAR; INTRAVENOUS; SUBCUTANEOUS at 12:07

## 2019-07-29 RX ADMIN — ACETAMINOPHEN 650 MG: 325 TABLET ORAL at 01:07

## 2019-07-29 RX ADMIN — IBUPROFEN 400 MG: 400 TABLET, FILM COATED ORAL at 11:07

## 2019-07-29 RX ADMIN — FOLIC ACID 1 MG: 1 TABLET ORAL at 09:07

## 2019-07-29 RX ADMIN — DEXTROSE AND SODIUM CHLORIDE: 5; .45 INJECTION, SOLUTION INTRAVENOUS at 05:07

## 2019-07-29 RX ADMIN — IBUPROFEN 400 MG: 400 TABLET, FILM COATED ORAL at 06:07

## 2019-07-29 RX ADMIN — ACETAMINOPHEN 650 MG: 325 TABLET ORAL at 06:07

## 2019-07-29 RX ADMIN — HYDROMORPHONE HYDROCHLORIDE 2 MG: 1 INJECTION, SOLUTION INTRAMUSCULAR; INTRAVENOUS; SUBCUTANEOUS at 01:07

## 2019-07-29 RX ADMIN — HYDROMORPHONE HYDROCHLORIDE 2 MG: 1 INJECTION, SOLUTION INTRAMUSCULAR; INTRAVENOUS; SUBCUTANEOUS at 09:07

## 2019-07-29 RX ADMIN — HYDROMORPHONE HYDROCHLORIDE 2 MG: 1 INJECTION, SOLUTION INTRAMUSCULAR; INTRAVENOUS; SUBCUTANEOUS at 05:07

## 2019-07-29 NOTE — SUBJECTIVE & OBJECTIVE
"Interval History: Patient seen at bedside, reports just having pain medication and pain is 7/10; prior to medication 10/10. Patient reports feeling "so-so" and pain medication "is starting to work". Patient does appear comfortable compared to re-admission. Continue current treatment.     Review of Systems   Constitutional: Positive for fatigue. Negative for appetite change, chills and fever.   HENT: Negative for trouble swallowing.    Respiratory: Negative for cough, chest tightness, shortness of breath and wheezing.    Cardiovascular: Negative for chest pain, palpitations and leg swelling.   Gastrointestinal: Negative for abdominal pain, constipation, diarrhea and nausea.   Genitourinary: Negative for difficulty urinating, frequency and urgency.   Musculoskeletal: Positive for arthralgias and myalgias.        Right hip pain from known AVN. Generalized body pains and aches   Skin: Negative for rash.   Neurological: Positive for weakness. Negative for dizziness, light-headedness and headaches.   Psychiatric/Behavioral: Negative for sleep disturbance.     Objective:     Vital Signs (Most Recent):  Temp: 97.2 °F (36.2 °C) (07/29/19 0748)  Pulse: 66 (07/29/19 0748)  Resp: 16 (07/29/19 0748)  BP: 118/61 (07/29/19 0748)  SpO2: (!) 92 % (07/29/19 0748) Vital Signs (24h Range):  Temp:  [97.2 °F (36.2 °C)-98.1 °F (36.7 °C)] 97.2 °F (36.2 °C)  Pulse:  [57-78] 66  Resp:  [12-18] 16  SpO2:  [92 %-99 %] 92 %  BP: (115-135)/(58-67) 118/61     Weight: 77.1 kg (170 lb)  Body mass index is 25.1 kg/m².    Intake/Output Summary (Last 24 hours) at 7/29/2019 1014  Last data filed at 7/29/2019 0556  Gross per 24 hour   Intake 1400 ml   Output 1725 ml   Net -325 ml      Physical Exam   Constitutional: He is oriented to person, place, and time. He appears well-developed and well-nourished. No distress.   Cardiovascular: Normal rate, regular rhythm and normal heart sounds. Exam reveals no gallop and no friction rub.   No murmur " heard.  Tenderness on palpation    Pulmonary/Chest: Effort normal and breath sounds normal. No respiratory distress. He has no wheezes. He has no rales.   Abdominal: Soft. Bowel sounds are normal. He exhibits no distension. There is generalized tenderness.   Musculoskeletal: Normal range of motion. He exhibits tenderness. He exhibits no edema.   FROM to all extremities, tenderness and pain with palpitation to all locations.    Neurological: He is alert and oriented to person, place, and time.   Skin: Skin is warm and dry. No rash noted. He is not diaphoretic. No cyanosis. Nails show no clubbing.   Psychiatric: He has a normal mood and affect. His behavior is normal.   Nursing note and vitals reviewed.      Significant Labs:   Bilirubin:   Recent Labs   Lab 07/22/19  0722 07/25/19  0820 07/27/19  0758 07/28/19  0740 07/29/19  0906   BILITOT 2.7* 3.7* 2.7* 3.2* 3.1*     CBC:   Recent Labs   Lab 07/28/19  0740   WBC 13.00*   HGB 8.5*   HCT 25.2*        TSH:   Recent Labs   Lab 06/12/19  0335   TSH 4.12     All pertinent labs within the past 24 hours have been reviewed.    Significant Imaging: I have reviewed all pertinent imaging results/findings within the past 24 hours.

## 2019-07-29 NOTE — PLAN OF CARE
Problem: Adult Inpatient Plan of Care  Goal: Plan of Care Review  Went over plan of care - all questions answered. Pain meds given around the clock - see mar. When lab came at 430 pt told them to come back later to draw. Will continue to monitor

## 2019-07-29 NOTE — ASSESSMENT & PLAN NOTE
WBC remains stable  CXR stable, patient without acute chest syndrome  UA clear  Continue to monitor

## 2019-07-29 NOTE — PLAN OF CARE
[id number] - [place]SHALL - [address] AT [facility] [address]  [address] [address]  Phone: [phone] Fax: [phone]    Payor: MEDICAID / Plan: [facility] COMMUNITY PLAN [facility] ([place] MEDICA[place]) / Product Type: Managed Medicaid /        07/29/19 0908   Discharge Assessment   Assessment Type Discharge Planning Assessment   Confirmed/corrected address and phone number on facesheet? Yes   Assessment information obtained from? Patient   Prior to hospitilization cognitive status: Alert/Oriented   Prior to hospitalization functional status: Independent   Current cognitive status: Alert/Oriented   Current Functional Status: Independent   Lives With parent(s)   Able to Return to Prior Arrangements yes   Is patient able to care for self after discharge? Yes   Who are your caregiver(s) and their phone number(s)?   (Marcella soriano [phone])   Patient's perception of discharge disposition home or selfcare   If yes, most recent facility name: [facility]    Patient currently being followed by outpatient case management? No   Patient currently receives any other outside agency services? No   Equipment Currently Used at Home none   Do you have any problems affording any of your prescribed medications? No   Does the patient have transportation home? Yes   Transportation Anticipated family or friend will provide   Discharge Plan A Home with family   Discharge Plan B Home with family;Home Health   DME Needed Upon Discharge  none

## 2019-07-29 NOTE — PROGRESS NOTES
"[facility] Hospital Medicine  Progress Note    Patient Name: Paddy Stevenson  MRN: [id number]  Patient Class: IP- Inpatient   Admission Date: 7/26/2019  Length of Stay: 3 days  Attending Physician: Mickey Saenz MD  Primary Care Provider: Primary Doctor [facility] Medicine Team: [facility] Y Ivis Hanks NP    Subjective:     Principal Problem:Vasoocclusive sickle cell crisis    HPI:  23 y/o M with PMH sickle cell anemia and AVN of bilateral hips s/p L hip replacement 2 years ago presents c/o R hip, back, and thigh pain. Patient presented to ED about 8hours after discharge from hospital of sickle cell crisis. At the time of discharge patient will increase mobility and decreased pain. Reports within a few hours of discharge patient developed increase pain over entire body and explains that it is more intense than his hospitalization. Patient reports increase pain started in his chest then developed over his entire body. Reports home pain medication did not help nor did warm packs. Denies fevers, chills, URI symptoms.     ED: WBC 12.98, retic 20 treated with 1L NS, hydromorphone 2mg, benadryl 25mg    Overview/Hospital Course:  Patient readmitted for Sickle crisis. Retic continues to increase with elevation of LD and Haptoglobin < 10. Describes as pain as sharp and located to his entire body. Infectious work-up has been negative. Continue with IVFs, pain management, supplemental oxygen.     Interval History: Patient seen at bedside, reports just having pain medication and pain is 7/10; prior to medication 10/10. Patient reports feeling "so-so" and pain medication "is starting to work". Patient does appear comfortable compared to re-admission. Continue current treatment.     Review of Systems   Constitutional: Positive for fatigue. Negative for appetite change, chills and fever.   HENT: Negative for trouble swallowing.    Respiratory: Negative for cough, chest tightness, shortness of " breath and wheezing.    Cardiovascular: Negative for chest pain, palpitations and leg swelling.   Gastrointestinal: Negative for abdominal pain, constipation, diarrhea and nausea.   Genitourinary: Negative for difficulty urinating, frequency and urgency.   Musculoskeletal: Positive for arthralgias and myalgias.        Right hip pain from known AVN. Generalized body pains and aches   Skin: Negative for rash.   Neurological: Positive for weakness. Negative for dizziness, light-headedness and headaches.   Psychiatric/Behavioral: Negative for sleep disturbance.     Objective:     Vital Signs (Most Recent):  Temp: 97.2 °F (36.2 °C) (07/29/19 0748)  Pulse: 66 (07/29/19 0748)  Resp: 16 (07/29/19 0748)  BP: 118/61 (07/29/19 0748)  SpO2: (!) 92 % (07/29/19 0748) Vital Signs (24h Range):  Temp:  [97.2 °F (36.2 °C)-98.1 °F (36.7 °C)] 97.2 °F (36.2 °C)  Pulse:  [57-78] 66  Resp:  [12-18] 16  SpO2:  [92 %-99 %] 92 %  BP: (115-135)/(58-67) 118/61     Weight: 77.1 kg (170 lb)  Body mass index is 25.1 kg/m².    Intake/Output Summary (Last 24 hours) at 7/29/2019 1014  Last data filed at 7/29/2019 0556  Gross per 24 hour   Intake 1400 ml   Output 1725 ml   Net -325 ml      Physical Exam   Constitutional: He is oriented to person, place, and time. He appears well-developed and well-nourished. No distress.   Cardiovascular: Normal rate, regular rhythm and normal heart sounds. Exam reveals no gallop and no friction rub.   No murmur heard.  Tenderness on palpation    Pulmonary/Chest: Effort normal and breath sounds normal. No respiratory distress. He has no wheezes. He has no rales.   Abdominal: Soft. Bowel sounds are normal. He exhibits no distension. There is generalized tenderness.   Musculoskeletal: Normal range of motion. He exhibits tenderness. He exhibits no edema.   FROM to all extremities, tenderness and pain with palpitation to all locations.    Neurological: He is alert and oriented to person, place, and time.   Skin: Skin is  warm and dry. No rash noted. He is not diaphoretic. No cyanosis. Nails show no clubbing.   Psychiatric: He has a normal mood and affect. His behavior is normal.   Nursing note and vitals reviewed.      Significant Labs:   Bilirubin:   Recent Labs   Lab 07/22/19  0722 07/25/19  0820 07/27/19  0758 07/28/19  0740 07/29/19  0906   BILITOT 2.7* 3.7* 2.7* 3.2* 3.1*     CBC:   Recent Labs   Lab 07/28/19  0740   WBC 13.00*   HGB 8.5*   HCT 25.2*        TSH:   Recent Labs   Lab 06/12/19  0335   TSH 4.12     All pertinent labs within the past 24 hours have been reviewed.    Significant Imaging: I have reviewed all pertinent imaging results/findings within the past 24 hours.      Assessment/Plan:      * Vasoocclusive sickle cell crisis  Sickle Cell anemia  Retic count elevated at 20-21.7  T bili elevated at 3.7  Haptoglobin <10 and   Hgb baseline 8-10, on admission Hgb 8.7  Resume morphine 12hr 45mg Q12hrs, tylenol 650 scheduled, ibuprofen 400mg q6, hydromorphone 2 mg q4h prn  Supplemental oxygen  D5 1/2 NS  Folic acid  Bowel regimen with senokot-s and miralax  CXR stable    Avascular necrosis of bones of both hips  S/p L hip replacement ~2 years ago  MRI R hip (12/2018) at OSH: Avascular necrosis of the right femoral head without subchondral collapse. 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  MRI on 7/17/19:Focal area of avascular necrosis involving at least 50% of the articular surface.  No imaging findings to suggest subchondral plate collapse.  2. Additional focal area of avascular necrosis within the right iliac crest and possibly within the right proximal femoral shaft, the latter which is not well evaluated secondary to partial visualization.  Pt reports discussing AVN and is followed by an Orthopedic surgeon in [place] with a f/u in about 1 week. Currently no plans of surgery.     Mild intermittent asthma without complication  Sats 98-99% on RA  Controlled  Will order prn breathing  treatments    Leukocytosis  WBC remains stable  CXR stable, patient without acute chest syndrome  UA clear  Continue to monitor      VTE Risk Mitigation (From admission, onward)        Ordered     IP VTE LOW RISK PATIENT  Once      07/26/19 0541     Place BONG hose  Until discontinued      07/26/19 0541          Ivis Hanks NP  Department of Hospital Medicine   [facility]

## 2019-07-29 NOTE — ASSESSMENT & PLAN NOTE
S/p L hip replacement ~2 years ago  MRI R hip (12/2018) at OSH: Avascular necrosis of the right femoral head without subchondral collapse. 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  MRI on 7/17/19:Focal area of avascular necrosis involving at least 50% of the articular surface.  No imaging findings to suggest subchondral plate collapse.  2. Additional focal area of avascular necrosis within the right iliac crest and possibly within the right proximal femoral shaft, the latter which is not well evaluated secondary to partial visualization.  Pt reports discussing AVN and is followed by an Orthopedic surgeon in [place] with a f/u in about 1 week. Currently no plans of surgery.

## 2019-07-30 ENCOUNTER — ANESTHESIA EVENT (OUTPATIENT)
Dept: MEDSURG UNIT | Facility: HOSPITAL | Age: 24
DRG: 812 | End: 2019-07-30
Payer: MEDICAID

## 2019-07-30 ENCOUNTER — ANESTHESIA (OUTPATIENT)
Dept: MEDSURG UNIT | Facility: HOSPITAL | Age: 24
DRG: 812 | End: 2019-07-30
Payer: MEDICAID

## 2019-07-30 LAB
ALBUMIN SERPL BCP-MCNC: 3.6 G/DL (ref 3.5–5.2)
ALP SERPL-CCNC: 60 U/L (ref 55–135)
ALT SERPL W/O P-5'-P-CCNC: 35 U/L (ref 10–44)
ANION GAP SERPL CALC-SCNC: 8 MMOL/L (ref 8–16)
AST SERPL-CCNC: 36 U/L (ref 10–40)
BASOPHILS # BLD AUTO: 0.11 K/UL (ref 0–0.2)
BASOPHILS NFR BLD: 0.9 % (ref 0–1.9)
BILIRUB SERPL-MCNC: 2.8 MG/DL (ref 0.1–1)
BUN SERPL-MCNC: 6 MG/DL (ref 6–20)
CALCIUM SERPL-MCNC: 8.6 MG/DL (ref 8.7–10.5)
CHLORIDE SERPL-SCNC: 108 MMOL/L (ref 95–110)
CO2 SERPL-SCNC: 22 MMOL/L (ref 23–29)
CREAT SERPL-MCNC: 0.8 MG/DL (ref 0.5–1.4)
DIFFERENTIAL METHOD: ABNORMAL
EOSINOPHIL # BLD AUTO: 1.2 K/UL (ref 0–0.5)
EOSINOPHIL NFR BLD: 9.4 % (ref 0–8)
ERYTHROCYTE [DISTWIDTH] IN BLOOD BY AUTOMATED COUNT: 23.1 % (ref 11.5–14.5)
EST. GFR  (AFRICAN AMERICAN): >60 ML/MIN/1.73 M^2
EST. GFR  (NON AFRICAN AMERICAN): >60 ML/MIN/1.73 M^2
GLUCOSE SERPL-MCNC: 90 MG/DL (ref 70–110)
HAPTOGLOB SERPL-MCNC: <10 MG/DL (ref 30–250)
HCT VFR BLD AUTO: 25 % (ref 40–54)
HGB BLD-MCNC: 8.4 G/DL (ref 14–18)
IMM GRANULOCYTES # BLD AUTO: 0.53 K/UL (ref 0–0.04)
IMM GRANULOCYTES NFR BLD AUTO: 4.2 % (ref 0–0.5)
LDH SERPL L TO P-CCNC: 479 U/L (ref 110–260)
LYMPHOCYTES # BLD AUTO: 3.2 K/UL (ref 1–4.8)
LYMPHOCYTES NFR BLD: 25.5 % (ref 18–48)
MCH RBC QN AUTO: 29.1 PG (ref 27–31)
MCHC RBC AUTO-ENTMCNC: 33.6 G/DL (ref 32–36)
MCV RBC AUTO: 87 FL (ref 82–98)
MONOCYTES # BLD AUTO: 1.7 K/UL (ref 0.3–1)
MONOCYTES NFR BLD: 13.8 % (ref 4–15)
NEUTROPHILS # BLD AUTO: 5.8 K/UL (ref 1.8–7.7)
NEUTROPHILS NFR BLD: 46.2 % (ref 38–73)
NRBC BLD-RTO: 3 /100 WBC
PLATELET # BLD AUTO: 300 K/UL (ref 150–350)
PMV BLD AUTO: 10.8 FL (ref 9.2–12.9)
POTASSIUM SERPL-SCNC: 4 MMOL/L (ref 3.5–5.1)
PROT SERPL-MCNC: 6.7 G/DL (ref 6–8.4)
RBC # BLD AUTO: 2.89 M/UL (ref 4.6–6.2)
RETICS/RBC NFR AUTO: 20.5 % (ref 0.4–2)
SODIUM SERPL-SCNC: 138 MMOL/L (ref 136–145)
WBC # BLD AUTO: 12.57 K/UL (ref 3.9–12.7)

## 2019-07-30 PROCEDURE — 83615 LACTATE (LD) (LDH) ENZYME: CPT

## 2019-07-30 PROCEDURE — 63600175 PHARM REV CODE 636 W HCPCS: Performed by: PHYSICIAN ASSISTANT

## 2019-07-30 PROCEDURE — 94761 N-INVAS EAR/PLS OXIMETRY MLT: CPT

## 2019-07-30 PROCEDURE — 99233 SBSQ HOSP IP/OBS HIGH 50: CPT | Mod: ,,, | Performed by: PHYSICIAN ASSISTANT

## 2019-07-30 PROCEDURE — 99233 PR SUBSEQUENT HOSPITAL CARE,LEVL III: ICD-10-PCS | Mod: ,,, | Performed by: PHYSICIAN ASSISTANT

## 2019-07-30 PROCEDURE — 85045 AUTOMATED RETICULOCYTE COUNT: CPT

## 2019-07-30 PROCEDURE — 83010 ASSAY OF HAPTOGLOBIN QUANT: CPT

## 2019-07-30 PROCEDURE — 80053 COMPREHEN METABOLIC PANEL: CPT

## 2019-07-30 PROCEDURE — 11000001 HC ACUTE MED/SURG PRIVATE ROOM

## 2019-07-30 PROCEDURE — 63600175 PHARM REV CODE 636 W HCPCS: Performed by: NURSE PRACTITIONER

## 2019-07-30 PROCEDURE — 36415 COLL VENOUS BLD VENIPUNCTURE: CPT

## 2019-07-30 PROCEDURE — 25000003 PHARM REV CODE 250: Performed by: NURSE PRACTITIONER

## 2019-07-30 PROCEDURE — 25000003 PHARM REV CODE 250: Performed by: PHYSICIAN ASSISTANT

## 2019-07-30 PROCEDURE — 85025 COMPLETE CBC W/AUTO DIFF WBC: CPT

## 2019-07-30 PROCEDURE — S5010 5% DEXTROSE AND 0.45% SALINE: HCPCS | Performed by: NURSE PRACTITIONER

## 2019-07-30 RX ORDER — MORPHINE SULFATE 15 MG/1
45 TABLET, FILM COATED, EXTENDED RELEASE ORAL EVERY 12 HOURS
Status: DISCONTINUED | OUTPATIENT
Start: 2019-07-30 | End: 2019-08-01 | Stop reason: HOSPADM

## 2019-07-30 RX ORDER — OXYCODONE HYDROCHLORIDE 10 MG/1
20 TABLET ORAL EVERY 4 HOURS PRN
Status: DISCONTINUED | OUTPATIENT
Start: 2019-07-30 | End: 2019-07-30

## 2019-07-30 RX ORDER — HYDROMORPHONE HYDROCHLORIDE 1 MG/ML
2 INJECTION, SOLUTION INTRAMUSCULAR; INTRAVENOUS; SUBCUTANEOUS EVERY 4 HOURS PRN
Status: DISPENSED | OUTPATIENT
Start: 2019-07-30 | End: 2019-07-30

## 2019-07-30 RX ORDER — OXYCODONE HYDROCHLORIDE 5 MG/1
5 TABLET ORAL
Status: DISCONTINUED | OUTPATIENT
Start: 2019-07-30 | End: 2019-07-30

## 2019-07-30 RX ORDER — ACETAMINOPHEN 500 MG
1000 TABLET ORAL EVERY 6 HOURS
Status: DISCONTINUED | OUTPATIENT
Start: 2019-07-30 | End: 2019-08-01 | Stop reason: HOSPADM

## 2019-07-30 RX ORDER — HYDROMORPHONE HYDROCHLORIDE 1 MG/ML
1 INJECTION, SOLUTION INTRAMUSCULAR; INTRAVENOUS; SUBCUTANEOUS EVERY 4 HOURS PRN
Status: DISCONTINUED | OUTPATIENT
Start: 2019-07-30 | End: 2019-07-30

## 2019-07-30 RX ORDER — HYDROMORPHONE HYDROCHLORIDE 1 MG/ML
1 INJECTION, SOLUTION INTRAMUSCULAR; INTRAVENOUS; SUBCUTANEOUS EVERY 4 HOURS PRN
Status: DISPENSED | OUTPATIENT
Start: 2019-07-31 | End: 2019-07-31

## 2019-07-30 RX ORDER — METHOCARBAMOL 500 MG/1
500 TABLET, FILM COATED ORAL 4 TIMES DAILY PRN
Status: DISCONTINUED | OUTPATIENT
Start: 2019-07-30 | End: 2019-07-31

## 2019-07-30 RX ORDER — OXYCODONE HYDROCHLORIDE 10 MG/1
10 TABLET ORAL
Status: DISCONTINUED | OUTPATIENT
Start: 2019-07-30 | End: 2019-07-30

## 2019-07-30 RX ADMIN — IBUPROFEN 400 MG: 400 TABLET, FILM COATED ORAL at 11:07

## 2019-07-30 RX ADMIN — DEXTROSE AND SODIUM CHLORIDE: 5; .45 INJECTION, SOLUTION INTRAVENOUS at 01:07

## 2019-07-30 RX ADMIN — ACETAMINOPHEN 650 MG: 325 TABLET ORAL at 01:07

## 2019-07-30 RX ADMIN — MORPHINE SULFATE 45 MG: 15 TABLET, EXTENDED RELEASE ORAL at 08:07

## 2019-07-30 RX ADMIN — HYDROMORPHONE HYDROCHLORIDE 2 MG: 1 INJECTION, SOLUTION INTRAMUSCULAR; INTRAVENOUS; SUBCUTANEOUS at 09:07

## 2019-07-30 RX ADMIN — HYDROMORPHONE HYDROCHLORIDE 2 MG: 1 INJECTION, SOLUTION INTRAMUSCULAR; INTRAVENOUS; SUBCUTANEOUS at 05:07

## 2019-07-30 RX ADMIN — ACETAMINOPHEN 1000 MG: 500 TABLET ORAL at 05:07

## 2019-07-30 RX ADMIN — ACETAMINOPHEN 650 MG: 325 TABLET ORAL at 05:07

## 2019-07-30 RX ADMIN — DEXTROSE AND SODIUM CHLORIDE: 5; .45 INJECTION, SOLUTION INTRAVENOUS at 08:07

## 2019-07-30 RX ADMIN — FOLIC ACID 1 MG: 1 TABLET ORAL at 08:07

## 2019-07-30 RX ADMIN — DEXTROSE AND SODIUM CHLORIDE: 5; .45 INJECTION, SOLUTION INTRAVENOUS at 05:07

## 2019-07-30 RX ADMIN — HYDROMORPHONE HYDROCHLORIDE 2 MG: 1 INJECTION, SOLUTION INTRAMUSCULAR; INTRAVENOUS; SUBCUTANEOUS at 01:07

## 2019-07-30 RX ADMIN — IBUPROFEN 400 MG: 400 TABLET, FILM COATED ORAL at 12:07

## 2019-07-30 RX ADMIN — IBUPROFEN 400 MG: 400 TABLET, FILM COATED ORAL at 05:07

## 2019-07-30 RX ADMIN — IBUPROFEN 400 MG: 400 TABLET, FILM COATED ORAL at 06:07

## 2019-07-30 RX ADMIN — ACETAMINOPHEN 1000 MG: 500 TABLET ORAL at 11:07

## 2019-07-30 NOTE — ANESTHESIA POST-OP PAIN MANAGEMENT
"Consult  Anesthesiology Pain Service      SUBJECTIVE:     Chief Complaint/Reason for Consult: Request anesthesia assistance with continuous pain management due to high complexity/refractory pain.    Surgical Procedure: None     History of Present Illness:  Mr. 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     The acute pain service was consulted to assist with management of chronic pain,pain seeking behavior and malingering by hospital medicine team.     Per chart review, patient has visited numerous EDs the last several months (9 times in July, 10 times in June, 19 times in May) with chief complaint of pain crisis. He has claimed during these admissions to be seen by Dr. Minaya at [facility] for management of his chronic pain, for which he is prescribed 45 MS Contin BID PRN.  was reviewed during 7/8/19 ED visit which revealed a one time Rx of 15mg MS Contin for 6 tablets 5/17/19. No other narcotic prescriptions were found.     In speaking with Mr. Stevenson, he immediately states his current pain regiment is working, he is not interested in any changes, additions or disruptions, he has tried all other medications (including lyrica, gabapentin, ibuprofen, acetaminophen, lidoderm patches, Lidoderm creams, NSAID creams) and they do not work for him, and his typical sickle cell crisis resolves with time and he "just needs more time." He states he has only had 2 crises these month. When asked about the management of his pain, he states he had been seeing Dr. Minaya at [facility], and that his usual prescription is 45 mg MS Contin BID PRN. He "only takes them when he feels a crisis coming on" but then later says he will have to find a new doctor because "his office will no " "longer schedule him for appointments with Dr. Minaya because he has too many patients."     Current inpatient regiment includes:  Ibuprofen 400 mg q6 hrs  Acetaminophen 650 mg q 8 hours   12 hour morphine tablets 45 mg q 12 hours  Hydromorphone 2mg q4 hours PRN severe breakthrough pain         Review of patient's allergies indicates:   Allergen Reactions    Adhesive Itching    Contrast media Shortness Of Breath    Rocephin [ceftriaxone] Hives    Sulfa (sulfonamide antibiotics) Anaphylaxis    Toradol [ketorolac] Shortness Of Breath    Vancomycin analogues Shortness Of Breath    Fentanyl Hives        Past Medical History:   Diagnosis Date    Asthma     AVN (avascular necrosis of bone)     Encounter for blood transfusion     Irregular heart rhythm 2010    Malingering 3/11/2016    Mild intermittent asthma without complication 11/16/2015    Seasonal allergies     Sickle cell anemia     Stroke      Past Surgical History:   Procedure Laterality Date    ABDOMINAL SURGERY      CHOLECYSTECTOMY      HIP SURGERY Left 2014    Hip Decompression    OTHER SURGICAL HISTORY      left rotater cuff repair    PORTACATH PLACEMENT      portacath removed   2/2014    SHOULDER ARTHROSCOPY W/ SUBACROMIAL DECOMPRESSION AND DISTAL CLAVICLE EXCISION       Family History   Problem Relation Age of Onset    Diabetes Father     Mental illness Brother     Sickle cell anemia Mother      Social History     Tobacco Use    Smoking status: Never Smoker    Smokeless tobacco: Never Used   Substance Use Topics    Alcohol use: No    Drug use: Yes     Types: Marijuana       Review of Systems:    Constitutional: No fever, No chills, No weight gain/loss  Eyes: No visual changes, double vision, tearing, pain, blind spots, or discharge  ENT: No nose bleeding, obstruction, d/c, dental issues, bleeding gums, Neck stiffness, masses  Respiratory: No SOB, wheezing, stridor, cough, hemoptysis, respiratory infx, or nt. " sweats  Cardiovascular:No chest pain, palpit., syncope, dyspnea, orthopnea, NPD, edema, cyanosis   Gastrointestinal: No dysphagia, indigestion, abd. pain, bloody stool,  heartburn, N/V, constipation, or diarrhea  Musculoskeletal: No pain, swelling of muscles or joints, limitation of motion, weakness   Skin/Breast: No rashes, itching, pigmentation, moisture or dryness, or changes in hair  Neurologic: No headaches, vertigo, Sz, , incoordination, parathesias, ataxia, tremor  Psychiatric: No anxiety, depression, hallucinations, suicidal ideas, or emotional issues  Endocrine:No polydypsia, intolerance to heat or cold, weight gain or loss  Hematologic/ Lymph: No Anemia, uncontrolled bleeding, transfusions, or LAD  Allergy/ Immunologic: No rxn to drugs, food, insects, or trouble breathing             OBJECTIVE:     Current Vital Signs  Temp: 36.4 °C (97.6 °F) (07/30/19 0755)  Pulse: 63 (07/30/19 0755)  Resp: 16 (07/30/19 0755)  BP: 128/69 (07/30/19 0755)  SpO2: 98 % (07/30/19 0755)    Vital Signs Range (Last 24H):  Temp:  [36.4 °C (97.6 °F)-36.8 °C (98.2 °F)]   Pulse:  [58-78]   Resp:  [12-18]   BP: (118-135)/(67-82)   SpO2:  [97 %-100 %]     I & O (Last 24H):    Intake/Output Summary (Last 24 hours) at 7/30/2019 1000  Last data filed at 7/29/2019 2300  Gross per 24 hour   Intake 180 ml   Output 1300 ml   Net -1120 ml       Physical Exam:  -Constitutional: Well-developed, well-nourished, non-distressed, not diaphoretic  -HENT: Normocephalic, atraumatic, external ears normal, oropharynx clear, MMM without exudates, nose normal without rhinorrhea  -Eyes: PERRLA, EOMI, conjunctiva normal, no discharge bilaterally, no scleral icterus  -Neck: normal ROM, supple, no thyromegaly, no palpable thyroid nodules, no tracheal deviation, stridor, JVD, or cervical adenopathy  -Cardiovascular: Regular rate and rhythm, normal S1 and S2, intact distant pulses, no murmurs, rubs or gallops, no carotid bruits  -Pulmonary/Chest wall: Breathing  comfortably without distress, clear to auscultation bilaterally, no dullness to percussion bilaterally, no wheezes, rales, or rhonchi, no chest tenderness  -GI: Soft, non-tender, non-distended, no tenderness/rebound/guarding, no masses, bowel sounds normoactive  -Musculoskeletal: Normal ROM, no edema, no atrophy,  no tenderness throughout  -Neurological: alert and oriented x 4, no cranial nerve deficits, no deficits in sensation, strength or tone throughout, no cerebellar deficits (finger-to-nose and heel-to-shin and alternating hand movements), no abnormal DTRs, sensation to monofilament intact throughout, no abnormal coordination or gait  -Skin: warm, dry, no erythema, rash, pallor, no acanthosis  -Psych: normal mood and affect, normal behavior, thought content and judgement.    Laboratory:  CBC:   Recent Labs     07/29/19  1200 07/30/19  0828   WBC 11.89 12.57   RBC 2.97* 2.89*   HGB 8.7* 8.4*   HCT 26.3* 25.0*    300   MCV 89 87   MCH 29.3 29.1   MCHC 33.1 33.6       CMP:   Recent Labs     07/29/19  0906 07/30/19  0828    138   K 4.0 4.0   CO2 22* 22*    108   BUN 6 6   CREATININE 0.7 0.8   GLU 93 90   CALCIUM 8.5* 8.6*   ALBUMIN 3.5 3.6   PROT 6.6 6.7   ALKPHOS 62 60   ALT 34 35   AST 36 36   BILITOT 3.1* 2.8*       Diagnostic Radiology:      ASSESSMENT/PLAN:     Active Hospital Problems    Diagnosis  POA    *Vasoocclusive sickle cell crisis [D57.00]  Yes    Sickle cell anemia [D57.1]  Yes    Mild intermittent asthma without complication [J45.20]  Yes     Chronic    Avascular necrosis of bones of both hips [M87.051, M87.052]  Yes     Chronic    Drug-seeking behavior [Z76.5]  Yes    Leukocytosis [D72.829]  Yes      Resolved Hospital Problems   No resolved problems to display.         Plan:  1) Recommend adjusting acetaminophen to 1000mg q6     2) Recommend continuing Ibuprofen    3) Recommend adding methocarbamol 500 mg QID PRN muscle spasms, can titrate up to 1000mg QID if patient finds  this to be helpful    4) Recommend discontinuation of ER morphine as per  review and patient advisory this Rx is not accurate.     5) Recommend weaning narcotics as patient has numerous indications of drug seeking behavior.  Can replace ER morphine with the following  Oxycodone 5 mg q3 prn mild pain  Oxycodone 10 mg q3 moderate pain  Oxycodone 15 mg q3 severe pain, Can increase up to 20 mg if needed    Can discontinue or wean hydromorphone from 2mg q4 to 1mg q4, then 1mg q6 and then discontinue.     Acute Pain Service to sign off at this time, as patient's complaints are chronic in nature.    Would recommend considering consult to addiction psychiatry.     Discussed with staff, Dr. Martel.    Atiya Lee MD   10:22 AM  07/30/2019

## 2019-07-30 NOTE — PLAN OF CARE
Problem: Adult Inpatient Plan of Care  Goal: Plan of Care Review  Outcome: Ongoing (interventions implemented as appropriate)     07/30/19 1700   Plan of Care Review   Plan of Care Reviewed With patient   Progress no change   Plan of care discussed with pt medicated with dilaudid as order with c/o pain on shoulder 10/10 pain with some relieved no injury free of falls

## 2019-07-30 NOTE — PLAN OF CARE
Problem: Adult Inpatient Plan of Care  Goal: Plan of Care Review  Outcome: Ongoing (interventions implemented as appropriate)     07/29/19 1700   Plan of Care Review   Progress no change   Plan of care discussed with pt question and answer addressed medicated rtc with dilaudid 2mg iv q4 as order for c/o pain on all joints 9/10 pain scale , awaken for pain no injury free of falls

## 2019-07-30 NOTE — ASSESSMENT & PLAN NOTE
S/p L hip replacement ~2 years ago  MRI R hip (12/2018) at OSH: Avascular necrosis of the right femoral head without subchondral collapse. 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  MRI on 7/17/19:Focal area of avascular necrosis involving at least 50% of the articular surface.  No imaging findings to suggest subchondral plate collapse.  2. Additional focal area of avascular necrosis within the right iliac crest and possibly within the right proximal femoral shaft, the latter which is not well evaluated secondary to partial visualization.  Pt reports discussing AVN and is followed by an Orthopedic surgeon in [place] with a f/u in about 1 week. Currently no plans of surgery.

## 2019-07-30 NOTE — ASSESSMENT & PLAN NOTE
ER visits July 2019 7/1: [facility]  7/2: [facility]  7/3: [facility]  7/7: [facility]  7/7: [facility]  7/13: [facility]  7/15: [facility]  7/16: [facility]  7/26: [facility]

## 2019-07-30 NOTE — ASSESSMENT & PLAN NOTE
Sickle Cell anemia  Retic count elevated at 20-21.7  T bili elevated at 3.7  Haptoglobin <10 and   Hgb baseline 8-10, on admission Hgb 8.7  Supplemental oxygen  D5 1/2 NS  Folic acid  Bowel regimen with senokot-s and miralax  CXR stable  7/30/19: Pain service consulted with following recommendations:   1) Recommend adjusting acetaminophen to 1000mg q6.   2) Recommend continuing Ibuprofen.   3) Recommend adding methocarbamol 500 mg QID PRN muscle spasms, can titrate up to 1000mg QID if patient finds this to be helpful.   4) Recommend discontinuation of ER morphine as per  review and patient advisory this Rx is not accurate.   5) Recommend weaning narcotics as patient has numerous indications of drug seeking behavior.Can replace ER morphine with the following  -Oxycodone 5 mg q3 prn mild pain  -Oxycodone 10 mg q3 moderate pain  -Oxycodone 15 mg q3 severe pain, Can increase up to 20 mg if needed  Patient stated multiple times oxy IR does not work for him, will continue ER morphine as it has been tolerated in review of VS however will not discharge with prescription.  6) Can discontinue or wean hydromorphone from 2mg q4 to 1mg q4, then 1mg q6 and then discontinue.   Would recommend considering consult to addiction psychiatry. Patient declined.

## 2019-07-30 NOTE — PROGRESS NOTES
"[facility] Hospital Medicine  Progress Note    Patient Name: Paddy Stevenson  MRN: [id number]  Patient Class: IP- Inpatient   Admission Date: 7/26/2019  Length of Stay: 4 days  Attending Physician: JOY Isbell MD  Primary Care Provider: Primary Doctor [facility] Medicine Team: [facility] MED VILLA Servin PA-C    Subjective:     Principal Problem:Vasoocclusive sickle cell crisis      HPI:  25 y/o M with PMH sickle cell anemia and AVN of bilateral hips s/p L hip replacement 2 years ago presents c/o R hip, back, and thigh pain. Patient presented to ED about 8hours after discharge from hospital of sickle cell crisis. At the time of discharge patient will increase mobility and decreased pain. Reports within a few hours of discharge patient developed increase pain over entire body and explains that it is more intense than his hospitalization. Patient reports increase pain started in his chest then developed over his entire body. Reports home pain medication did not help nor did warm packs. Denies fevers, chills, URI symptoms.     ED: WBC 12.98, retic 20 treated with 1L NS, hydromorphone 2mg, benadryl 25mg    Overview/Hospital Course:  Patient readmitted for Sickle crisis. Retic continues to increase with elevation of LD and Haptoglobin < 10. Describes as pain as sharp and located to his entire body. Infectious work-up has been negative. Continue with IVFs, pain management, supplemental oxygen. Day 4 pain service consulted with the following recommendations:  increase APAP to 1000 mg q6, continue ibuprofen, add methocarbamol 500 mg qid PRN, discontinue ER morphine with oxycodone scale as replacement, and wean dilaudid IV. Would recommend considering consult to addiction psychiatry which patient declined.    Interval History: Pt resting in NAD. He reported yesterday that pain had minimally improved since readmission but states the pain today is the same as yesterday. Pain is "gerneralized". R " hip pain has had no improvement in past 2 weeks. He states pain is moving in the right direction but not enough to decrease pain medication dosage or frequency today. Pt able to ambulate to the restroom unassisted without much difficulty. Pain service consulted for recommendations.     Review of Systems   Constitutional: Positive for fatigue. Negative for appetite change, chills and fever.   HENT: Negative for trouble swallowing.    Respiratory: Negative for cough, chest tightness, shortness of breath and wheezing.    Cardiovascular: Negative for chest pain, palpitations and leg swelling.   Gastrointestinal: Negative for abdominal pain, constipation, diarrhea and nausea.   Genitourinary: Negative for difficulty urinating, frequency and urgency.   Musculoskeletal: Positive for arthralgias and myalgias.        Right hip pain from known AVN. Generalized body pains and aches   Skin: Negative for rash.   Neurological: Positive for weakness. Negative for dizziness, light-headedness and headaches.   Psychiatric/Behavioral: Negative for sleep disturbance.     Objective:     Vital Signs (Most Recent):  Temp: 96.2 °F (35.7 °C) (07/30/19 1131)  Pulse: 68 (07/30/19 1131)  Resp: 18 (07/30/19 1131)  BP: 115/81 (07/30/19 1131)  SpO2: 97 % (07/30/19 1131) Vital Signs (24h Range):  Temp:  [96.2 °F (35.7 °C)-98.2 °F (36.8 °C)] 96.2 °F (35.7 °C)  Pulse:  [58-78] 68  Resp:  [12-18] 18  SpO2:  [97 %-100 %] 97 %  BP: (115-135)/(67-81) 115/81     Weight: 77.1 kg (170 lb)  Body mass index is 25.1 kg/m².    Intake/Output Summary (Last 24 hours) at 7/30/2019 1426  Last data filed at 7/29/2019 2300  Gross per 24 hour   Intake --   Output 1300 ml   Net -1300 ml      Physical Exam   Constitutional: He is oriented to person, place, and time. He appears well-developed and well-nourished. No distress.   HENT:   Head: Normocephalic and atraumatic.   Eyes: Conjunctivae and EOM are normal.   Neck: Normal range of motion. Neck supple.    Cardiovascular: Normal rate and regular rhythm.   No murmur heard.  Tenderness on palpation    Pulmonary/Chest: Effort normal and breath sounds normal. No respiratory distress. He has no wheezes. He has no rales.   Abdominal: Soft. Bowel sounds are normal. He exhibits no distension. There is generalized tenderness.   Musculoskeletal: Normal range of motion. He exhibits tenderness. He exhibits no edema.   FROM to all extremities, tenderness and pain with palpitation to all locations.   Pain with bilateral hip flexion R>L.    Neurological: He is alert and oriented to person, place, and time.   Skin: Skin is warm and dry. No rash noted. He is not diaphoretic. No cyanosis. Nails show no clubbing.   Psychiatric: He has a normal mood and affect. His behavior is normal.   Nursing note and vitals reviewed.      Significant Labs: All pertinent labs within the past 24 hours have been reviewed.    Significant Imaging: I have reviewed all pertinent imaging results/findings within the past 24 hours.      Assessment/Plan:      * Vasoocclusive sickle cell crisis  Sickle Cell anemia  Retic count elevated at 20-21.7  T bili elevated at 3.7  Haptoglobin <10 and   Hgb baseline 8-10, on admission Hgb 8.7  Supplemental oxygen  D5 1/2 NS  Folic acid  Bowel regimen with senokot-s and miralax  CXR stable  7/30/19: Pain service consulted with following recommendations:   1) Recommend adjusting acetaminophen to 1000mg q6.   2) Recommend continuing Ibuprofen.   3) Recommend adding methocarbamol 500 mg QID PRN muscle spasms, can titrate up to 1000mg QID if patient finds this to be helpful.   4) Recommend discontinuation of ER morphine as per  review and patient advisory this Rx is not accurate.   5) Recommend weaning narcotics as patient has numerous indications of drug seeking behavior.Can replace ER morphine with the following  -Oxycodone 5 mg q3 prn mild pain  -Oxycodone 10 mg q3 moderate pain  -Oxycodone 15 mg q3 severe pain,  Can increase up to 20 mg if needed  Patient stated multiple times oxy IR does not work for him, will continue ER morphine as it has been tolerated in review of VS however will not discharge with prescription.  6) Can discontinue or wean hydromorphone from 2mg q4 to 1mg q4, then 1mg q6 and then discontinue.   Would recommend considering consult to addiction psychiatry. Patient declined.    Avascular necrosis of bones of both hips  S/p L hip replacement ~2 years ago  MRI R hip (12/2018) at OSH: Avascular necrosis of the right femoral head without subchondral collapse. 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  MRI on 7/17/19:Focal area of avascular necrosis involving at least 50% of the articular surface.  No imaging findings to suggest subchondral plate collapse.  2. Additional focal area of avascular necrosis within the right iliac crest and possibly within the right proximal femoral shaft, the latter which is not well evaluated secondary to partial visualization.  Pt reports discussing AVN and is followed by an Orthopedic surgeon in [place] with a f/u in about 1 week. Currently no plans of surgery.     Leukocytosis  WBC remains stable  CXR stable, patient without acute chest syndrome  UA clear  Continue to monitor    Drug-seeking behavior  ER visits July 2019 7/1: [facility]  7/2: [facility]  7/3: [facility]  7/7: [facility]  7/7: [facility]  7/13: [facility]  7/15: [facility]  7/16: [facility]  7/26: [facility]      Mild intermittent asthma without complication  Sats 98-99% on RA  Controlled  Will order prn breathing treatments      VTE Risk Mitigation (From admission, onward)        Ordered     IP VTE LOW RISK PATIENT  Once      07/26/19 0541     Place BONG hose  Until discontinued      07/26/19 0541                Alexa Servin PA-C  Department of Hospital Medicine   [facility]

## 2019-07-30 NOTE — PLAN OF CARE
Problem: Adult Inpatient Plan of Care  Goal: Plan of Care Review  Went over plan of care - all questions answered. complaints of pain - consistent. See mar- . Pt did appear to sleep a little better tonight. Will continue to monitor.    Lab came to draw at 430 am pt told them to come back after 7

## 2019-07-30 NOTE — SUBJECTIVE & OBJECTIVE
"Interval History: Pt resting in NAD. He reported yesterday that pain had minimally improved since readmission but states the pain today is the same as yesterday. Pain is "gerneralized". R hip pain has had no improvement in past 2 weeks. He states pain is moving in the right direction but not enough to decrease pain medication dosage or frequency today. Pt able to ambulate to the restroom unassisted without much difficulty. Pain service consulted for recommendations.     Review of Systems   Constitutional: Positive for fatigue. Negative for appetite change, chills and fever.   HENT: Negative for trouble swallowing.    Respiratory: Negative for cough, chest tightness, shortness of breath and wheezing.    Cardiovascular: Negative for chest pain, palpitations and leg swelling.   Gastrointestinal: Negative for abdominal pain, constipation, diarrhea and nausea.   Genitourinary: Negative for difficulty urinating, frequency and urgency.   Musculoskeletal: Positive for arthralgias and myalgias.        Right hip pain from known AVN. Generalized body pains and aches   Skin: Negative for rash.   Neurological: Positive for weakness. Negative for dizziness, light-headedness and headaches.   Psychiatric/Behavioral: Negative for sleep disturbance.     Objective:     Vital Signs (Most Recent):  Temp: 96.2 °F (35.7 °C) (07/30/19 1131)  Pulse: 68 (07/30/19 1131)  Resp: 18 (07/30/19 1131)  BP: 115/81 (07/30/19 1131)  SpO2: 97 % (07/30/19 1131) Vital Signs (24h Range):  Temp:  [96.2 °F (35.7 °C)-98.2 °F (36.8 °C)] 96.2 °F (35.7 °C)  Pulse:  [58-78] 68  Resp:  [12-18] 18  SpO2:  [97 %-100 %] 97 %  BP: (115-135)/(67-81) 115/81     Weight: 77.1 kg (170 lb)  Body mass index is 25.1 kg/m².    Intake/Output Summary (Last 24 hours) at 7/30/2019 2935  Last data filed at 7/29/2019 2300  Gross per 24 hour   Intake --   Output 1300 ml   Net -1300 ml      Physical Exam   Constitutional: He is oriented to person, place, and time. He appears " well-developed and well-nourished. No distress.   HENT:   Head: Normocephalic and atraumatic.   Eyes: Conjunctivae and EOM are normal.   Neck: Normal range of motion. Neck supple.   Cardiovascular: Normal rate and regular rhythm.   No murmur heard.  Tenderness on palpation    Pulmonary/Chest: Effort normal and breath sounds normal. No respiratory distress. He has no wheezes. He has no rales.   Abdominal: Soft. Bowel sounds are normal. He exhibits no distension. There is generalized tenderness.   Musculoskeletal: Normal range of motion. He exhibits tenderness. He exhibits no edema.   FROM to all extremities, tenderness and pain with palpitation to all locations.   Pain with bilateral hip flexion R>L.    Neurological: He is alert and oriented to person, place, and time.   Skin: Skin is warm and dry. No rash noted. He is not diaphoretic. No cyanosis. Nails show no clubbing.   Psychiatric: He has a normal mood and affect. His behavior is normal.   Nursing note and vitals reviewed.      Significant Labs: All pertinent labs within the past 24 hours have been reviewed.    Significant Imaging: I have reviewed all pertinent imaging results/findings within the past 24 hours.

## 2019-07-31 LAB
ALBUMIN SERPL BCP-MCNC: 3.6 G/DL (ref 3.5–5.2)
ALP SERPL-CCNC: 61 U/L (ref 55–135)
ALT SERPL W/O P-5'-P-CCNC: 36 U/L (ref 10–44)
ANION GAP SERPL CALC-SCNC: 9 MMOL/L (ref 8–16)
ANISOCYTOSIS BLD QL SMEAR: ABNORMAL
AST SERPL-CCNC: 51 U/L (ref 10–40)
BASOPHILS # BLD AUTO: 0.1 K/UL (ref 0–0.2)
BASOPHILS NFR BLD: 0.8 % (ref 0–1.9)
BILIRUB SERPL-MCNC: 2.3 MG/DL (ref 0.1–1)
BUN SERPL-MCNC: 6 MG/DL (ref 6–20)
CALCIUM SERPL-MCNC: 9.3 MG/DL (ref 8.7–10.5)
CHLORIDE SERPL-SCNC: 110 MMOL/L (ref 95–110)
CO2 SERPL-SCNC: 21 MMOL/L (ref 23–29)
CREAT SERPL-MCNC: 0.8 MG/DL (ref 0.5–1.4)
DACRYOCYTES BLD QL SMEAR: ABNORMAL
DIFFERENTIAL METHOD: ABNORMAL
EOSINOPHIL # BLD AUTO: 1.3 K/UL (ref 0–0.5)
EOSINOPHIL NFR BLD: 10.6 % (ref 0–8)
ERYTHROCYTE [DISTWIDTH] IN BLOOD BY AUTOMATED COUNT: 23 % (ref 11.5–14.5)
EST. GFR  (AFRICAN AMERICAN): >60 ML/MIN/1.73 M^2
EST. GFR  (NON AFRICAN AMERICAN): >60 ML/MIN/1.73 M^2
GLUCOSE SERPL-MCNC: 83 MG/DL (ref 70–110)
HCT VFR BLD AUTO: 26.9 % (ref 40–54)
HGB BLD-MCNC: 8.9 G/DL (ref 14–18)
HOWELL-JOLLY BOD BLD QL SMEAR: ABNORMAL
HYPOCHROMIA BLD QL SMEAR: ABNORMAL
IMM GRANULOCYTES # BLD AUTO: 0.49 K/UL (ref 0–0.04)
IMM GRANULOCYTES NFR BLD AUTO: 4 % (ref 0–0.5)
LYMPHOCYTES # BLD AUTO: 2.9 K/UL (ref 1–4.8)
LYMPHOCYTES NFR BLD: 23.9 % (ref 18–48)
MCH RBC QN AUTO: 28.5 PG (ref 27–31)
MCHC RBC AUTO-ENTMCNC: 33.1 G/DL (ref 32–36)
MCV RBC AUTO: 86 FL (ref 82–98)
MONOCYTES # BLD AUTO: 1.6 K/UL (ref 0.3–1)
MONOCYTES NFR BLD: 13.2 % (ref 4–15)
NEUTROPHILS # BLD AUTO: 5.8 K/UL (ref 1.8–7.7)
NEUTROPHILS NFR BLD: 47.5 % (ref 38–73)
NRBC BLD-RTO: 3 /100 WBC
OVALOCYTES BLD QL SMEAR: ABNORMAL
PAPPENHEIMER BOD BLD QL SMEAR: PRESENT
PLATELET # BLD AUTO: 343 K/UL (ref 150–350)
PLATELET BLD QL SMEAR: ABNORMAL
PMV BLD AUTO: 10.9 FL (ref 9.2–12.9)
POIKILOCYTOSIS BLD QL SMEAR: ABNORMAL
POLYCHROMASIA BLD QL SMEAR: ABNORMAL
POTASSIUM SERPL-SCNC: 4.5 MMOL/L (ref 3.5–5.1)
PROT SERPL-MCNC: 7.1 G/DL (ref 6–8.4)
RBC # BLD AUTO: 3.12 M/UL (ref 4.6–6.2)
SCHISTOCYTES BLD QL SMEAR: ABNORMAL
SCHISTOCYTES BLD QL SMEAR: PRESENT
SICKLE CELLS BLD QL SMEAR: ABNORMAL
SODIUM SERPL-SCNC: 140 MMOL/L (ref 136–145)
SPHEROCYTES BLD QL SMEAR: ABNORMAL
TARGETS BLD QL SMEAR: ABNORMAL
WBC # BLD AUTO: 12.26 K/UL (ref 3.9–12.7)

## 2019-07-31 PROCEDURE — 80053 COMPREHEN METABOLIC PANEL: CPT

## 2019-07-31 PROCEDURE — 11000001 HC ACUTE MED/SURG PRIVATE ROOM

## 2019-07-31 PROCEDURE — S5010 5% DEXTROSE AND 0.45% SALINE: HCPCS | Performed by: NURSE PRACTITIONER

## 2019-07-31 PROCEDURE — 99233 PR SUBSEQUENT HOSPITAL CARE,LEVL III: ICD-10-PCS | Mod: ,,, | Performed by: PHYSICIAN ASSISTANT

## 2019-07-31 PROCEDURE — 36415 COLL VENOUS BLD VENIPUNCTURE: CPT

## 2019-07-31 PROCEDURE — 85025 COMPLETE CBC W/AUTO DIFF WBC: CPT

## 2019-07-31 PROCEDURE — 25000003 PHARM REV CODE 250: Performed by: PHYSICIAN ASSISTANT

## 2019-07-31 PROCEDURE — 25000003 PHARM REV CODE 250: Performed by: NURSE PRACTITIONER

## 2019-07-31 PROCEDURE — 99233 SBSQ HOSP IP/OBS HIGH 50: CPT | Mod: ,,, | Performed by: PHYSICIAN ASSISTANT

## 2019-07-31 PROCEDURE — 63600175 PHARM REV CODE 636 W HCPCS: Performed by: PHYSICIAN ASSISTANT

## 2019-07-31 RX ORDER — HYDROMORPHONE HYDROCHLORIDE 1 MG/ML
1 INJECTION, SOLUTION INTRAMUSCULAR; INTRAVENOUS; SUBCUTANEOUS EVERY 6 HOURS PRN
Status: COMPLETED | OUTPATIENT
Start: 2019-08-01 | End: 2019-08-01

## 2019-07-31 RX ORDER — HYDROMORPHONE HYDROCHLORIDE 1 MG/ML
1 INJECTION, SOLUTION INTRAMUSCULAR; INTRAVENOUS; SUBCUTANEOUS EVERY 6 HOURS PRN
Status: DISCONTINUED | OUTPATIENT
Start: 2019-08-01 | End: 2019-07-31

## 2019-07-31 RX ORDER — METHOCARBAMOL 500 MG/1
500 TABLET, FILM COATED ORAL 4 TIMES DAILY
Status: DISCONTINUED | OUTPATIENT
Start: 2019-07-31 | End: 2019-08-01 | Stop reason: HOSPADM

## 2019-07-31 RX ADMIN — DEXTROSE AND SODIUM CHLORIDE: 5; .45 INJECTION, SOLUTION INTRAVENOUS at 07:07

## 2019-07-31 RX ADMIN — DEXTROSE AND SODIUM CHLORIDE: 5; .45 INJECTION, SOLUTION INTRAVENOUS at 09:07

## 2019-07-31 RX ADMIN — MORPHINE SULFATE 45 MG: 15 TABLET, EXTENDED RELEASE ORAL at 08:07

## 2019-07-31 RX ADMIN — ACETAMINOPHEN 1000 MG: 500 TABLET ORAL at 05:07

## 2019-07-31 RX ADMIN — HYDROMORPHONE HYDROCHLORIDE 1 MG: 1 INJECTION, SOLUTION INTRAMUSCULAR; INTRAVENOUS; SUBCUTANEOUS at 01:07

## 2019-07-31 RX ADMIN — HYDROMORPHONE HYDROCHLORIDE 1 MG: 1 INJECTION, SOLUTION INTRAMUSCULAR; INTRAVENOUS; SUBCUTANEOUS at 05:07

## 2019-07-31 RX ADMIN — IBUPROFEN 400 MG: 400 TABLET, FILM COATED ORAL at 05:07

## 2019-07-31 RX ADMIN — IBUPROFEN 400 MG: 400 TABLET, FILM COATED ORAL at 12:07

## 2019-07-31 RX ADMIN — HYDROMORPHONE HYDROCHLORIDE 1 MG: 1 INJECTION, SOLUTION INTRAMUSCULAR; INTRAVENOUS; SUBCUTANEOUS at 09:07

## 2019-07-31 RX ADMIN — FOLIC ACID 1 MG: 1 TABLET ORAL at 08:07

## 2019-07-31 RX ADMIN — MORPHINE SULFATE 45 MG: 15 TABLET, EXTENDED RELEASE ORAL at 09:07

## 2019-07-31 NOTE — ASSESSMENT & PLAN NOTE
Spoke on the phone with my colleague Ivis Hanks NP to confirm pt was not told weaning of IV dilaudid would be delayed.  ER visits July 2019 7/1: [facility]  7/2: [facility]  7/3: [facility]  7/7: [facility]  7/7: [facility]  7/13: [facility]  7/15: [facility]  7/16: [facility]  7/26: [facility]

## 2019-07-31 NOTE — PROGRESS NOTES
[facility] Hospital Medicine  Progress Note    Patient Name: Paddy Stevenson  MRN: [id number]  Patient Class: IP- Inpatient   Admission Date: 7/26/2019  Length of Stay: 5 days  Attending Physician: JOY Isbell MD  Primary Care Provider: Primary Doctor [facility] Medicine Team: [facility] MED VILLA Servin PA-C    Subjective:     Principal Problem:Vasoocclusive sickle cell crisis      HPI:  23 y/o M with PMH sickle cell anemia and AVN of bilateral hips s/p L hip replacement 2 years ago presents c/o R hip, back, and thigh pain. Patient presented to ED about 8hours after discharge from hospital of sickle cell crisis. At the time of discharge patient will increase mobility and decreased pain. Reports within a few hours of discharge patient developed increase pain over entire body and explains that it is more intense than his hospitalization. Patient reports increase pain started in his chest then developed over his entire body. Reports home pain medication did not help nor did warm packs. Denies fevers, chills, URI symptoms.     ED: WBC 12.98, retic 20 treated with 1L NS, hydromorphone 2mg, benadryl 25mg    Overview/Hospital Course:  Patient readmitted for sickle cell crisis (admission 7/16-7/25 and returned within 8 hrs of discharge for uncontrolled pain). Retic count continued to increase with elevation of LD and Haptoglobin < 10. Infectious work-up has been negative. IVFs, pain management (IV dilaudid prn, scheduled tylenol, schedule ibuprofen, and supplemental oxygen initiated (pt refused to wear oxygen). Day 4 acute pain service consulted with the following recommendations:  increase tylenol from 650 q 8 to 1000 mg q6, continue ibuprofen 400 mg q 6 hrs, add methocarbamol 500 mg qid PRN (can increase to 1000 mg), discontinue ER morphine with oxycodone scale as replacement, and wean dilaudid IV.  Pain management recommended considering consult to addiction psychiatry which patient  "declined.  7/31/19 patient requested to fire provider and patient instructed he would be transferred to new medicine team 8/1.    Patient will need new hematology provider at discharge.  Patient stated his hematologist was decreasing his patient load.    Interval History:  no acute events overnight.  Generalized pain persists.  Pt did not request use of robaxin prn and refused when ordered as scheduled.  At midnight dilaudid weaned from 2 mg q 4 hrs prn to 1 mg q 4 hrs prn.  1300 RN Carmen called to report patient requesting new provider as current provider is "just guessing on managing pain."  Carmen present for discussion that new provider would be assigned starting 8/1 at 0700.  Patient states "I know what addiction looks like and I don't like when people make assumptions about me."  Discussed with patient with Carmen BOUDREAUX present that pain management team recommended addiction psychiatry consult and hospital medicine provider wanted to make sure patient was aware that this was a resource offered at the hospital and recommended by our pain specialists.    Review of Systems   Constitutional: Positive for fatigue. Negative for appetite change, chills and fever.   HENT: Negative for trouble swallowing.    Respiratory: Negative for cough, chest tightness, shortness of breath and wheezing.    Cardiovascular: Negative for chest pain, palpitations and leg swelling.   Gastrointestinal: Negative for abdominal pain, constipation, diarrhea and nausea.   Genitourinary: Negative for difficulty urinating, frequency and urgency.   Musculoskeletal: Positive for arthralgias and myalgias.        Right hip pain from known AVN. Generalized body pains and aches   Skin: Negative for rash.   Neurological: Positive for weakness. Negative for dizziness, light-headedness and headaches.   Psychiatric/Behavioral: Negative for sleep disturbance.     Objective:     Vital Signs (Most Recent):  Temp: 96.9 °F (36.1 °C) (07/31/19 0759)  Pulse: (!) 55 " (07/31/19 0759)  Resp: 17 (07/31/19 0759)  BP: 127/67 (07/31/19 0759)  SpO2: 95 % (07/31/19 0759) Vital Signs (24h Range):  Temp:  [96.2 °F (35.7 °C)-98.3 °F (36.8 °C)] 96.9 °F (36.1 °C)  Pulse:  [55-74] 55  Resp:  [14-18] 17  SpO2:  [95 %-98 %] 95 %  BP: (113-127)/(56-81) 127/67     Weight: 77.1 kg (170 lb)  Body mass index is 25.1 kg/m².    Intake/Output Summary (Last 24 hours) at 7/31/2019 1118  Last data filed at 7/31/2019 1000  Gross per 24 hour   Intake 680 ml   Output 1200 ml   Net -520 ml      Physical Exam   Constitutional: He is oriented to person, place, and time. He appears well-developed and well-nourished. No distress.   HENT:   Head: Normocephalic and atraumatic.   Eyes: Conjunctivae and EOM are normal.   Neck: Normal range of motion. Neck supple.   Cardiovascular: Normal rate and regular rhythm.   No murmur heard.  Tenderness on palpation    Pulmonary/Chest: Effort normal and breath sounds normal. No respiratory distress. He has no wheezes. He has no rales.   Abdominal: Soft. Bowel sounds are normal. He exhibits no distension. There is generalized tenderness.   Musculoskeletal: Normal range of motion. He exhibits tenderness. He exhibits no edema.   FROM to all extremities, tenderness and pain with palpitation to all locations.   Pain with bilateral hip flexion R>L.    Neurological: He is alert and oriented to person, place, and time.   Skin: Skin is warm and dry. No rash noted. He is not diaphoretic. No cyanosis. Nails show no clubbing.   Psychiatric: He has a normal mood and affect. His behavior is normal.   Nursing note and vitals reviewed.      Significant Labs: All pertinent labs within the past 24 hours have been reviewed.    Significant Imaging: I have reviewed all pertinent imaging results/findings within the past 24 hours.      Assessment/Plan:      * Vasoocclusive sickle cell crisis  Sickle Cell anemia  Retic count elevated at 20-21.7  T bili elevated at 3.7  Haptoglobin <10 and   Hgb  baseline 8-10, on admission Hgb 8.7  Supplemental oxygen  D5 1/2 NS  Folic acid  Bowel regimen with senokot-s and miralax  CXR stable  7/30/19: Pain service consulted with following recommendations:   1) Recommend adjusting acetaminophen to 1000mg q6.   2) Recommend continuing Ibuprofen.   3) Recommend adding methocarbamol 500 mg QID PRN muscle spasms, can titrate up to 1000mg QID if patient finds this to be helpful.   4) Recommend discontinuation of ER morphine as per  review and patient advisory this Rx is not accurate.   5) Recommend weaning narcotics as patient has numerous indications of drug seeking behavior.Can replace ER morphine with the following  -Oxycodone 5 mg q3 prn mild pain  -Oxycodone 10 mg q3 moderate pain  -Oxycodone 15 mg q3 severe pain, Can increase up to 20 mg if needed  Patient stated multiple times oxy IR does not work for him, will continue ER morphine as it has been tolerated in review of VS however will not discharge with prescription as patient reported he has 38 tablets left at home.  6) Can discontinue or wean hydromorphone from 2mg q4 to 1mg q4, then 1mg q6 and then discontinue.   Pain management recommend consult to addiction psychiatry.  Provider notified patient that pain management had recommended this as part of his treatment plan and provider wanted to make sure patient was aware we had this resource at the hospital.  Patient fired provider.    Avascular necrosis of bones of both hips  S/p L hip replacement ~2 years ago  MRI R hip (12/2018) at OSH: Avascular necrosis of the right femoral head without subchondral collapse. 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  MRI on 7/17/19:Focal area of avascular necrosis involving at least 50% of the articular surface.  No imaging findings to suggest subchondral plate collapse.  2. Additional focal area of avascular necrosis within the right iliac crest and possibly within the right proximal femoral shaft, the latter  which is not well evaluated secondary to partial visualization.  Pt reports discussing AVN and is followed by an Orthopedic surgeon in [place] with a f/u in about 1 week. Currently no plans of surgery.     Leukocytosis  WBC remains stable  CXR stable, patient without acute chest syndrome  UA clear  Continue to monitor    Drug-seeking behavior  Spoke on the phone with my colleague Iivs Hanks NP to confirm pt was not told weaning of IV dilaudid would be delayed.  ER visits July 2019 7[place]  [place]  7/3: [facility]  7/7: [facility]  7/7: [facility]  7/13: [place]  7/15: [facility]  7/16: [facility]  7/26: [facility]      Mild intermittent asthma without complication  Sats 98-99% on RA  Controlled  Will order prn breathing treatments      VTE Risk Mitigation (From admission, onward)        Ordered     IP VTE LOW RISK PATIENT  Once      07/26/19 0541     Place BONG hose  Until discontinued      07/26/19 0541                Alexa Servin PA-C  Department of Hospital Medicine   [facility]

## 2019-07-31 NOTE — SUBJECTIVE & OBJECTIVE
"Interval History:  no acute events overnight.  Generalized pain persists.  Pt did not request use of robaxin prn and refused when ordered as scheduled.  At midnight dilaudid weaned from 2 mg q 4 hrs prn to 1 mg q 4 hrs prn.  1300 RN Carmen called to report patient requesting new provider as current provider is "just guessing on managing pain."  Carmen present for discussion that new provider would be assigned starting 8/1 at 0700.  Patient states "I know what addiction looks like and I don't like when people make assumptions about me."  Discussed with patient with Carmen BOUDREAUX present that pain management team recommended addiction psychiatry consult and hospital medicine provider wanted to make sure patient was aware that this was a resource offered at the hospital and recommended by our pain specialists.    Review of Systems   Constitutional: Positive for fatigue. Negative for appetite change, chills and fever.   HENT: Negative for trouble swallowing.    Respiratory: Negative for cough, chest tightness, shortness of breath and wheezing.    Cardiovascular: Negative for chest pain, palpitations and leg swelling.   Gastrointestinal: Negative for abdominal pain, constipation, diarrhea and nausea.   Genitourinary: Negative for difficulty urinating, frequency and urgency.   Musculoskeletal: Positive for arthralgias and myalgias.        Right hip pain from known AVN. Generalized body pains and aches   Skin: Negative for rash.   Neurological: Positive for weakness. Negative for dizziness, light-headedness and headaches.   Psychiatric/Behavioral: Negative for sleep disturbance.     Objective:     Vital Signs (Most Recent):  Temp: 96.9 °F (36.1 °C) (07/31/19 0759)  Pulse: (!) 55 (07/31/19 0759)  Resp: 17 (07/31/19 0759)  BP: 127/67 (07/31/19 0759)  SpO2: 95 % (07/31/19 0759) Vital Signs (24h Range):  Temp:  [96.2 °F (35.7 °C)-98.3 °F (36.8 °C)] 96.9 °F (36.1 °C)  Pulse:  [55-74] 55  Resp:  [14-18] 17  SpO2:  [95 %-98 %] 95 %  BP: " (113-127)/(56-81) 127/67     Weight: 77.1 kg (170 lb)  Body mass index is 25.1 kg/m².    Intake/Output Summary (Last 24 hours) at 7/31/2019 1118  Last data filed at 7/31/2019 1000  Gross per 24 hour   Intake 680 ml   Output 1200 ml   Net -520 ml      Physical Exam   Constitutional: He is oriented to person, place, and time. He appears well-developed and well-nourished. No distress.   HENT:   Head: Normocephalic and atraumatic.   Eyes: Conjunctivae and EOM are normal.   Neck: Normal range of motion. Neck supple.   Cardiovascular: Normal rate and regular rhythm.   No murmur heard.  Tenderness on palpation    Pulmonary/Chest: Effort normal and breath sounds normal. No respiratory distress. He has no wheezes. He has no rales.   Abdominal: Soft. Bowel sounds are normal. He exhibits no distension. There is generalized tenderness.   Musculoskeletal: Normal range of motion. He exhibits tenderness. He exhibits no edema.   FROM to all extremities, tenderness and pain with palpitation to all locations.   Pain with bilateral hip flexion R>L.    Neurological: He is alert and oriented to person, place, and time.   Skin: Skin is warm and dry. No rash noted. He is not diaphoretic. No cyanosis. Nails show no clubbing.   Psychiatric: He has a normal mood and affect. His behavior is normal.   Nursing note and vitals reviewed.      Significant Labs: All pertinent labs within the past 24 hours have been reviewed.    Significant Imaging: I have reviewed all pertinent imaging results/findings within the past 24 hours.

## 2019-07-31 NOTE — PROGRESS NOTES
[facility] Hospital Medicine  Progress Note    Patient Name: Paddy Stevenson  MRN: [id number]  Patient Class: IP- Inpatient   Admission Date: 7/26/2019  Length of Stay: 5 days  Attending Physician: JOY Isbell MD  Primary Care Provider: [facility] Medicine Team: [facility] MED Y Alexa Servin PA-C    Subjective:     Principal Problem:Vasoocclusive sickle cell crisis      HPI:  23 y/o M with PMH sickle cell anemia and AVN of bilateral hips s/p L hip replacement 2 years ago presents c/o R hip, back, and thigh pain. Patient presented to ED about 8hours after discharge from hospital of sickle cell crisis. At the time of discharge patient will increase mobility and decreased pain. Reports within a few hours of discharge patient developed increase pain over entire body and explains that it is more intense than his hospitalization. Patient reports increase pain started in his chest then developed over his entire body. Reports home pain medication did not help nor did warm packs. Denies fevers, chills, URI symptoms.     ED: WBC 12.98, retic 20 treated with 1L NS, hydromorphone 2mg, benadryl 25mg    Overview/Hospital Course:  Patient readmitted for Sickle crisis. Retic continues to increase with elevation of LD and Haptoglobin < 10. Describes as pain as sharp and located to his entire body. Infectious work-up has been negative. Continue with IVFs, pain management, supplemental oxygen. Day 4 pain service consulted with the following recommendations:  increase APAP to 1000 mg q6, continue ibuprofen, add methocarbamol 500 mg qid PRN, discontinue ER morphine with oxycodone scale as replacement, and wean dilaudid IV.  Pain management recommended considering consult to addiction psychiatry which patient declined.    Interval History:  no acute events overnight, no new complaints.  Generalized pain persists.  Pt did not request use of robaxin prn.  At midnight dilaudid weaned from 2 mg q 4  hrs prn to 1 mg q 4 hrs prn.    Review of Systems   Constitutional: Positive for fatigue. Negative for appetite change, chills and fever.   HENT: Negative for trouble swallowing.    Respiratory: Negative for cough, chest tightness, shortness of breath and wheezing.    Cardiovascular: Negative for chest pain, palpitations and leg swelling.   Gastrointestinal: Negative for abdominal pain, constipation, diarrhea and nausea.   Genitourinary: Negative for difficulty urinating, frequency and urgency.   Musculoskeletal: Positive for arthralgias and myalgias.        Right hip pain from known AVN. Generalized body pains and aches   Skin: Negative for rash.   Neurological: Positive for weakness. Negative for dizziness, light-headedness and headaches.   Psychiatric/Behavioral: Negative for sleep disturbance.     Objective:     Vital Signs (Most Recent):  Temp: 96.9 °F (36.1 °C) (07/31/19 0759)  Pulse: (!) 55 (07/31/19 0759)  Resp: 17 (07/31/19 0759)  BP: 127/67 (07/31/19 0759)  SpO2: 95 % (07/31/19 0759) Vital Signs (24h Range):  Temp:  [96.2 °F (35.7 °C)-98.3 °F (36.8 °C)] 96.9 °F (36.1 °C)  Pulse:  [55-74] 55  Resp:  [14-18] 17  SpO2:  [95 %-98 %] 95 %  BP: (113-127)/(56-81) 127/67     Weight: 77.1 kg (170 lb)  Body mass index is 25.1 kg/m².    Intake/Output Summary (Last 24 hours) at 7/31/2019 1118  Last data filed at 7/31/2019 1000  Gross per 24 hour   Intake 680 ml   Output 1200 ml   Net -520 ml      Physical Exam   Constitutional: He is oriented to person, place, and time. He appears well-developed and well-nourished. No distress.   HENT:   Head: Normocephalic and atraumatic.   Eyes: Conjunctivae and EOM are normal.   Neck: Normal range of motion. Neck supple.   Cardiovascular: Normal rate and regular rhythm.   No murmur heard.  Tenderness on palpation    Pulmonary/Chest: Effort normal and breath sounds normal. No respiratory distress. He has no wheezes. He has no rales.   Abdominal: Soft. Bowel sounds are normal. He  exhibits no distension. There is generalized tenderness.   Musculoskeletal: Normal range of motion. He exhibits tenderness. He exhibits no edema.   FROM to all extremities, tenderness and pain with palpitation to all locations.   Pain with bilateral hip flexion R>L.    Neurological: He is alert and oriented to person, place, and time.   Skin: Skin is warm and dry. No rash noted. He is not diaphoretic. No cyanosis. Nails show no clubbing.   Psychiatric: He has a normal mood and affect. His behavior is normal.   Nursing note and vitals reviewed.      Significant Labs: All pertinent labs within the past 24 hours have been reviewed.    Significant Imaging: I have reviewed all pertinent imaging results/findings within the past 24 hours.      Assessment/Plan:      * Vasoocclusive sickle cell crisis  Sickle Cell anemia  Retic count elevated at 20-21.7  T bili elevated at 3.7  Haptoglobin <10 and   Hgb baseline 8-10, on admission Hgb 8.7  Supplemental oxygen  D5 1/2 NS  Folic acid  Bowel regimen with senokot-s and miralax  CXR stable  7/30/19: Pain service consulted with following recommendations:   1) Recommend adjusting acetaminophen to 1000mg q6.   2) Recommend continuing Ibuprofen.   3) Recommend adding methocarbamol 500 mg QID PRN muscle spasms, can titrate up to 1000mg QID if patient finds this to be helpful.   4) Recommend discontinuation of ER morphine as per  review and patient advisory this Rx is not accurate.   5) Recommend weaning narcotics as patient has numerous indications of drug seeking behavior.Can replace ER morphine with the following  -Oxycodone 5 mg q3 prn mild pain  -Oxycodone 10 mg q3 moderate pain  -Oxycodone 15 mg q3 severe pain, Can increase up to 20 mg if needed  Patient stated multiple times oxy IR does not work for him, will continue ER morphine as it has been tolerated in review of VS however will not discharge with prescription.  6) Can discontinue or wean hydromorphone from 2mg q4  to 1mg q4, then 1mg q6 and then discontinue.   Would recommend considering consult to addiction psychiatry. Patient declined.    Avascular necrosis of bones of both hips  S/p L hip replacement ~2 years ago  MRI R hip (12/2018) at OSH: Avascular necrosis of the right femoral head without subchondral collapse. 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  MRI on 7/17/19:Focal area of avascular necrosis involving at least 50% of the articular surface.  No imaging findings to suggest subchondral plate collapse.  2. Additional focal area of avascular necrosis within the right iliac crest and possibly within the right proximal femoral shaft, the latter which is not well evaluated secondary to partial visualization.  Pt reports discussing AVN and is followed by an Orthopedic surgeon in [place] with a f/u in about 1 week. Currently no plans of surgery.     Leukocytosis  WBC remains stable  CXR stable, patient without acute chest syndrome  UA clear  Continue to monitor    Drug-seeking behavior  Spoke on the phone with my colleague Ivis Hanks NP to confirm pt was not told weaning of IV dilaudid would be delayed.  ER visits July 2019 7/1: [facility]  7/2: [facility]  7/3: [facility]  7/7: [facility]  7/7: [facility]  7/13: [facility]  7/15: [facility]  7/16: [facility]  7/26: [facility]      Mild intermittent asthma without complication  Sats 98-99% on RA  Controlled  Will order prn breathing treatments      VTE Risk Mitigation (From admission, onward)        Ordered     IP VTE LOW RISK PATIENT  Once      07/26/19 0541     Place BONG hose  Until discontinued      07/26/19 0541                Alexa Servin PA-C  Department of Hospital Medicine   [facility]

## 2019-07-31 NOTE — ASSESSMENT & PLAN NOTE
S/p L hip replacement ~2 years ago  MRI R hip (12/2018) at OSH: Avascular necrosis of the right femoral head without subchondral collapse. 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  MRI on 7/17/19:Focal area of avascular necrosis involving at least 50% of the articular surface.  No imaging findings to suggest subchondral plate collapse.  2. Additional focal area of avascular necrosis within the right iliac crest and possibly within the right proximal femoral shaft, the latter which is not well evaluated secondary to partial visualization.  Pt reports discussing AVN and is followed by an Orthopedic surgeon in [place] with a f/u in about 1 week. Currently no plans of surgery.

## 2019-07-31 NOTE — ASSESSMENT & PLAN NOTE
Sickle Cell anemia  Retic count elevated at 20-21.7  T bili elevated at 3.7  Haptoglobin <10 and   Hgb baseline 8-10, on admission Hgb 8.7  Supplemental oxygen  D5 1/2 NS  Folic acid  Bowel regimen with senokot-s and miralax  CXR stable  7/30/19: Pain service consulted with following recommendations:   1) Recommend adjusting acetaminophen to 1000mg q6.   2) Recommend continuing Ibuprofen.   3) Recommend adding methocarbamol 500 mg QID PRN muscle spasms, can titrate up to 1000mg QID if patient finds this to be helpful.   4) Recommend discontinuation of ER morphine as per  review and patient advisory this Rx is not accurate.   5) Recommend weaning narcotics as patient has numerous indications of drug seeking behavior.Can replace ER morphine with the following  -Oxycodone 5 mg q3 prn mild pain  -Oxycodone 10 mg q3 moderate pain  -Oxycodone 15 mg q3 severe pain, Can increase up to 20 mg if needed  Patient stated multiple times oxy IR does not work for him, will continue ER morphine as it has been tolerated in review of VS however will not discharge with prescription as patient reported he has 38 tablets left at home.  6) Can discontinue or wean hydromorphone from 2mg q4 to 1mg q4, then 1mg q6 and then discontinue.   Pain management recommend consult to addiction psychiatry.  Provider notified patient that pain management had recommended this as part of his treatment plan and provider wanted to make sure patient was aware we had this resource at the hospital.  Patient fired provider.

## 2019-07-31 NOTE — PROGRESS NOTES
MD order to place oxygen to the pt to maintain saturation at 100% to help decrease sickling , explained that to the pt but pt refused to place O2 and he stated he is fine and he doesn't need O2 .

## 2019-07-31 NOTE — PLAN OF CARE
Problem: Adult Inpatient Plan of Care  Goal: Plan of Care Review  Went over plan of care - all questions answered. Pain meds given around the clock. Pain level seems to stay at 7. Will continue to monitor. Lab person came at 430am - pt told them to come back after 7am.

## 2019-08-01 VITALS
HEART RATE: 61 BPM | RESPIRATION RATE: 18 BRPM | SYSTOLIC BLOOD PRESSURE: 121 MMHG | TEMPERATURE: 98 F | HEIGHT: 69 IN | DIASTOLIC BLOOD PRESSURE: 62 MMHG | WEIGHT: 170 LBS | OXYGEN SATURATION: 98 % | BODY MASS INDEX: 25.18 KG/M2

## 2019-08-01 LAB
ALBUMIN SERPL BCP-MCNC: 3.8 G/DL (ref 3.5–5.2)
ALP SERPL-CCNC: 61 U/L (ref 55–135)
ALT SERPL W/O P-5'-P-CCNC: 28 U/L (ref 10–44)
ANION GAP SERPL CALC-SCNC: 10 MMOL/L (ref 8–16)
ANISOCYTOSIS BLD QL SMEAR: ABNORMAL
AST SERPL-CCNC: 30 U/L (ref 10–40)
BASOPHILS # BLD AUTO: 0.12 K/UL (ref 0–0.2)
BASOPHILS NFR BLD: 0.9 % (ref 0–1.9)
BILIRUB SERPL-MCNC: 2.4 MG/DL (ref 0.1–1)
BUN SERPL-MCNC: 6 MG/DL (ref 6–20)
CALCIUM SERPL-MCNC: 9.2 MG/DL (ref 8.7–10.5)
CHLORIDE SERPL-SCNC: 109 MMOL/L (ref 95–110)
CO2 SERPL-SCNC: 21 MMOL/L (ref 23–29)
CREAT SERPL-MCNC: 0.8 MG/DL (ref 0.5–1.4)
DIFFERENTIAL METHOD: ABNORMAL
EOSINOPHIL # BLD AUTO: 1.2 K/UL (ref 0–0.5)
EOSINOPHIL NFR BLD: 9.3 % (ref 0–8)
ERYTHROCYTE [DISTWIDTH] IN BLOOD BY AUTOMATED COUNT: 23.3 % (ref 11.5–14.5)
EST. GFR  (AFRICAN AMERICAN): >60 ML/MIN/1.73 M^2
EST. GFR  (NON AFRICAN AMERICAN): >60 ML/MIN/1.73 M^2
GLUCOSE SERPL-MCNC: 99 MG/DL (ref 70–110)
HAPTOGLOB SERPL-MCNC: <10 MG/DL (ref 30–250)
HCT VFR BLD AUTO: 27.3 % (ref 40–54)
HGB BLD-MCNC: 9.5 G/DL (ref 14–18)
HOWELL-JOLLY BOD BLD QL SMEAR: ABNORMAL
HYPOCHROMIA BLD QL SMEAR: ABNORMAL
IMM GRANULOCYTES # BLD AUTO: 0.49 K/UL (ref 0–0.04)
IMM GRANULOCYTES NFR BLD AUTO: 3.7 % (ref 0–0.5)
LDH SERPL L TO P-CCNC: 429 U/L (ref 110–260)
LYMPHOCYTES # BLD AUTO: 3.5 K/UL (ref 1–4.8)
LYMPHOCYTES NFR BLD: 26.3 % (ref 18–48)
MCH RBC QN AUTO: 29.7 PG (ref 27–31)
MCHC RBC AUTO-ENTMCNC: 34.8 G/DL (ref 32–36)
MCV RBC AUTO: 85 FL (ref 82–98)
MONOCYTES # BLD AUTO: 1.4 K/UL (ref 0.3–1)
MONOCYTES NFR BLD: 10.4 % (ref 4–15)
NEUTROPHILS # BLD AUTO: 6.5 K/UL (ref 1.8–7.7)
NEUTROPHILS NFR BLD: 49.4 % (ref 38–73)
NRBC BLD-RTO: 3 /100 WBC
OVALOCYTES BLD QL SMEAR: ABNORMAL
PLATELET # BLD AUTO: 392 K/UL (ref 150–350)
PMV BLD AUTO: 11.1 FL (ref 9.2–12.9)
POIKILOCYTOSIS BLD QL SMEAR: ABNORMAL
POLYCHROMASIA BLD QL SMEAR: ABNORMAL
POTASSIUM SERPL-SCNC: 3.7 MMOL/L (ref 3.5–5.1)
PROT SERPL-MCNC: 7 G/DL (ref 6–8.4)
RBC # BLD AUTO: 3.2 M/UL (ref 4.6–6.2)
RETICS/RBC NFR AUTO: 19.9 % (ref 0.4–2)
SCHISTOCYTES BLD QL SMEAR: ABNORMAL
SICKLE CELLS BLD QL SMEAR: ABNORMAL
SODIUM SERPL-SCNC: 140 MMOL/L (ref 136–145)
TARGETS BLD QL SMEAR: ABNORMAL
WBC # BLD AUTO: 13.15 K/UL (ref 3.9–12.7)

## 2019-08-01 PROCEDURE — 85025 COMPLETE CBC W/AUTO DIFF WBC: CPT

## 2019-08-01 PROCEDURE — S5010 5% DEXTROSE AND 0.45% SALINE: HCPCS | Performed by: NURSE PRACTITIONER

## 2019-08-01 PROCEDURE — 25000003 PHARM REV CODE 250: Performed by: NURSE PRACTITIONER

## 2019-08-01 PROCEDURE — 83615 LACTATE (LD) (LDH) ENZYME: CPT

## 2019-08-01 PROCEDURE — 94761 N-INVAS EAR/PLS OXIMETRY MLT: CPT

## 2019-08-01 PROCEDURE — 99239 HOSP IP/OBS DSCHRG MGMT >30: CPT | Mod: ,,, | Performed by: HOSPITALIST

## 2019-08-01 PROCEDURE — 63600175 PHARM REV CODE 636 W HCPCS: Performed by: PHYSICIAN ASSISTANT

## 2019-08-01 PROCEDURE — 63600175 PHARM REV CODE 636 W HCPCS: Performed by: HOSPITALIST

## 2019-08-01 PROCEDURE — 99239 PR HOSPITAL DISCHARGE DAY,>30 MIN: ICD-10-PCS | Mod: ,,, | Performed by: HOSPITALIST

## 2019-08-01 PROCEDURE — 36415 COLL VENOUS BLD VENIPUNCTURE: CPT

## 2019-08-01 PROCEDURE — 25000003 PHARM REV CODE 250: Performed by: PHYSICIAN ASSISTANT

## 2019-08-01 PROCEDURE — 85045 AUTOMATED RETICULOCYTE COUNT: CPT

## 2019-08-01 PROCEDURE — 83010 ASSAY OF HAPTOGLOBIN QUANT: CPT

## 2019-08-01 PROCEDURE — 80053 COMPREHEN METABOLIC PANEL: CPT

## 2019-08-01 RX ORDER — HYDROMORPHONE HYDROCHLORIDE 1 MG/ML
0.5 INJECTION, SOLUTION INTRAMUSCULAR; INTRAVENOUS; SUBCUTANEOUS EVERY 6 HOURS PRN
Status: DISCONTINUED | OUTPATIENT
Start: 2019-08-01 | End: 2019-08-01 | Stop reason: HOSPADM

## 2019-08-01 RX ADMIN — MORPHINE SULFATE 45 MG: 15 TABLET, EXTENDED RELEASE ORAL at 09:08

## 2019-08-01 RX ADMIN — FOLIC ACID 1 MG: 1 TABLET ORAL at 09:08

## 2019-08-01 RX ADMIN — ACETAMINOPHEN 1000 MG: 500 TABLET ORAL at 06:08

## 2019-08-01 RX ADMIN — METHOCARBAMOL TABLETS 500 MG: 500 TABLET, COATED ORAL at 12:08

## 2019-08-01 RX ADMIN — IBUPROFEN 400 MG: 400 TABLET, FILM COATED ORAL at 12:08

## 2019-08-01 RX ADMIN — DEXTROSE AND SODIUM CHLORIDE: 5; .45 INJECTION, SOLUTION INTRAVENOUS at 03:08

## 2019-08-01 RX ADMIN — HYDROMORPHONE HYDROCHLORIDE 1 MG: 1 INJECTION, SOLUTION INTRAMUSCULAR; INTRAVENOUS; SUBCUTANEOUS at 06:08

## 2019-08-01 RX ADMIN — HYDROMORPHONE HYDROCHLORIDE 0.5 MG: 1 INJECTION, SOLUTION INTRAMUSCULAR; INTRAVENOUS; SUBCUTANEOUS at 12:08

## 2019-08-01 RX ADMIN — ACETAMINOPHEN 1000 MG: 500 TABLET ORAL at 12:08

## 2019-08-01 RX ADMIN — IBUPROFEN 400 MG: 400 TABLET, FILM COATED ORAL at 06:08

## 2019-08-01 RX ADMIN — METHOCARBAMOL TABLETS 500 MG: 500 TABLET, COATED ORAL at 09:08

## 2019-08-01 NOTE — DISCHARGE SUMMARY
Discharge Summary  Hospital Medicine       Name: Paddy Stevenson  YOB: 1995 (Age: 24 y.o.)  Date of Admission: 7/26/2019  Date of Discharge: 8/1/2019  Attending Provider on Discharge: Kendra Miller MD  [facility] Medicine Team: [facility] MED D  Code status: Full Code    Primary Care Provider: [facility]    Discharge Diagnosis:  Active Hospital Problems    Diagnosis  POA    *Vasoocclusive sickle cell crisis [D57.00]  Yes    Sickle cell anemia [D57.1]  Yes    Mild intermittent asthma without complication [J45.20]  Yes     Chronic    Avascular necrosis of bones of both hips [M87.051, M87.052]  Yes     Chronic    Drug-seeking behavior [Z76.5]  Yes    Leukocytosis [D72.829]  Yes      Resolved Hospital Problems   No resolved problems to display.       HPI:  23 y/o M with PMH sickle cell anemia and AVN of bilateral hips s/p L hip replacement 2 years ago presents c/o R hip, back, and thigh pain. Patient presented to ED about 8hours after discharge from hospital of sickle cell crisis. At the time of discharge patient will increase mobility and decreased pain. Reports within a few hours of discharge patient developed increase pain over entire body and explains that it is more intense than his hospitalization. Patient reports increase pain started in his chest then developed over his entire body. Reports home pain medication did not help nor did warm packs. Denies fevers, chills, URI symptoms.   ED: WBC 12.98, retic 20 treated with 1L NS, hydromorphone 2mg, benadryl 25mg     Overview/Hospital Course:  Patient readmitted for sickle cell crisis (admission 7/16-7/25 and returned within 8 hrs of discharge for uncontrolled pain). Retic count continued to increase with elevation of LD and Haptoglobin < 10. Infectious work-up has been negative. IVFs, pain management (IV dilaudid prn, scheduled tylenol, schedule ibuprofen, and supplemental oxygen initiated (pt refused to wear oxygen). Day  4 acute pain service consulted with the following recommendations:  increase tylenol from 650 q 8 to 1000 mg q6, continue ibuprofen 400 mg q 6 hrs, add methocarbamol 500 mg qid PRN (can increase to 1000 mg), discontinue ER morphine with oxycodone scale as replacement, and wean dilaudid IV.  Pain management recommended considering consult to addiction psychiatry which patient declined.  7/31/19 patient requested to [facility] provider and patient instructed he would be transferred to new medicine team 8/1.  Patient was seen on 08/01.  Did not appear to be in any acute distress but complained of diffuse generalized pain.  Patient's Dilaudid was weaned down throughout his admission and actually turned off today.  Patient was very upset regarding his Dilaudid being off.  Advised patient that I will give him Dilaudid 0.5 mg IV q.6 hours p.r.n. for severe breakthrough pain.  Patient initially agreed.  However after a few hours, received a call from nursing staff that patient decided to leave AMA. The patient has decided to leave against medical advice. The patient has a normal mental status and vital signs, understands the risks of leaving, including permanent disability and or/death, and has had an opportunity to ask questions about his medical condition. The patient has been informed that he may return for care at any time.    Patient will need new hematology provider at discharge.  Patient stated his hematologist was decreasing his patient load.  Patient did not wait for Hematology appointment to be set up.     Labs:  Recent Labs   Lab 07/30/19 0828 07/31/19 0928 08/01/19  0925   WBC 12.57 12.26 13.15*   HGB 8.4* 8.9* 9.5*   HCT 25.0* 26.9* 27.3*    343 392*     Recent Labs   Lab 07/30/19  0828 07/31/19  0928 08/01/19  0925    140 140   K 4.0 4.5 3.7    110 109   CO2 22* 21* 21*   BUN 6 6 6   CREATININE 0.8 0.8 0.8   GLU 90 83 99   CALCIUM 8.6* 9.3 9.2     Recent Labs   Lab 07/30/19 0828 07/31/19 0928  08/01/19  0925   ALKPHOS 60 61 61   ALT 35 36 28   AST 36 51* 30   ALBUMIN 3.6 3.6 3.8   PROT 6.7 7.1 7.0   BILITOT 2.8* 2.3* 2.4*      No results for input(s): POCTGLUCOSE in the last 168 hours.  No results for input(s): CPK, CPKMB, MB, TROPONINI in the last 72 hours.    ROS (Positive in Bold, otherwise negative)  Constitutional: fever, chills, night sweats  CV: chest pain, edema, palpitations  Resp: SOB, cough, sputum production  GI: changes in appetite, NVDC, pain, melena, hematochezia, GERD, hematemesis  : Dysuria, hematuria, urinary urgency, frequency  MSK: arthralgia/myalgia, joint swelling  Neuro/Psych: anxiety, depression    PEx   Temp:  [97.3 °F (36.3 °C)-98.8 °F (37.1 °C)]   Pulse:  [50-86]   Resp:  [16-20]   BP: (107-138)/(56-78)   SpO2:  [96 %-98 %]      General: no distress   Lungs: clear to ausculation anteriorly and posteriorly   Heart: regular rate and rhythm   Abdomen: normal bowel sounds, soft, no tenderness   Extremities: no edema. No clubbing or cyanosis         Current Discharge Medication List      CONTINUE these medications which have NOT CHANGED    Details   folic acid (FOLVITE) 1 MG tablet Take 1 mg by mouth once daily.      morphine sulfate (MS CONTIN ORAL) Take 45 mg by mouth 2 (two) times daily as needed.             The relevant and important risks, side effects, and benefits of their medications were reviewed with patient during hospitalization and at discharge. The patient was given the opportunity to discuss and ask questions about their medications, including target symptoms, potential risks, side effects and benefits of their medications, as well as their expected prognosis if non-medication treatment options were chosen.  The patient expresses understanding of all these options and information and voluntarily consents to treatment.    Discharge Diet:regular diet with Normal Fluid intake of 1500 - 2000 mL per day    Activity: activity as tolerated    Discharge Condition:  Stable    Disposition: Home or Self Care    Tests pending at the time of discharge: none      Time spent  on the discharge of the patient including review of hospital course with the patient. reviewing discharge medications and arranging follow-up care: 36 mins    Discharge examination Patient was seen and examined on the date of discharge and determined to be suitable for discharge.    Discharge plan and follow up:  It is critical that you make your follow-up appointment(s). If you are discharged on the weekend or after business hours, or if we are unable to schedule these appointments for you for any reason, you or a family member need to call during the next business day to schedule your appointment(s).    -Follow up with you PCP, [facility] within 1-2 weeks as arranged with SW and treatment team prior to discharge  -Take all medications as prescribed and listed above.       - Please return to ED or call your physician if you have:         1. Fevers > 101.5 unresponsive to tylenol.       2. Abdominal pain and/or distention       3. Intractable nausea, vomiting or diarrhea       4. Inability to tolerate adequate oral intake of food       5. Neurologic changes, chest pain or shortness of breath         Patient left AMA and follow-up appointments were not made.    Kendra Miller MD  [facility] Medicine Staff  [phone] pager

## 2019-08-01 NOTE — PLAN OF CARE
08/01/19 1603   Final Note   Assessment Type Final Discharge Note     Patient left AMA on 8/1/19. Discharge summary faxed to [facility].

## 2019-08-01 NOTE — PLAN OF CARE
"Patient resting quietly in bed when CM rounded. Patient expressed frustration regarding medication regimen ordered & stated, "I'm in so much pain that I'm about to scream". Patient stated that he needs to get a new PCP but might move to [place] in the next few weeks. Patient agreed to go to a hospital follow up appointment prior to moving. Hospital follow up appointment scheduled for the patient with Dr. Ronni Fox at the [facility] on 8/14/19 at 0900.  "

## 2019-08-01 NOTE — PLAN OF CARE
Problem: Adult Inpatient Plan of Care  Goal: Plan of Care Review  Outcome: Ongoing (interventions implemented as appropriate)     07/31/19 1176   Plan of Care Review   Plan of Care Reviewed With patient   Pt is A,A,O x 4 . Stable V/S . Pt C/O generalized pain 8/10 and PRN and schedule pain medications given . Pt refused Robaxin po and refused the oxygen and MD notified . Pt is able to ambulate in the room and hallway independently . No falls or injuries . Pt on continuous IVF .

## 2019-08-01 NOTE — NURSING
Pt left AMA Dr. Quintero paged x2, awaiting return call, IV dc 'd catheter intact, no distress noted, pt instructed on risk of leaving against medical advice, pt stated he wanted to leave because his grandmother just , and that his brother was on his way to get him. Spoke with  returned paged and stated okay, pt signed AMA form.

## 2019-08-01 NOTE — PLAN OF CARE
Problem: Adult Inpatient Plan of Care  Goal: Plan of Care Review  Outcome: Ongoing (interventions implemented as appropriate)  Alert. Requested twice for IV dilaudid. First request was 2200 and was given then he received oral tylenol and ibuprofen shortly after midnight. Informed  About changes to his dilaudid ordered. 0200 requested dilaudid reminded that Iv Dilaudid was changed to every 6 hours. After this conversation pt went to sleep woke up briefly during 0400 A.M VSS but went back to sleep. No other complaint voiced. IV fluids infusing. Hr johana at times

## 2019-08-01 NOTE — PLAN OF CARE
08/01/19 0913   Post-Acute Status   Post-Acute Authorization Other   Other Status No Post-Acute Service Needs

## 2019-08-05 ENCOUNTER — HOSPITAL ENCOUNTER (INPATIENT)
Facility: HOSPITAL | Age: 24
LOS: 25 days | Discharge: HOME | DRG: 812 | End: 2019-08-30
Attending: EMERGENCY MEDICINE | Admitting: EMERGENCY MEDICINE
Payer: MEDICAID

## 2019-08-05 DIAGNOSIS — D57.00 VASO-OCCLUSIVE SICKLE CELL CRISIS: Primary | ICD-10-CM

## 2019-08-05 LAB
ALBUMIN SERPL BCP-MCNC: 3.7 G/DL (ref 3.5–5.2)
ALP SERPL-CCNC: 54 U/L (ref 55–135)
ALT SERPL W/O P-5'-P-CCNC: 17 U/L (ref 10–44)
ANION GAP SERPL CALC-SCNC: 7 MMOL/L (ref 8–16)
ANISOCYTOSIS BLD QL SMEAR: ABNORMAL
AST SERPL-CCNC: 21 U/L (ref 10–40)
BASOPHILS # BLD AUTO: 0.12 K/UL (ref 0–0.2)
BASOPHILS NFR BLD: 0.7 % (ref 0–1.9)
BILIRUB SERPL-MCNC: 2.7 MG/DL (ref 0.1–1)
BUN SERPL-MCNC: 9 MG/DL (ref 6–20)
CALCIUM SERPL-MCNC: 8.7 MG/DL (ref 8.7–10.5)
CHLORIDE SERPL-SCNC: 111 MMOL/L (ref 95–110)
CO2 SERPL-SCNC: 21 MMOL/L (ref 23–29)
CREAT SERPL-MCNC: 0.8 MG/DL (ref 0.5–1.4)
DIFFERENTIAL METHOD: ABNORMAL
EOSINOPHIL # BLD AUTO: 0.5 K/UL (ref 0–0.5)
EOSINOPHIL NFR BLD: 2.9 % (ref 0–8)
ERYTHROCYTE [DISTWIDTH] IN BLOOD BY AUTOMATED COUNT: 24.1 % (ref 11.5–14.5)
EST. GFR  (AFRICAN AMERICAN): >60 ML/MIN/1.73 M^2
EST. GFR  (NON AFRICAN AMERICAN): >60 ML/MIN/1.73 M^2
GLUCOSE SERPL-MCNC: 94 MG/DL (ref 70–110)
HCT VFR BLD AUTO: 28.4 % (ref 40–54)
HGB BLD-MCNC: 9.1 G/DL (ref 14–18)
HYPOCHROMIA BLD QL SMEAR: ABNORMAL
IMM GRANULOCYTES # BLD AUTO: 0.26 K/UL (ref 0–0.04)
IMM GRANULOCYTES NFR BLD AUTO: 1.6 % (ref 0–0.5)
LYMPHOCYTES # BLD AUTO: 4.7 K/UL (ref 1–4.8)
LYMPHOCYTES NFR BLD: 28.9 % (ref 18–48)
MCH RBC QN AUTO: 28.9 PG (ref 27–31)
MCHC RBC AUTO-ENTMCNC: 32 G/DL (ref 32–36)
MCV RBC AUTO: 90 FL (ref 82–98)
MONOCYTES # BLD AUTO: 1.8 K/UL (ref 0.3–1)
MONOCYTES NFR BLD: 11.3 % (ref 4–15)
NEUTROPHILS # BLD AUTO: 8.8 K/UL (ref 1.8–7.7)
NEUTROPHILS NFR BLD: 54.6 % (ref 38–73)
NRBC BLD-RTO: 2 /100 WBC
OVALOCYTES BLD QL SMEAR: ABNORMAL
PAPPENHEIMER BOD BLD QL SMEAR: PRESENT
PLATELET # BLD AUTO: 382 K/UL (ref 150–350)
PLATELET BLD QL SMEAR: ABNORMAL
PMV BLD AUTO: 10.5 FL (ref 9.2–12.9)
POIKILOCYTOSIS BLD QL SMEAR: ABNORMAL
POLYCHROMASIA BLD QL SMEAR: ABNORMAL
POTASSIUM SERPL-SCNC: 3.7 MMOL/L (ref 3.5–5.1)
PROT SERPL-MCNC: 6.7 G/DL (ref 6–8.4)
RBC # BLD AUTO: 3.15 M/UL (ref 4.6–6.2)
RETICS/RBC NFR AUTO: 14.9 % (ref 0.4–2)
SICKLE CELLS BLD QL SMEAR: ABNORMAL
SODIUM SERPL-SCNC: 139 MMOL/L (ref 136–145)
TARGETS BLD QL SMEAR: ABNORMAL
WBC # BLD AUTO: 16.13 K/UL (ref 3.9–12.7)

## 2019-08-05 PROCEDURE — 63600175 PHARM REV CODE 636 W HCPCS: Performed by: HOSPITALIST

## 2019-08-05 PROCEDURE — G0378 HOSPITAL OBSERVATION PER HR: HCPCS

## 2019-08-05 PROCEDURE — 76937 US GUIDE VASCULAR ACCESS: CPT

## 2019-08-05 PROCEDURE — 36410 VNPNXR 3YR/> PHY/QHP DX/THER: CPT

## 2019-08-05 PROCEDURE — 63600175 PHARM REV CODE 636 W HCPCS: Performed by: EMERGENCY MEDICINE

## 2019-08-05 PROCEDURE — 99223 1ST HOSP IP/OBS HIGH 75: CPT | Mod: ,,, | Performed by: HOSPITALIST

## 2019-08-05 PROCEDURE — 11000001 HC ACUTE MED/SURG PRIVATE ROOM

## 2019-08-05 PROCEDURE — S5010 5% DEXTROSE AND 0.45% SALINE: HCPCS | Performed by: HOSPITALIST

## 2019-08-05 PROCEDURE — 99285 EMERGENCY DEPT VISIT HI MDM: CPT | Mod: ,,, | Performed by: EMERGENCY MEDICINE

## 2019-08-05 PROCEDURE — 85045 AUTOMATED RETICULOCYTE COUNT: CPT

## 2019-08-05 PROCEDURE — 99223 PR INITIAL HOSPITAL CARE,LEVL III: ICD-10-PCS | Mod: ,,, | Performed by: HOSPITALIST

## 2019-08-05 PROCEDURE — 99285 EMERGENCY DEPT VISIT HI MDM: CPT | Mod: 25

## 2019-08-05 PROCEDURE — C1751 CATH, INF, PER/CENT/MIDLINE: HCPCS

## 2019-08-05 PROCEDURE — 80053 COMPREHEN METABOLIC PANEL: CPT

## 2019-08-05 PROCEDURE — 96374 THER/PROPH/DIAG INJ IV PUSH: CPT

## 2019-08-05 PROCEDURE — 25000003 PHARM REV CODE 250: Performed by: HOSPITALIST

## 2019-08-05 PROCEDURE — 99285 PR EMERGENCY DEPT VISIT,LEVEL V: ICD-10-PCS | Mod: ,,, | Performed by: EMERGENCY MEDICINE

## 2019-08-05 PROCEDURE — 36415 COLL VENOUS BLD VENIPUNCTURE: CPT

## 2019-08-05 PROCEDURE — 85025 COMPLETE CBC W/AUTO DIFF WBC: CPT

## 2019-08-05 PROCEDURE — 25000003 PHARM REV CODE 250: Performed by: EMERGENCY MEDICINE

## 2019-08-05 RX ORDER — OXYCODONE HYDROCHLORIDE 5 MG/1
10 TABLET ORAL
Status: COMPLETED | OUTPATIENT
Start: 2019-08-05 | End: 2019-08-05

## 2019-08-05 RX ORDER — HYDROMORPHONE HYDROCHLORIDE 1 MG/ML
0.5 INJECTION, SOLUTION INTRAMUSCULAR; INTRAVENOUS; SUBCUTANEOUS EVERY 6 HOURS PRN
Status: COMPLETED | OUTPATIENT
Start: 2019-08-05 | End: 2019-08-05

## 2019-08-05 RX ORDER — ACETAMINOPHEN 500 MG
1000 TABLET ORAL EVERY 6 HOURS
Status: DISCONTINUED | OUTPATIENT
Start: 2019-08-05 | End: 2019-08-06

## 2019-08-05 RX ORDER — NALOXONE HCL 0.4 MG/ML
0.02 VIAL (ML) INJECTION
Status: DISCONTINUED | OUTPATIENT
Start: 2019-08-05 | End: 2019-08-30 | Stop reason: HOSPADM

## 2019-08-05 RX ORDER — ALBUTEROL SULFATE 2.5 MG/.5ML
2.5 SOLUTION RESPIRATORY (INHALATION) EVERY 4 HOURS PRN
Status: DISCONTINUED | OUTPATIENT
Start: 2019-08-05 | End: 2019-08-30 | Stop reason: HOSPADM

## 2019-08-05 RX ORDER — POLYETHYLENE GLYCOL 3350 17 G/17G
17 POWDER, FOR SOLUTION ORAL DAILY
Status: DISCONTINUED | OUTPATIENT
Start: 2019-08-05 | End: 2019-08-30 | Stop reason: HOSPADM

## 2019-08-05 RX ORDER — SODIUM CHLORIDE 0.9 % (FLUSH) 0.9 %
10 SYRINGE (ML) INJECTION
Status: DISCONTINUED | OUTPATIENT
Start: 2019-08-05 | End: 2019-08-30 | Stop reason: HOSPADM

## 2019-08-05 RX ORDER — HYDROMORPHONE HYDROCHLORIDE 1 MG/ML
0.5 INJECTION, SOLUTION INTRAMUSCULAR; INTRAVENOUS; SUBCUTANEOUS
Status: COMPLETED | OUTPATIENT
Start: 2019-08-05 | End: 2019-08-05

## 2019-08-05 RX ORDER — PROMETHAZINE HYDROCHLORIDE 12.5 MG/1
12.5 TABLET ORAL EVERY 6 HOURS PRN
Status: DISCONTINUED | OUTPATIENT
Start: 2019-08-05 | End: 2019-08-30 | Stop reason: HOSPADM

## 2019-08-05 RX ORDER — AMOXICILLIN 250 MG
1 CAPSULE ORAL 2 TIMES DAILY
Status: DISCONTINUED | OUTPATIENT
Start: 2019-08-05 | End: 2019-08-30 | Stop reason: HOSPADM

## 2019-08-05 RX ORDER — MORPHINE SULFATE 15 MG/1
45 TABLET, FILM COATED, EXTENDED RELEASE ORAL EVERY 12 HOURS
Status: DISCONTINUED | OUTPATIENT
Start: 2019-08-05 | End: 2019-08-27

## 2019-08-05 RX ORDER — ONDANSETRON 2 MG/ML
4 INJECTION INTRAMUSCULAR; INTRAVENOUS EVERY 12 HOURS PRN
Status: DISCONTINUED | OUTPATIENT
Start: 2019-08-05 | End: 2019-08-30 | Stop reason: HOSPADM

## 2019-08-05 RX ORDER — ACETAMINOPHEN 500 MG
1000 TABLET ORAL
Status: COMPLETED | OUTPATIENT
Start: 2019-08-05 | End: 2019-08-05

## 2019-08-05 RX ORDER — FOLIC ACID 1 MG/1
1 TABLET ORAL DAILY
Status: DISCONTINUED | OUTPATIENT
Start: 2019-08-05 | End: 2019-08-30 | Stop reason: HOSPADM

## 2019-08-05 RX ORDER — DEXTROSE MONOHYDRATE AND SODIUM CHLORIDE 5; .45 G/100ML; G/100ML
INJECTION, SOLUTION INTRAVENOUS CONTINUOUS
Status: DISCONTINUED | OUTPATIENT
Start: 2019-08-05 | End: 2019-08-28

## 2019-08-05 RX ORDER — METHOCARBAMOL 500 MG/1
500 TABLET, FILM COATED ORAL 4 TIMES DAILY
Status: DISCONTINUED | OUTPATIENT
Start: 2019-08-05 | End: 2019-08-06

## 2019-08-05 RX ORDER — IBUPROFEN 400 MG/1
400 TABLET ORAL EVERY 6 HOURS
Status: DISCONTINUED | OUTPATIENT
Start: 2019-08-05 | End: 2019-08-06

## 2019-08-05 RX ADMIN — ACETAMINOPHEN 1000 MG: 500 TABLET ORAL at 05:08

## 2019-08-05 RX ADMIN — IBUPROFEN 400 MG: 400 TABLET, FILM COATED ORAL at 02:08

## 2019-08-05 RX ADMIN — FOLIC ACID 1 MG: 1 TABLET ORAL at 10:08

## 2019-08-05 RX ADMIN — SENNOSIDES,DOCUSATE SODIUM 1 TABLET: 8.6; 5 TABLET, FILM COATED ORAL at 10:08

## 2019-08-05 RX ADMIN — OXYCODONE HYDROCHLORIDE 10 MG: 5 TABLET ORAL at 05:08

## 2019-08-05 RX ADMIN — METHOCARBAMOL TABLETS 500 MG: 500 TABLET, COATED ORAL at 02:08

## 2019-08-05 RX ADMIN — HYDROMORPHONE HYDROCHLORIDE 0.5 MG: 1 INJECTION, SOLUTION INTRAMUSCULAR; INTRAVENOUS; SUBCUTANEOUS at 10:08

## 2019-08-05 RX ADMIN — HYDROMORPHONE HYDROCHLORIDE 0.5 MG: 1 INJECTION, SOLUTION INTRAMUSCULAR; INTRAVENOUS; SUBCUTANEOUS at 04:08

## 2019-08-05 RX ADMIN — DEXTROSE MONOHYDRATE AND SODIUM CHLORIDE: 5; .45 INJECTION, SOLUTION INTRAVENOUS at 04:08

## 2019-08-05 RX ADMIN — ACETAMINOPHEN 1000 MG: 500 TABLET ORAL at 02:08

## 2019-08-05 RX ADMIN — POLYETHYLENE GLYCOL 3350 17 G: 17 POWDER, FOR SOLUTION ORAL at 10:08

## 2019-08-05 RX ADMIN — METHOCARBAMOL TABLETS 500 MG: 500 TABLET, COATED ORAL at 10:08

## 2019-08-05 RX ADMIN — HYDROMORPHONE HYDROCHLORIDE 0.5 MG: 1 INJECTION, SOLUTION INTRAMUSCULAR; INTRAVENOUS; SUBCUTANEOUS at 05:08

## 2019-08-05 RX ADMIN — MORPHINE SULFATE 45 MG: 30 TABLET, EXTENDED RELEASE ORAL at 12:08

## 2019-08-05 NOTE — PLAN OF CARE
Problem: Adult Inpatient Plan of Care  Goal: Plan of Care Review  Outcome: Ongoing (interventions implemented as appropriate)  Pt with no falls or injuries this shift. Pt reports pain level 9/10 post medication.

## 2019-08-05 NOTE — ED PROVIDER NOTES
Source of History:  patient    Chief complaint:  Sickle Cell Pain Crisis (generalized)      HPI:  Paddy Stevenson is a 24 y.o. male presenting with generalized body aches consistent with previous sickle cell pain.  He was discharged 3 days ago (left AMA stating his grandmother  and he had to attend a ) and notes despite taking his oral morphine at home he continues to have severe pain. No chest pain or symptoms of acute chest, which he has had previously.  No fevers or chills, no cough.    ROS: As per HPI and below:    General: No fever.  No chills.  Eyes: No visual changes.  Head: No headache.    Integument: No rashes or lesions.  Chest: No shortness of breath.  Cardiovascular: No chest pain.  Abdomen: No abdominal pain.  No nausea or vomiting.  Urinary: No abnormal urination.  Neurologic: No focal weakness.  No numbness.  Hematologic: No easy bruising.  Endocrine: No excessive thirst or urination.      Review of patient's allergies indicates:   Allergen Reactions    Adhesive Itching    Contrast media Shortness Of Breath    Rocephin [ceftriaxone] Hives    Sulfa (sulfonamide antibiotics) Anaphylaxis    Toradol [ketorolac] Shortness Of Breath    Vancomycin analogues Shortness Of Breath    Fentanyl Hives       No current facility-administered medications on file prior to encounter.      Current Outpatient Medications on File Prior to Encounter   Medication Sig Dispense Refill    folic acid (FOLVITE) 1 MG tablet Take 1 mg by mouth once daily.      morphine sulfate (MS CONTIN ORAL) Take 45 mg by mouth 2 (two) times daily as needed.         PMH:  As per HPI and below:  Past Medical History:   Diagnosis Date    Asthma     AVN (avascular necrosis of bone)     Encounter for blood transfusion     Irregular heart rhythm 2010    Malingering 3/11/2016    Mild intermittent asthma without complication 2015    Seasonal allergies     Sickle cell anemia     Stroke      Past Surgical History:    Procedure Laterality Date    ABDOMINAL SURGERY      CHOLECYSTECTOMY      HIP SURGERY Left 2014    Hip Decompression    OTHER SURGICAL HISTORY      left rotater cuff repair    PORTACATH PLACEMENT      portacath removed   2/2014    SHOULDER ARTHROSCOPY W/ SUBACROMIAL DECOMPRESSION AND DISTAL CLAVICLE EXCISION         Social History     Socioeconomic History    Marital status: Single     Spouse name: Not on file    Number of children: Not on file    Years of education: Not on file    Highest education level: Not on file   Occupational History    Not on file   Social Needs    Financial resource strain: Not on file    Food insecurity:     Worry: Not on file     Inability: Not on file    Transportation needs:     Medical: Not on file     Non-medical: Not on file   Tobacco Use    Smoking status: Never Smoker    Smokeless tobacco: Never Used   Substance and Sexual Activity    Alcohol use: No    Drug use: Yes     Types: Marijuana    Sexual activity: Yes     Partners: Female     Birth control/protection: Condom   Lifestyle    Physical activity:     Days per week: Not on file     Minutes per session: Not on file    Stress: Not on file   Relationships    Social connections:     Talks on phone: Not on file     Gets together: Not on file     Attends Yarsani service: Not on file     Active member of club or organization: Not on file     Attends meetings of clubs or organizations: Not on file     Relationship status: Not on file   Other Topics Concern    Not on file   Social History Narrative    Not on file       Family History   Problem Relation Age of Onset    Diabetes Father     Mental illness Brother     Sickle cell anemia Mother        Physical Exam:    Vitals:    08/05/19 0423   BP: (!) 144/84   Pulse: 88   Resp: 18   Temp: 98.5 °F (36.9 °C)     Appearance:  Appears uncomfortable  Skin: No rashes seen.  Good turgor.  No abrasions.  No ecchymoses.  Eyes: No conjunctival injection.  ENT:  Oropharynx clear.    Chest: Clear to auscultation bilaterally.  Good air movement.  No wheezes.  No rhonchi.  Cardiovascular: Regular rate and rhythm.  No murmurs. No gallops. No rubs.  Abdomen: Soft.  Not distended.  Nontender.  No guarding.  No rebound. No Masses  Musculoskeletal: Good range of motion all joints.  No deformities.  Neck supple.  No meningismus.  Neurologic: Moves all extremities.  Normal sensation.  No facial droop.  Normal speech.    Mental Status:  Alert and oriented x 3.  Appropriate, conversant.          Laboratory Studies:  Labs Reviewed   CBC W/ AUTO DIFFERENTIAL   RETICULOCYTES     Chart reviewed:  Previous discharge summary, as well as notes from pain management with concern for addictive behaviors.    Imaging Results    None         Medications Given:  Medications   oxyCODONE immediate release tablet 10 mg (10 mg Oral Given 8/5/19 0524)   HYDROmorphone injection 0.5 mg (0.5 mg Intravenous Given 8/5/19 0525)   acetaminophen tablet 1,000 mg (1,000 mg Oral Given 8/5/19 0524)       Discussed with:  Hospital medicine    MDM:    24 y.o. male with continued vaso-occlusive pain. Patient was recently hospitalized and signed out AMA, he was seen by pain management who noted several red flags for addictive behavior, patient declined to see Psychiatry at that time.  Patient was given medications based on pain management recommendations at prior hospitalizations:  0.5 mg IV Dilaudid, 1 g Tylenol, 10 mg oral oxycodone, and on re-evaluation he states he still has severe pain.  Patient has mild leukocytosis an elevation in his reticulocyte count.  This is consistent with vaso-occlusive crisis.  Given his history of addictive behaviors, I advised him he is unlikely to receive continued IV narcotics if he is admitted to the hospital.  He understands this, I discussed with hospital medicine and will place in observation.    Diagnostic Impression:    No diagnosis found.           Vasquez Aguayo  MD  08/05/19 0629

## 2019-08-05 NOTE — NURSING
Received report from Jeanie BOUDREAUX in ER pt arrived to OBS unit via w/c. Pt accompanied by Jeanie BOUDREAUX. Pt aaox4. C/o pain all over 10/10. Pt oriented to room. Bed at lowest level, wheels locked. Call light within pt reach.

## 2019-08-05 NOTE — ED NOTES
Pt transported to OBS 8 via wheel chair with nurse Pt condition stable on leaving the ED, pt belongings are with pt and pt will notify family of room number

## 2019-08-05 NOTE — MEDICAL/APP STUDENT
Subjective:       Patient ID: Paddy Stevenson is a 24 y.o. male.    Chief Complaint: Sickle Cell Pain Crisis (generalized)    Presented to ED with generalized pain, described as 11/10. States it's worse than when he left 3 days prior (AMA) and overall worse than when it began a week ago. Pain is worse with movement. States he is compliant with Hydroxurea (500mg) and takes MS contin for pain, but it is not helping. States he is unable to eat or sleep. Reports night sweats and 7lbs weight loss over 1 week. Denies fever or chills. Resting comfortably upon entry into room, once awake moved into fetal position and looked uncomfortable, asked for pain medication.    Past Medical History:   No date: Asthma  No date: AVN (avascular necrosis of bone)  No date: Encounter for blood transfusion  2010: Irregular heart rhythm  3/11/2016: Malingering  11/16/2015: Mild intermittent asthma without complication  No date: Seasonal allergies  No date: Sickle cell anemia  No date: Stroke    No current facility-administered medications on file prior to encounter.   Current Outpatient Medications on File Prior to Encounter:  folic acid (FOLVITE) 1 MG tablet, Take 1 mg by mouth once daily., Disp: , Rfl:   morphine sulfate (MS CONTIN ORAL), Take 45 mg by mouth 2 (two) times daily as needed., Disp: , Rfl:     Past Surgical History:  No date: ABDOMINAL SURGERY  No date: CHOLECYSTECTOMY  2014: HIP SURGERY; Left      Comment:  Hip Decompression  No date: OTHER SURGICAL HISTORY      Comment:  left rotater cuff repair  No date: PORTACATH PLACEMENT  2/2014: portacath removed   No date: SHOULDER ARTHROSCOPY W/ SUBACROMIAL DECOMPRESSION AND DISTAL   CLAVICLE EXCISION    Social:  Reports infrequent marijuana use  Denies alcohol and tobacco use  Lives with family        Review of Systems   Constitutional: Positive for unexpected weight change.   HENT: Negative.    Eyes: Negative for photophobia, redness and visual disturbance.   Respiratory: Negative  for cough, choking and shortness of breath.    Cardiovascular: Positive for chest pain.   Gastrointestinal: Negative for constipation, diarrhea and nausea.   Endocrine: Negative.    Genitourinary: Negative for dysuria, flank pain and hematuria.   Musculoskeletal: Positive for arthralgias and myalgias.   Skin: Negative.    Neurological: Negative for seizures, light-headedness and headaches.       Objective:      Physical Exam   Constitutional: He is oriented to person, place, and time. He appears well-developed and well-nourished.   Eyes:   Reports nausea with light in eyes   Neck: No JVD present.   Cardiovascular: Normal rate, regular rhythm and normal heart sounds.   Pulmonary/Chest: Effort normal and breath sounds normal.   Abdominal: Soft. Bowel sounds are normal. He exhibits no distension and no mass. There is no tenderness. There is no rebound and no guarding.   Musculoskeletal:   Pain with movement   Neurological: He is alert and oriented to person, place, and time.       Assessment:       1. Vaso-occlusive sickle cell crisis        Plan:   Vaso-occlusive sickle cell crisis  Pain management, hydration, continue hydroxyurea, venous thrombosis prophylaxis, incentive spirometry. Consult [facility] for outpatient management    Opioid dependence/ malingering  Consult addiction psych, but patient uninterested

## 2019-08-05 NOTE — CONSULTS
Single lumen 18G x 10CM midline placed right brachial vein. Max dwell date 9/3/19, Lot#[id number].  Needle advanced into the vessel under real time ultrasound guidance.  Image recorded and saved.

## 2019-08-05 NOTE — ED NOTES
Pt awakened to verbal stimuli Resp full and reg Breath sounds clear nilo to all lung fields Radial pulse strong and reg

## 2019-08-05 NOTE — PLAN OF CARE
08/05/19 0917   Post-Acute Status   Post-Acute Authorization Other   Other Status No Post-Acute Service Needs

## 2019-08-05 NOTE — H&P
Hospital Medicine  History and Physical Exam     Team: [facility] HOSP MED D Kendra Miller MD  Admit Date: 2019  OWEN 2019  Principal Problem:  Sickle cell pain crisis  Patient information was obtained from patient and ER records.   Primary Care Physician: [facility]  Code status: Full Code    HPI: Paddy Stevenson is a 25 y/o M with PMH sickle cell anemia and AVN of bilateral hips s/p L hip replacement 2 years ago presents c/o R hip, back, and thigh pain. Patient was recently admitted to [facility] from  to 2019 for sickle cell pain crisis and left AMA once his Dilaudid dose was decreased.  Patient stated that he had to leave last admission because his grandmother passed and he had to attend the .  During this time, patient took his home extended release morphine but did not find any pain relief.  He reports his symptoms of diffuse generalized pain worsened which prompted him to seek further medical attention in the ED.  Patient denies any chest pain, cough, sputum production, nausea, vomiting, diarrhea, constipation, blood in stools, urinary urgency, frequency, dysuria, hematuria.  While in the ED, patient is noted to be afebrile, hemodynamically stable, saturating 100% on room air.  H&H of 9.1/28.4 which is above his baseline.  Occasional sickle cells noted. 14.9 retic count.    Past Medical History: Patient has a past medical history of Asthma, AVN (avascular necrosis of bone), Encounter for blood transfusion, Irregular heart rhythm (), Malingering (3/11/2016), Mild intermittent asthma without complication (2015), Seasonal allergies, Sickle cell anemia, and Stroke.    Past Surgical History: Patient has a past surgical history that includes Cholecystectomy; Portacath placement; portacath removed  (2014); Other surgical history; Shoulder arthroscopy w/ subacromial decompression and distal clavicle excision; Hip surgery (Left, ); and Abdominal surgery.    Social  History: Patient reports that he has never smoked. He has never used smokeless tobacco. He reports that he has current or past drug history. Drug: Marijuana. He reports that he does not drink alcohol.    Family History: family history includes Diabetes in his father; Mental illness in his brother; Sickle cell anemia in his mother.    Medications: reviewed     Allergies: Patient is allergic to adhesive; contrast media; rocephin [ceftriaxone]; sulfa (sulfonamide antibiotics); toradol [ketorolac]; vancomycin analogues; and fentanyl.    Review of Systems: (BOLDED POSITIVE) (REMAINDER NEGATIVE)     General: fever, chills, night sweats, weakness, fatigue    Eyes/Head: vision changes, headache, dizziness   ENT: tinnitus, epistaxis, dysphagia  Respiratory: SOB, cough, wheezing, sputum, hemoptysis   CVS: chest pain, edema, syncope, palpitations, MINOR  GI:nausea, vomiting, diarrhea, constipation, abdominal pain  : dysuria, hematuria, nocturia, incontinence  Neurological: headace, weakness, slurred speech, numbness/tingling   Heme/Lymph: anemia, easy bruising, swollen glands    MSK:arthralgia, gout, back pain, stiffness     Psychiatric: insomnia, stress, depression, anxiety, SI, HI         Physical Exam:     Temp:  [97.6 °F (36.4 °C)-98.5 °F (36.9 °C)]   Pulse:  [52-88]   Resp:  [14-18]   BP: (105-144)/(55-84)   SpO2:  [97 %-100 %]   Body mass index is 25.1 kg/m².   No intake or output data in the 24 hours ending 08/05/19 1453       General appearance: NAD, alert, appears stated age    Head: Normocephalic, without obvious abnormality, atraumatic     Eyes: conjunctivae/corneas clear. PERRLA, EOM's intact     Throat: Lips, mucosa, and tongue moist and without lesion     Neck: supple, trachea midline, no adenopathy     Lungs: clear to auscultation bilaterally, no wheezes, no crackles, no rales, no rhonchi   Heart: regular rate and rhythm, S1, S2 normal, no murmur, click, rub or gallop    Abdomen: Soft, non-tender, non-distended,  normoactive bowel sounds. No masses, no organomegaly     Extremities: atraumatic, no deformities, no cyanosis or edema     Neurologic: Alert and oriented X 3, Normal symmetric reflexes. CN II-XII intact. No focal neurological deficits.     Musculoskeletal: Normal ROM, normal strength     Skin: No rash, tears, or ecchymosis, capillary refill brisk       Labs:  Recent Results (from the past 24 hour(s))   CBC auto differential    Collection Time: 08/05/19  5:06 AM   Result Value Ref Range    WBC 16.13 (H) 3.90 - 12.70 K/uL    RBC 3.15 (L) 4.60 - 6.20 M/uL    Hemoglobin 9.1 (L) 14.0 - 18.0 g/dL    Hematocrit 28.4 (L) 40.0 - 54.0 %    Mean Corpuscular Volume 90 82 - 98 fL    Mean Corpuscular Hemoglobin 28.9 27.0 - 31.0 pg    Mean Corpuscular Hemoglobin Conc 32.0 32.0 - 36.0 g/dL    RDW 24.1 (H) 11.5 - 14.5 %    Platelets 382 (H) 150 - 350 K/uL    MPV 10.5 9.2 - 12.9 fL    Immature Granulocytes 1.6 (H) 0.0 - 0.5 %    Gran # (ANC) 8.8 (H) 1.8 - 7.7 K/uL    Immature Grans (Abs) 0.26 (H) 0.00 - 0.04 K/uL    Lymph # 4.7 1.0 - 4.8 K/uL    Mono # 1.8 (H) 0.3 - 1.0 K/uL    Eos # 0.5 0.0 - 0.5 K/uL    Baso # 0.12 0.00 - 0.20 K/uL    nRBC 2 (A) 0 /100 WBC    Gran% 54.6 38.0 - 73.0 %    Lymph% 28.9 18.0 - 48.0 %    Mono% 11.3 4.0 - 15.0 %    Eosinophil% 2.9 0.0 - 8.0 %    Basophil% 0.7 0.0 - 1.9 %    Platelet Estimate Increased (A)     Aniso Moderate     Poik Moderate     Poly Occasional     Hypo Occasional     Ovalocytes Occasional     Target Cells Occasional     Sickle Cells Occasional (A)     Pappenheimer Bodies Present     Differential Method Automated    Reticulocytes    Collection Time: 08/05/19  5:06 AM   Result Value Ref Range    Retic 14.9 (H) 0.4 - 2.0 %       No results found for: HGBA1C    No results for input(s): POCTGLUCOSE in the last 168 hours.    Active Hospital Problems    Diagnosis  POA    Vaso-occlusive sickle cell crisis [D57.00]  Yes    Malingering [Z76.5]  Not Applicable    On hydroxyurea therapy [Z79.899]   Not Applicable    Mild intermittent asthma without complication [J45.20]  Yes     Chronic    Avascular necrosis of bones of both hips [M87.051, M87.052]  Yes     Chronic    Drug-seeking behavior [Z76.5]  Yes    Narcotic abuse [F11.10]  Yes    Sickle cell pain crisis [D57.00]  Yes      Resolved Hospital Problems   No resolved problems to display.       Assessment and Plan:    Vasoocclusive sickle cell crisis:  Sickle Cell anemia:  -H&H on admission of 9.1/28.4 (baseline between 8-10)  -Retic count elevated to 14  -Supplemental oxygen  -D5 1/2 NS  -continue hydroxyurea and Folic acid  -Multi Modal pain control with tylenol 1000mg TID scheduled, ibuprofen 400 mg q.6 hours scheduled, Robaxin 500 mg 4 times a day scheduled, PTA extended-release morphine 45 mg b.i.d., Dilaudid 0.5 mg q.6 hours p.r.n. for PRN for breakthrough pain  -bowel regimen with Miralax and pericolace while pt is receiving opioid medications  -Nursing / Phlebotomy to use pediatric tubes when drawing labs to minimize iatrogenic anemia and blood loss.       Leukocytosis  -white count today of 16  -will order CXR, UA  -low threshold to start antibiotics if patient becomes febrile or hemodynamically unstable  -continue to monitor CBC daily     Drug-seeking behavior  -ER visits July 2019 7/1: [facility]  7/2: [facility]  7/3: [facility]  7/7: [facility]  7/7: [facility]  7/13: [facility]  7/15: [facility]  7/16: [facility]  7/26: [facility]  8/5: [facility]  -discussed consultation with Addiction Psychiatry.  Patient became very agitated and aggressive after mentioning this resource.  He clenched his fist and said do not talk to me about that in a threatening manner  -patient needs outpatient follow-up with PCP/Heme-Onc    Avascular necrosis of bones of both hips  -S/p L hip replacement ~2 years ago  -MRI R hip (12/2018) at OSH: Avascular necrosis of the right femoral head without subchondral  collapse. 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  -MRI on 7/17/19:Focal area of avascular necrosis involving at least 50% of the articular surface.  No imaging findings to suggest subchondral plate collapse.  2. Additional focal area of avascular necrosis within the right iliac crest and possibly within the right proximal femoral shaft, the latter which is not well evaluated secondary to partial visualization.  -Pt reports discussing AVN and is followed by an Orthopedic surgeon in [place] with a f/u coming up. Currently no plans of surgery.      Mild intermittent asthma without complication  -Sats 98-99% on RA  -Controlled  -Will order prn breathing treatments     DVT PPx: SCDs    Kendra Miller MD  Hospital Medicine Staff  [phone] pager

## 2019-08-05 NOTE — ED TRIAGE NOTES
Paddy Stevenson, a 24 y.o. male presents to the ED w/ complaint of generalized sickle cell pain crisis for approximately three days. Pt took MS contin 45mg q 12 hrs w/o relief of symptoms. 10/10 pain at this time.    Triage note:  Chief Complaint   Patient presents with    Sickle Cell Pain Crisis     generalized     Review of patient's allergies indicates:   Allergen Reactions    Adhesive Itching    Contrast media Shortness Of Breath    Rocephin [ceftriaxone] Hives    Sulfa (sulfonamide antibiotics) Anaphylaxis    Toradol [ketorolac] Shortness Of Breath    Vancomycin analogues Shortness Of Breath    Fentanyl Hives     Past Medical History:   Diagnosis Date    Asthma     AVN (avascular necrosis of bone)     Encounter for blood transfusion     Irregular heart rhythm 2010    Malingering 3/11/2016    Mild intermittent asthma without complication 11/16/2015    Seasonal allergies     Sickle cell anemia     Stroke

## 2019-08-05 NOTE — ED NOTES
Pt sleeping comfortably in recliner. No signs of distress noted. Respirations even and unlabored. Will continue to monitor.

## 2019-08-05 NOTE — PLAN OF CARE
08/05/19 1245   Discharge Assessment   Assessment Type Discharge Planning Assessment   Confirmed/corrected address and phone number on facesheet? Yes   Assessment information obtained from? Patient   Expected Length of Stay (days) 2   Communicated expected length of stay with patient/caregiver yes   Prior to hospitilization cognitive status: Alert/Oriented   Prior to hospitalization functional status: Independent   Current cognitive status: Alert/Oriented   Current Functional Status: Independent   Lives With parent(s)  (mother, Marcella Casanova ([phone]))   Able to Return to Prior Arrangements yes   Is patient able to care for self after discharge? Yes   Patient's perception of discharge disposition home or selfcare   Readmission Within the Last 30 Days current reason for admission unrelated to previous admission   If yes, most recent facility name: [facility]-pt left AMA   Patient currently being followed by outpatient case management? No   Patient currently receives any other outside agency services? No   Equipment Currently Used at Home none   Do you have any problems affording any of your prescribed medications? No   Is the patient taking medications as prescribed?   (unsure)   Does the patient have transportation home? Yes   Transportation Anticipated family or friend will provide   Does the patient receive services at the [facility]? No   Discharge Plan A Home with family   Discharge Plan B Home Health   DME Needed Upon Discharge  none   Patient/Family in Agreement with Plan yes   Readmission Questionnaire   At the time of your discharge, did someone talk to you about what your health problems were? Yes   At the time of discharge, did someone talk to you about what to watch out for regarding worsening of your health problem? Yes   At the time of discharge, did someone talk to you about what to do if you experienced worsening of your health problem? Yes   At the time of discharge, did someone talk to you  about which medication to take when you left the hospital and which ones to stop taking? Yes   At the time of discharge, did someone talk to you about when and where to follow up with a doctor after you left the hospital? Yes   What do you believe caused you to be sick enough to be re-admitted? sickle cell crisis   How often do you need to have someone help you when you read instructions, pamphlets, or other written material from your doctor or pharmacy? Never   Do you have problems taking your medications as prescribed? No   Do you have any problems affording any of  your prescribed medications? No   Do you have problems obtaining/receiving your medications? No   Does the patient have transportation to healthcare appointments? Yes   Living Arrangements house   Does the patient have family/friends to help with healtcare needs after discharge? yes   Does your caregiver provide all the help you need? I don't know     Dx: sickle cell crisis  Pharm: [facility] f/u appt: Dr. Ronni Fox ([facility]) on 8/14/19 at 0900    Patient known to this CM from his prior hospitalization 7/26/19 when he left [place] on 8/1/19.

## 2019-08-06 PROBLEM — F11.20 OPIOID DEPENDENCE: Status: ACTIVE | Noted: 2017-07-08

## 2019-08-06 PROBLEM — R52 BODY ACHES: Status: ACTIVE | Noted: 2018-08-15

## 2019-08-06 LAB
ALBUMIN SERPL BCP-MCNC: 3.5 G/DL (ref 3.5–5.2)
ALP SERPL-CCNC: 70 U/L (ref 55–135)
ALT SERPL W/O P-5'-P-CCNC: 13 U/L (ref 10–44)
ANION GAP SERPL CALC-SCNC: 9 MMOL/L (ref 8–16)
AST SERPL-CCNC: 19 U/L (ref 10–40)
BACTERIA #/AREA URNS AUTO: NORMAL /HPF
BASOPHILS # BLD AUTO: 0.09 K/UL (ref 0–0.2)
BASOPHILS NFR BLD: 0.6 % (ref 0–1.9)
BILIRUB SERPL-MCNC: 2.3 MG/DL (ref 0.1–1)
BILIRUB UR QL STRIP: NEGATIVE
BUN SERPL-MCNC: 8 MG/DL (ref 6–20)
CALCIUM SERPL-MCNC: 8.3 MG/DL (ref 8.7–10.5)
CHLORIDE SERPL-SCNC: 111 MMOL/L (ref 95–110)
CLARITY UR REFRACT.AUTO: CLEAR
CO2 SERPL-SCNC: 22 MMOL/L (ref 23–29)
COLOR UR AUTO: YELLOW
CREAT SERPL-MCNC: 0.8 MG/DL (ref 0.5–1.4)
DIFFERENTIAL METHOD: ABNORMAL
EOSINOPHIL # BLD AUTO: 1.1 K/UL (ref 0–0.5)
EOSINOPHIL NFR BLD: 7.8 % (ref 0–8)
ERYTHROCYTE [DISTWIDTH] IN BLOOD BY AUTOMATED COUNT: 25.2 % (ref 11.5–14.5)
EST. GFR  (AFRICAN AMERICAN): >60 ML/MIN/1.73 M^2
EST. GFR  (NON AFRICAN AMERICAN): >60 ML/MIN/1.73 M^2
GLUCOSE SERPL-MCNC: 98 MG/DL (ref 70–110)
GLUCOSE UR QL STRIP: NEGATIVE
HCT VFR BLD AUTO: 28.6 % (ref 40–54)
HGB BLD-MCNC: 9.2 G/DL (ref 14–18)
HGB UR QL STRIP: NEGATIVE
HYALINE CASTS UR QL AUTO: 1 /LPF
IMM GRANULOCYTES # BLD AUTO: 0.31 K/UL (ref 0–0.04)
IMM GRANULOCYTES NFR BLD AUTO: 2.2 % (ref 0–0.5)
KETONES UR QL STRIP: NEGATIVE
LEUKOCYTE ESTERASE UR QL STRIP: NEGATIVE
LYMPHOCYTES # BLD AUTO: 3.5 K/UL (ref 1–4.8)
LYMPHOCYTES NFR BLD: 24.4 % (ref 18–48)
MCH RBC QN AUTO: 29.4 PG (ref 27–31)
MCHC RBC AUTO-ENTMCNC: 32.2 G/DL (ref 32–36)
MCV RBC AUTO: 91 FL (ref 82–98)
MICROSCOPIC COMMENT: NORMAL
MONOCYTES # BLD AUTO: 1.3 K/UL (ref 0.3–1)
MONOCYTES NFR BLD: 9.1 % (ref 4–15)
NEUTROPHILS # BLD AUTO: 7.9 K/UL (ref 1.8–7.7)
NEUTROPHILS NFR BLD: 55.9 % (ref 38–73)
NITRITE UR QL STRIP: NEGATIVE
NRBC BLD-RTO: 3 /100 WBC
PH UR STRIP: 6 [PH] (ref 5–8)
PLATELET # BLD AUTO: 327 K/UL (ref 150–350)
PMV BLD AUTO: 10.8 FL (ref 9.2–12.9)
POTASSIUM SERPL-SCNC: 3.3 MMOL/L (ref 3.5–5.1)
PROT SERPL-MCNC: 6.5 G/DL (ref 6–8.4)
PROT UR QL STRIP: NEGATIVE
RBC # BLD AUTO: 3.13 M/UL (ref 4.6–6.2)
RBC #/AREA URNS AUTO: 1 /HPF (ref 0–4)
SODIUM SERPL-SCNC: 142 MMOL/L (ref 136–145)
SP GR UR STRIP: 1.01 (ref 1–1.03)
URN SPEC COLLECT METH UR: NORMAL
WBC # BLD AUTO: 14.21 K/UL (ref 3.9–12.7)
WBC #/AREA URNS AUTO: 1 /HPF (ref 0–5)

## 2019-08-06 PROCEDURE — 99232 PR SUBSEQUENT HOSPITAL CARE,LEVL II: ICD-10-PCS | Mod: ,,, | Performed by: INTERNAL MEDICINE

## 2019-08-06 PROCEDURE — S5010 5% DEXTROSE AND 0.45% SALINE: HCPCS | Performed by: HOSPITALIST

## 2019-08-06 PROCEDURE — 25000003 PHARM REV CODE 250: Performed by: INTERNAL MEDICINE

## 2019-08-06 PROCEDURE — 85025 COMPLETE CBC W/AUTO DIFF WBC: CPT

## 2019-08-06 PROCEDURE — 63600175 PHARM REV CODE 636 W HCPCS: Performed by: INTERNAL MEDICINE

## 2019-08-06 PROCEDURE — 81001 URINALYSIS AUTO W/SCOPE: CPT

## 2019-08-06 PROCEDURE — 80053 COMPREHEN METABOLIC PANEL: CPT

## 2019-08-06 PROCEDURE — 36415 COLL VENOUS BLD VENIPUNCTURE: CPT

## 2019-08-06 PROCEDURE — 99232 SBSQ HOSP IP/OBS MODERATE 35: CPT | Mod: ,,, | Performed by: INTERNAL MEDICINE

## 2019-08-06 PROCEDURE — 11000001 HC ACUTE MED/SURG PRIVATE ROOM

## 2019-08-06 PROCEDURE — 25000003 PHARM REV CODE 250: Performed by: HOSPITALIST

## 2019-08-06 RX ORDER — MORPHINE SULFATE 15 MG/1
15 TABLET ORAL EVERY 4 HOURS PRN
Status: DISCONTINUED | OUTPATIENT
Start: 2019-08-06 | End: 2019-08-09

## 2019-08-06 RX ORDER — HYDROXYUREA 500 MG/1
500 CAPSULE ORAL
Status: ON HOLD | COMMUNITY
Start: 2016-08-25 | End: 2019-08-30 | Stop reason: HOSPADM

## 2019-08-06 RX ORDER — HYDROMORPHONE HYDROCHLORIDE 1 MG/ML
1 INJECTION, SOLUTION INTRAMUSCULAR; INTRAVENOUS; SUBCUTANEOUS EVERY 6 HOURS PRN
Status: DISCONTINUED | OUTPATIENT
Start: 2019-08-07 | End: 2019-08-07

## 2019-08-06 RX ORDER — HYDROMORPHONE HYDROCHLORIDE 1 MG/ML
0.5 INJECTION, SOLUTION INTRAMUSCULAR; INTRAVENOUS; SUBCUTANEOUS EVERY 6 HOURS PRN
Status: DISCONTINUED | OUTPATIENT
Start: 2019-08-07 | End: 2019-08-07

## 2019-08-06 RX ORDER — HYDROMORPHONE HYDROCHLORIDE 2 MG/ML
2 INJECTION, SOLUTION INTRAMUSCULAR; INTRAVENOUS; SUBCUTANEOUS EVERY 6 HOURS PRN
Status: DISCONTINUED | OUTPATIENT
Start: 2019-08-06 | End: 2019-08-07

## 2019-08-06 RX ORDER — HYDROXYUREA 500 MG/1
500 CAPSULE ORAL DAILY
Status: DISCONTINUED | OUTPATIENT
Start: 2019-08-06 | End: 2019-08-21

## 2019-08-06 RX ORDER — OXYCODONE HYDROCHLORIDE 5 MG/1
5 TABLET ORAL ONCE AS NEEDED
Status: DISCONTINUED | OUTPATIENT
Start: 2019-08-06 | End: 2019-08-09

## 2019-08-06 RX ORDER — POTASSIUM CHLORIDE 20 MEQ/1
40 TABLET, EXTENDED RELEASE ORAL 3 TIMES DAILY
Status: DISPENSED | OUTPATIENT
Start: 2019-08-06 | End: 2019-08-06

## 2019-08-06 RX ADMIN — HYDROXYUREA 500 MG: 500 CAPSULE ORAL at 12:08

## 2019-08-06 RX ADMIN — DEXTROSE MONOHYDRATE AND SODIUM CHLORIDE: 5; .45 INJECTION, SOLUTION INTRAVENOUS at 09:08

## 2019-08-06 RX ADMIN — FOLIC ACID 1 MG: 1 TABLET ORAL at 09:08

## 2019-08-06 RX ADMIN — MORPHINE SULFATE 45 MG: 30 TABLET, EXTENDED RELEASE ORAL at 09:08

## 2019-08-06 RX ADMIN — HYDROMORPHONE HYDROCHLORIDE 2 MG: 2 INJECTION INTRAMUSCULAR; INTRAVENOUS; SUBCUTANEOUS at 06:08

## 2019-08-06 RX ADMIN — DEXTROSE MONOHYDRATE AND SODIUM CHLORIDE: 5; .45 INJECTION, SOLUTION INTRAVENOUS at 12:08

## 2019-08-06 RX ADMIN — ACETAMINOPHEN 1000 MG: 500 TABLET ORAL at 12:08

## 2019-08-06 RX ADMIN — HYDROMORPHONE HYDROCHLORIDE 2 MG: 2 INJECTION INTRAMUSCULAR; INTRAVENOUS; SUBCUTANEOUS at 12:08

## 2019-08-06 RX ADMIN — POTASSIUM CHLORIDE 40 MEQ: 20 TABLET, EXTENDED RELEASE ORAL at 03:08

## 2019-08-06 RX ADMIN — MORPHINE SULFATE 45 MG: 30 TABLET, EXTENDED RELEASE ORAL at 12:08

## 2019-08-06 RX ADMIN — IBUPROFEN 400 MG: 400 TABLET, FILM COATED ORAL at 12:08

## 2019-08-06 RX ADMIN — DEXTROSE MONOHYDRATE AND SODIUM CHLORIDE: 5; .45 INJECTION, SOLUTION INTRAVENOUS at 04:08

## 2019-08-06 RX ADMIN — MORPHINE SULFATE 45 MG: 30 TABLET, EXTENDED RELEASE ORAL at 10:08

## 2019-08-06 NOTE — PLAN OF CARE
08/06/19 0938   Post-Acute Status   Post-Acute Authorization Other   Other Status No Post-Acute Service Needs

## 2019-08-06 NOTE — NURSING
Report given to Dayami BOUDREAUX in [facility]. Pt transferred to room 7098 via w/c. Pt accompanied by Marina dominguez. All personal belongings, chart and meds sent with pt.

## 2019-08-06 NOTE — NURSING
Patient arrived to room 7098 via wheelchair with staff.  Patient is a,a,ox4, VSS, and has no complaints at this time.  Patient resting comfortably in bed with side rails up x3, bed locked in lowest position, and call light in reach.  Will continue to monitor.

## 2019-08-06 NOTE — PROGRESS NOTES
Hospital Medicine  Progress Note    [place] [facility] D Peace Gonzalez MD  Admit Date: 8/5/2019  OWEN 8/9/2019  Length of Stay:  LOS: 0 days   Code status: Full Code    Principal Problem:  Vaso-occlusive sickle cell crisis    HPI:  25 y/o M with PMH sickle cell anemia and AVN of bilateral hips s/p L hip replacement 2 years ago presents c/o R hip, back, and thigh pain. Patient was [place] [facility] [place] for sickle cell pain crisis and left AMA once his Dilaudid dose was decreased.      Interval history:   Patient reports generalized body pain.    Review of Systems   Constitutional: Negative for fever.   Respiratory: Negative for shortness of breath.    Cardiovascular: Negative for chest pain.       Physical Exam  Temp:  [96.7 °F (35.9 °C)-98.7 °F (37.1 °C)]   Pulse:  [63-87]   Resp:  [16-18]   BP: (117-133)/(6-78)   SpO2:  [96 %-100 %]     Intake/Output Summary (Last 24 hours) at 8/6/2019 1615  Last data filed at 8/6/2019 0800  Gross per 24 hour   Intake --   Output 700 ml   Net -700 ml     Physical Exam   Constitutional: He is well-developed, well-nourished, and in no distress.   Eyes: Conjunctivae are normal.   Cardiovascular: Normal rate and regular rhythm.   Pulmonary/Chest: Effort normal and breath sounds normal.   Abdominal: Soft. Bowel sounds are normal.   Musculoskeletal: He exhibits no edema.   Neurological: He is alert.     Recent Labs   Lab 08/01/19  0925 08/05/19  0506 08/06/19  0446   WBC 13.15* 16.13* 14.21*   HGB 9.5* 9.1* 9.2*   HCT 27.3* 28.4* 28.6*   * 382* 327     Recent Labs   Lab 08/01/19  0925 08/05/19  1535 08/06/19  0446    139 142   K 3.7 3.7 3.3*    111* 111*   CO2 21* 21* 22*   BUN 6 9 8   CREATININE 0.8 0.8 0.8   GLU 99 94 98   CALCIUM 9.2 8.7 8.3*     Recent Labs   Lab 08/01/19  0925 08/05/19  1535 08/06/19  0446   ALKPHOS 61 54* 70   ALT 28 17 13   AST 30 21 19   ALBUMIN 3.8 3.7 3.5   PROT 7.0 6.7 6.5   BILITOT 2.4* 2.7* 2.3*        No  results for input(s): CPK, CPKMB, MB, TROPONINI in the last 72 hours.      Scheduled Meds:   folic acid  1 mg Oral Daily    hydroxyurea  500 mg Oral Daily    morphine  45 mg Oral Q12H    polyethylene glycol  17 g Oral Daily    potassium chloride  40 mEq Oral TID    senna-docusate 8.6-50 mg  1 tablet Oral BID     Continuous Infusions:   dextrose 5 % and 0.45 % NaCl 125 mL/hr at 08/06/19 0912     As Needed:  albuterol sulfate, HYDROmorphone **FOLLOWED BY** [START ON 8/7/2019] HYDROmorphone **FOLLOWED BY** [START ON 8/7/2019] HYDROmorphone, morphine, naloxone, ondansetron, oxyCODONE, promethazine, sodium chloride 0.9%, sodium chloride 0.9%    Active Hospital Problems    Diagnosis  POA    *Vaso-occlusive sickle cell crisis [D57.00]  Yes    On hydroxyurea therapy [Z79.899]  Not Applicable    Body aches [R52]  Yes    Opioid dependence [F11.20]  Yes    Mild intermittent asthma without complication [J45.20]  Yes     Chronic    Avascular necrosis of bones of both hips [M87.051, M87.052]  Yes     Chronic    Sickle cell pain crisis [D57.00]  Yes      Resolved Hospital Problems   No resolved problems to display.       Overview of Hospital Course:  25 y/o M with PMH sickle cell anemia and AVN of bilateral hips s/p L hip replacement 2 years ago admitted for pain crisis.    Assessment and Plan:      Vasoocclusive sickle cell crisis:  Sickle Cell anemia:  -H&H on admission of 9.1/28.4 (baseline between 8-10)  -Retic count elevated to 14  -Supplemental oxygen  -D5 1/2 NS  -continue hydroxyurea and Folic acid  -Multi Modal pain control   -bowel regimen with Miralax and pericolace while pt is receiving opioid medications.       Leukocytosis  - CXR, UA  -low threshold to start antibiotics if patient becomes febrile or hemodynamically unstable     Avascular necrosis of bones of both hips  -S/p L hip replacement ~2 years ago  -MRI R hip (12/2018) at OSH: Avascular necrosis of the right femoral head without subchondral  collapse. 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  -MRI on 7/17/19:Focal area of avascular necrosis involving at least 50% of the articular surface.  No imaging findings to suggest subchondral plate collapse.  2. Additional focal area of avascular necrosis within the right iliac crest and possibly within the right proximal femoral shaft, the latter which is not well evaluated secondary to partial visualization.  -Pt reports discussing AVN and is followed by an Orthopedic surgeon in [place] with a f/u coming up. Currently no plans of surgery.      Mild intermittent asthma without complication  -Sats 98-99% on RA  -Controlled  -prn breathing treatments    High Risk Conditions  Patient is currently receiving parenteral controlled substances: Dilaudid     Diet:  Diet Adult Regular (IDDSI Level 7)  GI Prophylaxis: Not indicated  DVT Prophylaxis:     Anticoagulants   Medication Route Frequency     Lines/ Drains/ Airways: PIV  Urinary Catheter Indicated: Patient Does Not Have Urinary Catheter  Other Lines/Tubes/Drains:  Wounds:    Goals of Care: Understanding Illness and Limited Social Support  Discharge plan:  Home or Self Care    Peace Gonzalez MD  Department of [facility] Medicine  [id number]

## 2019-08-06 NOTE — NURSING
Dr Gonzalez notified pt c/o pain all over. Pt refusing oxycodone, methocarbamol, tylenol and ibuprofen. Pt requesting to see

## 2019-08-07 LAB
ALBUMIN SERPL BCP-MCNC: 3.4 G/DL (ref 3.5–5.2)
ALP SERPL-CCNC: 52 U/L (ref 55–135)
ALT SERPL W/O P-5'-P-CCNC: 14 U/L (ref 10–44)
ANION GAP SERPL CALC-SCNC: 8 MMOL/L (ref 8–16)
ANISOCYTOSIS BLD QL SMEAR: ABNORMAL
AST SERPL-CCNC: 23 U/L (ref 10–40)
BASOPHILS # BLD AUTO: 0.08 K/UL (ref 0–0.2)
BASOPHILS NFR BLD: 0.6 % (ref 0–1.9)
BILIRUB SERPL-MCNC: 2.5 MG/DL (ref 0.1–1)
BUN SERPL-MCNC: 5 MG/DL (ref 6–20)
CALCIUM SERPL-MCNC: 8.5 MG/DL (ref 8.7–10.5)
CHLORIDE SERPL-SCNC: 111 MMOL/L (ref 95–110)
CK SERPL-CCNC: 59 U/L (ref 20–200)
CO2 SERPL-SCNC: 22 MMOL/L (ref 23–29)
CREAT SERPL-MCNC: 0.7 MG/DL (ref 0.5–1.4)
DIFFERENTIAL METHOD: ABNORMAL
EOSINOPHIL # BLD AUTO: 1.2 K/UL (ref 0–0.5)
EOSINOPHIL NFR BLD: 9.1 % (ref 0–8)
ERYTHROCYTE [DISTWIDTH] IN BLOOD BY AUTOMATED COUNT: 25.5 % (ref 11.5–14.5)
EST. GFR  (AFRICAN AMERICAN): >60 ML/MIN/1.73 M^2
EST. GFR  (NON AFRICAN AMERICAN): >60 ML/MIN/1.73 M^2
GLUCOSE SERPL-MCNC: 76 MG/DL (ref 70–110)
HCT VFR BLD AUTO: 28 % (ref 40–54)
HGB BLD-MCNC: 9.4 G/DL (ref 14–18)
HOWELL-JOLLY BOD BLD QL SMEAR: ABNORMAL
IMM GRANULOCYTES # BLD AUTO: 0.4 K/UL (ref 0–0.04)
IMM GRANULOCYTES NFR BLD AUTO: 3.2 % (ref 0–0.5)
LDH SERPL L TO P-CCNC: 384 U/L (ref 110–260)
LYMPHOCYTES # BLD AUTO: 2.8 K/UL (ref 1–4.8)
LYMPHOCYTES NFR BLD: 22.2 % (ref 18–48)
MCH RBC QN AUTO: 29.8 PG (ref 27–31)
MCHC RBC AUTO-ENTMCNC: 33.6 G/DL (ref 32–36)
MCV RBC AUTO: 89 FL (ref 82–98)
MONOCYTES # BLD AUTO: 1 K/UL (ref 0.3–1)
MONOCYTES NFR BLD: 8.1 % (ref 4–15)
NEUTROPHILS # BLD AUTO: 7.2 K/UL (ref 1.8–7.7)
NEUTROPHILS NFR BLD: 56.8 % (ref 38–73)
NRBC BLD-RTO: 3 /100 WBC
PAPPENHEIMER BOD BLD QL SMEAR: PRESENT
PLATELET # BLD AUTO: 346 K/UL (ref 150–350)
PLATELET BLD QL SMEAR: ABNORMAL
PMV BLD AUTO: 10.3 FL (ref 9.2–12.9)
POIKILOCYTOSIS BLD QL SMEAR: SLIGHT
POLYCHROMASIA BLD QL SMEAR: ABNORMAL
POTASSIUM SERPL-SCNC: 3.8 MMOL/L (ref 3.5–5.1)
PROT SERPL-MCNC: 6.2 G/DL (ref 6–8.4)
RBC # BLD AUTO: 3.15 M/UL (ref 4.6–6.2)
RETICS/RBC NFR AUTO: 18.6 % (ref 0.4–2)
SCHISTOCYTES BLD QL SMEAR: PRESENT
SICKLE CELLS BLD QL SMEAR: ABNORMAL
SODIUM SERPL-SCNC: 141 MMOL/L (ref 136–145)
WBC # BLD AUTO: 12.68 K/UL (ref 3.9–12.7)

## 2019-08-07 PROCEDURE — 94761 N-INVAS EAR/PLS OXIMETRY MLT: CPT

## 2019-08-07 PROCEDURE — 85025 COMPLETE CBC W/AUTO DIFF WBC: CPT

## 2019-08-07 PROCEDURE — 25000003 PHARM REV CODE 250: Performed by: HOSPITALIST

## 2019-08-07 PROCEDURE — 63600175 PHARM REV CODE 636 W HCPCS: Performed by: INTERNAL MEDICINE

## 2019-08-07 PROCEDURE — 25000003 PHARM REV CODE 250: Performed by: INTERNAL MEDICINE

## 2019-08-07 PROCEDURE — 36415 COLL VENOUS BLD VENIPUNCTURE: CPT

## 2019-08-07 PROCEDURE — 80053 COMPREHEN METABOLIC PANEL: CPT

## 2019-08-07 PROCEDURE — 83615 LACTATE (LD) (LDH) ENZYME: CPT

## 2019-08-07 PROCEDURE — 82550 ASSAY OF CK (CPK): CPT

## 2019-08-07 PROCEDURE — S5010 5% DEXTROSE AND 0.45% SALINE: HCPCS | Performed by: HOSPITALIST

## 2019-08-07 PROCEDURE — 85045 AUTOMATED RETICULOCYTE COUNT: CPT

## 2019-08-07 PROCEDURE — 11000001 HC ACUTE MED/SURG PRIVATE ROOM

## 2019-08-07 PROCEDURE — 99231 PR SUBSEQUENT HOSPITAL CARE,LEVL I: ICD-10-PCS | Mod: ,,, | Performed by: INTERNAL MEDICINE

## 2019-08-07 PROCEDURE — 99231 SBSQ HOSP IP/OBS SF/LOW 25: CPT | Mod: ,,, | Performed by: INTERNAL MEDICINE

## 2019-08-07 RX ORDER — HYDROMORPHONE HYDROCHLORIDE 1 MG/ML
0.5 INJECTION, SOLUTION INTRAMUSCULAR; INTRAVENOUS; SUBCUTANEOUS EVERY 6 HOURS PRN
Status: DISPENSED | OUTPATIENT
Start: 2019-08-07 | End: 2019-08-08

## 2019-08-07 RX ORDER — HYDROMORPHONE HYDROCHLORIDE 1 MG/ML
1 INJECTION, SOLUTION INTRAMUSCULAR; INTRAVENOUS; SUBCUTANEOUS EVERY 6 HOURS PRN
Status: DISPENSED | OUTPATIENT
Start: 2019-08-08 | End: 2019-08-08

## 2019-08-07 RX ORDER — HYDROMORPHONE HYDROCHLORIDE 1 MG/ML
0.5 INJECTION, SOLUTION INTRAMUSCULAR; INTRAVENOUS; SUBCUTANEOUS EVERY 6 HOURS PRN
Status: DISCONTINUED | OUTPATIENT
Start: 2019-08-08 | End: 2019-08-09

## 2019-08-07 RX ADMIN — POLYETHYLENE GLYCOL 3350 17 G: 17 POWDER, FOR SOLUTION ORAL at 08:08

## 2019-08-07 RX ADMIN — MORPHINE SULFATE 45 MG: 30 TABLET, EXTENDED RELEASE ORAL at 09:08

## 2019-08-07 RX ADMIN — HYDROXYUREA 500 MG: 500 CAPSULE ORAL at 08:08

## 2019-08-07 RX ADMIN — SENNOSIDES,DOCUSATE SODIUM 1 TABLET: 8.6; 5 TABLET, FILM COATED ORAL at 08:08

## 2019-08-07 RX ADMIN — MORPHINE SULFATE 45 MG: 30 TABLET, EXTENDED RELEASE ORAL at 08:08

## 2019-08-07 RX ADMIN — HYDROMORPHONE HYDROCHLORIDE 1 MG: 1 INJECTION, SOLUTION INTRAMUSCULAR; INTRAVENOUS; SUBCUTANEOUS at 06:08

## 2019-08-07 RX ADMIN — MORPHINE SULFATE 15 MG: 15 TABLET ORAL at 09:08

## 2019-08-07 RX ADMIN — HYDROMORPHONE HYDROCHLORIDE: 1 INJECTION, SOLUTION INTRAMUSCULAR; INTRAVENOUS; SUBCUTANEOUS at 07:08

## 2019-08-07 RX ADMIN — MORPHINE SULFATE 15 MG: 15 TABLET ORAL at 05:08

## 2019-08-07 RX ADMIN — FOLIC ACID 1 MG: 1 TABLET ORAL at 08:08

## 2019-08-07 RX ADMIN — HYDROMORPHONE HYDROCHLORIDE 2 MG: 2 INJECTION INTRAMUSCULAR; INTRAVENOUS; SUBCUTANEOUS at 12:08

## 2019-08-07 RX ADMIN — DEXTROSE MONOHYDRATE AND SODIUM CHLORIDE: 5; .45 INJECTION, SOLUTION INTRAVENOUS at 02:08

## 2019-08-07 RX ADMIN — HYDROMORPHONE HYDROCHLORIDE 1 MG: 1 INJECTION, SOLUTION INTRAMUSCULAR; INTRAVENOUS; SUBCUTANEOUS at 12:08

## 2019-08-07 NOTE — PLAN OF CARE
Problem: Adult Inpatient Plan of Care  Goal: Plan of Care Review  Outcome: Ongoing (interventions implemented as appropriate)     08/07/19 0522   Plan of Care Review   Plan of Care Reviewed With patient   Progress no change   Pt A+Ox4, calm and cooperative throughout shift. PRN IV dilaudid given for pain. Ambulating independently. IVF infusing per MAR. Safety maintained throughout shift.        normal...

## 2019-08-07 NOTE — PROGRESS NOTES
Hospital Medicine  Progress Note    [place] [facility] [place] Peace Gonzalez MD  Admit Date: 8/5/2019  OWEN 8/9/2019  Length of Stay:  LOS: 1 day   Code status: Full Code    Principal Problem:  Vaso-occlusive sickle cell crisis    HPI:  25 y/o M with PMH sickle cell anemia and AVN of bilateral hips s/p L hip replacement 2 years ago presents c/o R hip, back, and thigh pain. Patient [place] [facility] [place]01[place] for sickle cell pain crisis and left AMA once his Dilaudid dose was decreased.      Interval history:   Patient reports persistent generalized body pain. Did not receive oral MSIR overnight -- unaware of availability    Review of Systems   Constitutional: Negative for fever.   Respiratory: Negative for shortness of breath.        Physical Exam  Temp:  [97 °F (36.1 °C)-98.2 °F (36.8 °C)]   Pulse:  [59-87]   Resp:  [14-18]   BP: (116-131)/(56-75)   SpO2:  [94 %-100 %]     Intake/Output Summary (Last 24 hours) at 8/7/2019 1308  Last data filed at 8/7/2019 0600  Gross per 24 hour   Intake 1755 ml   Output 900 ml   Net 855 ml     Physical Exam   Constitutional: He is well-developed, well-nourished, and in no distress.   Eyes: Conjunctivae are normal.   Cardiovascular: Normal rate and regular rhythm.   Pulmonary/Chest: Effort normal.   Abdominal: Soft. Normal appearance.   Musculoskeletal: He exhibits no edema.   Neurological: He is alert.     Recent Labs   Lab 08/05/19  0506 08/06/19  0446 08/07/19  1059   WBC 16.13* 14.21* 12.68   HGB 9.1* 9.2* 9.4*   HCT 28.4* 28.6* 28.0*   * 327 346     Recent Labs   Lab 08/05/19  1535 08/06/19  0446 08/07/19  1059    142 141   K 3.7 3.3* 3.8   * 111* 111*   CO2 21* 22* 22*   BUN 9 8 5*   CREATININE 0.8 0.8 0.7   GLU 94 98 76   CALCIUM 8.7 8.3* 8.5*     Recent Labs   Lab 08/05/19  1535 08/06/19  0446 08/07/19  1059   ALKPHOS 54* 70 52*   ALT 17 13 14   AST 21 19 23   ALBUMIN 3.7 3.5 3.4*   PROT 6.7 6.5 6.2   BILITOT 2.7* 2.3* 2.5*         Recent Labs     19  1059   CPK 59       Scheduled Meds:   folic acid  1 mg Oral Daily    hydroxyurea  500 mg Oral Daily    morphine  45 mg Oral Q12H    polyethylene glycol  17 g Oral Daily    senna-docusate 8.6-50 mg  1 tablet Oral BID     Continuous Infusions:   dextrose 5 % and 0.45 % NaCl 125 mL/hr at 19 1655     As Needed:  albuterol sulfate, [] HYDROmorphone **FOLLOWED BY** HYDROmorphone **FOLLOWED BY** HYDROmorphone, morphine, naloxone, ondansetron, oxyCODONE, promethazine, sodium chloride 0.9%, sodium chloride 0.9%    Active Hospital Problems    Diagnosis  POA    *Vaso-occlusive sickle cell crisis [D57.00]  Yes    On hydroxyurea therapy [Z79.899]  Not Applicable    Body aches [R52]  Yes    Opioid dependence [F11.20]  Yes    Mild intermittent asthma without complication [J45.20]  Yes     Chronic    Avascular necrosis of bones of both hips [M87.051, M87.052]  Yes     Chronic    Sickle cell pain crisis [D57.00]  Yes      Resolved Hospital Problems   No resolved problems to display.       Overview of Hospital Course:  25 y/o M with PMH sickle cell anemia and AVN of bilateral hips s/p L hip replacement 2 years ago admitted for pain crisis.    Assessment and Plan:      Vasoocclusive sickle cell crisis:  Sickle Cell anemia:  -H&H on admission of 9.1/28.4 (baseline between 8-10)  -Retic count elevated to 14  -Supplemental oxygen  -D5 1/2 NS  -continue hydroxyurea and Folic acid  -Multi Modal pain control   -bowel regimen with Miralax and Pericolace while pt is receiving opioid medications.       Leukocytosis  - CXR, UA  -low threshold to start antibiotics if patient becomes febrile or hemodynamically unstable     Avascular necrosis of bones of both hips  -S/p L hip replacement ~2 years ago  -MRI R hip (2018) at OSH: Avascular necrosis of the right femoral head without subchondral collapse. 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  -MRI on 19:Focal area of  avascular necrosis involving at least 50% of the articular surface.  No imaging findings to suggest subchondral plate collapse.  2. Additional focal area of avascular necrosis within the right iliac crest and possibly within the right proximal femoral shaft, the latter which is not well evaluated secondary to partial visualization.  -Pt reports being followed by an Orthopedic surgeon in [place] with an appointment coming up. Currently no plans of surgery.      Mild intermittent asthma without complication  -Sats 98-99% on RA  -Controlled  -prn breathing treatments    High Risk Conditions  Patient is currently receiving parenteral controlled substances: Dilaudid     Diet:  Diet Adult Regular (IDDSI Level 7)  GI Prophylaxis: Not indicated  DVT Prophylaxis:     Anticoagulants   Medication Route Frequency     Lines/ Drains/ Airways: PIV  Urinary Catheter Indicated: Patient Does Not Have Urinary Catheter  Other Lines/Tubes/Drains:  Wounds:    Goals of Care: Understanding Illness and Limited Social Support  Discharge plan:  Home or Self Care    Peace Gonzalez MD  Department of Hospital Medicine  [id number]

## 2019-08-07 NOTE — PLAN OF CARE
08/07/19 1250   Post-Acute Status   Post-Acute Authorization Other   Other Status No Post-Acute Service Needs

## 2019-08-08 LAB
ALBUMIN SERPL BCP-MCNC: 3.3 G/DL (ref 3.5–5.2)
ALP SERPL-CCNC: 62 U/L (ref 55–135)
ALT SERPL W/O P-5'-P-CCNC: 16 U/L (ref 10–44)
ANION GAP SERPL CALC-SCNC: 8 MMOL/L (ref 8–16)
ANISOCYTOSIS BLD QL SMEAR: ABNORMAL
AST SERPL-CCNC: 27 U/L (ref 10–40)
BASO STIPL BLD QL SMEAR: ABNORMAL
BASOPHILS # BLD AUTO: 0.09 K/UL (ref 0–0.2)
BASOPHILS NFR BLD: 0.7 % (ref 0–1.9)
BILIRUB SERPL-MCNC: 2.6 MG/DL (ref 0.1–1)
BUN SERPL-MCNC: 5 MG/DL (ref 6–20)
CALCIUM SERPL-MCNC: 8.5 MG/DL (ref 8.7–10.5)
CHLORIDE SERPL-SCNC: 110 MMOL/L (ref 95–110)
CO2 SERPL-SCNC: 22 MMOL/L (ref 23–29)
CREAT SERPL-MCNC: 0.7 MG/DL (ref 0.5–1.4)
DIFFERENTIAL METHOD: ABNORMAL
EOSINOPHIL # BLD AUTO: 1.2 K/UL (ref 0–0.5)
EOSINOPHIL NFR BLD: 8.5 % (ref 0–8)
ERYTHROCYTE [DISTWIDTH] IN BLOOD BY AUTOMATED COUNT: 24.9 % (ref 11.5–14.5)
EST. GFR  (AFRICAN AMERICAN): >60 ML/MIN/1.73 M^2
EST. GFR  (NON AFRICAN AMERICAN): >60 ML/MIN/1.73 M^2
GLUCOSE SERPL-MCNC: 91 MG/DL (ref 70–110)
HCT VFR BLD AUTO: 29.5 % (ref 40–54)
HGB BLD-MCNC: 9.9 G/DL (ref 14–18)
HYPOCHROMIA BLD QL SMEAR: ABNORMAL
IMM GRANULOCYTES # BLD AUTO: 0.65 K/UL (ref 0–0.04)
IMM GRANULOCYTES NFR BLD AUTO: 4.8 % (ref 0–0.5)
LYMPHOCYTES # BLD AUTO: 3.1 K/UL (ref 1–4.8)
LYMPHOCYTES NFR BLD: 22.9 % (ref 18–48)
MCH RBC QN AUTO: 29.8 PG (ref 27–31)
MCHC RBC AUTO-ENTMCNC: 33.6 G/DL (ref 32–36)
MCV RBC AUTO: 89 FL (ref 82–98)
MONOCYTES # BLD AUTO: 1.2 K/UL (ref 0.3–1)
MONOCYTES NFR BLD: 8.6 % (ref 4–15)
NEUTROPHILS # BLD AUTO: 7.4 K/UL (ref 1.8–7.7)
NEUTROPHILS NFR BLD: 54.5 % (ref 38–73)
NRBC BLD-RTO: 2 /100 WBC
OVALOCYTES BLD QL SMEAR: ABNORMAL
PLATELET # BLD AUTO: 328 K/UL (ref 150–350)
PLATELET BLD QL SMEAR: ABNORMAL
PMV BLD AUTO: 10.5 FL (ref 9.2–12.9)
POIKILOCYTOSIS BLD QL SMEAR: ABNORMAL
POLYCHROMASIA BLD QL SMEAR: ABNORMAL
POTASSIUM SERPL-SCNC: 4 MMOL/L (ref 3.5–5.1)
PROT SERPL-MCNC: 6.3 G/DL (ref 6–8.4)
RBC # BLD AUTO: 3.32 M/UL (ref 4.6–6.2)
SCHISTOCYTES BLD QL SMEAR: ABNORMAL
SICKLE CELLS BLD QL SMEAR: ABNORMAL
SODIUM SERPL-SCNC: 140 MMOL/L (ref 136–145)
TARGETS BLD QL SMEAR: ABNORMAL
WBC # BLD AUTO: 13.64 K/UL (ref 3.9–12.7)

## 2019-08-08 PROCEDURE — 25000003 PHARM REV CODE 250: Performed by: INTERNAL MEDICINE

## 2019-08-08 PROCEDURE — 99232 PR SUBSEQUENT HOSPITAL CARE,LEVL II: ICD-10-PCS | Mod: ,,, | Performed by: INTERNAL MEDICINE

## 2019-08-08 PROCEDURE — S5010 5% DEXTROSE AND 0.45% SALINE: HCPCS | Performed by: HOSPITALIST

## 2019-08-08 PROCEDURE — 99232 SBSQ HOSP IP/OBS MODERATE 35: CPT | Mod: ,,, | Performed by: INTERNAL MEDICINE

## 2019-08-08 PROCEDURE — 85025 COMPLETE CBC W/AUTO DIFF WBC: CPT

## 2019-08-08 PROCEDURE — 25000003 PHARM REV CODE 250: Performed by: HOSPITALIST

## 2019-08-08 PROCEDURE — 11000001 HC ACUTE MED/SURG PRIVATE ROOM

## 2019-08-08 PROCEDURE — 94761 N-INVAS EAR/PLS OXIMETRY MLT: CPT

## 2019-08-08 PROCEDURE — 36415 COLL VENOUS BLD VENIPUNCTURE: CPT

## 2019-08-08 PROCEDURE — 63600175 PHARM REV CODE 636 W HCPCS: Performed by: INTERNAL MEDICINE

## 2019-08-08 PROCEDURE — 80053 COMPREHEN METABOLIC PANEL: CPT

## 2019-08-08 RX ORDER — IBUPROFEN 400 MG/1
800 TABLET ORAL 3 TIMES DAILY
Status: COMPLETED | OUTPATIENT
Start: 2019-08-08 | End: 2019-08-11

## 2019-08-08 RX ADMIN — HYDROMORPHONE HYDROCHLORIDE 1 MG: 1 INJECTION, SOLUTION INTRAMUSCULAR; INTRAVENOUS; SUBCUTANEOUS at 10:08

## 2019-08-08 RX ADMIN — FOLIC ACID 1 MG: 1 TABLET ORAL at 10:08

## 2019-08-08 RX ADMIN — DEXTROSE MONOHYDRATE AND SODIUM CHLORIDE: 5; .45 INJECTION, SOLUTION INTRAVENOUS at 04:08

## 2019-08-08 RX ADMIN — HYDROMORPHONE HYDROCHLORIDE 0.5 MG: 1 INJECTION, SOLUTION INTRAMUSCULAR; INTRAVENOUS; SUBCUTANEOUS at 12:08

## 2019-08-08 RX ADMIN — HYDROMORPHONE HYDROCHLORIDE 1 MG: 1 INJECTION, SOLUTION INTRAMUSCULAR; INTRAVENOUS; SUBCUTANEOUS at 03:08

## 2019-08-08 RX ADMIN — HYDROXYUREA 500 MG: 500 CAPSULE ORAL at 10:08

## 2019-08-08 RX ADMIN — HYDROMORPHONE HYDROCHLORIDE 1 MG: 1 INJECTION, SOLUTION INTRAMUSCULAR; INTRAVENOUS; SUBCUTANEOUS at 09:08

## 2019-08-08 RX ADMIN — IBUPROFEN 800 MG: 400 TABLET, FILM COATED ORAL at 09:08

## 2019-08-08 RX ADMIN — MORPHINE SULFATE 45 MG: 30 TABLET, EXTENDED RELEASE ORAL at 08:08

## 2019-08-08 NOTE — PLAN OF CARE
Problem: Adult Inpatient Plan of Care  Goal: Plan of Care Review  Outcome: Ongoing (interventions implemented as appropriate)     08/08/19 0532   Plan of Care Review   Plan of Care Reviewed With patient   Progress no change   Pt A+Ox4, calm and cooperative throughout shift. PRN IV dilaudid given for pain. Pt ambulating independently, gait steady. Voiding w/o difficulty. Safety maintained throughout shift.

## 2019-08-08 NOTE — SUBJECTIVE & OBJECTIVE
Medications:  Continuous Infusions:   dextrose 5 % and 0.45 % NaCl 125 mL/hr at 19 1620     Scheduled Meds:   folic acid  1 mg Oral Daily    hydroxyurea  500 mg Oral Daily    morphine  45 mg Oral Q12H    polyethylene glycol  17 g Oral Daily    senna-docusate 8.6-50 mg  1 tablet Oral BID     PRN Meds:albuterol sulfate, [] HYDROmorphone **FOLLOWED BY** HYDROmorphone **FOLLOWED BY** HYDROmorphone, morphine, naloxone, ondansetron, oxyCODONE, promethazine, sodium chloride 0.9%, sodium chloride 0.9%     Review of patient's allergies indicates:   Allergen Reactions    Adhesive Itching    Contrast media Shortness Of Breath    Rocephin [ceftriaxone] Hives    Sulfa (sulfonamide antibiotics) Anaphylaxis    Toradol [ketorolac] Shortness Of Breath    Vancomycin analogues Shortness Of Breath    Fentanyl Hives        Past Medical History:   Diagnosis Date    Asthma     AVN (avascular necrosis of bone)     Encounter for blood transfusion     Irregular heart rhythm     Malingering 3/11/2016    Mild intermittent asthma without complication 2015    Seasonal allergies     Sickle cell anemia     Stroke      Past Surgical History:   Procedure Laterality Date    ABDOMINAL SURGERY      CHOLECYSTECTOMY      HIP SURGERY Left 2014    Hip Decompression    OTHER SURGICAL HISTORY      left rotater cuff repair    PORTACATH PLACEMENT      portacath removed   2014    SHOULDER ARTHROSCOPY W/ SUBACROMIAL DECOMPRESSION AND DISTAL CLAVICLE EXCISION       Family History     Problem Relation (Age of Onset)    Diabetes Father    Mental illness Brother    Sickle cell anemia Mother        Tobacco Use    Smoking status: Never Smoker    Smokeless tobacco: Never Used   Substance and Sexual Activity    Alcohol use: No    Drug use: Yes     Types: Marijuana    Sexual activity: Yes     Partners: Female     Birth control/protection: Condom       Review of Systems   Constitutional: Negative for chills,  fatigue and fever.   HENT: Negative for nosebleeds, sinus pressure and sinus pain.    Eyes: Negative for photophobia, pain and visual disturbance.   Respiratory: Negative for cough, chest tightness and shortness of breath.    Cardiovascular: Negative for chest pain and leg swelling.   Gastrointestinal: Negative for abdominal distention and abdominal pain.   Genitourinary: Negative for decreased urine volume, dysuria and hematuria.   Musculoskeletal: Positive for arthralgias and back pain.   Skin: Negative for pallor and rash.   Neurological: Negative for numbness and headaches.     Objective:     Vital Signs (Most Recent):  Temp: 97.3 °F (36.3 °C) (08/08/19 1642)  Pulse: 67 (08/08/19 1642)  Resp: 18 (08/08/19 1642)  BP: (!) 115/57 (08/08/19 1642)  SpO2: 97 % (08/08/19 1642) Vital Signs (24h Range):  Temp:  [97.2 °F (36.2 °C)-98.6 °F (37 °C)] 97.3 °F (36.3 °C)  Pulse:  [65-82] 67  Resp:  [16-20] 18  SpO2:  [97 %-100 %] 97 %  BP: (115-139)/(57-81) 115/57     Weight: 72.9 kg (160 lb 11.4 oz)  Body mass index is 23.73 kg/m².  Body surface area is 1.88 meters squared.      Intake/Output Summary (Last 24 hours) at 8/8/2019 1711  Last data filed at 8/7/2019 2300  Gross per 24 hour   Intake 480 ml   Output 850 ml   Net -370 ml       Physical Exam   Constitutional: He is oriented to person, place, and time. He appears well-developed and well-nourished. He appears distressed.   HENT:   Head: Normocephalic and atraumatic.   Eyes: Pupils are equal, round, and reactive to light. Conjunctivae and EOM are normal. No scleral icterus.   Neck: Normal range of motion. Neck supple.   Cardiovascular: Normal rate and regular rhythm.   No murmur heard.  Pulmonary/Chest: Effort normal. No respiratory distress. He exhibits no tenderness.   Abdominal: Soft. Bowel sounds are normal. He exhibits no distension. There is no tenderness.   Musculoskeletal: Normal range of motion. He exhibits tenderness. He exhibits no edema or deformity.    Neurological: He is alert and oriented to person, place, and time.   Skin: Skin is warm and dry. No rash noted. No erythema.       Significant Labs:   CBC:   Recent Labs   Lab 08/07/19  1059 08/08/19  1139   WBC 12.68 13.64*   HGB 9.4* 9.9*   HCT 28.0* 29.5*    328    and CMP:   Recent Labs   Lab 08/07/19  1059 08/08/19  0808    140   K 3.8 4.0   * 110   CO2 22* 22*   GLU 76 91   BUN 5* 5*   CREATININE 0.7 0.7   CALCIUM 8.5* 8.5*   PROT 6.2 6.3   ALBUMIN 3.4* 3.3*   BILITOT 2.5* 2.6*   ALKPHOS 52* 62   AST 23 27   ALT 14 16   ANIONGAP 8 8   EGFRNONAA >60.0 >60.0     Reading Physician Reading Date Result Priority   John Villa MD 7/17/2019       Narrative     EXAMINATION:  MRI HIP WITHOUT CONTRAST RIGHT    CLINICAL HISTORY:  avascular necrosis of R hip;    TECHNIQUE:  Routine MRI evaluation of the right hip performed without contrast.    COMPARISON:  Radiograph 07/17/2019.  MRI 03/12/2015.    FINDINGS:  Labrum: Normal morphology and signal intensity.  No imaging findings to suggest chondrolabral separation.  No paralabral cyst.    Bones: There is a left hip arthroplasty.  There is osseous stigmata of sickle cell disease including diffuse T1 signal hyperintensity throughout the visualized osseous structures and biconcave appearance of the lower lumbar vertebra.  There is a well-circumscribed, serpiginous area of abnormal marrow signal intensity involving at least 50% of the articular surface.  Additional serpiginous area of abnormal marrow signal intensity noted within the right iliac crest.  Partially visualized focal area of T2 signal hyperintensity within the right proximal femoral shaft, possibly representing an additional area of avascular necrosis.  No imaging findings to suggest subchondral plate collapse.    Cartilage: No partial or full-thickness cartilage defects.    Muscles: Normal bulk and signal intensity.    Tendons: Iliopsoas, rectus femoris, gluteus medius, gluteus minimus,  hamstring and adductor tendons are normal.    Miscellaneous: There is asymmetric narrowing of the left ischiofemoral space with mild suspected mass effect on the adjacent quadratus femoris, not well evaluated due to surrounding artifact from the adjacent hip prosthesis.  Round ligament is normal.  No joint effusions.  Limited evaluation of the pelvic structures demonstrates no significant abnormalities.      Impression       1. Focal area of avascular necrosis involving at least 50% of the articular surface.  No imaging findings to suggest subchondral plate collapse.  2. Additional focal area of avascular necrosis within the right iliac crest and possibly within the right proximal femoral shaft, the latter which is not well evaluated secondary to partial visualization.  3. Osseous stigmata of sickle cell disease, similar when compared to multiple previous exams.

## 2019-08-08 NOTE — PLAN OF CARE
08/08/19 0946   Post-Acute Status   Post-Acute Authorization Other   Other Status No Post-Acute Service Needs

## 2019-08-08 NOTE — CONSULTS
"[facility]  Hematology/Oncology  Consult Note    Patient Name: Paddy Stevenson  MRN: [id number]  Admission Date: 2019  Hospital Length of Stay: 2 days  Code Status: Full Code   Attending Provider: Peace Gonzalez MD  Consulting Provider: Wing Marks MD  Primary Care Physician: [facility]  Principal Problem:Vaso-occlusive sickle cell crisis    Inpatient consult to Hematology/Oncology  Consult performed by: Wing Marks MD  Consult ordered by: Peace Gonzalez MD        Subjective:     HPI:   25 y/o M with PMH sickle cell anemia and AVN of bilateral hips s/p L hip replacement 2 years ago presents c/o R hip, back, and thigh pain. Patient was recently admitted to [facility] from  to 2019 for sickle cell pain crisis and left AMA once his Dilaudid dose was decreased. Of note, patient has had 20+ Ed visits/admissions in the past few months for hip pain, pain crises, and on multiple occasions, displayed drug-seeking behaviors.    Hematology-oncology consulted for further management of sickle-cell pain which has not improved despite fluids, folate, hydrea, and morphine + dilaudid. Patient has never taken high-dose NSAIDs for sickle cell pain. He expresses his frustration with current treatment, disease, and feeling like "no one is listening to him". He reports that 2mg IV dilaudid always takes his pain away, but that the 1mg dose he is currently getting is "doing nothing".      Medications:  Continuous Infusions:   dextrose 5 % and 0.45 % NaCl 125 mL/hr at 19 1620     Scheduled Meds:   folic acid  1 mg Oral Daily    hydroxyurea  500 mg Oral Daily    morphine  45 mg Oral Q12H    polyethylene glycol  17 g Oral Daily    senna-docusate 8.6-50 mg  1 tablet Oral BID     PRN Meds:albuterol sulfate, [] HYDROmorphone **FOLLOWED BY** HYDROmorphone **FOLLOWED BY** HYDROmorphone, morphine, naloxone, ondansetron, oxyCODONE, promethazine, sodium chloride " 0.9%, sodium chloride 0.9%     Review of patient's allergies indicates:   Allergen Reactions    Adhesive Itching    Contrast media Shortness Of Breath    Rocephin [ceftriaxone] Hives    Sulfa (sulfonamide antibiotics) Anaphylaxis    Toradol [ketorolac] Shortness Of Breath    Vancomycin analogues Shortness Of Breath    Fentanyl Hives        Past Medical History:   Diagnosis Date    Asthma     AVN (avascular necrosis of bone)     Encounter for blood transfusion     Irregular heart rhythm 2010    Malingering 3/11/2016    Mild intermittent asthma without complication 11/16/2015    Seasonal allergies     Sickle cell anemia     Stroke      Past Surgical History:   Procedure Laterality Date    ABDOMINAL SURGERY      CHOLECYSTECTOMY      HIP SURGERY Left 2014    Hip Decompression    OTHER SURGICAL HISTORY      left rotater cuff repair    PORTACATH PLACEMENT      portacath removed   2/2014    SHOULDER ARTHROSCOPY W/ SUBACROMIAL DECOMPRESSION AND DISTAL CLAVICLE EXCISION       Family History     Problem Relation (Age of Onset)    Diabetes Father    Mental illness Brother    Sickle cell anemia Mother        Tobacco Use    Smoking status: Never Smoker    Smokeless tobacco: Never Used   Substance and Sexual Activity    Alcohol use: No    Drug use: Yes     Types: Marijuana    Sexual activity: Yes     Partners: Female     Birth control/protection: Condom       Review of Systems   Constitutional: Negative for chills, fatigue and fever.   HENT: Negative for nosebleeds, sinus pressure and sinus pain.    Eyes: Negative for photophobia, pain and visual disturbance.   Respiratory: Negative for cough, chest tightness and shortness of breath.    Cardiovascular: Negative for chest pain and leg swelling.   Gastrointestinal: Negative for abdominal distention and abdominal pain.   Genitourinary: Negative for decreased urine volume, dysuria and hematuria.   Musculoskeletal: Positive for arthralgias and back pain.    Skin: Negative for pallor and rash.   Neurological: Negative for numbness and headaches.     Objective:     Vital Signs (Most Recent):  Temp: 97.3 °F (36.3 °C) (08/08/19 1642)  Pulse: 67 (08/08/19 1642)  Resp: 18 (08/08/19 1642)  BP: (!) 115/57 (08/08/19 1642)  SpO2: 97 % (08/08/19 1642) Vital Signs (24h Range):  Temp:  [97.2 °F (36.2 °C)-98.6 °F (37 °C)] 97.3 °F (36.3 °C)  Pulse:  [65-82] 67  Resp:  [16-20] 18  SpO2:  [97 %-100 %] 97 %  BP: (115-139)/(57-81) 115/57     Weight: 72.9 kg (160 lb 11.4 oz)  Body mass index is 23.73 kg/m².  Body surface area is 1.88 meters squared.      Intake/Output Summary (Last 24 hours) at 8/8/2019 1711  Last data filed at 8/7/2019 2300  Gross per 24 hour   Intake 480 ml   Output 850 ml   Net -370 ml       Physical Exam   Constitutional: He is oriented to person, place, and time. He appears well-developed and well-nourished. He appears distressed.   HENT:   Head: Normocephalic and atraumatic.   Eyes: Pupils are equal, round, and reactive to light. Conjunctivae and EOM are normal. No scleral icterus.   Neck: Normal range of motion. Neck supple.   Cardiovascular: Normal rate and regular rhythm.   No murmur heard.  Pulmonary/Chest: Effort normal. No respiratory distress. He exhibits no tenderness.   Abdominal: Soft. Bowel sounds are normal. He exhibits no distension. There is no tenderness.   Musculoskeletal: Normal range of motion. He exhibits tenderness. He exhibits no edema or deformity.   Neurological: He is alert and oriented to person, place, and time.   Skin: Skin is warm and dry. No rash noted. No erythema.       Significant Labs:   CBC:   Recent Labs   Lab 08/07/19  1059 08/08/19  1139   WBC 12.68 13.64*   HGB 9.4* 9.9*   HCT 28.0* 29.5*    328    and CMP:   Recent Labs   Lab 08/07/19  1059 08/08/19  0808    140   K 3.8 4.0   * 110   CO2 22* 22*   GLU 76 91   BUN 5* 5*   CREATININE 0.7 0.7   CALCIUM 8.5* 8.5*   PROT 6.2 6.3   ALBUMIN 3.4* 3.3*   BILITOT  2.5* 2.6*   ALKPHOS 52* 62   AST 23 27   ALT 14 16   ANIONGAP 8 8   EGFRNONAA >60.0 >60.0     Reading Physician Reading Date Result Priority   John Villa MD 7/17/2019       Narrative     EXAMINATION:  MRI HIP WITHOUT CONTRAST RIGHT    CLINICAL HISTORY:  avascular necrosis of R hip;    TECHNIQUE:  Routine MRI evaluation of the right hip performed without contrast.    COMPARISON:  Radiograph 07/17/2019.  MRI 03/12/2015.    FINDINGS:  Labrum: Normal morphology and signal intensity.  No imaging findings to suggest chondrolabral separation.  No paralabral cyst.    Bones: There is a left hip arthroplasty.  There is osseous stigmata of sickle cell disease including diffuse T1 signal hyperintensity throughout the visualized osseous structures and biconcave appearance of the lower lumbar vertebra.  There is a well-circumscribed, serpiginous area of abnormal marrow signal intensity involving at least 50% of the articular surface.  Additional serpiginous area of abnormal marrow signal intensity noted within the right iliac crest.  Partially visualized focal area of T2 signal hyperintensity within the right proximal femoral shaft, possibly representing an additional area of avascular necrosis.  No imaging findings to suggest subchondral plate collapse.    Cartilage: No partial or full-thickness cartilage defects.    Muscles: Normal bulk and signal intensity.    Tendons: Iliopsoas, rectus femoris, gluteus medius, gluteus minimus, hamstring and adductor tendons are normal.    Miscellaneous: There is asymmetric narrowing of the left ischiofemoral space with mild suspected mass effect on the adjacent quadratus femoris, not well evaluated due to surrounding artifact from the adjacent hip prosthesis.  Round ligament is normal.  No joint effusions.  Limited evaluation of the pelvic structures demonstrates no significant abnormalities.      Impression       1. Focal area of avascular necrosis involving at least 50% of the  articular surface.  No imaging findings to suggest subchondral plate collapse.  2. Additional focal area of avascular necrosis within the right iliac crest and possibly within the right proximal femoral shaft, the latter which is not well evaluated secondary to partial visualization.  3. Osseous stigmata of sickle cell disease, similar when compared to multiple previous exams.             Assessment/Plan:     Sickle cell pain crisis  *acute on chronic  -20+ ED visits and admissions in the past few months for similar problems  -history of AVN of bilateral femur s/p L GAYLA; significant AVN in R hip documented on recent MRI  -currently treated w/ folate, hydrea (reports compliance at home, though MCV normal and is typically elevated in pts on hydrea)  -Getting morphine 45 mg PO q12 scheduled, Dilaudid 1mg IV q6 PRN  -hgb 9.9 currently, no concern for acute chest syndrome    Overall picture suggests suboptimal regimen (no NSAIDs), possible noncompliance, and likely significant opiate dependence and/or tolerance.    Recs:  Continue current regimen  -begin Ibuprofen 800mg q8 scheduled  -monitor renal function        Thank you for your consult. I will follow-up with patient. Please contact us if you have any additional questions.    Wing Marks MD  Hematology/Oncology  [facility]

## 2019-08-08 NOTE — ASSESSMENT & PLAN NOTE
*acute on chronic  -20+ ED visits and admissions in the past few months for similar problems  -history of AVN of bilateral femur s/p L GAYLA; significant AVN in R hip documented on recent MRI  -currently treated w/ folate, hydrea (reports compliance at home, though MCV normal and is typically elevated in pts on hydrea)  -Getting morphine 45 mg PO q12 scheduled, Dilaudid 1mg IV q6 PRN  -hgb 9.9 currently, no concern for acute chest syndrome    Overall picture suggests suboptimal regimen (no NSAIDs), possible noncompliance, and likely significant opiate dependence and/or tolerance.    Recs:  Continue current regimen  -begin Ibuprofen 800mg q8 scheduled  -monitor renal function

## 2019-08-08 NOTE — HPI
" 25 y/o M with PMH sickle cell anemia and AVN of bilateral hips s/p L hip replacement 2 years ago presents c/o R hip, back, and thigh pain. Patient was recently admitted to [facility] from 07/26 to 08/01/2019 for sickle cell pain crisis and left AMA once his Dilaudid dose was decreased. Of note, patient has had 20+ Ed visits/admissions in the past few months for hip pain, pain crises, and on multiple occasions, displayed drug-seeking behaviors.    Hematology-oncology consulted for further management of sickle-cell pain which has not improved despite fluids, folate, hydrea, and morphine + dilaudid. Patient has never taken high-dose NSAIDs for sickle cell pain. He expresses his frustration with current treatment, disease, and feeling like "no one is listening to him". He reports that 2mg IV dilaudid always takes his pain away, but that the 1mg dose he is currently getting is "doing nothing".  "

## 2019-08-09 PROCEDURE — S5010 5% DEXTROSE AND 0.45% SALINE: HCPCS | Performed by: HOSPITALIST

## 2019-08-09 PROCEDURE — 63600175 PHARM REV CODE 636 W HCPCS: Performed by: INTERNAL MEDICINE

## 2019-08-09 PROCEDURE — 99232 SBSQ HOSP IP/OBS MODERATE 35: CPT | Mod: ,,, | Performed by: INTERNAL MEDICINE

## 2019-08-09 PROCEDURE — 99232 PR SUBSEQUENT HOSPITAL CARE,LEVL II: ICD-10-PCS | Mod: ,,, | Performed by: INTERNAL MEDICINE

## 2019-08-09 PROCEDURE — 11000001 HC ACUTE MED/SURG PRIVATE ROOM

## 2019-08-09 PROCEDURE — 25000003 PHARM REV CODE 250: Performed by: HOSPITALIST

## 2019-08-09 PROCEDURE — 25000003 PHARM REV CODE 250: Performed by: INTERNAL MEDICINE

## 2019-08-09 RX ORDER — MORPHINE SULFATE 15 MG/1
15 TABLET ORAL EVERY 4 HOURS PRN
Status: DISCONTINUED | OUTPATIENT
Start: 2019-08-09 | End: 2019-08-12

## 2019-08-09 RX ORDER — HYDROMORPHONE HYDROCHLORIDE 1 MG/ML
1 INJECTION, SOLUTION INTRAMUSCULAR; INTRAVENOUS; SUBCUTANEOUS EVERY 6 HOURS PRN
Status: DISCONTINUED | OUTPATIENT
Start: 2019-08-09 | End: 2019-08-10

## 2019-08-09 RX ORDER — MORPHINE SULFATE 15 MG/1
15 TABLET ORAL EVERY 4 HOURS PRN
Status: DISCONTINUED | OUTPATIENT
Start: 2019-08-09 | End: 2019-08-09

## 2019-08-09 RX ADMIN — HYDROXYUREA 500 MG: 500 CAPSULE ORAL at 09:08

## 2019-08-09 RX ADMIN — HYDROMORPHONE HYDROCHLORIDE 0.5 MG: 1 INJECTION, SOLUTION INTRAMUSCULAR; INTRAVENOUS; SUBCUTANEOUS at 03:08

## 2019-08-09 RX ADMIN — HYDROMORPHONE HYDROCHLORIDE 1 MG: 1 INJECTION, SOLUTION INTRAMUSCULAR; INTRAVENOUS; SUBCUTANEOUS at 07:08

## 2019-08-09 RX ADMIN — MORPHINE SULFATE 45 MG: 30 TABLET, EXTENDED RELEASE ORAL at 09:08

## 2019-08-09 RX ADMIN — DEXTROSE MONOHYDRATE AND SODIUM CHLORIDE: 5; .45 INJECTION, SOLUTION INTRAVENOUS at 09:08

## 2019-08-09 RX ADMIN — POLYETHYLENE GLYCOL 3350 17 G: 17 POWDER, FOR SOLUTION ORAL at 09:08

## 2019-08-09 RX ADMIN — HYDROMORPHONE HYDROCHLORIDE 0.5 MG: 1 INJECTION, SOLUTION INTRAMUSCULAR; INTRAVENOUS; SUBCUTANEOUS at 09:08

## 2019-08-09 RX ADMIN — FOLIC ACID 1 MG: 1 TABLET ORAL at 09:08

## 2019-08-09 RX ADMIN — MORPHINE SULFATE 15 MG: 15 TABLET ORAL at 12:08

## 2019-08-09 RX ADMIN — HYDROMORPHONE HYDROCHLORIDE 1 MG: 1 INJECTION, SOLUTION INTRAMUSCULAR; INTRAVENOUS; SUBCUTANEOUS at 01:08

## 2019-08-09 RX ADMIN — IBUPROFEN 800 MG: 400 TABLET, FILM COATED ORAL at 03:08

## 2019-08-09 RX ADMIN — IBUPROFEN 800 MG: 400 TABLET, FILM COATED ORAL at 09:08

## 2019-08-09 RX ADMIN — DEXTROSE MONOHYDRATE AND SODIUM CHLORIDE: 5; .45 INJECTION, SOLUTION INTRAVENOUS at 07:08

## 2019-08-09 RX ADMIN — IBUPROFEN 800 MG: 400 TABLET, FILM COATED ORAL at 10:08

## 2019-08-09 RX ADMIN — SENNOSIDES,DOCUSATE SODIUM 1 TABLET: 8.6; 5 TABLET, FILM COATED ORAL at 10:08

## 2019-08-09 RX ADMIN — MORPHINE SULFATE 45 MG: 30 TABLET, EXTENDED RELEASE ORAL at 10:08

## 2019-08-09 RX ADMIN — SENNOSIDES,DOCUSATE SODIUM 1 TABLET: 8.6; 5 TABLET, FILM COATED ORAL at 09:08

## 2019-08-09 NOTE — PLAN OF CARE
Problem: Adult Inpatient Plan of Care  Goal: Plan of Care Review  Outcome: Ongoing (interventions implemented as appropriate)     08/09/19 9030   Plan of Care Review   Plan of Care Reviewed With patient   Progress no change   Pt A+Ox4, calm and cooperative throughout shift. PRN IV dilaudid and PO morphine IR given for pain. Tolerating diet, denies n/v. Voiding w/o difficulty. Refusing scheduled senna, reporting no difficulty w/ BM. Safety maintained throughout shift.

## 2019-08-09 NOTE — PROGRESS NOTES
Hospital Medicine  Progress Note    Team: [facility]OS[facility] MED D Peace Gonzalez MD  Admit Date: 8/5/2019  OWEN 8/12/2019  Length of Stay:  LOS: 3 days   Code status: Full Code    Principal Problem:  Vaso-occlusive sickle cell crisis    HPI:  25 y/o M with PMH sickle cell anemia and AVN of bilateral hips s/p L hip replacement 2 years ago presents c/o R hip, back, and thigh pain. Patient was recently admitted to [facility] from 07/26 to 08/01/2019 for sickle cell pain crisis and left AMA once his Dilaudid dose was decreased.      Interval history:   Patient reports persistent pain, not improving with IV fluids. Not receiving ordered doses of opioids.    Review of Systems   Constitutional: Negative for fever.   Respiratory: Negative for shortness of breath.        Physical Exam  Temp:  [97.3 °F (36.3 °C)-98 °F (36.7 °C)]   Pulse:  [51-67]   Resp:  [12-20]   BP: (111-132)/(57-77)   SpO2:  [95 %-100 %]     Intake/Output Summary (Last 24 hours) at 8/9/2019 1614  Last data filed at 8/9/2019 0354  Gross per 24 hour   Intake --   Output 1800 ml   Net -1800 ml     Physical Exam   Constitutional: He is well-developed, well-nourished, and in no distress.   Eyes: Conjunctivae are normal.   Cardiovascular: Normal rate and regular rhythm.   Pulmonary/Chest: Effort normal.   Abdominal: Soft. Normal appearance.   Musculoskeletal: He exhibits no edema.   Neurological: He is alert.     Recent Labs   Lab 08/06/19 0446 08/07/19  1059 08/08/19  1139   WBC 14.21* 12.68 13.64*   HGB 9.2* 9.4* 9.9*   HCT 28.6* 28.0* 29.5*    346 328     Recent Labs   Lab 08/06/19  0446 08/07/19  1059 08/08/19  0808    141 140   K 3.3* 3.8 4.0   * 111* 110   CO2 22* 22* 22*   BUN 8 5* 5*   CREATININE 0.8 0.7 0.7   GLU 98 76 91   CALCIUM 8.3* 8.5* 8.5*     Recent Labs   Lab 08/06/19  0446 08/07/19  1059 08/08/19  0808   ALKPHOS 70 52* 62   ALT 13 14 16   AST 19 23 27   ALBUMIN 3.5 3.4* 3.3*   PROT 6.5 6.2 6.3   BILITOT 2.3* 2.5* 2.6*        Recent Labs      19  1059   CPK 59       Scheduled Meds:   folic acid  1 mg Oral Daily    hydroxyurea  500 mg Oral Daily    ibuprofen  800 mg Oral TID    morphine  45 mg Oral Q12H    polyethylene glycol  17 g Oral Daily    senna-docusate 8.6-50 mg  1 tablet Oral BID     Continuous Infusions:   dextrose 5 % and 0.45 % NaCl 125 mL/hr at 19 0940     As Needed:  albuterol sulfate, [] HYDROmorphone **FOLLOWED BY** [] HYDROmorphone **FOLLOWED BY** HYDROmorphone, morphine, naloxone, ondansetron, promethazine, sodium chloride 0.9%, sodium chloride 0.9%    Active Hospital Problems    Diagnosis  POA    *Vaso-occlusive sickle cell crisis [D57.00]  Yes    On hydroxyurea therapy [Z79.899]  Not Applicable    Body aches [R52]  Yes    Opioid dependence [F11.20]  Yes    Mild intermittent asthma without complication [J45.20]  Yes     Chronic    Avascular necrosis of bones of both hips [M87.051, M87.052]  Yes     Chronic    Sickle cell pain crisis [D57.00]  Yes      Resolved Hospital Problems   No resolved problems to display.       Overview of Hospital Course:  23 y/o M with PMH sickle cell anemia and AVN of bilateral hips s/p L hip replacement 2 years ago admitted for pain crisis.    Assessment and Plan:      Vasoocclusive sickle cell crisis:  Sickle Cell anemia:  -H&H on admission of 9.1/28.4 (baseline between 8-10)  -Retic count elevated to 14  -Supplemental oxygen  -D5 1/2 NS  -continue hydroxyurea and Folic acid  -pain management  -bowel regimen with Miralax and Pericolace while pt is receiving opioid medications.   -patient not responding to IV fluids as expected; consulted Heme/Onc. Agree with resumption of NSAIDs (patient previously refusing)  -patient without significant improvement; continuing IV fluids, home-dose MSContin, MSIR q4 prn, Dilaudid 1 mg IV q6 prn.      Leukocytosis  - CXR, UA normal  -start antibiotics if patient becomes febrile or hemodynamically unstable     Avascular necrosis of  bones of both hips  -S/p L hip replacement ~2 years ago  -MRI R hip (12/2018) at OSH: Avascular necrosis of the right femoral head without subchondral collapse. 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  -MRI on 7/17/19:Focal area of avascular necrosis involving at least 50% of the articular surface.  No imaging findings to suggest subchondral plate collapse.  2. Additional focal area of avascular necrosis within the right iliac crest and possibly within the right proximal femoral shaft, the latter which is not well evaluated secondary to partial visualization.  -Pt reports being followed by an Orthopedic surgeon in [place] with an appointment coming up. Currently no plans of surgery.      Mild intermittent asthma without complication  -Sats 98-99% on RA  -Controlled  -prn breathing treatments    High Risk Conditions  Patient is currently receiving parenteral controlled substances: Dilaudid     Diet:  Diet Adult Regular (IDDSI Level 7)  GI Prophylaxis: Not indicated  DVT Prophylaxis:     Anticoagulants   Medication Route Frequency     Lines/ Drains/ Airways: PIV  Urinary Catheter Indicated: Patient Does Not Have Urinary Catheter  Other Lines/Tubes/Drains:  Wounds:    Goals of Care: Understanding Illness and Limited Social Support  Discharge plan:  Home or Self Care    Peace Gonzalez MD  Department of Hospital Medicine  [id number]

## 2019-08-09 NOTE — PLAN OF CARE
08/09/19 0839   Post-Acute Status   Post-Acute Authorization Other   Other Status No Post-Acute Service Needs

## 2019-08-10 PROCEDURE — 25000003 PHARM REV CODE 250: Performed by: HOSPITALIST

## 2019-08-10 PROCEDURE — 11000001 HC ACUTE MED/SURG PRIVATE ROOM

## 2019-08-10 PROCEDURE — 99232 PR SUBSEQUENT HOSPITAL CARE,LEVL II: ICD-10-PCS | Mod: ,,, | Performed by: INTERNAL MEDICINE

## 2019-08-10 PROCEDURE — 99232 SBSQ HOSP IP/OBS MODERATE 35: CPT | Mod: ,,, | Performed by: INTERNAL MEDICINE

## 2019-08-10 PROCEDURE — S5010 5% DEXTROSE AND 0.45% SALINE: HCPCS | Performed by: HOSPITALIST

## 2019-08-10 PROCEDURE — 94761 N-INVAS EAR/PLS OXIMETRY MLT: CPT

## 2019-08-10 PROCEDURE — 63600175 PHARM REV CODE 636 W HCPCS: Performed by: INTERNAL MEDICINE

## 2019-08-10 PROCEDURE — 25000003 PHARM REV CODE 250: Performed by: INTERNAL MEDICINE

## 2019-08-10 RX ORDER — HYDROMORPHONE HYDROCHLORIDE 1 MG/ML
2 INJECTION, SOLUTION INTRAMUSCULAR; INTRAVENOUS; SUBCUTANEOUS EVERY 6 HOURS PRN
Status: DISCONTINUED | OUTPATIENT
Start: 2019-08-10 | End: 2019-08-12

## 2019-08-10 RX ORDER — HYDROMORPHONE HYDROCHLORIDE 1 MG/ML
2 INJECTION, SOLUTION INTRAMUSCULAR; INTRAVENOUS; SUBCUTANEOUS ONCE
Status: COMPLETED | OUTPATIENT
Start: 2019-08-10 | End: 2019-08-10

## 2019-08-10 RX ADMIN — HYDROMORPHONE HYDROCHLORIDE 2 MG: 1 INJECTION, SOLUTION INTRAMUSCULAR; INTRAVENOUS; SUBCUTANEOUS at 05:08

## 2019-08-10 RX ADMIN — DEXTROSE MONOHYDRATE AND SODIUM CHLORIDE: 5; .45 INJECTION, SOLUTION INTRAVENOUS at 03:08

## 2019-08-10 RX ADMIN — HYDROMORPHONE HYDROCHLORIDE 1 MG: 1 INJECTION, SOLUTION INTRAMUSCULAR; INTRAVENOUS; SUBCUTANEOUS at 01:08

## 2019-08-10 RX ADMIN — IBUPROFEN 800 MG: 400 TABLET, FILM COATED ORAL at 09:08

## 2019-08-10 RX ADMIN — MORPHINE SULFATE 45 MG: 30 TABLET, EXTENDED RELEASE ORAL at 10:08

## 2019-08-10 RX ADMIN — HYDROMORPHONE HYDROCHLORIDE 1 MG: 1 INJECTION, SOLUTION INTRAMUSCULAR; INTRAVENOUS; SUBCUTANEOUS at 07:08

## 2019-08-10 RX ADMIN — MORPHINE SULFATE 15 MG: 15 TABLET ORAL at 02:08

## 2019-08-10 RX ADMIN — MORPHINE SULFATE 45 MG: 30 TABLET, EXTENDED RELEASE ORAL at 09:08

## 2019-08-10 RX ADMIN — MORPHINE SULFATE 15 MG: 15 TABLET ORAL at 06:08

## 2019-08-10 RX ADMIN — SENNOSIDES,DOCUSATE SODIUM 1 TABLET: 8.6; 5 TABLET, FILM COATED ORAL at 09:08

## 2019-08-10 RX ADMIN — MORPHINE SULFATE 15 MG: 15 TABLET ORAL at 10:08

## 2019-08-10 RX ADMIN — HYDROXYUREA 500 MG: 500 CAPSULE ORAL at 10:08

## 2019-08-10 RX ADMIN — MORPHINE SULFATE 15 MG: 15 TABLET ORAL at 08:08

## 2019-08-10 RX ADMIN — IBUPROFEN 800 MG: 400 TABLET, FILM COATED ORAL at 10:08

## 2019-08-10 RX ADMIN — HYDROMORPHONE HYDROCHLORIDE 2 MG: 1 INJECTION, SOLUTION INTRAMUSCULAR; INTRAVENOUS; SUBCUTANEOUS at 11:08

## 2019-08-10 RX ADMIN — FOLIC ACID 1 MG: 1 TABLET ORAL at 10:08

## 2019-08-10 RX ADMIN — IBUPROFEN 800 MG: 400 TABLET, FILM COATED ORAL at 03:08

## 2019-08-10 RX ADMIN — HYDROMORPHONE HYDROCHLORIDE 2 MG: 1 INJECTION, SOLUTION INTRAMUSCULAR; INTRAVENOUS; SUBCUTANEOUS at 12:08

## 2019-08-10 NOTE — PROGRESS NOTES
Hospital Medicine  Progress Note    Team: [facility] [place] [facility] HOSP MED D Peace Gonzalez MD  Admit Date: 8/5/2019  OWEN 8/12/2019  Length of Stay:  LOS: 4 days   Code status: Full Code    Principal Problem:  Vaso-occlusive sickle cell crisis    HPI:  25 y/o M with PMH sickle cell anemia and AVN of bilateral hips s/p L hip replacement 2 years ago presents c/o R hip, back, and thigh pain. Patient was recently admitted to [facility] from 07/26 to 08/01/2019 for sickle cell pain crisis and left AMA once his Dilaudid dose was decreased.      Interval history:   Patient reports persistent pain, not improving with IV fluids. Not receiving ordered doses of opioids overnight.    Review of Systems   Constitutional: Negative for fever.   Respiratory: Negative for shortness of breath.        Physical Exam  Temp:  [97.5 °F (36.4 °C)-98.1 °F (36.7 °C)]   Pulse:  [52-59]   Resp:  [12-20]   BP: (113-152)/(58-88)   SpO2:  [97 %-100 %]     Intake/Output Summary (Last 24 hours) at 8/10/2019 1711  Last data filed at 8/10/2019 1543  Gross per 24 hour   Intake 425 ml   Output 3370 ml   Net -2945 ml     Physical Exam   Constitutional: He is well-developed, well-nourished, and in no distress.   Eyes: Conjunctivae are normal.   Cardiovascular: Normal rate and regular rhythm.   Pulmonary/Chest: Effort normal.   Abdominal: Soft. Normal appearance.   Musculoskeletal: He exhibits no edema.   Neurological: He is alert.     Recent Labs   Lab 08/06/19  0446 08/07/19  1059 08/08/19  1139   WBC 14.21* 12.68 13.64*   HGB 9.2* 9.4* 9.9*   HCT 28.6* 28.0* 29.5*    346 328     Recent Labs   Lab 08/06/19  0446 08/07/19  1059 08/08/19  0808    141 140   K 3.3* 3.8 4.0   * 111* 110   CO2 22* 22* 22*   BUN 8 5* 5*   CREATININE 0.8 0.7 0.7   GLU 98 76 91   CALCIUM 8.3* 8.5* 8.5*     Recent Labs   Lab 08/06/19  0446 08/07/19  1059 08/08/19  0808   ALKPHOS 70 52* 62   ALT 13 14 16   AST 19 23 27   ALBUMIN 3.5 3.4* 3.3*   PROT 6.5 6.2 6.3   BILITOT 2.3* 2.5* 2.6*        Scheduled Meds:   folic acid  1 mg Oral Daily    hydroxyurea  500 mg Oral Daily    ibuprofen  800 mg Oral TID    morphine  45 mg Oral Q12H    polyethylene glycol  17 g Oral Daily    senna-docusate 8.6-50 mg  1 tablet Oral BID     Continuous Infusions:   dextrose 5 % and 0.45 % NaCl 125 mL/hr at 08/10/19 1543     As Needed:  albuterol sulfate, [] HYDROmorphone **FOLLOWED BY** [] HYDROmorphone **FOLLOWED BY** HYDROmorphone, morphine, naloxone, ondansetron, promethazine, sodium chloride 0.9%, sodium chloride 0.9%    Active Hospital Problems    Diagnosis  POA    *Vaso-occlusive sickle cell crisis [D57.00]  Yes    On hydroxyurea therapy [Z79.899]  Not Applicable    Body aches [R52]  Yes    Opioid dependence [F11.20]  Yes    Mild intermittent asthma without complication [J45.20]  Yes     Chronic    Avascular necrosis of bones of both hips [M87.051, M87.052]  Yes     Chronic    Sickle cell pain crisis [D57.00]  Yes      Resolved Hospital Problems   No resolved problems to display.       Overview of Hospital Course:  23 y/o M with PMH sickle cell anemia and AVN of bilateral hips s/p L hip replacement 2 years ago admitted for pain crisis.    Assessment and Plan:      Vasoocclusive sickle cell crisis:  Sickle Cell anemia:  -H&H on admission of 9.1/28.4 (baseline between 8-10)  -Retic count elevated to 14  -Supplemental oxygen  -D5 1/2 NS  -continue hydroxyurea and Folic acid  -pain management  -bowel regimen with Miralax and Pericolace while pt is receiving opioid medications.   -patient not responding to IV fluids as expected; consulted Heme/Onc. Agree with resumption of NSAIDs (patient previously refusing)  -patient without significant improvement; continuing IV fluids, home-dose MSContin, MSIR q4 prn, Dilaudid 1 mg IV q6 prn.  -patient not receiving oral MSIR as ordered; increasing IV Dilaudid dose. Plan to follow up with Heme/Onc on Monday if no improvement.      Leukocytosis  - CXR, UA  normal  -start antibiotics if patient becomes febrile or hemodynamically unstable     Avascular necrosis of bones of both hips  -S/p L hip replacement ~2 years ago  -MRI R hip (12/2018) at OSH: Avascular necrosis of the right femoral head without subchondral collapse. 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  -MRI on 7/17/19:Focal area of avascular necrosis involving at least 50% of the articular surface.  No imaging findings to suggest subchondral plate collapse.  2. Additional focal area of avascular necrosis within the right iliac crest and possibly within the right proximal femoral shaft, the latter which is not well evaluated secondary to partial visualization.  -Pt reports being followed by an Orthopedic surgeon in [place] with an appointment coming up. Currently no plans of surgery.      Mild intermittent asthma without complication  -Sats 98-99% on RA  -Controlled  -prn breathing treatments    High Risk Conditions  Patient is currently receiving parenteral controlled substances: Dilaudid     Diet:  Diet Adult Regular (IDDSI Level 7)  GI Prophylaxis: Not indicated  DVT Prophylaxis:     Anticoagulants   Medication Route Frequency     Lines/ Drains/ Airways: PIV  Urinary Catheter Indicated: Patient Does Not Have Urinary Catheter  Other Lines/Tubes/Drains:  Wounds:    Goals of Care: Understanding Illness and Limited Social Support  Discharge plan:  Home or Self Care    Peace Gonzalez MD  Department of Hospital Medicine  [phone]

## 2019-08-10 NOTE — PLAN OF CARE
Problem: Adult Inpatient Plan of Care  Goal: Plan of Care Review  Outcome: Ongoing (interventions implemented as appropriate)     08/10/19 0821   Plan of Care Review   Plan of Care Reviewed With patient   Pt is A,A,O x 4 . Stable V/s. Pt C/O pain in his BLE 10/10 during the day, pt was restless and crying , PRN and schedule pain medications given and MD notified . Pt received stat dose of hydromorphone today and MD increase the dose of hydromorphone to 2 mg Q 6 hrs . Pt stated he is still hurting put his pain is 9/10 now . Pt slept sometime during the day . Pt still on continuous IVF . Pt is on O2 N/C 2 L .

## 2019-08-10 NOTE — PROGRESS NOTES
Pt C/O sever pain in his legs 10/10 ,pt was crying and restless in the bed . PRN pain medications given but no relief per pt , Dr. Gonzalez notified and she came and evaluated the pt at the bedside and she ordered 2 mg of hydromorphone Iv stat . medication given , will continue monitoring .

## 2019-08-10 NOTE — PLAN OF CARE
Problem: Adult Inpatient Plan of Care  Goal: Plan of Care Review  Outcome: Ongoing (interventions implemented as appropriate)     08/10/19 0699   Plan of Care Review   Plan of Care Reviewed With patient     Pain management ongoing, no distress/discomfort noted in pt, IVF ongoing at 125ml/hr as ordered, all precautions maintained.

## 2019-08-11 LAB
ALBUMIN SERPL BCP-MCNC: 3.5 G/DL (ref 3.5–5.2)
ALP SERPL-CCNC: 64 U/L (ref 55–135)
ALT SERPL W/O P-5'-P-CCNC: 35 U/L (ref 10–44)
ANION GAP SERPL CALC-SCNC: 9 MMOL/L (ref 8–16)
ANISOCYTOSIS BLD QL SMEAR: ABNORMAL
AST SERPL-CCNC: 42 U/L (ref 10–40)
BASO STIPL BLD QL SMEAR: ABNORMAL
BASOPHILS NFR BLD: 0 % (ref 0–1.9)
BILIRUB SERPL-MCNC: 5 MG/DL (ref 0.1–1)
BUN SERPL-MCNC: 7 MG/DL (ref 6–20)
BURR CELLS BLD QL SMEAR: ABNORMAL
CALCIUM SERPL-MCNC: 8.8 MG/DL (ref 8.7–10.5)
CHLORIDE SERPL-SCNC: 109 MMOL/L (ref 95–110)
CO2 SERPL-SCNC: 23 MMOL/L (ref 23–29)
CREAT SERPL-MCNC: 0.7 MG/DL (ref 0.5–1.4)
DACRYOCYTES BLD QL SMEAR: ABNORMAL
DIFFERENTIAL METHOD: ABNORMAL
EOSINOPHIL NFR BLD: 3 % (ref 0–8)
ERYTHROCYTE [DISTWIDTH] IN BLOOD BY AUTOMATED COUNT: 23.7 % (ref 11.5–14.5)
EST. GFR  (AFRICAN AMERICAN): >60 ML/MIN/1.73 M^2
EST. GFR  (NON AFRICAN AMERICAN): >60 ML/MIN/1.73 M^2
GLUCOSE SERPL-MCNC: 88 MG/DL (ref 70–110)
HCT VFR BLD AUTO: 26.1 % (ref 40–54)
HGB BLD-MCNC: 8.8 G/DL (ref 14–18)
HOWELL-JOLLY BOD BLD QL SMEAR: ABNORMAL
HYPOCHROMIA BLD QL SMEAR: ABNORMAL
IMM GRANULOCYTES # BLD AUTO: ABNORMAL K/UL (ref 0–0.04)
IMM GRANULOCYTES NFR BLD AUTO: ABNORMAL % (ref 0–0.5)
LDH SERPL L TO P-CCNC: 412 U/L (ref 110–260)
LYMPHOCYTES NFR BLD: 19 % (ref 18–48)
MCH RBC QN AUTO: 29.4 PG (ref 27–31)
MCHC RBC AUTO-ENTMCNC: 33.7 G/DL (ref 32–36)
MCV RBC AUTO: 87 FL (ref 82–98)
MONOCYTES NFR BLD: 8 % (ref 4–15)
MYELOCYTES NFR BLD MANUAL: 1 %
NEUTROPHILS NFR BLD: 69 % (ref 38–73)
NRBC BLD-RTO: 5 /100 WBC
OVALOCYTES BLD QL SMEAR: ABNORMAL
PAPPENHEIMER BOD BLD QL SMEAR: PRESENT
PLATELET # BLD AUTO: 302 K/UL (ref 150–350)
PLATELET BLD QL SMEAR: ABNORMAL
PMV BLD AUTO: 10.7 FL (ref 9.2–12.9)
POIKILOCYTOSIS BLD QL SMEAR: ABNORMAL
POLYCHROMASIA BLD QL SMEAR: ABNORMAL
POTASSIUM SERPL-SCNC: 3.6 MMOL/L (ref 3.5–5.1)
PROT SERPL-MCNC: 6.3 G/DL (ref 6–8.4)
RBC # BLD AUTO: 2.99 M/UL (ref 4.6–6.2)
RETICS/RBC NFR AUTO: 19 % (ref 0.4–2)
SCHISTOCYTES BLD QL SMEAR: ABNORMAL
SCHISTOCYTES BLD QL SMEAR: PRESENT
SICKLE CELLS BLD QL SMEAR: ABNORMAL
SODIUM SERPL-SCNC: 141 MMOL/L (ref 136–145)
TARGETS BLD QL SMEAR: ABNORMAL
WBC # BLD AUTO: 13.93 K/UL (ref 3.9–12.7)

## 2019-08-11 PROCEDURE — 83615 LACTATE (LD) (LDH) ENZYME: CPT

## 2019-08-11 PROCEDURE — S5010 5% DEXTROSE AND 0.45% SALINE: HCPCS | Performed by: HOSPITALIST

## 2019-08-11 PROCEDURE — 85007 BL SMEAR W/DIFF WBC COUNT: CPT

## 2019-08-11 PROCEDURE — 85045 AUTOMATED RETICULOCYTE COUNT: CPT

## 2019-08-11 PROCEDURE — 99232 PR SUBSEQUENT HOSPITAL CARE,LEVL II: ICD-10-PCS | Mod: ,,, | Performed by: INTERNAL MEDICINE

## 2019-08-11 PROCEDURE — 63600175 PHARM REV CODE 636 W HCPCS: Performed by: INTERNAL MEDICINE

## 2019-08-11 PROCEDURE — 99232 SBSQ HOSP IP/OBS MODERATE 35: CPT | Mod: ,,, | Performed by: INTERNAL MEDICINE

## 2019-08-11 PROCEDURE — 36415 COLL VENOUS BLD VENIPUNCTURE: CPT

## 2019-08-11 PROCEDURE — 80053 COMPREHEN METABOLIC PANEL: CPT

## 2019-08-11 PROCEDURE — 85027 COMPLETE CBC AUTOMATED: CPT

## 2019-08-11 PROCEDURE — 11000001 HC ACUTE MED/SURG PRIVATE ROOM

## 2019-08-11 PROCEDURE — 25000003 PHARM REV CODE 250: Performed by: HOSPITALIST

## 2019-08-11 PROCEDURE — 94761 N-INVAS EAR/PLS OXIMETRY MLT: CPT

## 2019-08-11 PROCEDURE — 25000003 PHARM REV CODE 250: Performed by: INTERNAL MEDICINE

## 2019-08-11 RX ADMIN — MORPHINE SULFATE 15 MG: 15 TABLET ORAL at 10:08

## 2019-08-11 RX ADMIN — MORPHINE SULFATE 15 MG: 15 TABLET ORAL at 11:08

## 2019-08-11 RX ADMIN — IBUPROFEN 800 MG: 400 TABLET, FILM COATED ORAL at 08:08

## 2019-08-11 RX ADMIN — HYDROMORPHONE HYDROCHLORIDE 2 MG: 1 INJECTION, SOLUTION INTRAMUSCULAR; INTRAVENOUS; SUBCUTANEOUS at 06:08

## 2019-08-11 RX ADMIN — DEXTROSE MONOHYDRATE AND SODIUM CHLORIDE: 5; .45 INJECTION, SOLUTION INTRAVENOUS at 12:08

## 2019-08-11 RX ADMIN — SENNOSIDES,DOCUSATE SODIUM 1 TABLET: 8.6; 5 TABLET, FILM COATED ORAL at 09:08

## 2019-08-11 RX ADMIN — HYDROXYUREA 500 MG: 500 CAPSULE ORAL at 11:08

## 2019-08-11 RX ADMIN — MORPHINE SULFATE 45 MG: 30 TABLET, EXTENDED RELEASE ORAL at 08:08

## 2019-08-11 RX ADMIN — MORPHINE SULFATE 45 MG: 30 TABLET, EXTENDED RELEASE ORAL at 09:08

## 2019-08-11 RX ADMIN — DEXTROSE MONOHYDRATE AND SODIUM CHLORIDE: 5; .45 INJECTION, SOLUTION INTRAVENOUS at 08:08

## 2019-08-11 RX ADMIN — DEXTROSE MONOHYDRATE AND SODIUM CHLORIDE: 5; .45 INJECTION, SOLUTION INTRAVENOUS at 05:08

## 2019-08-11 RX ADMIN — MORPHINE SULFATE 15 MG: 15 TABLET ORAL at 03:08

## 2019-08-11 RX ADMIN — HYDROMORPHONE HYDROCHLORIDE 2 MG: 1 INJECTION, SOLUTION INTRAMUSCULAR; INTRAVENOUS; SUBCUTANEOUS at 12:08

## 2019-08-11 RX ADMIN — IBUPROFEN 800 MG: 400 TABLET, FILM COATED ORAL at 03:08

## 2019-08-11 RX ADMIN — MORPHINE SULFATE 15 MG: 15 TABLET ORAL at 05:08

## 2019-08-11 RX ADMIN — FOLIC ACID 1 MG: 1 TABLET ORAL at 08:08

## 2019-08-11 NOTE — PLAN OF CARE
Problem: Adult Inpatient Plan of Care  Goal: Plan of Care Review  Outcome: Ongoing (interventions implemented as appropriate)     08/11/19 8451   Plan of Care Review   Plan of Care Reviewed With patient   Pt is A,A,O x 4 . Stable V/s. Pt C/o generalized pain 8-9/10 during the day and PRN and scheduled pain medications given . Pt remains calm and cooperative , no restless or crying during the day . Pt slept between care . Pt on continuous IVF . No falls or injuries .

## 2019-08-11 NOTE — PLAN OF CARE
Problem: Adult Inpatient Plan of Care  Goal: Plan of Care Review  Outcome: Ongoing (interventions implemented as appropriate)     08/11/19 0540   Plan of Care Review   Plan of Care Reviewed With patient      Pain management ongoing, no distress/discomfort noted in pt, IVF ongoing at 125ml/hr as ordered, pt continues on O2 at 2lpm via NC, all precautions maintained. Will continue to monitor pt

## 2019-08-11 NOTE — PROGRESS NOTES
Hospital Medicine  Progress Note    Team: [facility]OSP MED D Peace Gonzalez MD  Admit Date: 8/5/2019  OWEN 8/12/2019  Length of Stay:  LOS: 5 days   Code status: Full Code    Principal Problem:  Vaso-occlusive sickle cell crisis    HPI:  23 y/o M with PMH sickle cell anemia and AVN of bilateral hips s/p L hip replacement 2 years ago presents c/o R hip, back, and thigh pain. Patient was recently admitted to [facility] from 07/26 to 08/01/2019 for sickle cell pain crisis and left AMA once his Dilaudid dose was decreased.      Interval history:   Patient reports persistent pain, not improving with IV fluids. Lab values indicate increased T.bili and LDH. Decrease in H&H.    Review of Systems   Constitutional: Negative for fever.   Respiratory: Negative for shortness of breath.        Physical Exam  Temp:  [97.8 °F (36.6 °C)-98.2 °F (36.8 °C)]   Pulse:  [59-82]   Resp:  [16-20]   BP: (116-139)/(55-74)   SpO2:  [95 %-100 %]     Intake/Output Summary (Last 24 hours) at 8/11/2019 1535  Last data filed at 8/11/2019 1400  Gross per 24 hour   Intake 2905 ml   Output 3610 ml   Net -705 ml     Physical Exam   Constitutional: He is well-developed, well-nourished, and in no distress.   Eyes: Conjunctivae are normal.   Cardiovascular: Normal rate and regular rhythm.   Pulmonary/Chest: Effort normal.   Abdominal: Soft. Normal appearance.   Musculoskeletal: He exhibits no edema.   Neurological: He is alert.     Recent Labs   Lab 08/07/19  1059 08/08/19  1139 08/11/19  1043   WBC 12.68 13.64* 13.93*   HGB 9.4* 9.9* 8.8*   HCT 28.0* 29.5* 26.1*    328 302     Recent Labs   Lab 08/07/19  1059 08/08/19  0808 08/11/19  1044    140 141   K 3.8 4.0 3.6   * 110 109   CO2 22* 22* 23   BUN 5* 5* 7   CREATININE 0.7 0.7 0.7   GLU 76 91 88   CALCIUM 8.5* 8.5* 8.8     Recent Labs   Lab 08/07/19  1059 08/08/19  0808 08/11/19  1044   ALKPHOS 52* 62 64   ALT 14 16 35   AST 23 27 42*   ALBUMIN 3.4* 3.3* 3.5   PROT 6.2 6.3 6.3   BILITOT 2.5*  2.6* 5.0*       Scheduled Meds:   folic acid  1 mg Oral Daily    hydroxyurea  500 mg Oral Daily    morphine  45 mg Oral Q12H    polyethylene glycol  17 g Oral Daily    senna-docusate 8.6-50 mg  1 tablet Oral BID     Continuous Infusions:   dextrose 5 % and 0.45 % NaCl 125 mL/hr at 19 0829     As Needed:  albuterol sulfate, [] HYDROmorphone **FOLLOWED BY** [] HYDROmorphone **FOLLOWED BY** HYDROmorphone, morphine, naloxone, ondansetron, promethazine, sodium chloride 0.9%, sodium chloride 0.9%    Active Hospital Problems    Diagnosis  POA    *Vaso-occlusive sickle cell crisis [D57.00]  Yes    On hydroxyurea therapy [Z79.899]  Not Applicable    Body aches [R52]  Yes    Opioid dependence [F11.20]  Yes    Mild intermittent asthma without complication [J45.20]  Yes     Chronic    Avascular necrosis of bones of both hips [M87.051, M87.052]  Yes     Chronic    Sickle cell pain crisis [D57.00]  Yes      Resolved Hospital Problems   No resolved problems to display.       Overview of Hospital Course:  23 y/o M with PMH sickle cell anemia and AVN of bilateral hips s/p L hip replacement 2 years ago admitted for pain crisis.    Assessment and Plan:      Vasoocclusive sickle cell crisis:  Sickle Cell anemia:  -H&H on admission of 9.1/28.4 (baseline between 8-10)  -Retic count elevated to 14  -Supplemental oxygen  -D5 1/2 NS  -continue hydroxyurea and Folic acid  -pain management  -bowel regimen with Miralax and Pericolace while pt is receiving opioid medications.   -patient not responding to IV fluids as expected; consulted Heme/Onc. Agree with resumption of NSAIDs (patient previously refusing)  -patient without significant improvement; continuing IV fluids, home-dose MSContin, MSIR q4 prn, Dilaudid 1 mg IV q6 prn.  -patient not receiving oral MSIR as ordered; increasing IV Dilaudid dose. Plan to follow up with Heme/Onc on Monday if no improvement.  -No improvement, possibly worsening hemolysis.  Plan to contact Heme/Onc in AM      Leukocytosis  - CXR, UA normal  -start antibiotics if patient becomes febrile or hemodynamically unstable     Avascular necrosis of bones of both hips  -S/p L hip replacement ~2 years ago  -MRI R hip (12/2018) at OSH: Avascular necrosis of the right femoral head without subchondral collapse. 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  -MRI on 7/17/19:Focal area of avascular necrosis involving at least 50% of the articular surface.  No imaging findings to suggest subchondral plate collapse.  2. Additional focal area of avascular necrosis within the right iliac crest and possibly within the right proximal femoral shaft, the latter which is not well evaluated secondary to partial visualization.  -Pt reports being followed by an Orthopedic surgeon in [place] with an appointment coming up. Currently no plans of surgery.      Mild intermittent asthma without complication  -Sats 98-99% on RA  -Controlled  -prn breathing treatments    High Risk Conditions  Patient is currently receiving parenteral controlled substances: Dilaudid     Diet:  Diet Adult Regular (IDDSI Level 7)  GI Prophylaxis: Not indicated  DVT Prophylaxis:     Anticoagulants   Medication Route Frequency     Lines/ Drains/ Airways: PIV  Urinary Catheter Indicated: Patient Does Not Have Urinary Catheter  Other Lines/Tubes/Drains:  Wounds:    Goals of Care: Understanding Illness and Limited Social Support  Discharge plan:  Home or Self Care    Peace Gonzalez MD  Department of Hospital Medicine  [id number]

## 2019-08-12 LAB
ALBUMIN SERPL BCP-MCNC: 3.7 G/DL (ref 3.5–5.2)
ALP SERPL-CCNC: 65 U/L (ref 55–135)
ALT SERPL W/O P-5'-P-CCNC: 47 U/L (ref 10–44)
ANION GAP SERPL CALC-SCNC: 6 MMOL/L (ref 8–16)
ANISOCYTOSIS BLD QL SMEAR: ABNORMAL
AST SERPL-CCNC: 42 U/L (ref 10–40)
BASOPHILS # BLD AUTO: ABNORMAL K/UL (ref 0–0.2)
BASOPHILS NFR BLD: 1 % (ref 0–1.9)
BILIRUB SERPL-MCNC: 3.4 MG/DL (ref 0.1–1)
BUN SERPL-MCNC: 9 MG/DL (ref 6–20)
CALCIUM SERPL-MCNC: 9.2 MG/DL (ref 8.7–10.5)
CHLORIDE SERPL-SCNC: 106 MMOL/L (ref 95–110)
CO2 SERPL-SCNC: 25 MMOL/L (ref 23–29)
CREAT SERPL-MCNC: 0.7 MG/DL (ref 0.5–1.4)
DIFFERENTIAL METHOD: ABNORMAL
EOSINOPHIL # BLD AUTO: ABNORMAL K/UL (ref 0–0.5)
EOSINOPHIL NFR BLD: 1 % (ref 0–8)
ERYTHROCYTE [DISTWIDTH] IN BLOOD BY AUTOMATED COUNT: 24 % (ref 11.5–14.5)
EST. GFR  (AFRICAN AMERICAN): >60 ML/MIN/1.73 M^2
EST. GFR  (NON AFRICAN AMERICAN): >60 ML/MIN/1.73 M^2
GLUCOSE SERPL-MCNC: 101 MG/DL (ref 70–110)
HCT VFR BLD AUTO: 26.9 % (ref 40–54)
HGB BLD-MCNC: 9.3 G/DL (ref 14–18)
HOWELL-JOLLY BOD BLD QL SMEAR: ABNORMAL
HYPOCHROMIA BLD QL SMEAR: ABNORMAL
IMM GRANULOCYTES # BLD AUTO: ABNORMAL K/UL (ref 0–0.04)
IMM GRANULOCYTES NFR BLD AUTO: ABNORMAL % (ref 0–0.5)
LDH SERPL L TO P-CCNC: 467 U/L (ref 110–260)
LYMPHOCYTES # BLD AUTO: ABNORMAL K/UL (ref 1–4.8)
LYMPHOCYTES NFR BLD: 25 % (ref 18–48)
MCH RBC QN AUTO: 30 PG (ref 27–31)
MCHC RBC AUTO-ENTMCNC: 34.6 G/DL (ref 32–36)
MCV RBC AUTO: 87 FL (ref 82–98)
MONOCYTES # BLD AUTO: ABNORMAL K/UL (ref 0.3–1)
MONOCYTES NFR BLD: 7 % (ref 4–15)
MYELOCYTES NFR BLD MANUAL: 4 %
NEUTROPHILS NFR BLD: 62 % (ref 38–73)
NRBC BLD-RTO: 5 /100 WBC
OVALOCYTES BLD QL SMEAR: ABNORMAL
PLATELET # BLD AUTO: 330 K/UL (ref 150–350)
PLATELET BLD QL SMEAR: ABNORMAL
PMV BLD AUTO: 10.2 FL (ref 9.2–12.9)
POIKILOCYTOSIS BLD QL SMEAR: ABNORMAL
POLYCHROMASIA BLD QL SMEAR: ABNORMAL
POTASSIUM SERPL-SCNC: 3.8 MMOL/L (ref 3.5–5.1)
PROT SERPL-MCNC: 7 G/DL (ref 6–8.4)
RBC # BLD AUTO: 3.1 M/UL (ref 4.6–6.2)
RETICS/RBC NFR AUTO: 18.9 % (ref 0.4–2)
SCHISTOCYTES BLD QL SMEAR: PRESENT
SICKLE CELLS BLD QL SMEAR: ABNORMAL
SODIUM SERPL-SCNC: 137 MMOL/L (ref 136–145)
TARGETS BLD QL SMEAR: ABNORMAL
WBC # BLD AUTO: 15.91 K/UL (ref 3.9–12.7)

## 2019-08-12 PROCEDURE — S5010 5% DEXTROSE AND 0.45% SALINE: HCPCS | Performed by: HOSPITALIST

## 2019-08-12 PROCEDURE — 25000003 PHARM REV CODE 250: Performed by: HOSPITALIST

## 2019-08-12 PROCEDURE — 85027 COMPLETE CBC AUTOMATED: CPT

## 2019-08-12 PROCEDURE — 85045 AUTOMATED RETICULOCYTE COUNT: CPT

## 2019-08-12 PROCEDURE — 99232 PR SUBSEQUENT HOSPITAL CARE,LEVL II: ICD-10-PCS | Mod: ,,, | Performed by: INTERNAL MEDICINE

## 2019-08-12 PROCEDURE — 63600175 PHARM REV CODE 636 W HCPCS: Performed by: INTERNAL MEDICINE

## 2019-08-12 PROCEDURE — 99231 PR SUBSEQUENT HOSPITAL CARE,LEVL I: ICD-10-PCS | Mod: ,,, | Performed by: INTERNAL MEDICINE

## 2019-08-12 PROCEDURE — 11000001 HC ACUTE MED/SURG PRIVATE ROOM

## 2019-08-12 PROCEDURE — 85007 BL SMEAR W/DIFF WBC COUNT: CPT

## 2019-08-12 PROCEDURE — 36415 COLL VENOUS BLD VENIPUNCTURE: CPT

## 2019-08-12 PROCEDURE — 99232 SBSQ HOSP IP/OBS MODERATE 35: CPT | Mod: ,,, | Performed by: INTERNAL MEDICINE

## 2019-08-12 PROCEDURE — 25000003 PHARM REV CODE 250: Performed by: INTERNAL MEDICINE

## 2019-08-12 PROCEDURE — 99231 SBSQ HOSP IP/OBS SF/LOW 25: CPT | Mod: ,,, | Performed by: INTERNAL MEDICINE

## 2019-08-12 PROCEDURE — 83615 LACTATE (LD) (LDH) ENZYME: CPT

## 2019-08-12 PROCEDURE — 80053 COMPREHEN METABOLIC PANEL: CPT

## 2019-08-12 RX ORDER — HYDROMORPHONE HYDROCHLORIDE 1 MG/ML
4 INJECTION, SOLUTION INTRAMUSCULAR; INTRAVENOUS; SUBCUTANEOUS
Status: DISCONTINUED | OUTPATIENT
Start: 2019-08-12 | End: 2019-08-14

## 2019-08-12 RX ORDER — IBUPROFEN 400 MG/1
800 TABLET ORAL 3 TIMES DAILY
Status: COMPLETED | OUTPATIENT
Start: 2019-08-12 | End: 2019-08-15

## 2019-08-12 RX ADMIN — HYDROMORPHONE HYDROCHLORIDE 2 MG: 1 INJECTION, SOLUTION INTRAMUSCULAR; INTRAVENOUS; SUBCUTANEOUS at 12:08

## 2019-08-12 RX ADMIN — HYDROMORPHONE HYDROCHLORIDE 4 MG: 1 INJECTION, SOLUTION INTRAMUSCULAR; INTRAVENOUS; SUBCUTANEOUS at 05:08

## 2019-08-12 RX ADMIN — DEXTROSE MONOHYDRATE AND SODIUM CHLORIDE: 5; .45 INJECTION, SOLUTION INTRAVENOUS at 01:08

## 2019-08-12 RX ADMIN — HYDROMORPHONE HYDROCHLORIDE 2 MG: 1 INJECTION, SOLUTION INTRAMUSCULAR; INTRAVENOUS; SUBCUTANEOUS at 02:08

## 2019-08-12 RX ADMIN — HYDROMORPHONE HYDROCHLORIDE 2 MG: 1 INJECTION, SOLUTION INTRAMUSCULAR; INTRAVENOUS; SUBCUTANEOUS at 08:08

## 2019-08-12 RX ADMIN — MORPHINE SULFATE 15 MG: 15 TABLET ORAL at 09:08

## 2019-08-12 RX ADMIN — IBUPROFEN 800 MG: 400 TABLET, FILM COATED ORAL at 09:08

## 2019-08-12 RX ADMIN — MORPHINE SULFATE 45 MG: 30 TABLET, EXTENDED RELEASE ORAL at 09:08

## 2019-08-12 RX ADMIN — SENNOSIDES,DOCUSATE SODIUM 1 TABLET: 8.6; 5 TABLET, FILM COATED ORAL at 09:08

## 2019-08-12 RX ADMIN — HYDROMORPHONE HYDROCHLORIDE 4 MG: 1 INJECTION, SOLUTION INTRAMUSCULAR; INTRAVENOUS; SUBCUTANEOUS at 08:08

## 2019-08-12 RX ADMIN — HYDROXYUREA 500 MG: 500 CAPSULE ORAL at 09:08

## 2019-08-12 RX ADMIN — IBUPROFEN 800 MG: 400 TABLET, FILM COATED ORAL at 05:08

## 2019-08-12 RX ADMIN — DEXTROSE MONOHYDRATE AND SODIUM CHLORIDE: 5; .45 INJECTION, SOLUTION INTRAVENOUS at 02:08

## 2019-08-12 RX ADMIN — MORPHINE SULFATE 15 MG: 15 TABLET ORAL at 04:08

## 2019-08-12 RX ADMIN — FOLIC ACID 1 MG: 1 TABLET ORAL at 09:08

## 2019-08-12 NOTE — PLAN OF CARE
08/12/19 0844   Post-Acute Status   Post-Acute Authorization Other   Other Status No Post-Acute Service Needs

## 2019-08-12 NOTE — PLAN OF CARE
Problem: Adult Inpatient Plan of Care  Goal: Plan of Care Review  Outcome: Ongoing (interventions implemented as appropriate)     08/12/19 0640   Plan of Care Review   Plan of Care Reviewed With patient     Midline dressing change due today, pt refusing states he wants the dressing change done during the day when he is awake, Pain management ongoing, no distress/discomfort noted in pt, IVF ongoing at 125ml/hr as ordered, pt continues on O2 at 2lpm via NC, all precautions maintained. Will continue to monitor pt

## 2019-08-12 NOTE — PROGRESS NOTES
Hospital Medicine  Progress Note    Team: [facility] [place] HOSP MED D Peace Gonzalez MD  Admit Date: 8/5/2019  OWEN 8/12/2019  Length of Stay:  LOS: 6 days   Code status: Full Code    Principal Problem:  Vaso-occlusive sickle cell crisis    HPI:  23 y/o M with PMH sickle cell anemia and AVN of bilateral hips s/p L hip replacement 2 years ago presents c/o R hip, back, and thigh pain. Patient was recently admitted to [facility] from 07/26 to 08/01/2019 for sickle cell pain crisis and left AMA.      Interval history:   Patient reports persistent pain, not improving with IV fluids.    Review of Systems   Constitutional: Negative for fever.   Respiratory: Negative for shortness of breath.        Physical Exam  Temp:  [98 °F (36.7 °C)-98.6 °F (37 °C)]   Pulse:  [68-84]   Resp:  [18-20]   BP: (126-138)/(61-86)   SpO2:  [93 %-100 %]     Intake/Output Summary (Last 24 hours) at 8/12/2019 1623  Last data filed at 8/12/2019 1424  Gross per 24 hour   Intake 1000 ml   Output 3945 ml   Net -2945 ml     Physical Exam   Constitutional: He is well-developed, well-nourished, and in no distress.   Eyes: Conjunctivae are normal.   Cardiovascular: Normal rate and regular rhythm.   Pulmonary/Chest: Effort normal.   Abdominal: Soft. Normal appearance.   Musculoskeletal: He exhibits no edema.   Neurological: He is alert.     Recent Labs   Lab 08/08/19  1139 08/11/19  1043 08/12/19  0855   WBC 13.64* 13.93* 15.91*   HGB 9.9* 8.8* 9.3*   HCT 29.5* 26.1* 26.9*    302 330     Recent Labs   Lab 08/08/19  0808 08/11/19  1044 08/12/19  0855    141 137   K 4.0 3.6 3.8    109 106   CO2 22* 23 25   BUN 5* 7 9   CREATININE 0.7 0.7 0.7   GLU 91 88 101   CALCIUM 8.5* 8.8 9.2     Recent Labs   Lab 08/08/19  0808 08/11/19  1044 08/12/19  0855   ALKPHOS 62 64 65   ALT 16 35 47*   AST 27 42* 42*   ALBUMIN 3.3* 3.5 3.7   PROT 6.3 6.3 7.0   BILITOT 2.6* 5.0* 3.4*       Scheduled Meds:   folic acid  1 mg Oral Daily    hydroxyurea  500 mg Oral Daily     morphine  45 mg Oral Q12H    polyethylene glycol  17 g Oral Daily    senna-docusate 8.6-50 mg  1 tablet Oral BID     Continuous Infusions:   dextrose 5 % and 0.45 % NaCl 125 mL/hr at 08/12/19 1424     As Needed:  albuterol sulfate, HYDROmorphone, naloxone, ondansetron, promethazine, sodium chloride 0.9%, sodium chloride 0.9%    Active Hospital Problems    Diagnosis  POA    *Vaso-occlusive sickle cell crisis [D57.00]  Yes    On hydroxyurea therapy [Z79.899]  Not Applicable    Body aches [R52]  Yes    Opioid dependence [F11.20]  Yes    Mild intermittent asthma without complication [J45.20]  Yes     Chronic    Avascular necrosis of bones of both hips [M87.051, M87.052]  Yes     Chronic    Sickle cell pain crisis [D57.00]  Yes      Resolved Hospital Problems   No resolved problems to display.       Overview of Hospital Course:  23 y/o M with PMH sickle cell anemia and AVN of bilateral hips s/p L hip replacement 2 years ago admitted for pain crisis.    Assessment and Plan:      Vasoocclusive sickle cell crisis:  Sickle Cell anemia:  -H&H on admission of 9.1/28.4 (baseline between 8-10)  -Retic count elevated to 14  -Supplemental oxygen  -D5 1/2 NS  -continue hydroxyurea and Folic acid  -pain management  -bowel regimen with Miralax and Pericolace while pt is receiving opioid medications.   -patient not responding to IV fluids as expected; consulted Heme/Onc. Agree with resumption of NSAIDs (patient previously refusing)  -patient without significant improvement; continuing IV fluids, home-dose MSContin, MSIR q4 prn, Dilaudid 1 mg IV q6 prn.  -patient not receiving oral MSIR as ordered; increasing IV Dilaudid dose to 2 mg. Plan to follow up with Heme/Onc on Monday if no improvement.  -No improvement, possibly worsening hemolysis. Plan to contact Heme/Onc in AM  -Patient appears to have high tolerance for opioids. Agree with Heme/Onc recommendations to increase IV Dilaudid: 4 mg Q 3 hours and resume  NSAIDs      Leukocytosis  - CXR, UA normal  - start antibiotics if patient becomes febrile or hemodynamically unstable     Avascular necrosis of bones of both hips  -S/p L hip replacement ~2 years ago  -MRI R hip (12/2018) at OSH: Avascular necrosis of the right femoral head without subchondral collapse. 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  -MRI on 7/17/19:Focal area of avascular necrosis involving at least 50% of the articular surface.  No imaging findings to suggest subchondral plate collapse.  2. Additional focal area of avascular necrosis within the right iliac crest and possibly within the right proximal femoral shaft, the latter which is not well evaluated secondary to partial visualization.  -Pt reports being followed by an Orthopedic surgeon in [place] with an appointment coming up. Currently no plans of surgery.      Mild intermittent asthma without complication  -Sats 98-99% on RA  -Controlled  -prn breathing treatments    High Risk Conditions  Patient is currently receiving parenteral controlled substances: Dilaudid     Diet:  Diet Adult Regular (IDDSI Level 7)  GI Prophylaxis: Not indicated  DVT Prophylaxis:     Anticoagulants   Medication Route Frequency     Lines/ Drains/ Airways: PIV  Urinary Catheter Indicated: Patient Does Not Have Urinary Catheter  Other Lines/Tubes/Drains:  Wounds:    Goals of Care: Treatment Decisions and Limited Social Support  Discharge plan:  Home or Self Care    Peace Gonzalez MD  Department of Hospital Medicine  [id number]

## 2019-08-12 NOTE — PROGRESS NOTES
"[facility]  Hematology/Oncology  Progress Note    Patient Name: Paddy Stevenson  Admission Date: 2019  Hospital Length of Stay: 6 days  Code Status: Full Code     Subjective:     HPI:   25 y/o M with PMH sickle cell anemia and AVN of bilateral hips s/p L hip replacement 2 years ago presents c/o R hip, back, and thigh pain. Patient was recently admitted to [facility] from  to 2019 for sickle cell pain crisis and left AMA once his Dilaudid dose was decreased. Of note, patient has had 20+ Ed visits/admissions in the past few months for hip pain, pain crises, and on multiple occasions, displayed drug-seeking behaviors.    Hematology-oncology consulted for further management of sickle-cell pain which has not improved despite fluids, folate, hydrea, and morphine + dilaudid. Patient has never taken high-dose NSAIDs for sickle cell pain. He expresses his frustration with current treatment, disease, and feeling like "no one is listening to him". He reports that 2mg IV dilaudid always takes his pain away, but that the 1mg dose he is currently getting is "doing nothing".    Interval History: Pain is all over but mostly affecting his back and legs. Pain 10/10 and goes down to 7/10 after dilaudid and only lasts for 1.5 hrs.  Pain of 4/10 would be tolerable for him.  Does not feel any benefit from morphine 15mg IR.  No constipation.      Oncology Treatment Plan:   [No treatment plan]    Medications:  Continuous Infusions:   dextrose 5 % and 0.45 % NaCl 125 mL/hr at 19 0100     Scheduled Meds:   folic acid  1 mg Oral Daily    hydroxyurea  500 mg Oral Daily    morphine  45 mg Oral Q12H    polyethylene glycol  17 g Oral Daily    senna-docusate 8.6-50 mg  1 tablet Oral BID     PRN Meds:albuterol sulfate, [] HYDROmorphone **FOLLOWED BY** [] HYDROmorphone **FOLLOWED BY** HYDROmorphone, morphine, naloxone, ondansetron, promethazine, sodium chloride 0.9%, sodium chloride 0.9% "     Review of Systems   Constitutional: Negative for chills and fever.   HENT: Negative for mouth sores and nosebleeds.    Eyes: Negative for visual disturbance.   Respiratory: Positive for chest tightness.    Cardiovascular: Negative for chest pain and leg swelling.   Gastrointestinal: Positive for abdominal pain. Negative for constipation.   Endocrine: Negative for cold intolerance and heat intolerance.   Genitourinary: Negative for difficulty urinating.   Musculoskeletal: Positive for arthralgias and back pain.   Skin: Negative for color change.   Neurological: Negative for dizziness and headaches.   Hematological: Negative for adenopathy. Does not bruise/bleed easily.   Psychiatric/Behavioral: Negative for confusion.     Objective:     Vital Signs (Most Recent):  Temp: 98.6 °F (37 °C) (08/12/19 1222)  Pulse: 71 (08/12/19 1222)  Resp: 20 (08/12/19 1222)  BP: 138/61 (08/12/19 1222)  SpO2: 97 % (08/12/19 1222) Vital Signs (24h Range):  Temp:  [97.9 °F (36.6 °C)-98.6 °F (37 °C)] 98.6 °F (37 °C)  Pulse:  [62-84] 71  Resp:  [16-20] 20  SpO2:  [93 %-100 %] 97 %  BP: (126-138)/(61-86) 138/61     Weight: 72.9 kg (160 lb 11.4 oz)  Body mass index is 23.73 kg/m².  Body surface area is 1.88 meters squared.      Intake/Output Summary (Last 24 hours) at 8/12/2019 1413  Last data filed at 8/12/2019 0630  Gross per 24 hour   Intake 1000 ml   Output 2545 ml   Net -1545 ml       Physical Exam   Constitutional: He appears well-developed. He appears distressed.   HENT:   Head: Normocephalic.   Eyes: Pupils are equal, round, and reactive to light. EOM are normal.   Neck: Normal range of motion. No tracheal deviation present.   Cardiovascular: Normal rate, regular rhythm and normal heart sounds.   Pulmonary/Chest: Effort normal and breath sounds normal. No respiratory distress. He has no wheezes. He has no rales.   Abdominal: Soft. Bowel sounds are normal. He exhibits no distension and no mass. There is no tenderness. There is no  guarding.   Musculoskeletal: Normal range of motion. He exhibits no edema.   Neurological: He is alert.   Skin: Skin is warm and dry.       Significant Labs:   CBC:   Recent Labs   Lab 08/11/19  1043 08/12/19  0855   WBC 13.93* 15.91*   HGB 8.8* 9.3*   HCT 26.1* 26.9*    330    and CMP:   Recent Labs   Lab 08/11/19  1044 08/12/19  0855    137   K 3.6 3.8    106   CO2 23 25   GLU 88 101   BUN 7 9   CREATININE 0.7 0.7   CALCIUM 8.8 9.2   PROT 6.3 7.0   ALBUMIN 3.5 3.7   BILITOT 5.0* 3.4*   ALKPHOS 64 65   AST 42* 42*   ALT 35 47*   ANIONGAP 9 6*   EGFRNONAA >60.0 >60.0       Diagnostic Results:  None    Assessment/Plan:     Sickle cell pain crisis  *acute on chronic  -20+ ED visits and admissions in the past few months for similar problems  -history of AVN of bilateral femur s/p L GAYLA; significant AVN in R hip documented on recent MRI  -currently treated w/ folate, hydrea (reports compliance at home, though MCV normal and is typically elevated in pts on hydrea)  -Getting morphine 45 mg PO q12 scheduled, Dilaudid 2mg IV q6 PRN  -hgb 9.9 currently, no concern for acute chest syndrome    Overall picture suggests suboptimal regimen (no NSAIDs), possible noncompliance, and likely significant opiate dependence and/or tolerance.    Recs:  Continue home dose hydroxyurea  Continue MS contin 45mg q12h, increase IV dilaudid to 4mg q3h prn  -continue Ibuprofen 800mg q8 scheduled (patient reports benefit from this)  -monitor renal function             Pantera Nunes MD  Hematology/Oncology  [facility]

## 2019-08-12 NOTE — PLAN OF CARE
Problem: Adult Inpatient Plan of Care  Goal: Plan of Care Review  Outcome: Ongoing (interventions implemented as appropriate)  Pt medicated with PRN pain meds, pt states that his pain is not controlled, Dr. Gonzalez notified and stated she will adjust his meds, IV fluids infusing continuous, no distress noted, will continue to monitor.    Problem: Pain Acute  Goal: Optimal Pain Control    Intervention: Prevent or Manage Pain     08/12/19 1636   Prevent or Manage Pain   Sensory Stimulation Regulation auditory stimulation minimized;care clustered;lighting decreased;quiet environment promoted   Sleep/Rest Enhancement noise level reduced;relaxation techniques promoted

## 2019-08-12 NOTE — ASSESSMENT & PLAN NOTE
*acute on chronic  -20+ ED visits and admissions in the past few months for similar problems  -history of AVN of bilateral femur s/p L GAYLA; significant AVN in R hip documented on recent MRI  -currently treated w/ folate, hydrea (reports compliance at home, though MCV normal and is typically elevated in pts on hydrea)  -Getting morphine 45 mg PO q12 scheduled, Dilaudid 2mg IV q6 PRN  -hgb 8.8 currently, no concern for acute chest syndrome    Overall picture suggests suboptimal regimen (no NSAIDs), possible noncompliance, and likely significant opiate dependence and/or tolerance.    Recs:  Continue home dose hydroxyurea  Continue MS contin 45mg q12h, increase IV dilaudid to 4mg q3h prn  -continue Ibuprofen 800mg q8 scheduled (patient reports benefit from this)  -monitor renal function

## 2019-08-12 NOTE — SUBJECTIVE & OBJECTIVE
Interval History: Pain is all over but mostly affecting his back and legs. Pain 10/10 and goes down to 7/10 after dilaudid and only lasts for 1.5 hrs.  Pain of 4/10 would be tolerable for him.  Does not feel any benefit from morphine 15mg IR.  No constipation.      Oncology Treatment Plan:   [No treatment plan]    Medications:  Continuous Infusions:   dextrose 5 % and 0.45 % NaCl 125 mL/hr at 19 0100     Scheduled Meds:   folic acid  1 mg Oral Daily    hydroxyurea  500 mg Oral Daily    morphine  45 mg Oral Q12H    polyethylene glycol  17 g Oral Daily    senna-docusate 8.6-50 mg  1 tablet Oral BID     PRN Meds:albuterol sulfate, [] HYDROmorphone **FOLLOWED BY** [] HYDROmorphone **FOLLOWED BY** HYDROmorphone, morphine, naloxone, ondansetron, promethazine, sodium chloride 0.9%, sodium chloride 0.9%     Review of Systems   Constitutional: Negative for chills and fever.   HENT: Negative for mouth sores and nosebleeds.    Eyes: Negative for visual disturbance.   Respiratory: Positive for chest tightness.    Cardiovascular: Negative for chest pain and leg swelling.   Gastrointestinal: Positive for abdominal pain. Negative for constipation.   Endocrine: Negative for cold intolerance and heat intolerance.   Genitourinary: Negative for difficulty urinating.   Musculoskeletal: Positive for arthralgias and back pain.   Skin: Negative for color change.   Neurological: Negative for dizziness and headaches.   Hematological: Negative for adenopathy. Does not bruise/bleed easily.   Psychiatric/Behavioral: Negative for confusion.     Objective:     Vital Signs (Most Recent):  Temp: 98.6 °F (37 °C) (19 1222)  Pulse: 71 (19 1222)  Resp: 20 (19 1222)  BP: 138/61 (19 1222)  SpO2: 97 % (19 1222) Vital Signs (24h Range):  Temp:  [97.9 °F (36.6 °C)-98.6 °F (37 °C)] 98.6 °F (37 °C)  Pulse:  [62-84] 71  Resp:  [16-20] 20  SpO2:  [93 %-100 %] 97 %  BP: (126-138)/(61-86) 138/61     Weight:  72.9 kg (160 lb 11.4 oz)  Body mass index is 23.73 kg/m².  Body surface area is 1.88 meters squared.      Intake/Output Summary (Last 24 hours) at 8/12/2019 1413  Last data filed at 8/12/2019 0630  Gross per 24 hour   Intake 1000 ml   Output 2545 ml   Net -1545 ml       Physical Exam   Constitutional: He appears well-developed. He appears distressed.   HENT:   Head: Normocephalic.   Eyes: Pupils are equal, round, and reactive to light. EOM are normal.   Neck: Normal range of motion. No tracheal deviation present.   Cardiovascular: Normal rate, regular rhythm and normal heart sounds.   Pulmonary/Chest: Effort normal and breath sounds normal. No respiratory distress. He has no wheezes. He has no rales.   Abdominal: Soft. Bowel sounds are normal. He exhibits no distension and no mass. There is no tenderness. There is no guarding.   Musculoskeletal: Normal range of motion. He exhibits no edema.   Neurological: He is alert.   Skin: Skin is warm and dry.       Significant Labs:   CBC:   Recent Labs   Lab 08/11/19  1043 08/12/19  0855   WBC 13.93* 15.91*   HGB 8.8* 9.3*   HCT 26.1* 26.9*    330    and CMP:   Recent Labs   Lab 08/11/19  1044 08/12/19  0855    137   K 3.6 3.8    106   CO2 23 25   GLU 88 101   BUN 7 9   CREATININE 0.7 0.7   CALCIUM 8.8 9.2   PROT 6.3 7.0   ALBUMIN 3.5 3.7   BILITOT 5.0* 3.4*   ALKPHOS 64 65   AST 42* 42*   ALT 35 47*   ANIONGAP 9 6*   EGFRNONAA >60.0 >60.0       Diagnostic Results:  None

## 2019-08-13 LAB
ALBUMIN SERPL BCP-MCNC: 3.4 G/DL (ref 3.5–5.2)
ALP SERPL-CCNC: 62 U/L (ref 55–135)
ALT SERPL W/O P-5'-P-CCNC: 36 U/L (ref 10–44)
ANION GAP SERPL CALC-SCNC: 8 MMOL/L (ref 8–16)
ANISOCYTOSIS BLD QL SMEAR: ABNORMAL
AST SERPL-CCNC: 31 U/L (ref 10–40)
BASOPHILS # BLD AUTO: ABNORMAL K/UL (ref 0–0.2)
BASOPHILS NFR BLD: 0 % (ref 0–1.9)
BILIRUB SERPL-MCNC: 3.8 MG/DL (ref 0.1–1)
BUN SERPL-MCNC: 9 MG/DL (ref 6–20)
CALCIUM SERPL-MCNC: 8.5 MG/DL (ref 8.7–10.5)
CHLORIDE SERPL-SCNC: 104 MMOL/L (ref 95–110)
CO2 SERPL-SCNC: 24 MMOL/L (ref 23–29)
CREAT SERPL-MCNC: 0.8 MG/DL (ref 0.5–1.4)
DIFFERENTIAL METHOD: ABNORMAL
EOSINOPHIL # BLD AUTO: ABNORMAL K/UL (ref 0–0.5)
EOSINOPHIL NFR BLD: 5 % (ref 0–8)
ERYTHROCYTE [DISTWIDTH] IN BLOOD BY AUTOMATED COUNT: 24.1 % (ref 11.5–14.5)
EST. GFR  (AFRICAN AMERICAN): >60 ML/MIN/1.73 M^2
EST. GFR  (NON AFRICAN AMERICAN): >60 ML/MIN/1.73 M^2
GLUCOSE SERPL-MCNC: 105 MG/DL (ref 70–110)
HCT VFR BLD AUTO: 25.4 % (ref 40–54)
HGB BLD-MCNC: 8.5 G/DL (ref 14–18)
HOWELL-JOLLY BOD BLD QL SMEAR: ABNORMAL
IMM GRANULOCYTES # BLD AUTO: ABNORMAL K/UL (ref 0–0.04)
IMM GRANULOCYTES NFR BLD AUTO: ABNORMAL % (ref 0–0.5)
LDH SERPL L TO P-CCNC: 404 U/L (ref 110–260)
LYMPHOCYTES # BLD AUTO: ABNORMAL K/UL (ref 1–4.8)
LYMPHOCYTES NFR BLD: 31 % (ref 18–48)
MCH RBC QN AUTO: 29.7 PG (ref 27–31)
MCHC RBC AUTO-ENTMCNC: 33.5 G/DL (ref 32–36)
MCV RBC AUTO: 89 FL (ref 82–98)
METAMYELOCYTES NFR BLD MANUAL: 2 %
MONOCYTES # BLD AUTO: ABNORMAL K/UL (ref 0.3–1)
MONOCYTES NFR BLD: 10 % (ref 4–15)
MYELOCYTES NFR BLD MANUAL: 2 %
NEUTROPHILS # BLD AUTO: ABNORMAL K/UL (ref 1.8–7.7)
NEUTROPHILS NFR BLD: 50 % (ref 38–73)
NRBC BLD-RTO: 4 /100 WBC
OVALOCYTES BLD QL SMEAR: ABNORMAL
PLATELET # BLD AUTO: 289 K/UL (ref 150–350)
PMV BLD AUTO: 10.5 FL (ref 9.2–12.9)
POIKILOCYTOSIS BLD QL SMEAR: SLIGHT
POLYCHROMASIA BLD QL SMEAR: ABNORMAL
POTASSIUM SERPL-SCNC: 3.6 MMOL/L (ref 3.5–5.1)
PROT SERPL-MCNC: 6.5 G/DL (ref 6–8.4)
RBC # BLD AUTO: 2.86 M/UL (ref 4.6–6.2)
RETICS/RBC NFR AUTO: 16.6 % (ref 0.4–2)
SICKLE CELLS BLD QL SMEAR: ABNORMAL
SODIUM SERPL-SCNC: 136 MMOL/L (ref 136–145)
TARGETS BLD QL SMEAR: ABNORMAL
WBC # BLD AUTO: 12.41 K/UL (ref 3.9–12.7)

## 2019-08-13 PROCEDURE — 63600175 PHARM REV CODE 636 W HCPCS: Performed by: INTERNAL MEDICINE

## 2019-08-13 PROCEDURE — 11000001 HC ACUTE MED/SURG PRIVATE ROOM

## 2019-08-13 PROCEDURE — 99233 SBSQ HOSP IP/OBS HIGH 50: CPT | Mod: ,,, | Performed by: HOSPITALIST

## 2019-08-13 PROCEDURE — 25000003 PHARM REV CODE 250: Performed by: HOSPITALIST

## 2019-08-13 PROCEDURE — 99233 PR SUBSEQUENT HOSPITAL CARE,LEVL III: ICD-10-PCS | Mod: ,,, | Performed by: HOSPITALIST

## 2019-08-13 PROCEDURE — 85007 BL SMEAR W/DIFF WBC COUNT: CPT

## 2019-08-13 PROCEDURE — 80053 COMPREHEN METABOLIC PANEL: CPT

## 2019-08-13 PROCEDURE — 36415 COLL VENOUS BLD VENIPUNCTURE: CPT

## 2019-08-13 PROCEDURE — 25000003 PHARM REV CODE 250: Performed by: INTERNAL MEDICINE

## 2019-08-13 PROCEDURE — S5010 5% DEXTROSE AND 0.45% SALINE: HCPCS | Performed by: HOSPITALIST

## 2019-08-13 PROCEDURE — 85027 COMPLETE CBC AUTOMATED: CPT

## 2019-08-13 PROCEDURE — 83615 LACTATE (LD) (LDH) ENZYME: CPT

## 2019-08-13 PROCEDURE — 85045 AUTOMATED RETICULOCYTE COUNT: CPT

## 2019-08-13 RX ADMIN — IBUPROFEN 800 MG: 400 TABLET, FILM COATED ORAL at 03:08

## 2019-08-13 RX ADMIN — HYDROMORPHONE HYDROCHLORIDE 4 MG: 1 INJECTION, SOLUTION INTRAMUSCULAR; INTRAVENOUS; SUBCUTANEOUS at 03:08

## 2019-08-13 RX ADMIN — MORPHINE SULFATE 45 MG: 30 TABLET, EXTENDED RELEASE ORAL at 09:08

## 2019-08-13 RX ADMIN — IBUPROFEN 800 MG: 400 TABLET, FILM COATED ORAL at 09:08

## 2019-08-13 RX ADMIN — HYDROMORPHONE HYDROCHLORIDE 4 MG: 1 INJECTION, SOLUTION INTRAMUSCULAR; INTRAVENOUS; SUBCUTANEOUS at 12:08

## 2019-08-13 RX ADMIN — HYDROMORPHONE HYDROCHLORIDE 4 MG: 1 INJECTION, SOLUTION INTRAMUSCULAR; INTRAVENOUS; SUBCUTANEOUS at 06:08

## 2019-08-13 RX ADMIN — SENNOSIDES,DOCUSATE SODIUM 1 TABLET: 8.6; 5 TABLET, FILM COATED ORAL at 09:08

## 2019-08-13 RX ADMIN — HYDROMORPHONE HYDROCHLORIDE 4 MG: 1 INJECTION, SOLUTION INTRAMUSCULAR; INTRAVENOUS; SUBCUTANEOUS at 09:08

## 2019-08-13 RX ADMIN — FOLIC ACID 1 MG: 1 TABLET ORAL at 09:08

## 2019-08-13 RX ADMIN — HYDROXYUREA 500 MG: 500 CAPSULE ORAL at 09:08

## 2019-08-13 RX ADMIN — DEXTROSE MONOHYDRATE AND SODIUM CHLORIDE: 5; .45 INJECTION, SOLUTION INTRAVENOUS at 12:08

## 2019-08-13 RX ADMIN — DEXTROSE MONOHYDRATE AND SODIUM CHLORIDE: 5; .45 INJECTION, SOLUTION INTRAVENOUS at 09:08

## 2019-08-13 RX ADMIN — HYDROMORPHONE HYDROCHLORIDE 4 MG: 1 INJECTION, SOLUTION INTRAMUSCULAR; INTRAVENOUS; SUBCUTANEOUS at 10:08

## 2019-08-13 RX ADMIN — DEXTROSE MONOHYDRATE AND SODIUM CHLORIDE: 5; .45 INJECTION, SOLUTION INTRAVENOUS at 06:08

## 2019-08-13 NOTE — PLAN OF CARE
Problem: Adult Inpatient Plan of Care  Goal: Plan of Care Review  Outcome: Ongoing (interventions implemented as appropriate)  Able to make needs known, continues on IV fluids, medicated with PRN pain meds, no distress noted, will continue to monitor.    Problem: Fall Injury Risk  Goal: Absence of Fall and Fall-Related Injury    Intervention: Identify and Manage Contributors to Fall Injury Risk     08/13/19 1097   Manage Acute Allergic Reaction   Medication Review/Management medications reviewed   Identify and Manage Contributors to Fall Injury Risk   Self-Care Promotion independence encouraged;BADL personal routines maintained

## 2019-08-13 NOTE — PLAN OF CARE
Problem: Adult Inpatient Plan of Care  Goal: Plan of Care Review  Outcome: Ongoing (interventions implemented as appropriate)  Patient AA&Ox4. VSs stable. IVF infusing per order. Pain medications administered per order. Safety intact. Denies needs. Call light and bedside table within reach. Bed in lowest, locked position with side rails up x3. Nonskid socks in place. Patient verbalizes understanding to call for assistance. Will continue to monitor.

## 2019-08-13 NOTE — PLAN OF CARE
08/13/19 0841   Post-Acute Status   Post-Acute Authorization Other   Other Status No Post-Acute Service Needs

## 2019-08-13 NOTE — MEDICAL/APP STUDENT
Subjective:       Patient ID: Paddy Stevenson is a 24 y.o. male.    Chief Complaint: Sickle Cell Pain Crisis (generalized)    Overnight  No acute events. Reports generalized pain now at a 9/10, becomes a 6/10 when IV dilaudid given.      Review of Systems   Constitutional: Positive for appetite change. Negative for chills, diaphoresis and fever.   Eyes: Negative for photophobia, pain and visual disturbance.   Respiratory: Negative for cough, chest tightness and shortness of breath.    Cardiovascular: Negative for chest pain.   Gastrointestinal: Negative for anal bleeding, blood in stool, diarrhea, nausea and vomiting.   Genitourinary: Negative for decreased urine volume, difficulty urinating, dysuria, frequency, hematuria and urgency.   Musculoskeletal: Positive for arthralgias and myalgias.   Neurological: Negative for headaches.       Objective:      Physical Exam   Cardiovascular: Normal rate and regular rhythm.   Murmur heard.      Assessment:       1. Vaso-occlusive sickle cell crisis        Plan:       Vaso-occlusive sickle cell crisis  Continue IV fluids, pain management, and hydroxyurea. Follow heme for outpatient management once crisis resolves.

## 2019-08-13 NOTE — PROGRESS NOTES
Hospital Medicine   Progress note      Team: [facility] HOSP MED D Kendra Miller MD   Admit Date: 2019   Hospital Day: 7  OWEN: 2019   Code status: Full Code   Principal Problem: Vaso-occlusive sickle cell crisis     HPI: Paddy Stevenson is a 25 y/o M with PMH sickle cell anemia and AVN of bilateral hips s/p L hip replacement 2 years ago presents c/o R hip, back, and thigh pain. Patient was recently admitted to [facility] from  to 2019 for sickle cell pain crisis and left AMA once his Dilaudid dose was decreased.  Patient stated that he had to leave last admission because his grandmother passed and he had to attend the .  During this time, patient took his home extended release morphine but did not find any pain relief.  He reports his symptoms of diffuse generalized pain worsened which prompted him to seek further medical attention in the ED.  Patient denies any chest pain, cough, sputum production, nausea, vomiting, diarrhea, constipation, blood in stools, urinary urgency, frequency, dysuria, hematuria.  While in the ED, patient is noted to be afebrile, hemodynamically stable, saturating 100% on room air.  H&H of 9.1/28.4 which is above his baseline.  Occasional sickle cells noted. 14.9 retic count.       Interval hx:   Pt was seen and examined at bedside. Pt had no acute events overnight.  Continues to complain of generalized pain this morning. Pt remained hemodynamically stable and afebrile. Pt has been tolerating PO intake well without any nausea, vomiting, diarrhea, constipation, blood in stools or trouble urinating. Pt denies any fevers, chills, malaise, headaches, chest pain, SOB, cough. Pt has been able to ambulate to the bathroom with some assistance.    ROS (Positive in Bold, otherwise negative)  Constitutional: fever, chills, night sweats  CV: chest pain, edema, palpitations  Resp: SOB, cough, sputum production  GI: changes in appetite, NVDC, pain, melena, hematochezia, GERD, hematemesis  :  Dysuria, hematuria, urinary urgency, frequency  MSK: arthralgia/myalgia, joint swelling  Neuro/Psych: anxiety, depression    PEx   Temp:  [96.9 °F (36.1 °C)-98.8 °F (37.1 °C)]   Pulse:  [64-93]   Resp:  [16-20]   BP: (123-134)/(62-81)   SpO2:  [94 %-97 %]      I & O (Last 24H):     Intake/Output Summary (Last 24 hours) at 8/13/2019 1532  Last data filed at 8/13/2019 1200  Gross per 24 hour   Intake 2030 ml   Output 1050 ml   Net 980 ml       General:  male  in no acute distress. Nontoxic. Resting in bed. Cooperative.  HEENT: NCAT. PERRL. EOMI. Sclera Anicteric.  CVS: RRR. Normal S1 S2. No murmurs  Pulm: CTAB. Normal respiratory effort. No wheezes, rhonchi, or crackles.  Abdomen: Soft. Non-distended. No tenderness to palpation. No rebound or guarding. +BS.  Extremities: No edema. No cyanosis. Full ROM.  Neuro: Alert, oriented x 4, Spont mvt of all extremities with no focal deficits noted.    Recent Results (from the past 24 hour(s))   CBC auto differential    Collection Time: 08/13/19  8:31 AM   Result Value Ref Range    WBC 12.41 3.90 - 12.70 K/uL    RBC 2.86 (L) 4.60 - 6.20 M/uL    Hemoglobin 8.5 (L) 14.0 - 18.0 g/dL    Hematocrit 25.4 (L) 40.0 - 54.0 %    Mean Corpuscular Volume 89 82 - 98 fL    Mean Corpuscular Hemoglobin 29.7 27.0 - 31.0 pg    Mean Corpuscular Hemoglobin Conc 33.5 32.0 - 36.0 g/dL    RDW 24.1 (H) 11.5 - 14.5 %    Platelets 289 150 - 350 K/uL    MPV 10.5 9.2 - 12.9 fL    Immature Granulocytes Test Not Performed 0.0 - 0.5 %    Gran # (ANC) Test Not Performed 1.8 - 7.7 K/uL    Immature Grans (Abs) Test Not Performed 0.00 - 0.04 K/uL    Lymph # Test Not Performed 1.0 - 4.8 K/uL    Mono # Test Not Performed 0.3 - 1.0 K/uL    Eos # Test Not Performed 0.0 - 0.5 K/uL    Baso # Test Not Performed 0.00 - 0.20 K/uL    nRBC 4 (A) 0 /100 WBC    Gran% 50.0 38.0 - 73.0 %    Lymph% 31.0 18.0 - 48.0 %    Mono% 10.0 4.0 - 15.0 %    Eosinophil% 5.0 0.0 - 8.0 %    Basophil% 0.0 0.0 - 1.9 %     Metamyelocytes 2.0 %    Myelocytes 2.0 %    Aniso Moderate     Poik Slight     Poly Moderate     Ovalocytes Occasional     Target Cells Occasional     Sickle Cells Occasional (A)     Byers-Jolly Bodies Occasional     Differential Method Manual    Comprehensive metabolic panel    Collection Time: 08/13/19  8:31 AM   Result Value Ref Range    Sodium 136 136 - 145 mmol/L    Potassium 3.6 3.5 - 5.1 mmol/L    Chloride 104 95 - 110 mmol/L    CO2 24 23 - 29 mmol/L    Glucose 105 70 - 110 mg/dL    BUN, Bld 9 6 - 20 mg/dL    Creatinine 0.8 0.5 - 1.4 mg/dL    Calcium 8.5 (L) 8.7 - 10.5 mg/dL    Total Protein 6.5 6.0 - 8.4 g/dL    Albumin 3.4 (L) 3.5 - 5.2 g/dL    Total Bilirubin 3.8 (H) 0.1 - 1.0 mg/dL    Alkaline Phosphatase 62 55 - 135 U/L    AST 31 10 - 40 U/L    ALT 36 10 - 44 U/L    Anion Gap 8 8 - 16 mmol/L    eGFR if African American >60.0 >60 mL/min/1.73 m^2    eGFR if non African American >60.0 >60 mL/min/1.73 m^2   Lactate dehydrogenase    Collection Time: 08/13/19  8:31 AM   Result Value Ref Range     (H) 110 - 260 U/L   Reticulocytes    Collection Time: 08/13/19  8:31 AM   Result Value Ref Range    Retic 16.6 (H) 0.4 - 2.0 %       No results for input(s): POCTGLUCOSE in the last 168 hours.    No results found for: HGBA1C     Active Hospital Problems    Diagnosis  POA    *Vaso-occlusive sickle cell crisis [D57.00]  Yes    On hydroxyurea therapy [Z79.899]  Not Applicable    Body aches [R52]  Yes    Opioid dependence [F11.20]  Yes    Mild intermittent asthma without complication [J45.20]  Yes     Chronic    Avascular necrosis of bones of both hips [M87.051, M87.052]  Yes     Chronic    Sickle cell pain crisis [D57.00]  Yes      Resolved Hospital Problems   No resolved problems to display.          Assessment and Plan for problems addressed today:      folic acid  1 mg Oral Daily    hydroxyurea  500 mg Oral Daily    ibuprofen  800 mg Oral TID    morphine  45 mg Oral Q12H    polyethylene glycol  17 g  Oral Daily    senna-docusate 8.6-50 mg  1 tablet Oral BID     albuterol sulfate, [] HYDROmorphone **FOLLOWED BY** [] HYDROmorphone **FOLLOWED BY** HYDROmorphone, naloxone, ondansetron, promethazine, sodium chloride 0.9%, sodium chloride 0.9%    Vasoocclusive sickle cell crisis:  Sickle Cell anemia:  -H&H on admission of 9.1/28.4 (baseline between 8-10)  -Retic count elevated to 14   -Supplemental oxygen  -D5 1/2 NS  -continue hydroxyurea and Folic acid  -Multi Modal pain control ibuprofen 800 mg q.8 hours scheduled, PTA extended-release morphine 45 mg b.i.d., Dilaudid 4 mg q.3 hours p.r.n. for PRN for breakthrough pain  -bowel regimen with Miralax and pericolace while pt is receiving opioid medications  -Nursing / Phlebotomy to use pediatric tubes when drawing labs to minimize iatrogenic anemia and blood loss.       Leukocytosis: resolved  -white count of 16 on admission, now normalized   -low threshold to start antibiotics if patient becomes febrile or hemodynamically unstable  -continue to monitor CBC daily     Drug-seeking behavior  -ER visits : [facility]  : [facility]  7/3: [facility]  : [facility]  : [facility]  : [facility]  7/15: [facility]  : [facility]  : [facility]  : [facility]  -discussed consultation with Addiction Psychiatry.  Patient became very agitated and aggressive after mentioning this resource.  He clenched his fist and said do not talk to me about that in a threatening manner  -patient needs outpatient follow-up with PCP/Heme-Onc     Avascular necrosis of bones of both hips  -S/p L hip replacement ~2 years ago  -MRI R hip (2018) at OSH: Avascular necrosis of the right femoral head without subchondral collapse. 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  -MRI on 19:Focal area of avascular necrosis involving at least 50% of the articular surface.  No imaging findings to  suggest subchondral plate collapse.  2. Additional focal area of avascular necrosis within the right iliac crest and possibly within the right proximal femoral shaft, the latter which is not well evaluated secondary to partial visualization.  -Pt reports discussing AVN and is followed by an Orthopedic surgeon in [place] with a f/u coming up. Currently no plans of surgery.      Mild intermittent asthma without complication  -Sats 98-99% on RA  -Controlled  -Will order prn breathing treatments    DVT PPx: SCDs    Discharge plan and follow up: DC home once medically stable     Kendra Miller MD  Hospital Medicine Staff  [phone] pager

## 2019-08-13 NOTE — PLAN OF CARE
"Previously scheduled appointment with Dr. Ronni Fox (new PCP) on 8/14/19 at 0900 rescheduled for 8/20/19 at 1200 due to the patient's continued hospitalization. Patient resting quietly in bed when CM rounded. Patient reported pain level "6" this AM.   "

## 2019-08-14 LAB
ALBUMIN SERPL BCP-MCNC: 3.1 G/DL (ref 3.5–5.2)
ALP SERPL-CCNC: 67 U/L (ref 55–135)
ALT SERPL W/O P-5'-P-CCNC: 53 U/L (ref 10–44)
ANION GAP SERPL CALC-SCNC: 7 MMOL/L (ref 8–16)
ANISOCYTOSIS BLD QL SMEAR: ABNORMAL
AST SERPL-CCNC: 46 U/L (ref 10–40)
BASOPHILS # BLD AUTO: 0.09 K/UL (ref 0–0.2)
BASOPHILS NFR BLD: 0.8 % (ref 0–1.9)
BILIRUB SERPL-MCNC: 4.1 MG/DL (ref 0.1–1)
BUN SERPL-MCNC: 7 MG/DL (ref 6–20)
CALCIUM SERPL-MCNC: 9 MG/DL (ref 8.7–10.5)
CHLORIDE SERPL-SCNC: 108 MMOL/L (ref 95–110)
CO2 SERPL-SCNC: 25 MMOL/L (ref 23–29)
CREAT SERPL-MCNC: 0.7 MG/DL (ref 0.5–1.4)
DIFFERENTIAL METHOD: ABNORMAL
EOSINOPHIL # BLD AUTO: 1.1 K/UL (ref 0–0.5)
EOSINOPHIL NFR BLD: 9.7 % (ref 0–8)
ERYTHROCYTE [DISTWIDTH] IN BLOOD BY AUTOMATED COUNT: 24.3 % (ref 11.5–14.5)
EST. GFR  (AFRICAN AMERICAN): >60 ML/MIN/1.73 M^2
EST. GFR  (NON AFRICAN AMERICAN): >60 ML/MIN/1.73 M^2
GLUCOSE SERPL-MCNC: 110 MG/DL (ref 70–110)
HCT VFR BLD AUTO: 24.5 % (ref 40–54)
HGB BLD-MCNC: 7.8 G/DL (ref 14–18)
HOWELL-JOLLY BOD BLD QL SMEAR: ABNORMAL
HYPOCHROMIA BLD QL SMEAR: ABNORMAL
IMM GRANULOCYTES # BLD AUTO: 0.48 K/UL (ref 0–0.04)
IMM GRANULOCYTES NFR BLD AUTO: 4.1 % (ref 0–0.5)
LDH SERPL L TO P-CCNC: 365 U/L (ref 110–260)
LYMPHOCYTES # BLD AUTO: 2.6 K/UL (ref 1–4.8)
LYMPHOCYTES NFR BLD: 22 % (ref 18–48)
MCH RBC QN AUTO: 29.5 PG (ref 27–31)
MCHC RBC AUTO-ENTMCNC: 31.8 G/DL (ref 32–36)
MCV RBC AUTO: 93 FL (ref 82–98)
MONOCYTES # BLD AUTO: 1.4 K/UL (ref 0.3–1)
MONOCYTES NFR BLD: 12.2 % (ref 4–15)
NEUTROPHILS # BLD AUTO: 6.1 K/UL (ref 1.8–7.7)
NEUTROPHILS NFR BLD: 51.2 % (ref 38–73)
NRBC BLD-RTO: 3 /100 WBC
OVALOCYTES BLD QL SMEAR: ABNORMAL
PLATELET # BLD AUTO: 311 K/UL (ref 150–350)
PMV BLD AUTO: 10.6 FL (ref 9.2–12.9)
POIKILOCYTOSIS BLD QL SMEAR: SLIGHT
POLYCHROMASIA BLD QL SMEAR: ABNORMAL
POTASSIUM SERPL-SCNC: 3.8 MMOL/L (ref 3.5–5.1)
PROT SERPL-MCNC: 6.2 G/DL (ref 6–8.4)
RBC # BLD AUTO: 2.64 M/UL (ref 4.6–6.2)
RETICS/RBC NFR AUTO: 20.4 % (ref 0.4–2)
SICKLE CELLS BLD QL SMEAR: ABNORMAL
SODIUM SERPL-SCNC: 140 MMOL/L (ref 136–145)
TARGETS BLD QL SMEAR: ABNORMAL
WBC # BLD AUTO: 11.8 K/UL (ref 3.9–12.7)

## 2019-08-14 PROCEDURE — 85045 AUTOMATED RETICULOCYTE COUNT: CPT

## 2019-08-14 PROCEDURE — 63600175 PHARM REV CODE 636 W HCPCS: Performed by: HOSPITALIST

## 2019-08-14 PROCEDURE — 85025 COMPLETE CBC W/AUTO DIFF WBC: CPT

## 2019-08-14 PROCEDURE — 99232 SBSQ HOSP IP/OBS MODERATE 35: CPT | Mod: ,,, | Performed by: HOSPITALIST

## 2019-08-14 PROCEDURE — 36415 COLL VENOUS BLD VENIPUNCTURE: CPT

## 2019-08-14 PROCEDURE — 11000001 HC ACUTE MED/SURG PRIVATE ROOM

## 2019-08-14 PROCEDURE — S5010 5% DEXTROSE AND 0.45% SALINE: HCPCS | Performed by: HOSPITALIST

## 2019-08-14 PROCEDURE — 63600175 PHARM REV CODE 636 W HCPCS: Performed by: INTERNAL MEDICINE

## 2019-08-14 PROCEDURE — 80053 COMPREHEN METABOLIC PANEL: CPT

## 2019-08-14 PROCEDURE — 25000003 PHARM REV CODE 250: Performed by: HOSPITALIST

## 2019-08-14 PROCEDURE — 83615 LACTATE (LD) (LDH) ENZYME: CPT

## 2019-08-14 PROCEDURE — 99232 PR SUBSEQUENT HOSPITAL CARE,LEVL II: ICD-10-PCS | Mod: ,,, | Performed by: HOSPITALIST

## 2019-08-14 PROCEDURE — 25000003 PHARM REV CODE 250: Performed by: INTERNAL MEDICINE

## 2019-08-14 RX ORDER — HYDROMORPHONE HYDROCHLORIDE 1 MG/ML
2 INJECTION, SOLUTION INTRAMUSCULAR; INTRAVENOUS; SUBCUTANEOUS EVERY 4 HOURS PRN
Status: DISCONTINUED | OUTPATIENT
Start: 2019-08-14 | End: 2019-08-16

## 2019-08-14 RX ADMIN — HYDROMORPHONE HYDROCHLORIDE 2 MG: 1 INJECTION, SOLUTION INTRAMUSCULAR; INTRAVENOUS; SUBCUTANEOUS at 08:08

## 2019-08-14 RX ADMIN — IBUPROFEN 800 MG: 400 TABLET, FILM COATED ORAL at 09:08

## 2019-08-14 RX ADMIN — MORPHINE SULFATE 45 MG: 30 TABLET, EXTENDED RELEASE ORAL at 08:08

## 2019-08-14 RX ADMIN — SENNOSIDES,DOCUSATE SODIUM 1 TABLET: 8.6; 5 TABLET, FILM COATED ORAL at 09:08

## 2019-08-14 RX ADMIN — HYDROMORPHONE HYDROCHLORIDE 4 MG: 1 INJECTION, SOLUTION INTRAMUSCULAR; INTRAVENOUS; SUBCUTANEOUS at 11:08

## 2019-08-14 RX ADMIN — HYDROMORPHONE HYDROCHLORIDE 2 MG: 1 INJECTION, SOLUTION INTRAMUSCULAR; INTRAVENOUS; SUBCUTANEOUS at 03:08

## 2019-08-14 RX ADMIN — IBUPROFEN 800 MG: 400 TABLET, FILM COATED ORAL at 03:08

## 2019-08-14 RX ADMIN — FOLIC ACID 1 MG: 1 TABLET ORAL at 08:08

## 2019-08-14 RX ADMIN — MORPHINE SULFATE 45 MG: 30 TABLET, EXTENDED RELEASE ORAL at 09:08

## 2019-08-14 RX ADMIN — HYDROMORPHONE HYDROCHLORIDE 4 MG: 1 INJECTION, SOLUTION INTRAMUSCULAR; INTRAVENOUS; SUBCUTANEOUS at 01:08

## 2019-08-14 RX ADMIN — DEXTROSE MONOHYDRATE AND SODIUM CHLORIDE: 5; .45 INJECTION, SOLUTION INTRAVENOUS at 01:08

## 2019-08-14 RX ADMIN — HYDROXYUREA 500 MG: 500 CAPSULE ORAL at 08:08

## 2019-08-14 RX ADMIN — IBUPROFEN 800 MG: 400 TABLET, FILM COATED ORAL at 08:08

## 2019-08-14 RX ADMIN — DEXTROSE MONOHYDRATE AND SODIUM CHLORIDE: 5; .45 INJECTION, SOLUTION INTRAVENOUS at 09:08

## 2019-08-14 RX ADMIN — SENNOSIDES,DOCUSATE SODIUM 1 TABLET: 8.6; 5 TABLET, FILM COATED ORAL at 08:08

## 2019-08-14 RX ADMIN — HYDROMORPHONE HYDROCHLORIDE 4 MG: 1 INJECTION, SOLUTION INTRAMUSCULAR; INTRAVENOUS; SUBCUTANEOUS at 08:08

## 2019-08-14 RX ADMIN — POLYETHYLENE GLYCOL 3350 17 G: 17 POWDER, FOR SOLUTION ORAL at 08:08

## 2019-08-14 RX ADMIN — HYDROMORPHONE HYDROCHLORIDE 4 MG: 1 INJECTION, SOLUTION INTRAMUSCULAR; INTRAVENOUS; SUBCUTANEOUS at 05:08

## 2019-08-14 NOTE — PLAN OF CARE
08/14/19 0841   Post-Acute Status   Post-Acute Authorization Other   Other Status No Post-Acute Service Needs

## 2019-08-14 NOTE — MEDICAL/APP STUDENT
Subjective:       Patient ID: Paddy Stevenson is a 24 y.o. male.    Chief Complaint: Sickle Cell Pain Crisis (generalized)    Overnight/ interval  No acute events overnight. Pt. Informed of plan of care.     Review of Systems   Constitutional: Negative.    HENT: Negative.    Respiratory: Negative for shortness of breath.    Cardiovascular: Negative for chest pain.   Gastrointestinal: Negative for abdominal pain, blood in stool, diarrhea, nausea and vomiting.   Genitourinary: Negative for difficulty urinating, dysuria and hematuria.   Musculoskeletal: Positive for arthralgias and myalgias.            Assessment:       1. Vaso-occlusive sickle cell crisis        Plan:    Vaso-occlusive sickle cell crisis  Continue hydration, hydroxyurea, begin to reduce IV pain management. Arrange outpatient management.

## 2019-08-15 LAB
ALBUMIN SERPL BCP-MCNC: 3.4 G/DL (ref 3.5–5.2)
ALP SERPL-CCNC: 66 U/L (ref 55–135)
ALT SERPL W/O P-5'-P-CCNC: 66 U/L (ref 10–44)
ANION GAP SERPL CALC-SCNC: 7 MMOL/L (ref 8–16)
ANISOCYTOSIS BLD QL SMEAR: ABNORMAL
AST SERPL-CCNC: 48 U/L (ref 10–40)
BASOPHILS # BLD AUTO: 0.07 K/UL (ref 0–0.2)
BASOPHILS NFR BLD: 0.5 % (ref 0–1.9)
BILIRUB SERPL-MCNC: 2.5 MG/DL (ref 0.1–1)
BUN SERPL-MCNC: 7 MG/DL (ref 6–20)
CALCIUM SERPL-MCNC: 9 MG/DL (ref 8.7–10.5)
CHLORIDE SERPL-SCNC: 108 MMOL/L (ref 95–110)
CO2 SERPL-SCNC: 24 MMOL/L (ref 23–29)
CREAT SERPL-MCNC: 0.7 MG/DL (ref 0.5–1.4)
DIFFERENTIAL METHOD: ABNORMAL
EOSINOPHIL # BLD AUTO: 1.2 K/UL (ref 0–0.5)
EOSINOPHIL NFR BLD: 8.9 % (ref 0–8)
ERYTHROCYTE [DISTWIDTH] IN BLOOD BY AUTOMATED COUNT: 24.1 % (ref 11.5–14.5)
EST. GFR  (AFRICAN AMERICAN): >60 ML/MIN/1.73 M^2
EST. GFR  (NON AFRICAN AMERICAN): >60 ML/MIN/1.73 M^2
GLUCOSE SERPL-MCNC: 92 MG/DL (ref 70–110)
HCT VFR BLD AUTO: 24.5 % (ref 40–54)
HGB BLD-MCNC: 8 G/DL (ref 14–18)
HOWELL-JOLLY BOD BLD QL SMEAR: ABNORMAL
HYPOCHROMIA BLD QL SMEAR: ABNORMAL
IMM GRANULOCYTES # BLD AUTO: 0.63 K/UL (ref 0–0.04)
IMM GRANULOCYTES NFR BLD AUTO: 4.7 % (ref 0–0.5)
LDH SERPL L TO P-CCNC: 424 U/L (ref 110–260)
LYMPHOCYTES # BLD AUTO: 3.1 K/UL (ref 1–4.8)
LYMPHOCYTES NFR BLD: 23.2 % (ref 18–48)
MCH RBC QN AUTO: 29 PG (ref 27–31)
MCHC RBC AUTO-ENTMCNC: 32.7 G/DL (ref 32–36)
MCV RBC AUTO: 89 FL (ref 82–98)
MONOCYTES # BLD AUTO: 1.4 K/UL (ref 0.3–1)
MONOCYTES NFR BLD: 10.9 % (ref 4–15)
NEUTROPHILS # BLD AUTO: 6.9 K/UL (ref 1.8–7.7)
NEUTROPHILS NFR BLD: 51.8 % (ref 38–73)
NRBC BLD-RTO: 3 /100 WBC
OVALOCYTES BLD QL SMEAR: ABNORMAL
PLATELET # BLD AUTO: 303 K/UL (ref 150–350)
PLATELET BLD QL SMEAR: ABNORMAL
PMV BLD AUTO: 10.6 FL (ref 9.2–12.9)
POIKILOCYTOSIS BLD QL SMEAR: ABNORMAL
POLYCHROMASIA BLD QL SMEAR: ABNORMAL
POTASSIUM SERPL-SCNC: 3.9 MMOL/L (ref 3.5–5.1)
PROT SERPL-MCNC: 6.4 G/DL (ref 6–8.4)
RBC # BLD AUTO: 2.76 M/UL (ref 4.6–6.2)
RETICS/RBC NFR AUTO: 20.6 % (ref 0.4–2)
SICKLE CELLS BLD QL SMEAR: ABNORMAL
SODIUM SERPL-SCNC: 139 MMOL/L (ref 136–145)
TARGETS BLD QL SMEAR: ABNORMAL
WBC # BLD AUTO: 13.27 K/UL (ref 3.9–12.7)

## 2019-08-15 PROCEDURE — 25000003 PHARM REV CODE 250: Performed by: HOSPITALIST

## 2019-08-15 PROCEDURE — 99233 PR SUBSEQUENT HOSPITAL CARE,LEVL III: ICD-10-PCS | Mod: ,,, | Performed by: HOSPITALIST

## 2019-08-15 PROCEDURE — 36415 COLL VENOUS BLD VENIPUNCTURE: CPT

## 2019-08-15 PROCEDURE — 11000001 HC ACUTE MED/SURG PRIVATE ROOM

## 2019-08-15 PROCEDURE — 85045 AUTOMATED RETICULOCYTE COUNT: CPT

## 2019-08-15 PROCEDURE — 85025 COMPLETE CBC W/AUTO DIFF WBC: CPT

## 2019-08-15 PROCEDURE — 63600175 PHARM REV CODE 636 W HCPCS: Performed by: HOSPITALIST

## 2019-08-15 PROCEDURE — 99233 SBSQ HOSP IP/OBS HIGH 50: CPT | Mod: ,,, | Performed by: HOSPITALIST

## 2019-08-15 PROCEDURE — 83615 LACTATE (LD) (LDH) ENZYME: CPT

## 2019-08-15 PROCEDURE — 25000003 PHARM REV CODE 250: Performed by: INTERNAL MEDICINE

## 2019-08-15 PROCEDURE — S5010 5% DEXTROSE AND 0.45% SALINE: HCPCS | Performed by: HOSPITALIST

## 2019-08-15 PROCEDURE — 80053 COMPREHEN METABOLIC PANEL: CPT

## 2019-08-15 RX ADMIN — POLYETHYLENE GLYCOL 3350 17 G: 17 POWDER, FOR SOLUTION ORAL at 09:08

## 2019-08-15 RX ADMIN — MORPHINE SULFATE 45 MG: 30 TABLET, EXTENDED RELEASE ORAL at 09:08

## 2019-08-15 RX ADMIN — DEXTROSE MONOHYDRATE AND SODIUM CHLORIDE: 5; .45 INJECTION, SOLUTION INTRAVENOUS at 11:08

## 2019-08-15 RX ADMIN — HYDROMORPHONE HYDROCHLORIDE 2 MG: 1 INJECTION, SOLUTION INTRAMUSCULAR; INTRAVENOUS; SUBCUTANEOUS at 01:08

## 2019-08-15 RX ADMIN — HYDROMORPHONE HYDROCHLORIDE 2 MG: 1 INJECTION, SOLUTION INTRAMUSCULAR; INTRAVENOUS; SUBCUTANEOUS at 06:08

## 2019-08-15 RX ADMIN — HYDROMORPHONE HYDROCHLORIDE 2 MG: 1 INJECTION, SOLUTION INTRAMUSCULAR; INTRAVENOUS; SUBCUTANEOUS at 11:08

## 2019-08-15 RX ADMIN — HYDROMORPHONE HYDROCHLORIDE 2 MG: 1 INJECTION, SOLUTION INTRAMUSCULAR; INTRAVENOUS; SUBCUTANEOUS at 09:08

## 2019-08-15 RX ADMIN — IBUPROFEN 800 MG: 400 TABLET, FILM COATED ORAL at 09:08

## 2019-08-15 RX ADMIN — HYDROMORPHONE HYDROCHLORIDE 2 MG: 1 INJECTION, SOLUTION INTRAMUSCULAR; INTRAVENOUS; SUBCUTANEOUS at 04:08

## 2019-08-15 RX ADMIN — HYDROMORPHONE HYDROCHLORIDE 2 MG: 1 INJECTION, SOLUTION INTRAMUSCULAR; INTRAVENOUS; SUBCUTANEOUS at 12:08

## 2019-08-15 RX ADMIN — HYDROXYUREA 500 MG: 500 CAPSULE ORAL at 09:08

## 2019-08-15 RX ADMIN — SENNOSIDES,DOCUSATE SODIUM 1 TABLET: 8.6; 5 TABLET, FILM COATED ORAL at 09:08

## 2019-08-15 RX ADMIN — DEXTROSE MONOHYDRATE AND SODIUM CHLORIDE: 5; .45 INJECTION, SOLUTION INTRAVENOUS at 02:08

## 2019-08-15 RX ADMIN — FOLIC ACID 1 MG: 1 TABLET ORAL at 09:08

## 2019-08-15 NOTE — PROGRESS NOTES
Hospital Medicine   Progress note      [place]: [facility] D Kendra Miller MD   Admit Date: 2019   Hospital Day: 9  OWEN: 2019   Code status: Full Code   Principal Problem: Vaso-occlusive sickle cell crisis     HPI: Paddy Stevenson is a 23 y/o M with PMH sickle cell anemia and AVN of bilateral hips s/p L hip replacement 2 years ago presents c/o R hip, back, and thigh pain. Patient was recently [place] [facility] [place]  to 2019 for sickle cell pain crisis and left AMA once his Dilaudid dose was decreased.  Patient stated that he had to leave last admission because his grandmother passed and he had to attend the .  During this time, patient took his home extended release morphine but did not find any pain relief.  He reports his symptoms of diffuse generalized pain worsened which prompted him to seek further medical attention in the ED.  Patient denies any chest pain, cough, sputum production, nausea, vomiting, diarrhea, constipation, blood in stools, urinary urgency, frequency, dysuria, hematuria.  While in the ED, patient is noted to be afebrile, hemodynamically stable, saturating 100% on room air.  H&H of 9.1/28.4 which is above his baseline.  Occasional sickle cells noted. 14.9 retic count.    Interval hx:   Pt was seen and examined at bedside. Pt had no acute events overnight.  Patient continues to complain of diffuse arthralgia and myalgia.  However, has remained normotensive, hemodynamically stable, normal heart rate.      ROS (Positive in Bold, otherwise negative)  Constitutional: fever, chills, night sweats  CV: chest pain, edema, palpitations  Resp: SOB, cough, sputum production  GI: changes in appetite, NVDC, pain, melena, hematochezia, GERD, hematemesis  : Dysuria, hematuria, urinary urgency, frequency  MSK: arthralgia/myalgia, joint swelling  Neuro/Psych: anxiety, depression    PEx   Temp:  [97.3 °F (36.3 °C)-98.4 °F (36.9 °C)]   Pulse:  [75-87]   Resp:  [14-18]   BP:  (124-137)/(67-89)   SpO2:  [94 %-100 %]      I & O (Last 24H):     Intake/Output Summary (Last 24 hours) at 8/15/2019 1532  Last data filed at 8/15/2019 1300  Gross per 24 hour   Intake 480 ml   Output 1550 ml   Net -1070 ml       General:  male  in no acute distress. Nontoxic. Resting in bed. Cooperative.  HEENT: NCAT. PERRL. EOMI. Sclera Anicteric.  CVS: RRR. Normal S1 S2. No murmurs  Pulm: CTAB. Normal respiratory effort. No wheezes, rhonchi, or crackles.  Abdomen: Soft. Non-distended. No tenderness to palpation. No rebound or guarding. +BS.  Extremities: No edema. No cyanosis. Full ROM.  Neuro: Alert, oriented x 4, Spont mvt of all extremities with no focal deficits noted.    Recent Results (from the past 24 hour(s))   CBC auto differential    Collection Time: 08/15/19  9:30 AM   Result Value Ref Range    WBC 13.27 (H) 3.90 - 12.70 K/uL    RBC 2.76 (L) 4.60 - 6.20 M/uL    Hemoglobin 8.0 (L) 14.0 - 18.0 g/dL    Hematocrit 24.5 (L) 40.0 - 54.0 %    Mean Corpuscular Volume 89 82 - 98 fL    Mean Corpuscular Hemoglobin 29.0 27.0 - 31.0 pg    Mean Corpuscular Hemoglobin Conc 32.7 32.0 - 36.0 g/dL    RDW 24.1 (H) 11.5 - 14.5 %    Platelets 303 150 - 350 K/uL    MPV 10.6 9.2 - 12.9 fL    Immature Granulocytes 4.7 (H) 0.0 - 0.5 %    Gran # (ANC) 6.9 1.8 - 7.7 K/uL    Immature Grans (Abs) 0.63 (H) 0.00 - 0.04 K/uL    Lymph # 3.1 1.0 - 4.8 K/uL    Mono # 1.4 (H) 0.3 - 1.0 K/uL    Eos # 1.2 (H) 0.0 - 0.5 K/uL    Baso # 0.07 0.00 - 0.20 K/uL    nRBC 3 (A) 0 /100 WBC    Gran% 51.8 38.0 - 73.0 %    Lymph% 23.2 18.0 - 48.0 %    Mono% 10.9 4.0 - 15.0 %    Eosinophil% 8.9 (H) 0.0 - 8.0 %    Basophil% 0.5 0.0 - 1.9 %    Platelet Estimate Appears normal     Aniso Moderate     Poik Moderate     Poly Occasional     Hypo Occasional     Ovalocytes Occasional     Target Cells Occasional     Sickle Cells Occasional (A)     Byers-Jolly Bodies Occasional     Differential Method Automated    Comprehensive metabolic panel     Collection Time: 08/15/19  9:30 AM   Result Value Ref Range    Sodium 139 136 - 145 mmol/L    Potassium 3.9 3.5 - 5.1 mmol/L    Chloride 108 95 - 110 mmol/L    CO2 24 23 - 29 mmol/L    Glucose 92 70 - 110 mg/dL    BUN, Bld 7 6 - 20 mg/dL    Creatinine 0.7 0.5 - 1.4 mg/dL    Calcium 9.0 8.7 - 10.5 mg/dL    Total Protein 6.4 6.0 - 8.4 g/dL    Albumin 3.4 (L) 3.5 - 5.2 g/dL    Total Bilirubin 2.5 (H) 0.1 - 1.0 mg/dL    Alkaline Phosphatase 66 55 - 135 U/L    AST 48 (H) 10 - 40 U/L    ALT 66 (H) 10 - 44 U/L    Anion Gap 7 (L) 8 - 16 mmol/L    eGFR if African American >60.0 >60 mL/min/1.73 m^2    eGFR if non African American >60.0 >60 mL/min/1.73 m^2   Lactate dehydrogenase    Collection Time: 08/15/19  9:30 AM   Result Value Ref Range     (H) 110 - 260 U/L   Reticulocytes    Collection Time: 08/15/19  9:30 AM   Result Value Ref Range    Retic 20.6 (H) 0.4 - 2.0 %       No results for input(s): POCTGLUCOSE in the last 168 hours.    No results found for: HGBA1C     Active Hospital Problems    Diagnosis  POA    *Vaso-occlusive sickle cell crisis [D57.00]  Yes    On hydroxyurea therapy [Z79.899]  Not Applicable    Body aches [R52]  Yes    Opioid dependence [F11.20]  Yes    Mild intermittent asthma without complication [J45.20]  Yes     Chronic    Avascular necrosis of bones of both hips [M87.051, M87.052]  Yes     Chronic    Sickle cell pain crisis [D57.00]  Yes      Resolved Hospital Problems   No resolved problems to display.          Assessment and Plan for problems addressed today:      folic acid  1 mg Oral Daily    hydroxyurea  500 mg Oral Daily    morphine  45 mg Oral Q12H    polyethylene glycol  17 g Oral Daily    senna-docusate 8.6-50 mg  1 tablet Oral BID     albuterol sulfate, [] HYDROmorphone **FOLLOWED BY** [] HYDROmorphone **FOLLOWED BY** HYDROmorphone, naloxone, ondansetron, promethazine, sodium chloride 0.9%, sodium chloride 0.9%    Vasoocclusive sickle cell crisis:  Sickle  Cell anemia:  -H&H on admission of 9.1/28.4 (baseline between 8-10)  -Retic count appropriately elevated  -Supplemental oxygen  -D5 1/2 NS  -continue hydroxyurea and Folic acid  -Multi Modal pain control ibuprofen 800 mg q.8 hours scheduled, PTA extended-release morphine 45 mg b.i.d., Dilaudid 2 mg q.4 hours p.r.n. for PRN for breakthrough pain  -bowel regimen with Miralax and pericolace while pt is receiving opioid medications  -Nursing / Phlebotomy to use pediatric tubes when drawing labs to minimize iatrogenic anemia and blood loss.       Leukocytosis: resolved  -white count of 16 on admission, now normalized   -low threshold to start antibiotics if patient becomes febrile or hemodynamically unstable  -continue to monitor CBC daily     Drug-seeking behavior  -ER visits July 2019 7/1: [facility]  7/2: [facility]  7/3: [facility]  7/7: [facility]  [facility]: [facility]  7/13: [facility]  7/15: [facility]  7/16: [facility]  7/26: [facility]  8/5: [facility] to present  -discussed consultation with Addiction Psychiatry.  Patient became very agitated and aggressive after mentioning this resource.  He clenched his fist and said do not talk to me about that in a threatening manner  -reviewed Care everywhere, patient has had multiple recurrent hospital admissions exhibiting drug-seeking behavior.    -patient reports that he has been taking morphine XR 45 mg b.i.d..  However upon review of , this medication was last filled in 05/2019 for 3 days supply.  Prior to that medications were filled in 2017.   -patient is previously had police called on him for drug-seeking behavior a  -patient needs outpatient follow-up with PCP/Heme-Onc     Avascular necrosis of bones of both hips  -S/p L hip replacement ~2 years ago  -MRI R hip (12/2018) at OSH: Avascular necrosis of the right femoral head without subchondral collapse. Findings of osteonecrosis involving the right  acetabulum.  -MRI on 7/17/19:Focal area of avascular necrosis involving at least 50% of the articular surface.  No imaging findings to suggest subchondral plate collapse.  2. Additional focal area of avascular necrosis within the right iliac crest and possibly within the right proximal femoral shaft, the latter which is not well evaluated secondary to partial visualization.  -Pt reports discussing AVN and is followed by an Orthopedic surgeon in [place] with a f/u coming up. Currently no plans of surgery.      Mild intermittent asthma without complication  -Sats 98-99% on RA  -Controlled  -Will order prn breathing treatments    DVT PPx: SCDs    Discharge plan and follow up: DC home once medically stable     Kendra Miller MD  Hospital Medicine Staff  [phone] pager

## 2019-08-15 NOTE — PROGRESS NOTES
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 Patient was recently admitted to [facility] from  to 2019 for sickle cell pain crisis and left AMA once his Dilaudid dose was decreased.  Patient stated that he had to leave last admission because his grandmother passed and he had to attend the .  During this time, patient took his home extended release morphine but did not find any pain relief.  He reports his symptoms of diffuse generalized pain worsened which prompted him to seek further medical attention in the ED.  Patient denies any chest pain, cough, sputum production, nausea, vomiting, diarrhea, constipation, blood in stools, urinary urgency, frequency, dysuria, hematuria.  While in the ED, patient is noted to be afebrile, hemodynamically stable, saturating 100% on room air.  H&H of 9.1/28.4 which is above his baseline.  Occasional sickle cells noted. 14.9 retic count.    Interval hx:   Pt was seen and examined at bedside. Pt had no acute events overnight.  Patient continues to complain of diffuse arthralgia and myalgia.  However, has remained normotensive, hemodynamically stable, normal heart rate.  Start weaning down Dilaudid today.     ROS (Positive in Bold, otherwise negative)  Constitutional: fever, chills, night sweats  CV: chest pain, edema, palpitations  Resp: SOB, cough, sputum production  GI: changes in appetite, NVDC, pain, melena, hematochezia, GERD, hematemesis  : Dysuria, hematuria, urinary urgency, frequency  MSK: arthralgia/myalgia, joint swelling  Neuro/Psych: anxiety, depression    PEx   Temp:  [96.9 °F (36.1 °C)-98.1 °F (36.7 °C)]   Pulse:  [70-87]    Resp:  [16-20]   BP: (122-137)/(57-77)   SpO2:  [96 %-99 %]      I & O (Last 24H):     Intake/Output Summary (Last 24 hours) at 8/14/2019 2108  Last data filed at 8/14/2019 1138  Gross per 24 hour   Intake 1497.91 ml   Output 900 ml   Net 597.91 ml       General:  male  in no acute distress. Nontoxic. Resting in bed. Cooperative.  HEENT: NCAT. PERRL. EOMI. Sclera Anicteric.  CVS: RRR. Normal S1 S2. No murmurs  Pulm: CTAB. Normal respiratory effort. No wheezes, rhonchi, or crackles.  Abdomen: Soft. Non-distended. No tenderness to palpation. No rebound or guarding. +BS.  Extremities: No edema. No cyanosis. Full ROM.  Neuro: Alert, oriented x 4, Spont mvt of all extremities with no focal deficits noted.    Recent Results (from the past 24 hour(s))   CBC auto differential    Collection Time: 08/14/19  9:24 AM   Result Value Ref Range    WBC 11.80 3.90 - 12.70 K/uL    RBC 2.64 (L) 4.60 - 6.20 M/uL    Hemoglobin 7.8 (L) 14.0 - 18.0 g/dL    Hematocrit 24.5 (L) 40.0 - 54.0 %    Mean Corpuscular Volume 93 82 - 98 fL    Mean Corpuscular Hemoglobin 29.5 27.0 - 31.0 pg    Mean Corpuscular Hemoglobin Conc 31.8 (L) 32.0 - 36.0 g/dL    RDW 24.3 (H) 11.5 - 14.5 %    Platelets 311 150 - 350 K/uL    MPV 10.6 9.2 - 12.9 fL    Immature Granulocytes 4.1 (H) 0.0 - 0.5 %    Gran # (ANC) 6.1 1.8 - 7.7 K/uL    Immature Grans (Abs) 0.48 (H) 0.00 - 0.04 K/uL    Lymph # 2.6 1.0 - 4.8 K/uL    Mono # 1.4 (H) 0.3 - 1.0 K/uL    Eos # 1.1 (H) 0.0 - 0.5 K/uL    Baso # 0.09 0.00 - 0.20 K/uL    nRBC 3 (A) 0 /100 WBC    Gran% 51.2 38.0 - 73.0 %    Lymph% 22.0 18.0 - 48.0 %    Mono% 12.2 4.0 - 15.0 %    Eosinophil% 9.7 (H) 0.0 - 8.0 %    Basophil% 0.8 0.0 - 1.9 %    Aniso Moderate     Poik Slight     Poly Occasional     Hypo Occasional     Ovalocytes Occasional     Target Cells Occasional     Sickle Cells Occasional (A)     Byers-Jolly Bodies Occasional     Differential Method Automated    Comprehensive metabolic panel    Collection  Time: 19  9:24 AM   Result Value Ref Range    Sodium 140 136 - 145 mmol/L    Potassium 3.8 3.5 - 5.1 mmol/L    Chloride 108 95 - 110 mmol/L    CO2 25 23 - 29 mmol/L    Glucose 110 70 - 110 mg/dL    BUN, Bld 7 6 - 20 mg/dL    Creatinine 0.7 0.5 - 1.4 mg/dL    Calcium 9.0 8.7 - 10.5 mg/dL    Total Protein 6.2 6.0 - 8.4 g/dL    Albumin 3.1 (L) 3.5 - 5.2 g/dL    Total Bilirubin 4.1 (H) 0.1 - 1.0 mg/dL    Alkaline Phosphatase 67 55 - 135 U/L    AST 46 (H) 10 - 40 U/L    ALT 53 (H) 10 - 44 U/L    Anion Gap 7 (L) 8 - 16 mmol/L    eGFR if African American >60.0 >60 mL/min/1.73 m^2    eGFR if non African American >60.0 >60 mL/min/1.73 m^2   Lactate dehydrogenase    Collection Time: 19  9:24 AM   Result Value Ref Range     (H) 110 - 260 U/L   Reticulocytes    Collection Time: 19  9:24 AM   Result Value Ref Range    Retic 20.4 (H) 0.4 - 2.0 %       No results for input(s): POCTGLUCOSE in the last 168 hours.    No results found for: HGBA1C     Active Hospital Problems    Diagnosis  POA    *Vaso-occlusive sickle cell crisis [D57.00]  Yes    On hydroxyurea therapy [Z79.899]  Not Applicable    Body aches [R52]  Yes    Opioid dependence [F11.20]  Yes    Mild intermittent asthma without complication [J45.20]  Yes     Chronic    Avascular necrosis of bones of both hips [M87.051, M87.052]  Yes     Chronic    Sickle cell pain crisis [D57.00]  Yes      Resolved Hospital Problems   No resolved problems to display.          Assessment and Plan for problems addressed today:      folic acid  1 mg Oral Daily    hydroxyurea  500 mg Oral Daily    ibuprofen  800 mg Oral TID    morphine  45 mg Oral Q12H    polyethylene glycol  17 g Oral Daily    senna-docusate 8.6-50 mg  1 tablet Oral BID     albuterol sulfate, [] HYDROmorphone **FOLLOWED BY** [] HYDROmorphone **FOLLOWED BY** HYDROmorphone, naloxone, ondansetron, promethazine, sodium chloride 0.9%, sodium chloride 0.9%    Vasoocclusive sickle  cell crisis:  Sickle Cell anemia:  -H&H on admission of 9.1/28.4 (baseline between 8-10)  -Retic count appropriately elevated  -Supplemental oxygen  -D5 1/2 NS  -continue hydroxyurea and Folic acid  -Multi Modal pain control ibuprofen 800 mg q.8 hours scheduled, PTA extended-release morphine 45 mg b.i.d., Dilaudid 2 mg q.4 hours p.r.n. for PRN for breakthrough pain  -bowel regimen with Miralax and pericolace while pt is receiving opioid medications  -Nursing / Phlebotomy to use pediatric tubes when drawing labs to minimize iatrogenic anemia and blood loss.       Leukocytosis: resolved  -white count of 16 on admission, now normalized   -low threshold to start antibiotics if patient becomes febrile or hemodynamically unstable  -continue to monitor CBC daily     Drug-seeking behavior  -ER visits July 2019 7/1: [facility]  7/2: [facility]  7/3: [facility]  7/7: Loc[facility] within [facility]  7/7: [facility]  7/13: [facility]  7/15: [facility]  7/16: [facility]  7/26: [facility]  8/5: [facility]  -discussed consultation with Addiction Psychiatry.  Patient became very agitated and aggressive after mentioning this resource.  He clenched his fist and said do not talk to me about that in a threatening manner  -patient needs outpatient follow-up with PCP/Heme-Onc     Avascular necrosis of bones of both hips  -S/p L hip replacement ~2 years ago  -MRI R hip (12/2018) at OSH: Avascular necrosis of the right femoral head without subchondral collapse. 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  -MRI on 7/17/19:Focal area of avascular necrosis involving at least 50% of the articular surface.  No imaging findings to suggest subchondral plate collapse.  2. Additional focal area of avascular necrosis within the right iliac crest and possibly within the right proximal femoral shaft, the latter which is not well evaluated secondary to partial visualization.  -Pt reports discussing AVN and  is followed by an Orthopedic surgeon in [place] with a f/u coming up. Currently no plans of surgery.      Mild intermittent asthma without complication  -Sats 98-99% on RA  -Controlled  -Will order prn breathing treatments    DVT PPx: SCDs    Discharge plan and follow up: DC home once medically stable     Kendra Miller MD  Hospital Medicine Staff  [phone] pager

## 2019-08-15 NOTE — PLAN OF CARE
08/15/19 0843   Post-Acute Status   Post-Acute Authorization Other   Other Status No Post-Acute Service Needs

## 2019-08-15 NOTE — PLAN OF CARE
08/15/19 1342   Discharge Reassessment   Assessment Type Discharge Planning Reassessment   Discharge Plan A Home with family   Discharge Plan B Home Health   DME Needed Upon Discharge  none   Anticipated Discharge Disposition Home   Can the patient answer the patient profile reliably? Yes, cognitively intact   How does the patient rate their overall health at the present time? Good   Describe the patient's ability to walk at the present time. No restrictions   How often would a person be available to care for the patient? Often   Number of comorbid conditions (as recorded on the chart) Five or more   Post-Acute Status   Post-Acute Authorization Other   Other Status No Post-Acute Service Needs   Discharge Delays   (pt not medically stable for discharge)     Hem/Onc continues to follow patient for sickle cell pain crisis.

## 2019-08-15 NOTE — NURSING
Notified [facility] Med D of client's refusal to have am labs performed. Will continue to monitor.

## 2019-08-15 NOTE — CLINICAL REVIEW
Since decrease in PRN dilaudid dose, patient has worsened pain control with PRN doses lasting only 1 hour and not reaching tolerable threshold for pain at 5/10.  Reviewed history of hydroxyurea and he says he was decreased from 1500mg qday to 500mg qday due to hepatic toxicity.  Review of labs in Care Everywhere show mild transaminitis (neither ALT or AST exceeding 100) around 12/2018 in [place] labs.  Todays labs reveal worsened anemia and has uptrend in both tbili and retic count.  -Recommend restarting prior pain regimen.  -When pain stable, can consider increasing hydroxyurea to 1000mg qday.    Pantera Nunes MD  Hematology Oncology Fellow PGY6  Pager [phone]

## 2019-08-16 PROCEDURE — 99231 SBSQ HOSP IP/OBS SF/LOW 25: CPT | Mod: ,,, | Performed by: INTERNAL MEDICINE

## 2019-08-16 PROCEDURE — 25000003 PHARM REV CODE 250: Performed by: PHYSICIAN ASSISTANT

## 2019-08-16 PROCEDURE — 25000003 PHARM REV CODE 250: Performed by: INTERNAL MEDICINE

## 2019-08-16 PROCEDURE — 99231 PR SUBSEQUENT HOSPITAL CARE,LEVL I: ICD-10-PCS | Mod: ,,, | Performed by: INTERNAL MEDICINE

## 2019-08-16 PROCEDURE — 25000003 PHARM REV CODE 250: Performed by: HOSPITALIST

## 2019-08-16 PROCEDURE — 63600175 PHARM REV CODE 636 W HCPCS: Performed by: INTERNAL MEDICINE

## 2019-08-16 PROCEDURE — S5010 5% DEXTROSE AND 0.45% SALINE: HCPCS | Performed by: HOSPITALIST

## 2019-08-16 PROCEDURE — 97802 MEDICAL NUTRITION INDIV IN: CPT

## 2019-08-16 PROCEDURE — 63600175 PHARM REV CODE 636 W HCPCS: Performed by: HOSPITALIST

## 2019-08-16 PROCEDURE — 11000001 HC ACUTE MED/SURG PRIVATE ROOM

## 2019-08-16 RX ORDER — HYDROMORPHONE HYDROCHLORIDE 1 MG/ML
4 INJECTION, SOLUTION INTRAMUSCULAR; INTRAVENOUS; SUBCUTANEOUS
Status: DISCONTINUED | OUTPATIENT
Start: 2019-08-16 | End: 2019-08-21

## 2019-08-16 RX ORDER — DIPHENHYDRAMINE HCL 25 MG
25 CAPSULE ORAL ONCE AS NEEDED
Status: COMPLETED | OUTPATIENT
Start: 2019-08-16 | End: 2019-08-16

## 2019-08-16 RX ADMIN — SENNOSIDES,DOCUSATE SODIUM 1 TABLET: 8.6; 5 TABLET, FILM COATED ORAL at 09:08

## 2019-08-16 RX ADMIN — DIPHENHYDRAMINE HYDROCHLORIDE 25 MG: 25 CAPSULE ORAL at 11:08

## 2019-08-16 RX ADMIN — HYDROMORPHONE HYDROCHLORIDE 4 MG: 1 INJECTION, SOLUTION INTRAMUSCULAR; INTRAVENOUS; SUBCUTANEOUS at 08:08

## 2019-08-16 RX ADMIN — HYDROXYUREA 500 MG: 500 CAPSULE ORAL at 08:08

## 2019-08-16 RX ADMIN — HYDROMORPHONE HYDROCHLORIDE 2 MG: 1 INJECTION, SOLUTION INTRAMUSCULAR; INTRAVENOUS; SUBCUTANEOUS at 11:08

## 2019-08-16 RX ADMIN — FOLIC ACID 1 MG: 1 TABLET ORAL at 08:08

## 2019-08-16 RX ADMIN — DEXTROSE MONOHYDRATE AND SODIUM CHLORIDE: 5; .45 INJECTION, SOLUTION INTRAVENOUS at 08:08

## 2019-08-16 RX ADMIN — HYDROMORPHONE HYDROCHLORIDE 4 MG: 1 INJECTION, SOLUTION INTRAMUSCULAR; INTRAVENOUS; SUBCUTANEOUS at 02:08

## 2019-08-16 RX ADMIN — DEXTROSE MONOHYDRATE AND SODIUM CHLORIDE: 5; .45 INJECTION, SOLUTION INTRAVENOUS at 06:08

## 2019-08-16 RX ADMIN — MORPHINE SULFATE 45 MG: 30 TABLET, EXTENDED RELEASE ORAL at 08:08

## 2019-08-16 RX ADMIN — MORPHINE SULFATE 45 MG: 30 TABLET, EXTENDED RELEASE ORAL at 09:08

## 2019-08-16 RX ADMIN — HYDROMORPHONE HYDROCHLORIDE 2 MG: 1 INJECTION, SOLUTION INTRAMUSCULAR; INTRAVENOUS; SUBCUTANEOUS at 03:08

## 2019-08-16 RX ADMIN — HYDROMORPHONE HYDROCHLORIDE 4 MG: 1 INJECTION, SOLUTION INTRAMUSCULAR; INTRAVENOUS; SUBCUTANEOUS at 11:08

## 2019-08-16 RX ADMIN — HYDROMORPHONE HYDROCHLORIDE 4 MG: 1 INJECTION, SOLUTION INTRAMUSCULAR; INTRAVENOUS; SUBCUTANEOUS at 05:08

## 2019-08-16 RX ADMIN — HYDROMORPHONE HYDROCHLORIDE 2 MG: 1 INJECTION, SOLUTION INTRAMUSCULAR; INTRAVENOUS; SUBCUTANEOUS at 06:08

## 2019-08-16 NOTE — PLAN OF CARE
Problem: Adult Inpatient Plan of Care  Goal: Plan of Care Review  Outcome: Ongoing (interventions implemented as appropriate)  Recommendations    Recommendation:   1. Continue regular diet    -Encourage intake at meal times to meet EEN/EPN   2 RD to monitor and follow up     Goals: pt to meet >75% of EEN/EPN while admitted  Nutrition Goal Status: new  Communication of RD Recs: other (comment)(POC)

## 2019-08-16 NOTE — PROGRESS NOTES
"[facility]  Adult Nutrition  Progress Note    SUMMARY       Recommendations    Recommendation:   1. Continue regular diet    -Encourage intake at meal times to meet EEN/EPN   2 RD to monitor and follow up     Goals: pt to meet >75% of EEN/EPN while admitted  Nutrition Goal Status: new  Communication of RD Recs: other (comment)(POC)    Reason for Assessment    Reason For Assessment: length of stay  Diagnosis: (sickle cell crisis)  Relevant Medical History: Sickle cell; stroke  Interdisciplinary Rounds: did not attend  General Information Comments: Pt reported fair appetite, PO ~50% of meals. States appetite has always been small. Denies any ONS at this time, encouraged intake to meet needs. No c/o N/V/C/D reported. No recent wt loss reported, -160 lbs per pt. Well nourished at this time,no s/s of malnutrition at this time.   Nutrition Discharge Planning: d/c on regular diet     Nutrition Risk Screen    Nutrition Risk Screen: no indicators present    Nutrition/Diet History    Spiritual, Cultural Beliefs, Scientologist Practices, Values that Affect Care: no  Factors Affecting Nutritional Intake: decreased appetite    Anthropometrics    Temp: 97.8 °F (36.6 °C)  Height Method: Stated  Height: 5' 9" (175.3 cm)  Height (inches): 69 in  Weight Method: Standard Scale  Weight: 72.9 kg (160 lb 11.4 oz)  Weight (lb): 160.72 lb  Ideal Body Weight (IBW), Male: 160 lb  % Ideal Body Weight, Male (lb): 106.25 lb  BMI (Calculated): 25.2  BMI Grade: 25 - 29.9 - overweight  Usual Body Weight (UBW), k.18 kg  % Usual Body Weight: 0    Lab/Procedures/Meds    Pertinent Labs Reviewed: reviewed  Pertinent Medications Reviewed: reviewed  Pertinent Medications Comments: folic acid; morphine; polyethylene glycol; senna-docusate     Estimated/Assessed Needs    Weight Used For Calorie Calculations: 72.9 kg (160 lb 11.5 oz)  Energy Calorie Requirements (kcal): 2044 kcal/d  Energy Need Method: [place] " Cm(x1.2)  Protein Requirements: 73-87 g/kg   Weight Used For Protein Calculations: 72.9 kg (160 lb 11.5 oz)(1.0-1.2 g/kg)  Fluid Requirements (mL): 1 mL/kcal or per MD  RDA Method (mL): 2044    Nutrition Prescription Ordered    Current Diet Order: Regular diet     Evaluation of Received Nutrient/Fluid Intake    I/O: -7.4 L since admit   Energy Calories Required: not meeting needs  Protein Required: not meeting needs  Fluid Required: meeting needs  Comments: LBM 8/15  Tolerance: tolerating  % Intake of Estimated Energy Needs: 50 - 75 %  % Meal Intake: 25 - 50 %    Nutrition Risk    Level of Risk/Frequency of Follow-up: low(1x/week)     Assessment and Plan    Nutrition Problem  Inadequate oral intake    Related to (etiology):   Decreased appetite     Signs and Symptoms (as evidenced by):   PO <75% of EEN/EPN      Interventions (treatment strategy):  Collaboration of care with other providers    Nutrition Diagnosis Status:   New    Monitor and Evaluation    Food and Nutrient Intake: energy intake, food and beverage intake  Food and Nutrient Adminstration: diet order  Anthropometric Measurements: weight, weight change  Biochemical Data, Medical Tests and Procedures: electrolyte and renal panel, lipid profile, gastrointestinal profile, glucose/endocrine profile, inflammatory profile  Nutrition-Focused Physical Findings: overall appearance     Nutrition Follow-Up    RD Follow-up?: Yes

## 2019-08-16 NOTE — PLAN OF CARE
08/16/19 0842   Post-Acute Status   Post-Acute Authorization Other   Other Status No Post-Acute Service Needs

## 2019-08-16 NOTE — PROGRESS NOTES
Hospital Medicine  Progress Note    Team: [facility]OS[facility] MED D Peace Gonzalez MD  Admit Date: 8/5/2019  OWEN 8/20/2019  Length of Stay:  LOS: 10 days   Code status: Full Code    Principal Problem:  Vaso-occlusive sickle cell crisis    HPI:  23 y/o M with PMH sickle cell anemia and AVN of bilateral hips s/p L hip replacement 2 years ago presents c/o R hip, back, and thigh pain. Patient was recently admitted to [facility] from 07/26 to 08/01/2019 for sickle cell pain crisis and left AMA.      Interval history:   Patient reports persistent pain, not improving with IV fluids. Not tolerating empiric taper in Dilaudid.    Review of Systems   Constitutional: Negative for fever.   Respiratory: Negative for shortness of breath.        Physical Exam  Temp:  [97.6 °F (36.4 °C)-98.4 °F (36.9 °C)]   Pulse:  [56-73]   Resp:  [14-18]   BP: (118-147)/(68-77)   SpO2:  [96 %-100 %]     Intake/Output Summary (Last 24 hours) at 8/16/2019 1654  Last data filed at 8/16/2019 1300  Gross per 24 hour   Intake 0 ml   Output 1800 ml   Net -1800 ml     Physical Exam   Constitutional: He is well-developed, well-nourished, and in no distress.   Eyes: Conjunctivae are normal.   Cardiovascular: Normal rate and regular rhythm.   Pulmonary/Chest: Effort normal.   Abdominal: Soft. Normal appearance.   Musculoskeletal: He exhibits no edema.   Neurological: He is alert.     Recent Labs   Lab 08/13/19  0831 08/14/19  0924 08/15/19  0930   WBC 12.41 11.80 13.27*   HGB 8.5* 7.8* 8.0*   HCT 25.4* 24.5* 24.5*    311 303     Recent Labs   Lab 08/13/19  0831 08/14/19  0924 08/15/19  0930    140 139   K 3.6 3.8 3.9    108 108   CO2 24 25 24   BUN 9 7 7   CREATININE 0.8 0.7 0.7    110 92   CALCIUM 8.5* 9.0 9.0     Recent Labs   Lab 08/13/19  0831 08/14/19  0924 08/15/19  0930   ALKPHOS 62 67 66   ALT 36 53* 66*   AST 31 46* 48*   ALBUMIN 3.4* 3.1* 3.4*   PROT 6.5 6.2 6.4   BILITOT 3.8* 4.1* 2.5*       Scheduled Meds:   folic acid  1 mg Oral  Daily    hydroxyurea  500 mg Oral Daily    morphine  45 mg Oral Q12H    polyethylene glycol  17 g Oral Daily    senna-docusate 8.6-50 mg  1 tablet Oral BID     Continuous Infusions:   dextrose 5 % and 0.45 % NaCl 125 mL/hr at 08/16/19 0834     As Needed:  albuterol sulfate, HYDROmorphone, naloxone, ondansetron, promethazine, sodium chloride 0.9%, sodium chloride 0.9%    Active Hospital Problems    Diagnosis  POA    *Vaso-occlusive sickle cell crisis [D57.00]  Yes    On hydroxyurea therapy [Z79.899]  Not Applicable    Body aches [R52]  Yes    Opioid dependence [F11.20]  Yes    Mild intermittent asthma without complication [J45.20]  Yes     Chronic    Avascular necrosis of bones of both hips [M87.051, M87.052]  Yes     Chronic    Sickle cell pain crisis [D57.00]  Yes      Resolved Hospital Problems   No resolved problems to display.       Overview of Hospital Course:  25 y/o M with PMH sickle cell anemia and AVN of bilateral hips s/p L hip replacement 2 years ago admitted for pain crisis.    Assessment and Plan:      Vasoocclusive sickle cell crisis:  Sickle Cell anemia:  -H&H on admission of 9.1/28.4 (baseline between 8-10)  -Retic count elevated to 14  -Supplemental oxygen  -D5 1/2 NS  -continue hydroxyurea and Folic acid  -pain management  -bowel regimen with Miralax and Pericolace while pt is receiving opioid medications.   -patient not responding to IV fluids as expected; consulted Heme/Onc. Agree with resumption of NSAIDs (patient previously refusing)  -patient without significant improvement; continuing IV fluids, home-dose MSContin, MSIR q4 prn, Dilaudid 1 mg IV q6 prn.  -patient not receiving oral MSIR as ordered; increasing IV Dilaudid dose to 2 mg. Plan to follow up with Heme/Onc on Monday if no improvement.  -No improvement, possibly worsening hemolysis. Plan to contact Heme/Onc in AM  -Patient appears to have high tolerance for opioids. Agree with Heme/Onc recommendations to increase IV  Dilaudid: 4 mg Q 3 hours and resume NSAIDs  -Patient continues to have crisis pain. Continue IV fluids and resume IV Dilaudid: 4 mg Q 3 hours.      Leukocytosis  - CXR, UA normal  - start antibiotics if patient becomes febrile or hemodynamically unstable     Avascular necrosis of bones of both hips  -S/p L hip replacement ~2 years ago  -MRI R hip (12/2018) at OSH: Avascular necrosis of the right femoral head without subchondral collapse. 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  -MRI on 7/17/19:Focal area of avascular necrosis involving at least 50% of the articular surface.  No imaging findings to suggest subchondral plate collapse.  2. Additional focal area of avascular necrosis within the right iliac crest and possibly within the right proximal femoral shaft, the latter which is not well evaluated secondary to partial visualization.  -Pt reports being followed by an Orthopedic surgeon in [place] with an appointment coming up. Currently no plans of surgery.      Mild intermittent asthma without complication  -Sats 98-99% on RA  -Controlled  -prn breathing treatments    High Risk Conditions  Patient is currently receiving parenteral controlled substances: Dilaudid     Diet:  Diet Adult Regular (IDDSI Level 7)  GI Prophylaxis: Not indicated  DVT Prophylaxis:     Anticoagulants   Medication Route Frequency     Lines/ Drains/ Airways: PIV  Urinary Catheter Indicated: Patient Does Not Have Urinary Catheter  Other Lines/Tubes/Drains:  Wounds:    Goals of Care: Treatment Decisions and Limited Social Support  Discharge plan:  Home or Self Care    Peace Gonzalez MD  Department of Hospital Medicine  [phone]

## 2019-08-17 PROCEDURE — S5010 5% DEXTROSE AND 0.45% SALINE: HCPCS | Performed by: HOSPITALIST

## 2019-08-17 PROCEDURE — 99232 PR SUBSEQUENT HOSPITAL CARE,LEVL II: ICD-10-PCS | Mod: ,,, | Performed by: INTERNAL MEDICINE

## 2019-08-17 PROCEDURE — 25000003 PHARM REV CODE 250: Performed by: INTERNAL MEDICINE

## 2019-08-17 PROCEDURE — 99232 SBSQ HOSP IP/OBS MODERATE 35: CPT | Mod: ,,, | Performed by: INTERNAL MEDICINE

## 2019-08-17 PROCEDURE — S5010 5% DEXTROSE AND 0.45% SALINE: HCPCS | Performed by: INTERNAL MEDICINE

## 2019-08-17 PROCEDURE — 63600175 PHARM REV CODE 636 W HCPCS: Performed by: INTERNAL MEDICINE

## 2019-08-17 PROCEDURE — 25000003 PHARM REV CODE 250: Performed by: HOSPITALIST

## 2019-08-17 PROCEDURE — 11000001 HC ACUTE MED/SURG PRIVATE ROOM

## 2019-08-17 RX ADMIN — DEXTROSE MONOHYDRATE AND SODIUM CHLORIDE: 5; .45 INJECTION, SOLUTION INTRAVENOUS at 11:08

## 2019-08-17 RX ADMIN — HYDROMORPHONE HYDROCHLORIDE 4 MG: 1 INJECTION, SOLUTION INTRAMUSCULAR; INTRAVENOUS; SUBCUTANEOUS at 11:08

## 2019-08-17 RX ADMIN — HYDROMORPHONE HYDROCHLORIDE 4 MG: 1 INJECTION, SOLUTION INTRAMUSCULAR; INTRAVENOUS; SUBCUTANEOUS at 02:08

## 2019-08-17 RX ADMIN — HYDROMORPHONE HYDROCHLORIDE 4 MG: 1 INJECTION, SOLUTION INTRAMUSCULAR; INTRAVENOUS; SUBCUTANEOUS at 09:08

## 2019-08-17 RX ADMIN — DEXTROSE MONOHYDRATE AND SODIUM CHLORIDE: 5; .45 INJECTION, SOLUTION INTRAVENOUS at 06:08

## 2019-08-17 RX ADMIN — HYDROMORPHONE HYDROCHLORIDE 4 MG: 1 INJECTION, SOLUTION INTRAMUSCULAR; INTRAVENOUS; SUBCUTANEOUS at 05:08

## 2019-08-17 RX ADMIN — SENNOSIDES,DOCUSATE SODIUM 1 TABLET: 8.6; 5 TABLET, FILM COATED ORAL at 09:08

## 2019-08-17 RX ADMIN — MORPHINE SULFATE 45 MG: 30 TABLET, EXTENDED RELEASE ORAL at 09:08

## 2019-08-17 RX ADMIN — HYDROMORPHONE HYDROCHLORIDE 4 MG: 1 INJECTION, SOLUTION INTRAMUSCULAR; INTRAVENOUS; SUBCUTANEOUS at 08:08

## 2019-08-17 RX ADMIN — FOLIC ACID 1 MG: 1 TABLET ORAL at 08:08

## 2019-08-17 RX ADMIN — DEXTROSE MONOHYDRATE AND SODIUM CHLORIDE: 5; .45 INJECTION, SOLUTION INTRAVENOUS at 02:08

## 2019-08-17 RX ADMIN — HYDROXYUREA 500 MG: 500 CAPSULE ORAL at 08:08

## 2019-08-17 RX ADMIN — MORPHINE SULFATE 45 MG: 30 TABLET, EXTENDED RELEASE ORAL at 08:08

## 2019-08-17 NOTE — NURSING
Spoke with Dr Gonzalez and reported that staff was unsuccessful at venous collect.  Requested permission to do a finger stick  Dr Gonzalez declined and requested to increase fluids to 150 mL per hour and hold labs for today.  Collection will resume tomorrow.

## 2019-08-17 NOTE — NURSING
Received a call from lab stating they were unable to collect thee pt labwork with attempts made by 2 lab techs.  RN attempted and was unable to collect.  RN attempted to draw from midline, which was unsuccessful.  Dr Gonzalez paged.

## 2019-08-17 NOTE — NURSING
Lab advised patient still refusing blood draw, had been refusing all day.  Asked patient, he stated he was in too much pain to allow blood draw.  Lab wanted to know whether to cancel labs or reschedule.  I called MD.  I advised MD patient was refusing labs all day due to pain.  MD changed pain meds.

## 2019-08-17 NOTE — PROGRESS NOTES
Hospital Medicine  Progress Note    Team: [facility] HOSP MED D Peace Gonzalez MD  Admit Date: 8/5/2019  OWEN 8/20/2019  Length of Stay:  LOS: 11 days   Code status: Full Code    Principal Problem:  Vaso-occlusive sickle cell crisis    HPI:  25 y/o M with PMH sickle cell anemia and AVN of bilateral hips s/p L hip replacement 2 years ago presents c/o R hip, back, and thigh pain. Patient was recently admitted to [facility] from 07/26 to 08/01/2019 for sickle cell pain crisis and left AMA.      Interval history:   Patient reports persistent pain, not improving with IV fluids but better controlled.    Review of Systems   Constitutional: Negative for fever.   Respiratory: Negative for shortness of breath.    All other systems reviewed and are negative.      Physical Exam  Temp:  [97.8 °F (36.6 °C)-98.3 °F (36.8 °C)]   Pulse:  [58-89]   Resp:  [14-18]   BP: (115-138)/(63-82)   SpO2:  [94 %-100 %]     Intake/Output Summary (Last 24 hours) at 8/17/2019 1545  Last data filed at 8/17/2019 0544  Gross per 24 hour   Intake --   Output 2075 ml   Net -2075 ml     Physical Exam   Constitutional: He is well-developed, well-nourished, and in no distress.   Eyes: Conjunctivae are normal.   Cardiovascular: Normal rate and regular rhythm.   Pulmonary/Chest: Effort normal.   Abdominal: Soft. Normal appearance.   Musculoskeletal: He exhibits no edema.   Neurological: He is alert.     Recent Labs   Lab 08/13/19  0831 08/14/19  0924 08/15/19  0930   WBC 12.41 11.80 13.27*   HGB 8.5* 7.8* 8.0*   HCT 25.4* 24.5* 24.5*    311 303     Recent Labs   Lab 08/13/19  0831 08/14/19  0924 08/15/19  0930    140 139   K 3.6 3.8 3.9    108 108   CO2 24 25 24   BUN 9 7 7   CREATININE 0.8 0.7 0.7    110 92   CALCIUM 8.5* 9.0 9.0     Recent Labs   Lab 08/13/19  0831 08/14/19  0924 08/15/19  0930   ALKPHOS 62 67 66   ALT 36 53* 66*   AST 31 46* 48*   ALBUMIN 3.4* 3.1* 3.4*   PROT 6.5 6.2 6.4   BILITOT 3.8* 4.1* 2.5*       Scheduled Meds:    folic acid  1 mg Oral Daily    hydroxyurea  500 mg Oral Daily    morphine  45 mg Oral Q12H    polyethylene glycol  17 g Oral Daily    senna-docusate 8.6-50 mg  1 tablet Oral BID     Continuous Infusions:   dextrose 5 % and 0.45 % NaCl 150 mL/hr at 08/17/19 1303     As Needed:  albuterol sulfate, HYDROmorphone, naloxone, ondansetron, promethazine, sodium chloride 0.9%, sodium chloride 0.9%    Active Hospital Problems    Diagnosis  POA    *Vaso-occlusive sickle cell crisis [D57.00]  Yes    On hydroxyurea therapy [Z79.899]  Not Applicable    Body aches [R52]  Yes    Opioid dependence [F11.20]  Yes    Mild intermittent asthma without complication [J45.20]  Yes     Chronic    Avascular necrosis of bones of both hips [M87.051, M87.052]  Yes     Chronic    Sickle cell pain crisis [D57.00]  Yes      Resolved Hospital Problems   No resolved problems to display.       Overview of Hospital Course:  25 y/o M with PMH sickle cell anemia and AVN of bilateral hips s/p L hip replacement 2 years ago admitted for pain crisis.    Assessment and Plan:      Vasoocclusive sickle cell crisis:  Sickle Cell anemia:  -H&H on admission of 9.1/28.4 (baseline between 8-10)  -Retic count elevated to 14  -Supplemental oxygen  -D5 1/2 NS  -continue hydroxyurea and Folic acid  -pain management  -bowel regimen with Miralax and Pericolace while pt is receiving opioid medications.   -patient not responding to IV fluids as expected; consulted Heme/Onc. Agree with resumption of NSAIDs (patient previously refusing)  -patient without significant improvement; continuing IV fluids, home-dose MSContin, MSIR q4 prn, Dilaudid 1 mg IV q6 prn.  -patient not receiving oral MSIR as ordered; increasing IV Dilaudid dose to 2 mg. Plan to follow up with Heme/Onc on Monday if no improvement.  -No improvement, possibly worsening hemolysis. Plan to contact Heme/Onc in AM  -Patient appears to have high tolerance for opioids. Agree with Heme/Onc recommendations  to increase IV Dilaudid: 4 mg Q 3 hours and resume NSAIDs  -Patient continues to have crisis pain. Continue IV fluids and resumed IV Dilaudid: 4 mg Q 3 hours.      Leukocytosis  - CXR, UA normal  - start antibiotics if patient becomes febrile or hemodynamically unstable     Avascular necrosis of bones of both hips  -S/p L hip replacement ~2 years ago  -MRI R hip (12/2018) at OSH: Avascular necrosis of the right femoral head without subchondral collapse. 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  -MRI on 7/17/19:Focal area of avascular necrosis involving at least 50% of the articular surface.  No imaging findings to suggest subchondral plate collapse.  2. Additional focal area of avascular necrosis within the right iliac crest and possibly within the right proximal femoral shaft, the latter which is not well evaluated secondary to partial visualization.  -Pt reports being followed by an Orthopedic surgeon in [place] with an appointment coming up. Currently no plans of surgery.      Mild intermittent asthma without complication  -Sats 98-99% on RA  -Controlled  -prn breathing treatments    High Risk Conditions  Patient is currently receiving parenteral controlled substances: Dilaudid     Diet:  Diet Adult Regular (IDDSI Level 7)  GI Prophylaxis: Not indicated  DVT Prophylaxis:     Anticoagulants   Medication Route Frequency     Lines/ Drains/ Airways: PIV  Urinary Catheter Indicated: Patient Does Not Have Urinary Catheter  Other Lines/Tubes/Drains:  Wounds:    Goals of Care: Treatment Decisions and Limited Social Support  Discharge plan:  Home or Self Care    Peace Gonzalez MD  Department of Hospital Medicine  [id number]

## 2019-08-18 LAB
ALBUMIN SERPL BCP-MCNC: 3.5 G/DL (ref 3.5–5.2)
ALP SERPL-CCNC: 61 U/L (ref 55–135)
ALT SERPL W/O P-5'-P-CCNC: 36 U/L (ref 10–44)
ANION GAP SERPL CALC-SCNC: 11 MMOL/L (ref 8–16)
ANISOCYTOSIS BLD QL SMEAR: ABNORMAL
AST SERPL-CCNC: 35 U/L (ref 10–40)
BASO STIPL BLD QL SMEAR: ABNORMAL
BASOPHILS # BLD AUTO: 0.1 K/UL (ref 0–0.2)
BASOPHILS NFR BLD: 0.8 % (ref 0–1.9)
BILIRUB SERPL-MCNC: 2.4 MG/DL (ref 0.1–1)
BUN SERPL-MCNC: 7 MG/DL (ref 6–20)
CALCIUM SERPL-MCNC: 9.1 MG/DL (ref 8.7–10.5)
CHLORIDE SERPL-SCNC: 107 MMOL/L (ref 95–110)
CO2 SERPL-SCNC: 21 MMOL/L (ref 23–29)
CREAT SERPL-MCNC: 0.8 MG/DL (ref 0.5–1.4)
DACRYOCYTES BLD QL SMEAR: ABNORMAL
DIFFERENTIAL METHOD: ABNORMAL
EOSINOPHIL # BLD AUTO: 1 K/UL (ref 0–0.5)
EOSINOPHIL NFR BLD: 8 % (ref 0–8)
ERYTHROCYTE [DISTWIDTH] IN BLOOD BY AUTOMATED COUNT: 23 % (ref 11.5–14.5)
EST. GFR  (AFRICAN AMERICAN): >60 ML/MIN/1.73 M^2
EST. GFR  (NON AFRICAN AMERICAN): >60 ML/MIN/1.73 M^2
GIANT PLATELETS BLD QL SMEAR: PRESENT
GLUCOSE SERPL-MCNC: 114 MG/DL (ref 70–110)
HCT VFR BLD AUTO: 25.8 % (ref 40–54)
HGB BLD-MCNC: 8.7 G/DL (ref 14–18)
HOWELL-JOLLY BOD BLD QL SMEAR: ABNORMAL
HYPOCHROMIA BLD QL SMEAR: ABNORMAL
IMM GRANULOCYTES # BLD AUTO: 0.46 K/UL (ref 0–0.04)
IMM GRANULOCYTES NFR BLD AUTO: 3.9 % (ref 0–0.5)
LDH SERPL L TO P-CCNC: 355 U/L (ref 110–260)
LYMPHOCYTES # BLD AUTO: 2.9 K/UL (ref 1–4.8)
LYMPHOCYTES NFR BLD: 24.7 % (ref 18–48)
MCH RBC QN AUTO: 29.7 PG (ref 27–31)
MCHC RBC AUTO-ENTMCNC: 33.7 G/DL (ref 32–36)
MCV RBC AUTO: 88 FL (ref 82–98)
MONOCYTES # BLD AUTO: 1.3 K/UL (ref 0.3–1)
MONOCYTES NFR BLD: 10.7 % (ref 4–15)
NEUTROPHILS # BLD AUTO: 6.2 K/UL (ref 1.8–7.7)
NEUTROPHILS NFR BLD: 51.9 % (ref 38–73)
NRBC BLD-RTO: 3 /100 WBC
OVALOCYTES BLD QL SMEAR: ABNORMAL
PLATELET # BLD AUTO: 374 K/UL (ref 150–350)
PLATELET BLD QL SMEAR: ABNORMAL
PMV BLD AUTO: 10.3 FL (ref 9.2–12.9)
POIKILOCYTOSIS BLD QL SMEAR: ABNORMAL
POLYCHROMASIA BLD QL SMEAR: ABNORMAL
POTASSIUM SERPL-SCNC: 4.1 MMOL/L (ref 3.5–5.1)
PROT SERPL-MCNC: 6.9 G/DL (ref 6–8.4)
RBC # BLD AUTO: 2.93 M/UL (ref 4.6–6.2)
RETICS/RBC NFR AUTO: 17.8 % (ref 0.4–2)
SCHISTOCYTES BLD QL SMEAR: ABNORMAL
SICKLE CELLS BLD QL SMEAR: ABNORMAL
SODIUM SERPL-SCNC: 139 MMOL/L (ref 136–145)
TARGETS BLD QL SMEAR: ABNORMAL
WBC # BLD AUTO: 11.88 K/UL (ref 3.9–12.7)

## 2019-08-18 PROCEDURE — 63600175 PHARM REV CODE 636 W HCPCS: Performed by: INTERNAL MEDICINE

## 2019-08-18 PROCEDURE — 11000001 HC ACUTE MED/SURG PRIVATE ROOM

## 2019-08-18 PROCEDURE — 25000003 PHARM REV CODE 250: Performed by: INTERNAL MEDICINE

## 2019-08-18 PROCEDURE — 80053 COMPREHEN METABOLIC PANEL: CPT

## 2019-08-18 PROCEDURE — 25000003 PHARM REV CODE 250: Performed by: HOSPITALIST

## 2019-08-18 PROCEDURE — 85045 AUTOMATED RETICULOCYTE COUNT: CPT

## 2019-08-18 PROCEDURE — 83020 HEMOGLOBIN ELECTROPHORESIS: CPT

## 2019-08-18 PROCEDURE — S5010 5% DEXTROSE AND 0.45% SALINE: HCPCS | Performed by: INTERNAL MEDICINE

## 2019-08-18 PROCEDURE — 36415 COLL VENOUS BLD VENIPUNCTURE: CPT

## 2019-08-18 PROCEDURE — 99232 SBSQ HOSP IP/OBS MODERATE 35: CPT | Mod: ,,, | Performed by: INTERNAL MEDICINE

## 2019-08-18 PROCEDURE — 83615 LACTATE (LD) (LDH) ENZYME: CPT

## 2019-08-18 PROCEDURE — 99232 PR SUBSEQUENT HOSPITAL CARE,LEVL II: ICD-10-PCS | Mod: ,,, | Performed by: INTERNAL MEDICINE

## 2019-08-18 PROCEDURE — 85025 COMPLETE CBC W/AUTO DIFF WBC: CPT

## 2019-08-18 RX ADMIN — HYDROMORPHONE HYDROCHLORIDE 4 MG: 1 INJECTION, SOLUTION INTRAMUSCULAR; INTRAVENOUS; SUBCUTANEOUS at 10:08

## 2019-08-18 RX ADMIN — SENNOSIDES,DOCUSATE SODIUM 1 TABLET: 8.6; 5 TABLET, FILM COATED ORAL at 09:08

## 2019-08-18 RX ADMIN — MORPHINE SULFATE 45 MG: 30 TABLET, EXTENDED RELEASE ORAL at 09:08

## 2019-08-18 RX ADMIN — HYDROMORPHONE HYDROCHLORIDE 4 MG: 1 INJECTION, SOLUTION INTRAMUSCULAR; INTRAVENOUS; SUBCUTANEOUS at 12:08

## 2019-08-18 RX ADMIN — HYDROMORPHONE HYDROCHLORIDE 4 MG: 1 INJECTION, SOLUTION INTRAMUSCULAR; INTRAVENOUS; SUBCUTANEOUS at 01:08

## 2019-08-18 RX ADMIN — DEXTROSE MONOHYDRATE AND SODIUM CHLORIDE: 5; .45 INJECTION, SOLUTION INTRAVENOUS at 11:08

## 2019-08-18 RX ADMIN — HYDROXYUREA 500 MG: 500 CAPSULE ORAL at 09:08

## 2019-08-18 RX ADMIN — HYDROMORPHONE HYDROCHLORIDE 4 MG: 1 INJECTION, SOLUTION INTRAMUSCULAR; INTRAVENOUS; SUBCUTANEOUS at 04:08

## 2019-08-18 RX ADMIN — DEXTROSE MONOHYDRATE AND SODIUM CHLORIDE: 5; .45 INJECTION, SOLUTION INTRAVENOUS at 04:08

## 2019-08-18 RX ADMIN — POLYETHYLENE GLYCOL 3350 17 G: 17 POWDER, FOR SOLUTION ORAL at 09:08

## 2019-08-18 RX ADMIN — HYDROMORPHONE HYDROCHLORIDE 4 MG: 1 INJECTION, SOLUTION INTRAMUSCULAR; INTRAVENOUS; SUBCUTANEOUS at 03:08

## 2019-08-18 RX ADMIN — DEXTROSE MONOHYDRATE AND SODIUM CHLORIDE: 5; .45 INJECTION, SOLUTION INTRAVENOUS at 10:08

## 2019-08-18 RX ADMIN — HYDROMORPHONE HYDROCHLORIDE 4 MG: 1 INJECTION, SOLUTION INTRAMUSCULAR; INTRAVENOUS; SUBCUTANEOUS at 07:08

## 2019-08-18 RX ADMIN — FOLIC ACID 1 MG: 1 TABLET ORAL at 09:08

## 2019-08-18 NOTE — PROGRESS NOTES
Hospital Medicine  Progress Note    Team: [facility] MED D Peace Gonzalez MD  Admit Date: 8/5/2019  OWEN 8/20/2019  Length of Stay:  LOS: 12 days   Code status: Full Code    Principal Problem:  Vaso-occlusive sickle cell crisis    HPI:  25 y/o M with PMH sickle cell anemia and AVN of bilateral hips s/p L hip replacement 2 years ago presents c/o R hip, back, and thigh pain. Patient was recently admitted to [facility] from 07/26 to 08/01/2019 for sickle cell pain crisis and left AMA.      Interval history:   Patient reports persistent pain is better controlled.    Review of Systems   Constitutional: Negative for fever.   Respiratory: Negative for shortness of breath.        Physical Exam  Temp:  [96.8 °F (36 °C)-98.2 °F (36.8 °C)]   Pulse:  [75-89]   Resp:  [12-20]   BP: (119-137)/(61-82)   SpO2:  [96 %-99 %]     Intake/Output Summary (Last 24 hours) at 8/18/2019 1649  Last data filed at 8/18/2019 0924  Gross per 24 hour   Intake 360 ml   Output 1300 ml   Net -940 ml     Physical Exam   Constitutional: He is well-developed, well-nourished, and in no distress.   Eyes: Conjunctivae are normal.   Cardiovascular: Normal rate and regular rhythm.   Pulmonary/Chest: Effort normal.   Abdominal: Soft. Normal appearance.   Musculoskeletal: He exhibits no edema.   Neurological: He is alert.     Recent Labs   Lab 08/14/19  0924 08/15/19  0930 08/18/19  0704   WBC 11.80 13.27* 11.88   HGB 7.8* 8.0* 8.7*   HCT 24.5* 24.5* 25.8*    303 374*     Recent Labs   Lab 08/14/19  0924 08/15/19  0930 08/18/19  0704    139 139   K 3.8 3.9 4.1    108 107   CO2 25 24 21*   BUN 7 7 7   CREATININE 0.7 0.7 0.8    92 114*   CALCIUM 9.0 9.0 9.1     Recent Labs   Lab 08/14/19  0924 08/15/19  0930 08/18/19  0704   ALKPHOS 67 66 61   ALT 53* 66* 36   AST 46* 48* 35   ALBUMIN 3.1* 3.4* 3.5   PROT 6.2 6.4 6.9   BILITOT 4.1* 2.5* 2.4*       Scheduled Meds:   folic acid  1 mg Oral Daily    hydroxyurea  500 mg Oral Daily    morphine   45 mg Oral Q12H    polyethylene glycol  17 g Oral Daily    senna-docusate 8.6-50 mg  1 tablet Oral BID     Continuous Infusions:   dextrose 5 % and 0.45 % NaCl 150 mL/hr at 08/18/19 1626     As Needed:  albuterol sulfate, HYDROmorphone, naloxone, ondansetron, promethazine, sodium chloride 0.9%, sodium chloride 0.9%    Active Hospital Problems    Diagnosis  POA    *Vaso-occlusive sickle cell crisis [D57.00]  Yes    On hydroxyurea therapy [Z79.899]  Not Applicable    Body aches [R52]  Yes    Opioid dependence [F11.20]  Yes    Mild intermittent asthma without complication [J45.20]  Yes     Chronic    Avascular necrosis of bones of both hips [M87.051, M87.052]  Yes     Chronic    Sickle cell pain crisis [D57.00]  Yes      Resolved Hospital Problems   No resolved problems to display.       Overview of Hospital Course:  23 y/o M with PMH sickle cell anemia and AVN of bilateral hips s/p L hip replacement 2 years ago admitted for pain crisis.    Assessment and Plan:      Vasoocclusive sickle cell crisis:  Sickle Cell anemia:  -H&H on admission of 9.1/28.4 (baseline between 8-10)  -Retic count elevated to 14  -Supplemental oxygen  -D5 1/2 NS  -continue hydroxyurea and Folic acid  -pain management  -bowel regimen with Miralax and Pericolace while pt is receiving opioid medications.   -patient not responding to IV fluids as expected; consulted Heme/Onc. Agree with resumption of NSAIDs (patient previously refusing)  -patient without significant improvement; continuing IV fluids, home-dose MSContin, MSIR q4 prn, Dilaudid 1 mg IV q6 prn.  -patient not receiving oral MSIR as ordered; increasing IV Dilaudid dose to 2 mg. Plan to follow up with Heme/Onc on Monday if no improvement.  -No improvement, possibly worsening hemolysis. Plan to contact Heme/Onc in AM  -Patient appears to have high tolerance for opioids. Agree with Heme/Onc recommendations to increase IV Dilaudid: 4 mg Q 3 hours and resume NSAIDs  -Patient  continues to have crisis pain. Continue IV fluids and resumed IV Dilaudid: 4 mg Q 3 hours. Slowly improving. Hgb-S assay pending.      Leukocytosis  - CXR, UA normal  - start antibiotics if patient becomes febrile or hemodynamically unstable     Avascular necrosis of bones of both hips  -S/p L hip replacement ~2 years ago  -MRI R hip (12/2018) at OSH: Avascular necrosis of the right femoral head without subchondral collapse. 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  -MRI on 7/17/19:Focal area of avascular necrosis involving at least 50% of the articular surface.  No imaging findings to suggest subchondral plate collapse.  2. Additional focal area of avascular necrosis within the right iliac crest and possibly within the right proximal femoral shaft, the latter which is not well evaluated secondary to partial visualization.  -Pt reports being followed by an Orthopedic surgeon in [place] with an appointment coming up. Currently no plans of surgery.      Mild intermittent asthma without complication  -Sats 98-99% on RA  -Controlled  -prn breathing treatments    High Risk Conditions  Patient is currently receiving parenteral controlled substances: Dilaudid     Diet:  Diet Adult Regular (IDDSI Level 7)  GI Prophylaxis: Not indicated  DVT Prophylaxis:     Anticoagulants   Medication Route Frequency     Lines/ Drains/ Airways: PIV  Urinary Catheter Indicated: Patient Does Not Have Urinary Catheter  Other Lines/Tubes/Drains:  Wounds:    Goals of Care: Treatment Decisions and Limited Social Support  Discharge plan:  Home or Self Care    Peace Gonzalez MD  Department of Hospital Medicine  [id number]

## 2019-08-18 NOTE — NURSING
Pt was given scheduled 45 mg of MS Contin this AM.  He requested his PRN Dilaudid every 3 hrs this shift and requested around the clock dosing and to be woken up and medicated.  Pt pain level drops to around 4-5 after pain meds given and it starts to increase approximately 30 min before it is due.  Denies nausea or any other complaint. Will continue to monitor.

## 2019-08-18 NOTE — PLAN OF CARE
Problem: Adult Inpatient Plan of Care  Goal: Plan of Care Review  Outcome: Ongoing (interventions implemented as appropriate)     08/18/19 1708   Plan of Care Review   Plan of Care Reviewed With patient   Progress no change       Problem: Fall Injury Risk  Goal: Absence of Fall and Fall-Related Injury    Intervention: Identify and Manage Contributors to Fall Injury Risk     08/17/19 0824 08/18/19 0924   Manage Acute Allergic Reaction   Medication Review/Management  --  medications reviewed   Identify and Manage Contributors to Fall Injury Risk   Self-Care Promotion independence encouraged;BADL personal objects within reach;BADL personal routines maintained;safe use of adaptive equipment encouraged  --          Problem: Pain Acute  Goal: Optimal Pain Control    Intervention: Develop Pain Management Plan     08/18/19 0924   Prevent or Manage Pain   Pain Management Interventions awakened for pain meds per patient request;around-the-clock dosing utilized         Problem: Infection  Goal: Infection Symptom Resolution    Intervention: Prevent or Manage Infection     08/17/19 0824 08/17/19 1820 08/18/19 0924   Prevent or Manage Infection   Fever Reduction/Comfort Measures  --  lightweight bedding;lightweight clothing  --    Infection Management  --   --  aseptic technique maintained   Manage Diarrhea   Isolation Precautions protective environment maintained  --   --

## 2019-08-19 LAB
ALBUMIN SERPL BCP-MCNC: 3.7 G/DL (ref 3.5–5.2)
ALP SERPL-CCNC: 61 U/L (ref 55–135)
ALT SERPL W/O P-5'-P-CCNC: 32 U/L (ref 10–44)
ANION GAP SERPL CALC-SCNC: 10 MMOL/L (ref 8–16)
AST SERPL-CCNC: 28 U/L (ref 10–40)
BASOPHILS # BLD AUTO: 0.1 K/UL (ref 0–0.2)
BASOPHILS NFR BLD: 0.8 % (ref 0–1.9)
BILIRUB SERPL-MCNC: 2.3 MG/DL (ref 0.1–1)
BUN SERPL-MCNC: 8 MG/DL (ref 6–20)
CALCIUM SERPL-MCNC: 9.1 MG/DL (ref 8.7–10.5)
CHLORIDE SERPL-SCNC: 106 MMOL/L (ref 95–110)
CO2 SERPL-SCNC: 21 MMOL/L (ref 23–29)
CREAT SERPL-MCNC: 0.8 MG/DL (ref 0.5–1.4)
DIFFERENTIAL METHOD: ABNORMAL
EOSINOPHIL # BLD AUTO: 1 K/UL (ref 0–0.5)
EOSINOPHIL NFR BLD: 7.9 % (ref 0–8)
ERYTHROCYTE [DISTWIDTH] IN BLOOD BY AUTOMATED COUNT: 23.7 % (ref 11.5–14.5)
EST. GFR  (AFRICAN AMERICAN): >60 ML/MIN/1.73 M^2
EST. GFR  (NON AFRICAN AMERICAN): >60 ML/MIN/1.73 M^2
GLUCOSE SERPL-MCNC: 84 MG/DL (ref 70–110)
HCT VFR BLD AUTO: 26.1 % (ref 40–54)
HGB BLD-MCNC: 8.8 G/DL (ref 14–18)
HGB FRACT BLD ELPH-IMP: NORMAL
HGB S MFR BLD ELPH: 93 %
IMM GRANULOCYTES # BLD AUTO: 0.37 K/UL (ref 0–0.04)
IMM GRANULOCYTES NFR BLD AUTO: 3 % (ref 0–0.5)
LDH SERPL L TO P-CCNC: 439 U/L (ref 110–260)
LYMPHOCYTES # BLD AUTO: 2.9 K/UL (ref 1–4.8)
LYMPHOCYTES NFR BLD: 24.1 % (ref 18–48)
MCH RBC QN AUTO: 29.3 PG (ref 27–31)
MCHC RBC AUTO-ENTMCNC: 33.7 G/DL (ref 32–36)
MCV RBC AUTO: 87 FL (ref 82–98)
MONOCYTES # BLD AUTO: 1.6 K/UL (ref 0.3–1)
MONOCYTES NFR BLD: 12.7 % (ref 4–15)
NEUTROPHILS # BLD AUTO: 6.3 K/UL (ref 1.8–7.7)
NEUTROPHILS NFR BLD: 51.5 % (ref 38–73)
NRBC BLD-RTO: 3 /100 WBC
PLATELET # BLD AUTO: 372 K/UL (ref 150–350)
PMV BLD AUTO: 10.7 FL (ref 9.2–12.9)
POTASSIUM SERPL-SCNC: 4 MMOL/L (ref 3.5–5.1)
PROT SERPL-MCNC: 7.1 G/DL (ref 6–8.4)
RBC # BLD AUTO: 3 M/UL (ref 4.6–6.2)
RETICS/RBC NFR AUTO: 16.1 % (ref 0.4–2)
SODIUM SERPL-SCNC: 137 MMOL/L (ref 136–145)
WBC # BLD AUTO: 12.21 K/UL (ref 3.9–12.7)

## 2019-08-19 PROCEDURE — 80053 COMPREHEN METABOLIC PANEL: CPT

## 2019-08-19 PROCEDURE — 36415 COLL VENOUS BLD VENIPUNCTURE: CPT

## 2019-08-19 PROCEDURE — 25000003 PHARM REV CODE 250: Performed by: INTERNAL MEDICINE

## 2019-08-19 PROCEDURE — S5010 5% DEXTROSE AND 0.45% SALINE: HCPCS | Performed by: INTERNAL MEDICINE

## 2019-08-19 PROCEDURE — 85025 COMPLETE CBC W/AUTO DIFF WBC: CPT

## 2019-08-19 PROCEDURE — 99231 SBSQ HOSP IP/OBS SF/LOW 25: CPT | Mod: ,,, | Performed by: INTERNAL MEDICINE

## 2019-08-19 PROCEDURE — 63600175 PHARM REV CODE 636 W HCPCS: Performed by: INTERNAL MEDICINE

## 2019-08-19 PROCEDURE — 25000003 PHARM REV CODE 250: Performed by: HOSPITALIST

## 2019-08-19 PROCEDURE — 85045 AUTOMATED RETICULOCYTE COUNT: CPT

## 2019-08-19 PROCEDURE — 99231 PR SUBSEQUENT HOSPITAL CARE,LEVL I: ICD-10-PCS | Mod: ,,, | Performed by: INTERNAL MEDICINE

## 2019-08-19 PROCEDURE — 83615 LACTATE (LD) (LDH) ENZYME: CPT

## 2019-08-19 PROCEDURE — 11000001 HC ACUTE MED/SURG PRIVATE ROOM

## 2019-08-19 RX ADMIN — HYDROMORPHONE HYDROCHLORIDE 4 MG: 1 INJECTION, SOLUTION INTRAMUSCULAR; INTRAVENOUS; SUBCUTANEOUS at 10:08

## 2019-08-19 RX ADMIN — FOLIC ACID 1 MG: 1 TABLET ORAL at 09:08

## 2019-08-19 RX ADMIN — HYDROMORPHONE HYDROCHLORIDE 4 MG: 1 INJECTION, SOLUTION INTRAMUSCULAR; INTRAVENOUS; SUBCUTANEOUS at 07:08

## 2019-08-19 RX ADMIN — DEXTROSE MONOHYDRATE AND SODIUM CHLORIDE: 5; .45 INJECTION, SOLUTION INTRAVENOUS at 07:08

## 2019-08-19 RX ADMIN — HYDROMORPHONE HYDROCHLORIDE 4 MG: 1 INJECTION, SOLUTION INTRAMUSCULAR; INTRAVENOUS; SUBCUTANEOUS at 01:08

## 2019-08-19 RX ADMIN — HYDROXYUREA 500 MG: 500 CAPSULE ORAL at 09:08

## 2019-08-19 RX ADMIN — MORPHINE SULFATE 45 MG: 30 TABLET, EXTENDED RELEASE ORAL at 09:08

## 2019-08-19 RX ADMIN — HYDROMORPHONE HYDROCHLORIDE 4 MG: 1 INJECTION, SOLUTION INTRAMUSCULAR; INTRAVENOUS; SUBCUTANEOUS at 11:08

## 2019-08-19 RX ADMIN — HYDROMORPHONE HYDROCHLORIDE 4 MG: 1 INJECTION, SOLUTION INTRAMUSCULAR; INTRAVENOUS; SUBCUTANEOUS at 05:08

## 2019-08-19 RX ADMIN — HYDROMORPHONE HYDROCHLORIDE 4 MG: 1 INJECTION, SOLUTION INTRAMUSCULAR; INTRAVENOUS; SUBCUTANEOUS at 08:08

## 2019-08-19 RX ADMIN — SENNOSIDES,DOCUSATE SODIUM 1 TABLET: 8.6; 5 TABLET, FILM COATED ORAL at 09:08

## 2019-08-19 RX ADMIN — HYDROMORPHONE HYDROCHLORIDE 4 MG: 1 INJECTION, SOLUTION INTRAMUSCULAR; INTRAVENOUS; SUBCUTANEOUS at 04:08

## 2019-08-19 NOTE — PLAN OF CARE
Problem: Adult Inpatient Plan of Care  Goal: Plan of Care Review  Outcome: Ongoing (interventions implemented as appropriate)     08/19/19 1750   Plan of Care Review   Plan of Care Reviewed With patient   Pt AAOx4, VS stable, Pt complains of pain between 8-10 throughout shift. No falls, no injuries. Pt did have low /47  And was asymptomatic but BP quickly returned to normal. Skinned remained intact and fluids running.

## 2019-08-19 NOTE — PLAN OF CARE
08/19/19 0846   Post-Acute Status   Post-Acute Authorization Other   Other Status No Post-Acute Service Needs

## 2019-08-19 NOTE — PLAN OF CARE
Problem: Adult Inpatient Plan of Care  Goal: Plan of Care Review  Outcome: Ongoing (interventions implemented as appropriate)  Patient AA&Ox4. VSs stable. IVFs infusing per order. Pain medications administered per order. Safety intact. Denies needs. Call light and bedside table within reach. Bed in lowest, locked position with side rails up x3. Nonskid socks in place. Patient verbalizes understanding to call for assistance. Will continue to monitor.

## 2019-08-19 NOTE — PROGRESS NOTES
Hospital Medicine  Progress Note    Team: [facility] MED D Peace Gonzalez MD  Admit Date: 8/5/2019  OWEN 8/20/2019  Length of Stay:  LOS: 13 days   Code status: Full Code    Principal Problem:  Vaso-occlusive sickle cell crisis    HPI:  23 y/o M with PMH sickle cell anemia and AVN of bilateral hips s/p L hip replacement 2 years ago presents c/o R hip, back, and thigh pain. Patient was recently admitted to [facility] from 07/26 to 08/01/2019 for sickle cell pain crisis and left AMA.      Interval history:   Patient reports persistent pain is better controlled.    Review of Systems   Constitutional: Negative for fever.   Respiratory: Negative for shortness of breath.        Physical Exam  Temp:  [96.8 °F (36 °C)-98.4 °F (36.9 °C)]   Pulse:  [64-75]   Resp:  [18-20]   BP: (116-137)/(57-73)   SpO2:  [94 %-97 %]     Intake/Output Summary (Last 24 hours) at 8/19/2019 1552  Last data filed at 8/19/2019 1353  Gross per 24 hour   Intake 3805 ml   Output 2575 ml   Net 1230 ml     Physical Exam   Constitutional: He is well-developed, well-nourished, and in no distress.   Eyes: Conjunctivae are normal.   Cardiovascular: Normal rate and regular rhythm.   Pulmonary/Chest: Effort normal.   Abdominal: Soft. Normal appearance.   Musculoskeletal: He exhibits no edema.   Neurological: He is alert.     Recent Labs   Lab 08/15/19  0930 08/18/19  0704 08/19/19  0510   WBC 13.27* 11.88 12.21   HGB 8.0* 8.7* 8.8*   HCT 24.5* 25.8* 26.1*    374* 372*     Recent Labs   Lab 08/15/19  0930 08/18/19  0704 08/19/19  0510    139 137   K 3.9 4.1 4.0    107 106   CO2 24 21* 21*   BUN 7 7 8   CREATININE 0.7 0.8 0.8   GLU 92 114* 84   CALCIUM 9.0 9.1 9.1     Recent Labs   Lab 08/15/19  0930 08/18/19  0704 08/19/19  0510   ALKPHOS 66 61 61   ALT 66* 36 32   AST 48* 35 28   ALBUMIN 3.4* 3.5 3.7   PROT 6.4 6.9 7.1   BILITOT 2.5* 2.4* 2.3*       Scheduled Meds:   folic acid  1 mg Oral Daily    hydroxyurea  500 mg Oral Daily    morphine  45  mg Oral Q12H    polyethylene glycol  17 g Oral Daily    senna-docusate 8.6-50 mg  1 tablet Oral BID     Continuous Infusions:   dextrose 5 % and 0.45 % NaCl 125 mL/hr at 08/19/19 1013     As Needed:  albuterol sulfate, HYDROmorphone, naloxone, ondansetron, promethazine, sodium chloride 0.9%, sodium chloride 0.9%    Active Hospital Problems    Diagnosis  POA    *Vaso-occlusive sickle cell crisis [D57.00]  Yes    On hydroxyurea therapy [Z79.899]  Not Applicable    Body aches [R52]  Yes    Opioid dependence [F11.20]  Yes    Mild intermittent asthma without complication [J45.20]  Yes     Chronic    Avascular necrosis of bones of both hips [M87.051, M87.052]  Yes     Chronic    Sickle cell pain crisis [D57.00]  Yes      Resolved Hospital Problems   No resolved problems to display.       Overview of Hospital Course:  23 y/o M with PMH sickle cell anemia and AVN of bilateral hips s/p L hip replacement 2 years ago admitted for pain crisis.    Assessment and Plan:      Vasoocclusive sickle cell crisis:  Sickle Cell anemia:  -H&H on admission of 9.1/28.4 (baseline between 8-10)  -Retic count elevated to 14  -Supplemental oxygen  -D5 1/2 NS  -continue hydroxyurea and Folic acid  -pain management  -bowel regimen with Miralax and Pericolace while pt is receiving opioid medications.   -patient not responding to IV fluids as expected; consulted Heme/Onc. Agree with resumption of NSAIDs (patient previously refusing)  -patient without significant improvement; continuing IV fluids, home-dose MSContin, MSIR q4 prn, Dilaudid 1 mg IV q6 prn.  -patient not receiving oral MSIR as ordered; increasing IV Dilaudid dose to 2 mg. Plan to follow up with Heme/Onc on Monday if no improvement.  -No improvement, possibly worsening hemolysis. Plan to contact Heme/Onc in AM  -Patient appears to have high tolerance for opioids. Agree with Heme/Onc recommendations to increase IV Dilaudid: 4 mg Q 3 hours and resume NSAIDs  -Patient continues  to have crisis pain. Continue IV fluids and resumed IV Dilaudid: 4 mg Q 3 hours. Slowly improving.  -Hgb-S assay similar as previous levels..      Leukocytosis  - CXR, UA normal  - start antibiotics if patient becomes febrile or hemodynamically unstable     Avascular necrosis of bones of both hips  -S/p L hip replacement ~2 years ago  -MRI R hip (12/2018) at OSH: Avascular necrosis of the right femoral head without subchondral collapse. 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  -MRI on 7/17/19:Focal area of avascular necrosis involving at least 50% of the articular surface.  No imaging findings to suggest subchondral plate collapse.  2. Additional focal area of avascular necrosis within the right iliac crest and possibly within the right proximal femoral shaft, the latter which is not well evaluated secondary to partial visualization.  -Pt reports being followed by an Orthopedic surgeon in [place] with an appointment coming up. Currently no plans of surgery.      Mild intermittent asthma without complication  -Sats 98-99% on RA  -Controlled  -prn breathing treatments    High Risk Conditions  Patient is currently receiving parenteral controlled substances: Dilaudid     Diet:  Diet Adult Regular (IDDSI Level 7)  GI Prophylaxis: Not indicated  DVT Prophylaxis:     Anticoagulants   Medication Route Frequency     Lines/ Drains/ Airways: PIV  Urinary Catheter Indicated: Patient Does Not Have Urinary Catheter  Other Lines/Tubes/Drains:  Wounds:    Goals of Care: Treatment Decisions and Limited Social Support  Discharge plan:  Home or Self Care    Peace Gonzalez MD  Department of Hospital Medicine  [id number]

## 2019-08-20 LAB
ALBUMIN SERPL BCP-MCNC: 3.6 G/DL (ref 3.5–5.2)
ALP SERPL-CCNC: 67 U/L (ref 55–135)
ALT SERPL W/O P-5'-P-CCNC: 28 U/L (ref 10–44)
ANION GAP SERPL CALC-SCNC: 9 MMOL/L (ref 8–16)
ANISOCYTOSIS BLD QL SMEAR: SLIGHT
AST SERPL-CCNC: 31 U/L (ref 10–40)
BASOPHILS # BLD AUTO: 0.08 K/UL (ref 0–0.2)
BASOPHILS NFR BLD: 0.8 % (ref 0–1.9)
BILIRUB SERPL-MCNC: 2.7 MG/DL (ref 0.1–1)
BUN SERPL-MCNC: 7 MG/DL (ref 6–20)
CALCIUM SERPL-MCNC: 9.2 MG/DL (ref 8.7–10.5)
CHLORIDE SERPL-SCNC: 108 MMOL/L (ref 95–110)
CO2 SERPL-SCNC: 21 MMOL/L (ref 23–29)
CREAT SERPL-MCNC: 0.8 MG/DL (ref 0.5–1.4)
DACRYOCYTES BLD QL SMEAR: ABNORMAL
DIFFERENTIAL METHOD: ABNORMAL
EOSINOPHIL # BLD AUTO: 1 K/UL (ref 0–0.5)
EOSINOPHIL NFR BLD: 9.9 % (ref 0–8)
ERYTHROCYTE [DISTWIDTH] IN BLOOD BY AUTOMATED COUNT: 24.1 % (ref 11.5–14.5)
EST. GFR  (AFRICAN AMERICAN): >60 ML/MIN/1.73 M^2
EST. GFR  (NON AFRICAN AMERICAN): >60 ML/MIN/1.73 M^2
GLUCOSE SERPL-MCNC: 82 MG/DL (ref 70–110)
HCT VFR BLD AUTO: 28.6 % (ref 40–54)
HGB BLD-MCNC: 9.2 G/DL (ref 14–18)
HOWELL-JOLLY BOD BLD QL SMEAR: ABNORMAL
HYPOCHROMIA BLD QL SMEAR: ABNORMAL
IMM GRANULOCYTES # BLD AUTO: 0.3 K/UL (ref 0–0.04)
IMM GRANULOCYTES NFR BLD AUTO: 3 % (ref 0–0.5)
LDH SERPL L TO P-CCNC: 404 U/L (ref 110–260)
LYMPHOCYTES # BLD AUTO: 2.7 K/UL (ref 1–4.8)
LYMPHOCYTES NFR BLD: 27.1 % (ref 18–48)
MCH RBC QN AUTO: 29.3 PG (ref 27–31)
MCHC RBC AUTO-ENTMCNC: 32.2 G/DL (ref 32–36)
MCV RBC AUTO: 91 FL (ref 82–98)
MONOCYTES # BLD AUTO: 1.2 K/UL (ref 0.3–1)
MONOCYTES NFR BLD: 12.4 % (ref 4–15)
NEUTROPHILS # BLD AUTO: 4.7 K/UL (ref 1.8–7.7)
NEUTROPHILS NFR BLD: 46.8 % (ref 38–73)
NRBC BLD-RTO: 3 /100 WBC
OVALOCYTES BLD QL SMEAR: ABNORMAL
PAPPENHEIMER BOD BLD QL SMEAR: PRESENT
PLATELET # BLD AUTO: 316 K/UL (ref 150–350)
PLATELET BLD QL SMEAR: ABNORMAL
PMV BLD AUTO: 9.6 FL (ref 9.2–12.9)
POIKILOCYTOSIS BLD QL SMEAR: SLIGHT
POLYCHROMASIA BLD QL SMEAR: ABNORMAL
POTASSIUM SERPL-SCNC: 4.2 MMOL/L (ref 3.5–5.1)
PROT SERPL-MCNC: 7.1 G/DL (ref 6–8.4)
RBC # BLD AUTO: 3.14 M/UL (ref 4.6–6.2)
RETICS/RBC NFR AUTO: 16.9 % (ref 0.4–2)
SICKLE CELLS BLD QL SMEAR: ABNORMAL
SODIUM SERPL-SCNC: 138 MMOL/L (ref 136–145)
TARGETS BLD QL SMEAR: ABNORMAL
WBC # BLD AUTO: 10.02 K/UL (ref 3.9–12.7)

## 2019-08-20 PROCEDURE — S5010 5% DEXTROSE AND 0.45% SALINE: HCPCS | Performed by: INTERNAL MEDICINE

## 2019-08-20 PROCEDURE — 83615 LACTATE (LD) (LDH) ENZYME: CPT

## 2019-08-20 PROCEDURE — 25000003 PHARM REV CODE 250: Performed by: INTERNAL MEDICINE

## 2019-08-20 PROCEDURE — 99231 SBSQ HOSP IP/OBS SF/LOW 25: CPT | Mod: ,,, | Performed by: INTERNAL MEDICINE

## 2019-08-20 PROCEDURE — 63600175 PHARM REV CODE 636 W HCPCS: Performed by: INTERNAL MEDICINE

## 2019-08-20 PROCEDURE — 85045 AUTOMATED RETICULOCYTE COUNT: CPT

## 2019-08-20 PROCEDURE — 80053 COMPREHEN METABOLIC PANEL: CPT

## 2019-08-20 PROCEDURE — 85025 COMPLETE CBC W/AUTO DIFF WBC: CPT

## 2019-08-20 PROCEDURE — 99231 PR SUBSEQUENT HOSPITAL CARE,LEVL I: ICD-10-PCS | Mod: ,,, | Performed by: INTERNAL MEDICINE

## 2019-08-20 PROCEDURE — 36415 COLL VENOUS BLD VENIPUNCTURE: CPT

## 2019-08-20 PROCEDURE — 25000003 PHARM REV CODE 250: Performed by: HOSPITALIST

## 2019-08-20 PROCEDURE — 11000001 HC ACUTE MED/SURG PRIVATE ROOM

## 2019-08-20 RX ORDER — IBUPROFEN 400 MG/1
800 TABLET ORAL EVERY 6 HOURS
Status: DISPENSED | OUTPATIENT
Start: 2019-08-20 | End: 2019-08-23

## 2019-08-20 RX ADMIN — HYDROXYUREA 500 MG: 500 CAPSULE ORAL at 09:08

## 2019-08-20 RX ADMIN — HYDROMORPHONE HYDROCHLORIDE 4 MG: 1 INJECTION, SOLUTION INTRAMUSCULAR; INTRAVENOUS; SUBCUTANEOUS at 05:08

## 2019-08-20 RX ADMIN — DEXTROSE MONOHYDRATE AND SODIUM CHLORIDE: 5; .45 INJECTION, SOLUTION INTRAVENOUS at 03:08

## 2019-08-20 RX ADMIN — HYDROMORPHONE HYDROCHLORIDE 4 MG: 1 INJECTION, SOLUTION INTRAMUSCULAR; INTRAVENOUS; SUBCUTANEOUS at 02:08

## 2019-08-20 RX ADMIN — DEXTROSE MONOHYDRATE AND SODIUM CHLORIDE: 5; .45 INJECTION, SOLUTION INTRAVENOUS at 05:08

## 2019-08-20 RX ADMIN — HYDROMORPHONE HYDROCHLORIDE 4 MG: 1 INJECTION, SOLUTION INTRAMUSCULAR; INTRAVENOUS; SUBCUTANEOUS at 03:08

## 2019-08-20 RX ADMIN — HYDROMORPHONE HYDROCHLORIDE 4 MG: 1 INJECTION, SOLUTION INTRAMUSCULAR; INTRAVENOUS; SUBCUTANEOUS at 08:08

## 2019-08-20 RX ADMIN — HYDROMORPHONE HYDROCHLORIDE 4 MG: 1 INJECTION, SOLUTION INTRAMUSCULAR; INTRAVENOUS; SUBCUTANEOUS at 12:08

## 2019-08-20 RX ADMIN — FOLIC ACID 1 MG: 1 TABLET ORAL at 09:08

## 2019-08-20 RX ADMIN — HYDROMORPHONE HYDROCHLORIDE 4 MG: 1 INJECTION, SOLUTION INTRAMUSCULAR; INTRAVENOUS; SUBCUTANEOUS at 09:08

## 2019-08-20 RX ADMIN — SENNOSIDES,DOCUSATE SODIUM 1 TABLET: 8.6; 5 TABLET, FILM COATED ORAL at 09:08

## 2019-08-20 RX ADMIN — MORPHINE SULFATE 45 MG: 30 TABLET, EXTENDED RELEASE ORAL at 10:08

## 2019-08-20 RX ADMIN — MORPHINE SULFATE 45 MG: 30 TABLET, EXTENDED RELEASE ORAL at 09:08

## 2019-08-20 RX ADMIN — HYDROMORPHONE HYDROCHLORIDE 4 MG: 1 INJECTION, SOLUTION INTRAMUSCULAR; INTRAVENOUS; SUBCUTANEOUS at 06:08

## 2019-08-20 NOTE — PLAN OF CARE
1030  Patient stated that he was seen at [facility] hem/onc in the past but was unable to provide the doctor's name. Patient requested to get re-established.     1355  CM was informed by [facility] that the patient was seen by Dr. Alexa Burton (hem/onc) in the past. Voicemail message left requesting a hospital follow up appointment in 1-2 weeks. Awaiting return call.

## 2019-08-20 NOTE — PLAN OF CARE
Problem: Adult Inpatient Plan of Care  Goal: Plan of Care Review  Outcome: Ongoing (interventions implemented as appropriate)     08/20/19 1700   Plan of Care Review   Plan of Care Reviewed With patient   Pt AAOx4, VS stable, Pt complains of pain around an 8. Pt  Has been more active/lively today, talking more and being more engaged. Pt remained free of falls and injuries. Skin intact. Pt appears to be drowsy but aroused when spoken too. Will continue to monitor.

## 2019-08-20 NOTE — PLAN OF CARE
At Previously scheduled appointment with Dr. Ronni Fox (PCP at [facility]) on 8/20/19 at 1200 rescheduled for 8/26/19 at 0930.

## 2019-08-20 NOTE — PLAN OF CARE
08/19/19 1918   Discharge Reassessment   Assessment Type Discharge Planning Reassessment   Do you have any problems affording any of your prescribed medications? No   Discharge Plan A Home with family   Discharge Plan B Home Health   DME Needed Upon Discharge  none   Anticipated Discharge Disposition Home   Can the patient answer the patient profile reliably? Yes, cognitively intact   How does the patient rate their overall health at the present time? Fair   Describe the patient's ability to walk at the present time. No restrictions   How often would a person be available to care for the patient? Often   Number of comorbid conditions (as recorded on the chart) Five or more   Post-Acute Status   Post-Acute Authorization Other   Other Status No Post-Acute Service Needs     Hem/Onc continues to follow the patient for sickle cell crisis.

## 2019-08-20 NOTE — CARE UPDATE
Brief Hematology Follow-Up Note    Patient seen today, states pain improving. 8/10 in b/l arms and legs. Having regular BM. Denies chest pain or dyspnea    Vitals reviewed  Lungs CTA  RRR    Hb 9.2  Retic 16    HbS 93%, HvF 3%    -Patient currently on Hydrea 500mg Qd. When pain stable, can consider increasing dose to 1000mg qday.  -Continue folic acid  -Transition IV pain regimen to PO when able to. Patient now on MS contin 45mg BID with dilaudid 4mg Q3H    The following was discussed with Dr. Vázquez. We will sign off. Please contact Hematology Consult Service with any additional questions.    Laura Weeks MD PGY-V  Hematology/Oncology Fellow

## 2019-08-20 NOTE — PLAN OF CARE
08/20/19 0845   Post-Acute Status   Post-Acute Authorization Other   Other Status No Post-Acute Service Needs

## 2019-08-20 NOTE — PROGRESS NOTES
Hospital Medicine  Progress Note    Team: [facility] HOSP MED D Peace Gonzalez MD  Admit Date: 8/5/2019  OWEN 8/21/2019  Length of Stay:  LOS: 14 days   Code status: Full Code    Principal Problem:  Vaso-occlusive sickle cell crisis    HPI:  23 y/o M with PMH sickle cell anemia and AVN of bilateral hips s/p L hip replacement 2 years ago presents c/o R hip, back, and thigh pain. Patient was recently admitted to [facility] from 07/26 to 08/01/2019 for sickle cell pain crisis and left AMA.      Interval history:   Patient feels a little worse today, did not sleep well; reports persistent pain.    Review of Systems   Constitutional: Negative for fever.   Respiratory: Negative for shortness of breath.        Physical Exam  Temp:  [97.3 °F (36.3 °C)-98.4 °F (36.9 °C)]   Pulse:  [62-83]   Resp:  [18-20]   BP: (105-136)/(47-70)   SpO2:  [95 %-100 %]     Intake/Output Summary (Last 24 hours) at 8/20/2019 1253  Last data filed at 8/19/2019 1500  Gross per 24 hour   Intake --   Output 1225 ml   Net -1225 ml     Physical Exam   Constitutional: He is well-developed, well-nourished, and in no distress.   Eyes: Conjunctivae are normal.   Cardiovascular: Normal rate and regular rhythm.   Pulmonary/Chest: Effort normal.   Abdominal: Soft. Normal appearance.   Musculoskeletal: He exhibits no edema.   Neurological: He is alert.     Recent Labs   Lab 08/15/19  0930 08/18/19  0704 08/19/19  0510   WBC 13.27* 11.88 12.21   HGB 8.0* 8.7* 8.8*   HCT 24.5* 25.8* 26.1*    374* 372*     Recent Labs   Lab 08/15/19  0930 08/18/19  0704 08/19/19  0510    139 137   K 3.9 4.1 4.0    107 106   CO2 24 21* 21*   BUN 7 7 8   CREATININE 0.7 0.8 0.8   GLU 92 114* 84   CALCIUM 9.0 9.1 9.1     Recent Labs   Lab 08/15/19  0930 08/18/19  0704 08/19/19  0510   ALKPHOS 66 61 61   ALT 66* 36 32   AST 48* 35 28   ALBUMIN 3.4* 3.5 3.7   PROT 6.4 6.9 7.1   BILITOT 2.5* 2.4* 2.3*       Scheduled Meds:   folic acid  1 mg Oral Daily    hydroxyurea  500 mg  Oral Daily    morphine  45 mg Oral Q12H    polyethylene glycol  17 g Oral Daily    senna-docusate 8.6-50 mg  1 tablet Oral BID     Continuous Infusions:   dextrose 5 % and 0.45 % NaCl 125 mL/hr at 08/20/19 0555     As Needed:  albuterol sulfate, HYDROmorphone, naloxone, ondansetron, promethazine, sodium chloride 0.9%, sodium chloride 0.9%    Active Hospital Problems    Diagnosis  POA    *Vaso-occlusive sickle cell crisis [D57.00]  Yes    On hydroxyurea therapy [Z79.899]  Not Applicable    Body aches [R52]  Yes    Opioid dependence [F11.20]  Yes    Mild intermittent asthma without complication [J45.20]  Yes     Chronic    Avascular necrosis of bones of both hips [M87.051, M87.052]  Yes     Chronic    Sickle cell pain crisis [D57.00]  Yes      Resolved Hospital Problems   No resolved problems to display.       Overview of Hospital Course:  23 y/o M with PMH sickle cell anemia and AVN of bilateral hips s/p L hip replacement 2 years ago admitted for pain crisis.    Assessment and Plan:      Vasoocclusive sickle cell crisis:  Sickle Cell anemia:  -H&H on admission of 9.1/28.4 (baseline between 8-10)  -Retic count elevated to 14  -Supplemental oxygen  -D5 1/2 NS  -continue hydroxyurea and Folic acid  -pain management  -bowel regimen with Miralax and Pericolace while pt is receiving opioid medications.   -patient not responding to IV fluids as expected; consulted Heme/Onc. Agree with resumption of NSAIDs (patient previously refusing)  -patient without significant improvement; continuing IV fluids, home-dose MSContin, MSIR q4 prn, Dilaudid 1 mg IV q6 prn.  -patient not receiving oral MSIR as ordered; increasing IV Dilaudid dose to 2 mg. Plan to follow up with Heme/Onc on Monday if no improvement.  -No improvement, possibly worsening hemolysis. Plan to contact Heme/Onc in AM  -Patient appears to have high tolerance for opioids. Agree with Heme/Onc recommendations to increase IV Dilaudid: 4 mg Q 3 hours and resume  NSAIDs  -Patient continues to have crisis pain. Continue IV fluids and resumed IV Dilaudid: 4 mg Q 3 hours.  -Hgb-S assay similar as previous levels.  -Resume trial of NSAIDs.       Leukocytosis  - CXR, UA normal  - start antibiotics if patient becomes febrile or hemodynamically unstable     Avascular necrosis of bones of both hips  -S/p L hip replacement ~2 years ago  -MRI R hip (12/2018) at OSH: Avascular necrosis of the right femoral head without subchondral collapse. 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  -MRI on 7/17/19:Focal area of avascular necrosis involving at least 50% of the articular surface.  No imaging findings to suggest subchondral plate collapse.  2. Additional focal area of avascular necrosis within the right iliac crest and possibly within the right proximal femoral shaft, the latter which is not well evaluated secondary to partial visualization.  -Pt reports being followed by an Orthopedic surgeon in [place] with an appointment coming up. Currently no plans of surgery.      Mild intermittent asthma without complication  -Sats 98-99% on RA  -Controlled  -prn breathing treatments    High Risk Conditions  Patient is currently receiving parenteral controlled substances: Dilaudid     Diet:  Diet Adult Regular (IDDSI Level 7)  GI Prophylaxis: Not indicated  DVT Prophylaxis:     Anticoagulants   Medication Route Frequency     Lines/ Drains/ Airways: PIV  Urinary Catheter Indicated: Patient Does Not Have Urinary Catheter  Other Lines/Tubes/Drains:  Wounds:    Goals of Care: Treatment Decisions and Limited Social Support  Discharge plan:  Home or Self Care    Peace Gonzalez MD  Department of Hospital Medicine  [id number]

## 2019-08-21 LAB
ALBUMIN SERPL BCP-MCNC: 3.6 G/DL (ref 3.5–5.2)
ALP SERPL-CCNC: 63 U/L (ref 55–135)
ALT SERPL W/O P-5'-P-CCNC: 27 U/L (ref 10–44)
ANION GAP SERPL CALC-SCNC: 9 MMOL/L (ref 8–16)
ANISOCYTOSIS BLD QL SMEAR: ABNORMAL
AST SERPL-CCNC: 33 U/L (ref 10–40)
BASO STIPL BLD QL SMEAR: ABNORMAL
BASOPHILS # BLD AUTO: 0.13 K/UL (ref 0–0.2)
BASOPHILS NFR BLD: 1 % (ref 0–1.9)
BILIRUB SERPL-MCNC: 2.8 MG/DL (ref 0.1–1)
BUN SERPL-MCNC: 9 MG/DL (ref 6–20)
BURR CELLS BLD QL SMEAR: ABNORMAL
CALCIUM SERPL-MCNC: 9.2 MG/DL (ref 8.7–10.5)
CHLORIDE SERPL-SCNC: 108 MMOL/L (ref 95–110)
CO2 SERPL-SCNC: 22 MMOL/L (ref 23–29)
CREAT SERPL-MCNC: 0.8 MG/DL (ref 0.5–1.4)
DACRYOCYTES BLD QL SMEAR: ABNORMAL
DIFFERENTIAL METHOD: ABNORMAL
EOSINOPHIL # BLD AUTO: 1.1 K/UL (ref 0–0.5)
EOSINOPHIL NFR BLD: 8.3 % (ref 0–8)
ERYTHROCYTE [DISTWIDTH] IN BLOOD BY AUTOMATED COUNT: 23.9 % (ref 11.5–14.5)
EST. GFR  (AFRICAN AMERICAN): >60 ML/MIN/1.73 M^2
EST. GFR  (NON AFRICAN AMERICAN): >60 ML/MIN/1.73 M^2
GIANT PLATELETS BLD QL SMEAR: PRESENT
GLUCOSE SERPL-MCNC: 83 MG/DL (ref 70–110)
HCT VFR BLD AUTO: 26.9 % (ref 40–54)
HGB BLD-MCNC: 8.9 G/DL (ref 14–18)
HOWELL-JOLLY BOD BLD QL SMEAR: ABNORMAL
HYPOCHROMIA BLD QL SMEAR: ABNORMAL
IMM GRANULOCYTES # BLD AUTO: 0.44 K/UL (ref 0–0.04)
IMM GRANULOCYTES NFR BLD AUTO: 3.5 % (ref 0–0.5)
LDH SERPL L TO P-CCNC: 482 U/L (ref 110–260)
LYMPHOCYTES # BLD AUTO: 2.6 K/UL (ref 1–4.8)
LYMPHOCYTES NFR BLD: 20.6 % (ref 18–48)
MCH RBC QN AUTO: 29.5 PG (ref 27–31)
MCHC RBC AUTO-ENTMCNC: 33.1 G/DL (ref 32–36)
MCV RBC AUTO: 89 FL (ref 82–98)
MONOCYTES # BLD AUTO: 1.6 K/UL (ref 0.3–1)
MONOCYTES NFR BLD: 12.3 % (ref 4–15)
NEUTROPHILS # BLD AUTO: 6.9 K/UL (ref 1.8–7.7)
NEUTROPHILS NFR BLD: 54.3 % (ref 38–73)
NRBC BLD-RTO: 2 /100 WBC
OVALOCYTES BLD QL SMEAR: ABNORMAL
PAPPENHEIMER BOD BLD QL SMEAR: PRESENT
PLATELET # BLD AUTO: 391 K/UL (ref 150–350)
PLATELET BLD QL SMEAR: ABNORMAL
PMV BLD AUTO: 9.5 FL (ref 9.2–12.9)
POIKILOCYTOSIS BLD QL SMEAR: SLIGHT
POLYCHROMASIA BLD QL SMEAR: ABNORMAL
POTASSIUM SERPL-SCNC: 4.3 MMOL/L (ref 3.5–5.1)
PROT SERPL-MCNC: 7 G/DL (ref 6–8.4)
RBC # BLD AUTO: 3.02 M/UL (ref 4.6–6.2)
RETICS/RBC NFR AUTO: 17.6 % (ref 0.4–2)
SICKLE CELLS BLD QL SMEAR: ABNORMAL
SODIUM SERPL-SCNC: 139 MMOL/L (ref 136–145)
TARGETS BLD QL SMEAR: ABNORMAL
WBC # BLD AUTO: 12.72 K/UL (ref 3.9–12.7)

## 2019-08-21 PROCEDURE — 85045 AUTOMATED RETICULOCYTE COUNT: CPT

## 2019-08-21 PROCEDURE — 99232 SBSQ HOSP IP/OBS MODERATE 35: CPT | Mod: ,,, | Performed by: INTERNAL MEDICINE

## 2019-08-21 PROCEDURE — 80053 COMPREHEN METABOLIC PANEL: CPT

## 2019-08-21 PROCEDURE — 99232 PR SUBSEQUENT HOSPITAL CARE,LEVL II: ICD-10-PCS | Mod: ,,, | Performed by: INTERNAL MEDICINE

## 2019-08-21 PROCEDURE — 85025 COMPLETE CBC W/AUTO DIFF WBC: CPT

## 2019-08-21 PROCEDURE — 83615 LACTATE (LD) (LDH) ENZYME: CPT

## 2019-08-21 PROCEDURE — S5010 5% DEXTROSE AND 0.45% SALINE: HCPCS | Performed by: INTERNAL MEDICINE

## 2019-08-21 PROCEDURE — 94761 N-INVAS EAR/PLS OXIMETRY MLT: CPT

## 2019-08-21 PROCEDURE — 36415 COLL VENOUS BLD VENIPUNCTURE: CPT

## 2019-08-21 PROCEDURE — 25000003 PHARM REV CODE 250: Performed by: HOSPITALIST

## 2019-08-21 PROCEDURE — 63600175 PHARM REV CODE 636 W HCPCS: Performed by: INTERNAL MEDICINE

## 2019-08-21 PROCEDURE — 25000003 PHARM REV CODE 250: Performed by: INTERNAL MEDICINE

## 2019-08-21 PROCEDURE — 11000001 HC ACUTE MED/SURG PRIVATE ROOM

## 2019-08-21 RX ORDER — DIPHENHYDRAMINE HCL 25 MG
25 CAPSULE ORAL EVERY 6 HOURS PRN
Status: DISCONTINUED | OUTPATIENT
Start: 2019-08-21 | End: 2019-08-23

## 2019-08-21 RX ORDER — HYDROXYUREA 500 MG/1
1000 CAPSULE ORAL DAILY
Status: DISCONTINUED | OUTPATIENT
Start: 2019-08-22 | End: 2019-08-30 | Stop reason: HOSPADM

## 2019-08-21 RX ORDER — HYDROMORPHONE HYDROCHLORIDE 1 MG/ML
3 INJECTION, SOLUTION INTRAMUSCULAR; INTRAVENOUS; SUBCUTANEOUS
Status: DISCONTINUED | OUTPATIENT
Start: 2019-08-21 | End: 2019-08-30 | Stop reason: HOSPADM

## 2019-08-21 RX ADMIN — SENNOSIDES,DOCUSATE SODIUM 1 TABLET: 8.6; 5 TABLET, FILM COATED ORAL at 08:08

## 2019-08-21 RX ADMIN — HYDROXYUREA 500 MG: 500 CAPSULE ORAL at 09:08

## 2019-08-21 RX ADMIN — HYDROMORPHONE HYDROCHLORIDE 4 MG: 1 INJECTION, SOLUTION INTRAMUSCULAR; INTRAVENOUS; SUBCUTANEOUS at 03:08

## 2019-08-21 RX ADMIN — HYDROMORPHONE HYDROCHLORIDE 3 MG: 1 INJECTION, SOLUTION INTRAMUSCULAR; INTRAVENOUS; SUBCUTANEOUS at 08:08

## 2019-08-21 RX ADMIN — MORPHINE SULFATE 45 MG: 30 TABLET, EXTENDED RELEASE ORAL at 09:08

## 2019-08-21 RX ADMIN — DEXTROSE MONOHYDRATE AND SODIUM CHLORIDE: 5; .45 INJECTION, SOLUTION INTRAVENOUS at 12:08

## 2019-08-21 RX ADMIN — IBUPROFEN 800 MG: 400 TABLET, FILM COATED ORAL at 05:08

## 2019-08-21 RX ADMIN — HYDROMORPHONE HYDROCHLORIDE 4 MG: 1 INJECTION, SOLUTION INTRAMUSCULAR; INTRAVENOUS; SUBCUTANEOUS at 06:08

## 2019-08-21 RX ADMIN — FOLIC ACID 1 MG: 1 TABLET ORAL at 10:08

## 2019-08-21 RX ADMIN — DIPHENHYDRAMINE HYDROCHLORIDE 25 MG: 25 CAPSULE ORAL at 10:08

## 2019-08-21 RX ADMIN — HYDROMORPHONE HYDROCHLORIDE 3 MG: 1 INJECTION, SOLUTION INTRAMUSCULAR; INTRAVENOUS; SUBCUTANEOUS at 11:08

## 2019-08-21 RX ADMIN — IBUPROFEN 800 MG: 400 TABLET, FILM COATED ORAL at 10:08

## 2019-08-21 RX ADMIN — HYDROMORPHONE HYDROCHLORIDE 4 MG: 1 INJECTION, SOLUTION INTRAMUSCULAR; INTRAVENOUS; SUBCUTANEOUS at 12:08

## 2019-08-21 RX ADMIN — HYDROMORPHONE HYDROCHLORIDE 3 MG: 1 INJECTION, SOLUTION INTRAMUSCULAR; INTRAVENOUS; SUBCUTANEOUS at 01:08

## 2019-08-21 RX ADMIN — DEXTROSE MONOHYDRATE AND SODIUM CHLORIDE: 5; .45 INJECTION, SOLUTION INTRAVENOUS at 11:08

## 2019-08-21 RX ADMIN — HYDROMORPHONE HYDROCHLORIDE 3 MG: 1 INJECTION, SOLUTION INTRAMUSCULAR; INTRAVENOUS; SUBCUTANEOUS at 05:08

## 2019-08-21 RX ADMIN — DEXTROSE MONOHYDRATE AND SODIUM CHLORIDE: 5; .45 INJECTION, SOLUTION INTRAVENOUS at 03:08

## 2019-08-21 RX ADMIN — HYDROMORPHONE HYDROCHLORIDE 4 MG: 1 INJECTION, SOLUTION INTRAMUSCULAR; INTRAVENOUS; SUBCUTANEOUS at 10:08

## 2019-08-21 NOTE — PLAN OF CARE
08/21/19 0843   Post-Acute Status   Post-Acute Authorization Other   Other Status No Post-Acute Service Needs

## 2019-08-21 NOTE — PROGRESS NOTES
Hospital Medicine  Progress Note    Team: [facility] [place] [facility] D Peace Gonzalez MD  Admit Date: 8/5/2019  OWEN 8/23/2019  Length of Stay:  LOS: 15 days   Code status: Full Code    Principal Problem:  Vaso-occlusive sickle cell crisis    HPI:  23 y/o M with PMH sickle cell anemia and AVN of bilateral hips s/p L hip replacement 2 years ago presents c/o R hip, back, and thigh pain. Patient was recently [place] [facility] [place] to 08/01/2019 for sickle cell pain crisis and left AMA.      Interval history:   Patient feels better today, requests to try a decrease in his pain medications    Review of Systems   Constitutional: Negative for fever.   Respiratory: Negative for shortness of breath.        Physical Exam  Temp:  [96.7 °F (35.9 °C)-98.9 °F (37.2 °C)]   Pulse:  [61-92]   Resp:  [18-20]   BP: (108-124)/(53-77)   SpO2:  [96 %-100 %]     Intake/Output Summary (Last 24 hours) at 8/21/2019 1716  Last data filed at 8/21/2019 0600  Gross per 24 hour   Intake 300 ml   Output 1325 ml   Net -1025 ml     Physical Exam   Constitutional: He is well-developed, well-nourished, and in no distress.   Eyes: Conjunctivae are normal.   Cardiovascular: Normal rate and regular rhythm.   Pulmonary/Chest: Effort normal.   Abdominal: Soft. Normal appearance.   Musculoskeletal: He exhibits no edema.   Neurological: He is alert.     Recent Labs   Lab 08/19/19  0510 08/20/19  1235 08/21/19  1347   WBC 12.21 10.02 12.72*   HGB 8.8* 9.2* 8.9*   HCT 26.1* 28.6* 26.9*   * 316 391*     Recent Labs   Lab 08/19/19  0510 08/20/19  1235 08/21/19  1347    138 139   K 4.0 4.2 4.3    108 108   CO2 21* 21* 22*   BUN 8 7 9   CREATININE 0.8 0.8 0.8   GLU 84 82 83   CALCIUM 9.1 9.2 9.2     Recent Labs   Lab 08/19/19  0510 08/20/19  1235 08/21/19  1347   ALKPHOS 61 67 63   ALT 32 28 27   AST 28 31 33   ALBUMIN 3.7 3.6 3.6   PROT 7.1 7.1 7.0   BILITOT 2.3* 2.7* 2.8*       Scheduled Meds:   folic acid  1 mg Oral Daily    hydroxyurea  500 mg  Oral Daily    ibuprofen  800 mg Oral Q6H    morphine  45 mg Oral Q12H    polyethylene glycol  17 g Oral Daily    senna-docusate 8.6-50 mg  1 tablet Oral BID     Continuous Infusions:   dextrose 5 % and 0.45 % NaCl 125 mL/hr at 08/21/19 1522     As Needed:  albuterol sulfate, HYDROmorphone, naloxone, ondansetron, promethazine, sodium chloride 0.9%, sodium chloride 0.9%    Active Hospital Problems    Diagnosis  POA    *Vaso-occlusive sickle cell crisis [D57.00]  Yes    On hydroxyurea therapy [Z79.899]  Not Applicable    Body aches [R52]  Yes    Opioid dependence [F11.20]  Yes    Mild intermittent asthma without complication [J45.20]  Yes     Chronic    Avascular necrosis of bones of both hips [M87.051, M87.052]  Yes     Chronic    Sickle cell pain crisis [D57.00]  Yes      Resolved Hospital Problems   No resolved problems to display.       Overview of Hospital Course:  25 y/o M with PMH sickle cell anemia and AVN of bilateral hips s/p L hip replacement 2 years ago admitted for pain crisis.    Assessment and Plan:      Vasoocclusive sickle cell crisis:  Sickle Cell anemia:  -H&H on admission of 9.1/28.4 (baseline between 8-10)  -Retic count elevated to 14  -Supplemental oxygen  -D5 1/2 NS  -continue hydroxyurea and Folic acid  -pain management  -bowel regimen with Miralax and Pericolace while pt is receiving opioid medications.   -patient not responding to IV fluids as expected; consulted Heme/Onc. Agree with resumption of NSAIDs (patient previously refusing)  -patient without significant improvement; continuing IV fluids, home-dose MSContin, MSIR q4 prn, Dilaudid 1 mg IV q6 prn.  -patient not receiving oral MSIR as ordered; increasing IV Dilaudid dose to 2 mg. Plan to follow up with Heme/Onc on Monday if no improvement.  -No improvement, possibly worsening hemolysis. Plan to contact Heme/Onc in AM  -Patient appears to have high tolerance for opioids. Agree with Heme/Onc recommendations to increase IV  Dilaudid: 4 mg Q 3 hours and resume NSAIDs  -Patient continues to have crisis pain. Continue IV fluids and resumed IV Dilaudid: 4 mg Q 3 hours.  -Hgb-S assay similar as previous levels.  -Resume trial of NSAIDs.   -Decreasing IV Dilaudid dose at patient's request. Agree with Heme/Onc recommendations to increase hydroxyurea now that pain is improving. Continuing folate.      Leukocytosis  - CXR, UA normal  - start antibiotics if patient becomes febrile or hemodynamically unstable     Avascular necrosis of bones of both hips  -S/p L hip replacement ~2 years ago  -MRI R hip (12/2018) at OSH: Avascular necrosis of the right femoral head without subchondral collapse. 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  -MRI on 7/17/19:Focal area of avascular necrosis involving at least 50% of the articular surface.  No imaging findings to suggest subchondral plate collapse.  2. Additional focal area of avascular necrosis within the right iliac crest and possibly within the right proximal femoral shaft, the latter which is not well evaluated secondary to partial visualization.  -Pt reports being followed by an Orthopedic surgeon in [place] with an appointment coming up. Currently no plans of surgery.      Mild intermittent asthma without complication  -Sats 98-99% on RA  -Controlled  -prn breathing treatments    High Risk Conditions  Patient is currently receiving parenteral controlled substances: Dilaudid     Diet:  Diet Adult Regular (IDDSI Level 7)  GI Prophylaxis: Not indicated  DVT Prophylaxis:     Anticoagulants   Medication Route Frequency     Lines/ Drains/ Airways: PIV  Urinary Catheter Indicated: Patient Does Not Have Urinary Catheter  Other Lines/Tubes/Drains:  Wounds:    Goals of Care: Treatment Decisions and Limited Social Support  Discharge plan:  Home or Self Care    Peace Gonzalez MD  Department of Hospital Medicine  [id number]

## 2019-08-22 LAB
ALBUMIN SERPL BCP-MCNC: 3.7 G/DL (ref 3.5–5.2)
ALP SERPL-CCNC: 69 U/L (ref 55–135)
ALT SERPL W/O P-5'-P-CCNC: 27 U/L (ref 10–44)
ANION GAP SERPL CALC-SCNC: 10 MMOL/L (ref 8–16)
ANISOCYTOSIS BLD QL SMEAR: ABNORMAL
AST SERPL-CCNC: 34 U/L (ref 10–40)
BASOPHILS # BLD AUTO: 0.09 K/UL (ref 0–0.2)
BASOPHILS NFR BLD: 0.6 % (ref 0–1.9)
BILIRUB SERPL-MCNC: 2.4 MG/DL (ref 0.1–1)
BUN SERPL-MCNC: 9 MG/DL (ref 6–20)
CALCIUM SERPL-MCNC: 9.1 MG/DL (ref 8.7–10.5)
CHLORIDE SERPL-SCNC: 106 MMOL/L (ref 95–110)
CO2 SERPL-SCNC: 21 MMOL/L (ref 23–29)
CREAT SERPL-MCNC: 0.8 MG/DL (ref 0.5–1.4)
DACRYOCYTES BLD QL SMEAR: ABNORMAL
DIFFERENTIAL METHOD: ABNORMAL
EOSINOPHIL # BLD AUTO: 1 K/UL (ref 0–0.5)
EOSINOPHIL NFR BLD: 6.6 % (ref 0–8)
ERYTHROCYTE [DISTWIDTH] IN BLOOD BY AUTOMATED COUNT: 23.9 % (ref 11.5–14.5)
EST. GFR  (AFRICAN AMERICAN): >60 ML/MIN/1.73 M^2
EST. GFR  (NON AFRICAN AMERICAN): >60 ML/MIN/1.73 M^2
GLUCOSE SERPL-MCNC: 101 MG/DL (ref 70–110)
HCT VFR BLD AUTO: 25.9 % (ref 40–54)
HGB BLD-MCNC: 8.9 G/DL (ref 14–18)
HOWELL-JOLLY BOD BLD QL SMEAR: ABNORMAL
HYPOCHROMIA BLD QL SMEAR: ABNORMAL
IMM GRANULOCYTES # BLD AUTO: 0.58 K/UL (ref 0–0.04)
IMM GRANULOCYTES NFR BLD AUTO: 3.9 % (ref 0–0.5)
LDH SERPL L TO P-CCNC: 435 U/L (ref 110–260)
LYMPHOCYTES # BLD AUTO: 3.3 K/UL (ref 1–4.8)
LYMPHOCYTES NFR BLD: 22 % (ref 18–48)
MCH RBC QN AUTO: 29.5 PG (ref 27–31)
MCHC RBC AUTO-ENTMCNC: 34.4 G/DL (ref 32–36)
MCV RBC AUTO: 86 FL (ref 82–98)
MONOCYTES # BLD AUTO: 1.6 K/UL (ref 0.3–1)
MONOCYTES NFR BLD: 11 % (ref 4–15)
NEUTROPHILS # BLD AUTO: 8.3 K/UL (ref 1.8–7.7)
NEUTROPHILS NFR BLD: 55.9 % (ref 38–73)
NRBC BLD-RTO: 2 /100 WBC
OVALOCYTES BLD QL SMEAR: ABNORMAL
PLATELET # BLD AUTO: 330 K/UL (ref 150–350)
PMV BLD AUTO: 10.1 FL (ref 9.2–12.9)
POIKILOCYTOSIS BLD QL SMEAR: SLIGHT
POLYCHROMASIA BLD QL SMEAR: ABNORMAL
POTASSIUM SERPL-SCNC: 4.1 MMOL/L (ref 3.5–5.1)
PROT SERPL-MCNC: 7.4 G/DL (ref 6–8.4)
RBC # BLD AUTO: 3.02 M/UL (ref 4.6–6.2)
RETICS/RBC NFR AUTO: 15.2 % (ref 0.4–2)
SCHISTOCYTES BLD QL SMEAR: ABNORMAL
SICKLE CELLS BLD QL SMEAR: ABNORMAL
SODIUM SERPL-SCNC: 137 MMOL/L (ref 136–145)
TARGETS BLD QL SMEAR: ABNORMAL
WBC # BLD AUTO: 14.83 K/UL (ref 3.9–12.7)

## 2019-08-22 PROCEDURE — 25000003 PHARM REV CODE 250: Performed by: INTERNAL MEDICINE

## 2019-08-22 PROCEDURE — 83615 LACTATE (LD) (LDH) ENZYME: CPT

## 2019-08-22 PROCEDURE — 99232 SBSQ HOSP IP/OBS MODERATE 35: CPT | Mod: ,,, | Performed by: INTERNAL MEDICINE

## 2019-08-22 PROCEDURE — 25000003 PHARM REV CODE 250: Performed by: HOSPITALIST

## 2019-08-22 PROCEDURE — 99232 PR SUBSEQUENT HOSPITAL CARE,LEVL II: ICD-10-PCS | Mod: ,,, | Performed by: INTERNAL MEDICINE

## 2019-08-22 PROCEDURE — 63600175 PHARM REV CODE 636 W HCPCS: Performed by: INTERNAL MEDICINE

## 2019-08-22 PROCEDURE — 36415 COLL VENOUS BLD VENIPUNCTURE: CPT

## 2019-08-22 PROCEDURE — 11000001 HC ACUTE MED/SURG PRIVATE ROOM

## 2019-08-22 PROCEDURE — 80053 COMPREHEN METABOLIC PANEL: CPT

## 2019-08-22 PROCEDURE — 85025 COMPLETE CBC W/AUTO DIFF WBC: CPT

## 2019-08-22 PROCEDURE — 85045 AUTOMATED RETICULOCYTE COUNT: CPT

## 2019-08-22 PROCEDURE — S5010 5% DEXTROSE AND 0.45% SALINE: HCPCS | Performed by: INTERNAL MEDICINE

## 2019-08-22 RX ORDER — DIPHENHYDRAMINE HYDROCHLORIDE 50 MG/ML
25 INJECTION INTRAMUSCULAR; INTRAVENOUS ONCE
Status: COMPLETED | OUTPATIENT
Start: 2019-08-22 | End: 2019-08-22

## 2019-08-22 RX ADMIN — DEXTROSE MONOHYDRATE AND SODIUM CHLORIDE: 5; .45 INJECTION, SOLUTION INTRAVENOUS at 09:08

## 2019-08-22 RX ADMIN — HYDROMORPHONE HYDROCHLORIDE 3 MG: 1 INJECTION, SOLUTION INTRAMUSCULAR; INTRAVENOUS; SUBCUTANEOUS at 09:08

## 2019-08-22 RX ADMIN — MORPHINE SULFATE 45 MG: 30 TABLET, EXTENDED RELEASE ORAL at 08:08

## 2019-08-22 RX ADMIN — HYDROXYUREA 1000 MG: 500 CAPSULE ORAL at 09:08

## 2019-08-22 RX ADMIN — HYDROMORPHONE HYDROCHLORIDE 3 MG: 1 INJECTION, SOLUTION INTRAMUSCULAR; INTRAVENOUS; SUBCUTANEOUS at 02:08

## 2019-08-22 RX ADMIN — HYDROMORPHONE HYDROCHLORIDE 3 MG: 1 INJECTION, SOLUTION INTRAMUSCULAR; INTRAVENOUS; SUBCUTANEOUS at 06:08

## 2019-08-22 RX ADMIN — DEXTROSE MONOHYDRATE AND SODIUM CHLORIDE: 5; .45 INJECTION, SOLUTION INTRAVENOUS at 07:08

## 2019-08-22 RX ADMIN — SENNOSIDES,DOCUSATE SODIUM 1 TABLET: 8.6; 5 TABLET, FILM COATED ORAL at 08:08

## 2019-08-22 RX ADMIN — FOLIC ACID 1 MG: 1 TABLET ORAL at 09:08

## 2019-08-22 RX ADMIN — MORPHINE SULFATE 45 MG: 30 TABLET, EXTENDED RELEASE ORAL at 09:08

## 2019-08-22 RX ADMIN — HYDROMORPHONE HYDROCHLORIDE 3 MG: 1 INJECTION, SOLUTION INTRAMUSCULAR; INTRAVENOUS; SUBCUTANEOUS at 12:08

## 2019-08-22 RX ADMIN — HYDROMORPHONE HYDROCHLORIDE 3 MG: 1 INJECTION, SOLUTION INTRAMUSCULAR; INTRAVENOUS; SUBCUTANEOUS at 03:08

## 2019-08-22 RX ADMIN — DIPHENHYDRAMINE HYDROCHLORIDE 25 MG: 50 INJECTION, SOLUTION INTRAMUSCULAR; INTRAVENOUS at 06:08

## 2019-08-22 NOTE — PROGRESS NOTES
"[facility]  Adult Nutrition  Progress Note    SUMMARY       Recommendations    Recommendation/Intervention: 1.) Continue regular diet. 2.) If PO intake <50% of meals, suggest Boost Plus BID. 3.) Update wt (last wt taken 2019)  Goals: 1.) Pt to consume/tolerate >75% EEN and EPN by follow up.   Nutrition Goal Status: progressing towards goal  Communication of RD Recs: (POC)    Reason for Assessment    Reason For Assessment: RD follow-up  Diagnosis: (sickle cell crisis)  Relevant Medical History: Sickle cell; stroke  Interdisciplinary Rounds: did not attend  General Information Comments: Pt sitting up in bed with lunch tray to bedside. Observed 100% consumed. Pt reports good PO intake of meals. Request additional food items. Will honor. No GI distress stated. NFPE performed to bedside. Pt appears well nourished.   Nutrition Discharge Planning: d/c on regular diet     Nutrition Risk Screen    Nutrition Risk Screen: no indicators present    Nutrition/Diet History    Spiritual, Cultural Beliefs, Buddhist Practices, Values that Affect Care: no  Factors Affecting Nutritional Intake: decreased appetite    Anthropometrics    Temp: 97.3 °F (36.3 °C)  Height Method: Stated  Height: 5' 9" (175.3 cm)  Height (inches): 69 in  Weight Method: Standard Scale  Weight: 72.9 kg (160 lb 11.4 oz)  Weight (lb): 160.72 lb  Ideal Body Weight (IBW), Male: 160 lb  % Ideal Body Weight, Male (lb): 106.25 lb  BMI (Calculated): 25.2  BMI Grade: 25 - 29.9 - overweight  Usual Body Weight (UBW), k.18 kg  % Usual Body Weight: 0       Lab/Procedures/Meds    Pertinent Labs Reviewed: reviewed  Pertinent Labs Comments: noted  Pertinent Medications Reviewed: reviewed  Pertinent Medications Comments: folic acid, morphine, miralax, senna-docusate      Estimated/Assessed Needs    Weight Used For Calorie Calculations: 72.9 kg (160 lb 11.5 oz)  Energy Calorie Requirements (kcal): 2044 kcal/d  Energy Need Method: [place] " Cm(x1.2)  Protein Requirements: 73-87 g/kg   Weight Used For Protein Calculations: 72.9 kg (160 lb 11.5 oz)(1.0-1.2 g/kg)  Fluid Requirements (mL): 1 mL/kcal or per MD     RDA Method (mL): 2044         Nutrition Prescription Ordered    Current Diet Order: regular    Evaluation of Received Nutrient/Fluid Intake    I/O: -12.4L since 8/8  Energy Calories Required: not meeting needs  Protein Required: not meeting needs  Fluid Required: meeting needs  Comments: LBM 8/21  Tolerance: tolerating  % Intake of Estimated Energy Needs: 75 - 100 %  % Meal Intake: 75 - 100 %    Nutrition Risk    Level of Risk/Frequency of Follow-up: low     Assessment and Plan  Nutrition Problem  Inadequate oral intake     Related to (etiology):   Decreased appetite      Signs and Symptoms (as evidenced by):   PO <75% of EEN/EPN       Interventions (treatment strategy):  Collaboration of care with other providers     Nutrition Diagnosis Status:   Resolved       Monitor and Evaluation    Food and Nutrient Intake: energy intake, food and beverage intake  Food and Nutrient Adminstration: diet order  Knowledge/Beliefs/Attitudes: food and nutrition knowledge/skill  Physical Activity and Function: nutrition-related ADLs and IADLs  Anthropometric Measurements: weight, weight change, body mass index  Biochemical Data, Medical Tests and Procedures: electrolyte and renal panel, gastrointestinal profile, glucose/endocrine profile  Nutrition-Focused Physical Findings: overall appearance     Malnutrition Assessment                 Orbital Region (Subcutaneous Fat Loss): well nourished  Upper Arm Region (Subcutaneous Fat Loss): well nourished   [place] Region (Muscle Loss): well nourished  Clavicle Bone Region (Muscle Loss): well nourished  Clavicle and Acromion Bone Region (Muscle Loss): well nourished  Dorsal Hand (Muscle Loss): well nourished  Anterior Thigh Region (Muscle Loss): well nourished  Posterior Calf Region (Muscle Loss): well nourished                  Nutrition Follow-Up    RD Follow-up?: Yes

## 2019-08-22 NOTE — PLAN OF CARE
Hospital follow up appointment scheduled for the patient with Dr. Alexa Burton (hem/onc at [facility]) on 8/26/19 at 1330.

## 2019-08-22 NOTE — PLAN OF CARE
Patient AAOx4, ambulates independently.  Patient reports constant severe pain in lower extremities and pain in the rest of his body; PRN dilaudid 3mg given when requested and appropriate.  D5 1/2NS running at 125, patient tolerating well.  VS stable on room air.  No falls or injuries, no major events.  Will continue to monitor.

## 2019-08-22 NOTE — PLAN OF CARE
Problem: Adult Inpatient Plan of Care  Goal: Plan of Care Review     Recommendations     Recommendation/Intervention: 1.) Continue regular diet. 2.) If PO intake <50% of meals, suggest Boost Plus BID. 3.) Update wt (last wt taken 8/5/2019)  Goals: 1.) Pt to consume/tolerate >75% EEN and EPN by follow up.   Nutrition Goal Status: progressing towards goal  Communication of RD Recs: (POC)    Assessment and Plan  Nutrition Problem  Inadequate oral intake     Related to (etiology):   Decreased appetite      Signs and Symptoms (as evidenced by):   PO <75% of EEN/EPN       Interventions (treatment strategy):  Collaboration of care with other providers     Nutrition Diagnosis Status:   Resolved

## 2019-08-22 NOTE — PLAN OF CARE
08/22/19 1235   Post-Acute Status   Post-Acute Authorization Other   Other Status No Post-Acute Service Needs

## 2019-08-22 NOTE — PROGRESS NOTES
Hospital Medicine  Progress Note    [place]: [facility] D Peace Gonzalez MD  Admit Date: 8/5/2019  OWEN 8/23/2019  Length of Stay:  LOS: 16 days   Code status: Full Code    Principal Problem:  Vaso-occlusive sickle cell crisis    HPI:  23 y/o M with PMH sickle cell anemia and AVN of bilateral hips s/p L hip replacement 2 years ago presents c/o R hip, back, and thigh pain. Patient was recently [place] [facility] [place] 08[place]01/2019 for sickle cell pain crisis and left AMA.      Interval history:   Patient unable to sleep overnight due to family issues. Persistent pain.    Review of Systems   Constitutional: Negative for fever.   Respiratory: Negative for shortness of breath.        Physical Exam  Temp:  [97.1 °F (36.2 °C)-98.6 °F (37 °C)]   Pulse:  [83-97]   Resp:  [16-17]   BP: (104-125)/(53-72)   SpO2:  [92 %-100 %]     Intake/Output Summary (Last 24 hours) at 8/22/2019 1726  Last data filed at 8/22/2019 0600  Gross per 24 hour   Intake 300 ml   Output 1775 ml   Net -1475 ml     Physical Exam   Constitutional: He is well-developed, well-nourished, and in no distress.   Eyes: Conjunctivae are normal.   Cardiovascular: Normal rate and regular rhythm.   Pulmonary/Chest: Effort normal.   Abdominal: Soft. Normal appearance.   Musculoskeletal: He exhibits no edema.   Neurological: He is alert.     Recent Labs   Lab 08/20/19  1235 08/21/19  1347 08/22/19  0746   WBC 10.02 12.72* 14.83*   HGB 9.2* 8.9* 8.9*   HCT 28.6* 26.9* 25.9*    391* 330     Recent Labs   Lab 08/20/19  1235 08/21/19  1347 08/22/19  0746    139 137   K 4.2 4.3 4.1    108 106   CO2 21* 22* 21*   BUN 7 9 9   CREATININE 0.8 0.8 0.8   GLU 82 83 101   CALCIUM 9.2 9.2 9.1     Recent Labs   Lab 08/20/19  1235 08/21/19  1347 08/22/19  0746   ALKPHOS 67 63 69   ALT 28 27 27   AST 31 33 34   ALBUMIN 3.6 3.6 3.7   PROT 7.1 7.0 7.4   BILITOT 2.7* 2.8* 2.4*       Scheduled Meds:   diphenhydrAMINE  25 mg Intravenous Once    folic acid  1 mg  Oral Daily    hydroxyurea  1,000 mg Oral Daily    ibuprofen  800 mg Oral Q6H    morphine  45 mg Oral Q12H    polyethylene glycol  17 g Oral Daily    senna-docusate 8.6-50 mg  1 tablet Oral BID     Continuous Infusions:   dextrose 5 % and 0.45 % NaCl 125 mL/hr at 08/22/19 0927     As Needed:  albuterol sulfate, HYDROmorphone, naloxone, ondansetron, promethazine, sodium chloride 0.9%, sodium chloride 0.9%    Active Hospital Problems    Diagnosis  POA    *Vaso-occlusive sickle cell crisis [D57.00]  Yes    On hydroxyurea therapy [Z79.899]  Not Applicable    Body aches [R52]  Yes    Opioid dependence [F11.20]  Yes    Mild intermittent asthma without complication [J45.20]  Yes     Chronic    Avascular necrosis of bones of both hips [M87.051, M87.052]  Yes     Chronic    Sickle cell pain crisis [D57.00]  Yes      Resolved Hospital Problems   No resolved problems to display.       Overview of Hospital Course:  25 y/o M with PMH sickle cell anemia and AVN of bilateral hips s/p L hip replacement 2 years ago admitted for pain crisis.    Assessment and Plan:      Vasoocclusive sickle cell crisis:  Sickle Cell anemia:  -H&H on admission of 9.1/28.4 (baseline between 8-10)  -Retic count elevated to 14  -Supplemental oxygen  -D5 1/2 NS  -continue hydroxyurea and Folic acid  -pain management  -bowel regimen with Miralax and Pericolace while pt is receiving opioid medications.   -patient not responding to IV fluids as expected; consulted Heme/Onc. Agree with resumption of NSAIDs (patient previously refusing)  -patient without significant improvement; continuing IV fluids, home-dose MSContin, MSIR q4 prn, Dilaudid 1 mg IV q6 prn.  -patient not receiving oral MSIR as ordered; increasing IV Dilaudid dose to 2 mg. Plan to follow up with Heme/Onc on Monday if no improvement.  -No improvement, possibly worsening hemolysis. Plan to contact Heme/Onc in AM  -Patient appears to have high tolerance for opioids. Agree with Heme/Onc  recommendations to increase IV Dilaudid: 4 mg Q 3 hours and resume NSAIDs  -Patient continues to have crisis pain. Continue IV fluids and resumed IV Dilaudid: 4 mg Q 3 hours.  -Hgb-S assay similar as previous levels.  -Resume trial of NSAIDs.   -Decreased IV Dilaudid dose at patient's request. Agree with Heme/Onc recommendations to increase hydroxyurea now that pain is improving. Continuing folate.  -Increased hydroxyurea 8/22      Leukocytosis  - CXR, UA normal  - start antibiotics if patient becomes febrile or hemodynamically unstable     Avascular necrosis of bones of both hips  -S/p L hip replacement ~2 years ago  -MRI R hip (12/2018) at OSH: Avascular necrosis of the right femoral head without subchondral collapse. 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  -MRI on 7/17/19:Focal area of avascular necrosis involving at least 50% of the articular surface.  No imaging findings to suggest subchondral plate collapse.  2. Additional focal area of avascular necrosis within the right iliac crest and possibly within the right proximal femoral shaft, the latter which is not well evaluated secondary to partial visualization.  -Pt reports being followed by an Orthopedic surgeon in [place] with an appointment coming up. Currently no plans of surgery.      Mild intermittent asthma without complication  -Sats 98-99% on RA  -Controlled  -prn breathing treatments    High Risk Conditions  Patient is currently receiving parenteral controlled substances: Dilaudid     Diet:  Diet Adult Regular (IDDSI Level 7)  GI Prophylaxis: Not indicated  DVT Prophylaxis:     Anticoagulants   Medication Route Frequency     Lines/ Drains/ Airways: PIV  Urinary Catheter Indicated: Patient Does Not Have Urinary Catheter  Other Lines/Tubes/Drains:  Wounds:    Goals of Care: Treatment Decisions and Limited Social Support  Discharge plan:  Home or Self Care    Peace Gonzalez MD  Department of Hospital Medicine  [id number]

## 2019-08-23 PROCEDURE — 11000001 HC ACUTE MED/SURG PRIVATE ROOM

## 2019-08-23 PROCEDURE — S5010 5% DEXTROSE AND 0.45% SALINE: HCPCS | Performed by: INTERNAL MEDICINE

## 2019-08-23 PROCEDURE — 99231 PR SUBSEQUENT HOSPITAL CARE,LEVL I: ICD-10-PCS | Mod: ,,, | Performed by: INTERNAL MEDICINE

## 2019-08-23 PROCEDURE — 25000003 PHARM REV CODE 250: Performed by: INTERNAL MEDICINE

## 2019-08-23 PROCEDURE — 99231 SBSQ HOSP IP/OBS SF/LOW 25: CPT | Mod: ,,, | Performed by: INTERNAL MEDICINE

## 2019-08-23 PROCEDURE — 63600175 PHARM REV CODE 636 W HCPCS: Performed by: INTERNAL MEDICINE

## 2019-08-23 PROCEDURE — 25000003 PHARM REV CODE 250: Performed by: HOSPITALIST

## 2019-08-23 RX ORDER — HYDROXYZINE PAMOATE 25 MG/1
25 CAPSULE ORAL EVERY 8 HOURS PRN
Status: DISCONTINUED | OUTPATIENT
Start: 2019-08-23 | End: 2019-08-30 | Stop reason: HOSPADM

## 2019-08-23 RX ADMIN — HYDROXYUREA 1000 MG: 500 CAPSULE ORAL at 08:08

## 2019-08-23 RX ADMIN — MORPHINE SULFATE 45 MG: 30 TABLET, EXTENDED RELEASE ORAL at 08:08

## 2019-08-23 RX ADMIN — HYDROMORPHONE HYDROCHLORIDE 3 MG: 1 INJECTION, SOLUTION INTRAMUSCULAR; INTRAVENOUS; SUBCUTANEOUS at 10:08

## 2019-08-23 RX ADMIN — HYDROMORPHONE HYDROCHLORIDE 3 MG: 1 INJECTION, SOLUTION INTRAMUSCULAR; INTRAVENOUS; SUBCUTANEOUS at 12:08

## 2019-08-23 RX ADMIN — MORPHINE SULFATE 45 MG: 30 TABLET, EXTENDED RELEASE ORAL at 09:08

## 2019-08-23 RX ADMIN — HYDROMORPHONE HYDROCHLORIDE 3 MG: 1 INJECTION, SOLUTION INTRAMUSCULAR; INTRAVENOUS; SUBCUTANEOUS at 01:08

## 2019-08-23 RX ADMIN — SENNOSIDES,DOCUSATE SODIUM 1 TABLET: 8.6; 5 TABLET, FILM COATED ORAL at 09:08

## 2019-08-23 RX ADMIN — FOLIC ACID 1 MG: 1 TABLET ORAL at 08:08

## 2019-08-23 RX ADMIN — DEXTROSE MONOHYDRATE AND SODIUM CHLORIDE: 5; .45 INJECTION, SOLUTION INTRAVENOUS at 07:08

## 2019-08-23 RX ADMIN — HYDROMORPHONE HYDROCHLORIDE 3 MG: 1 INJECTION, SOLUTION INTRAMUSCULAR; INTRAVENOUS; SUBCUTANEOUS at 04:08

## 2019-08-23 RX ADMIN — HYDROMORPHONE HYDROCHLORIDE 3 MG: 1 INJECTION, SOLUTION INTRAMUSCULAR; INTRAVENOUS; SUBCUTANEOUS at 07:08

## 2019-08-23 RX ADMIN — DEXTROSE MONOHYDRATE AND SODIUM CHLORIDE: 5; .45 INJECTION, SOLUTION INTRAVENOUS at 04:08

## 2019-08-23 NOTE — NURSING
Patient refused AM labs at 0600, now states that he is willing.  Venipuncture called for re-attempt at lab draws.,

## 2019-08-23 NOTE — PROGRESS NOTES
Hospital Medicine  Progress Note    [place] [facility] [place] [facility] [place] D Peace Gonzalez MD  Admit Date: 8/5/2019  OWEN 8/26/2019  Length of Stay:  LOS: 17 days   Code status: Full Code    Principal Problem:  Vaso-occlusive sickle cell crisis    HPI:  23 y/o M with PMH sickle cell anemia and AVN of bilateral hips s/p L hip replacement 2 years ago presents c/o R hip, back, and thigh pain. Patient was recently [place] [facility] [place]01/2019 for sickle cell pain crisis and left AMA.      Interval history:   Patient with no new complaints. Persistent pain, but not worsening.    Review of Systems   Constitutional: Negative for fever.   Respiratory: Negative for shortness of breath.        Physical Exam  Temp:  [97.4 °F (36.3 °C)-98.5 °F (36.9 °C)]   Pulse:  [83-96]   Resp:  [12-17]   BP: ()/(54-69)   SpO2:  [93 %-96 %]     Intake/Output Summary (Last 24 hours) at 8/23/2019 1824  Last data filed at 8/23/2019 0400  Gross per 24 hour   Intake 0 ml   Output 900 ml   Net -900 ml     Physical Exam   Constitutional: He is well-developed, well-nourished, and in no distress.   Eyes: Conjunctivae are normal.   Cardiovascular: Normal rate and regular rhythm.   Pulmonary/Chest: Effort normal.   Abdominal: Soft. Normal appearance.   Musculoskeletal: He exhibits no edema.   Neurological: He is alert.     Recent Labs   Lab 08/20/19  1235 08/21/19  1347 08/22/19  0746   WBC 10.02 12.72* 14.83*   HGB 9.2* 8.9* 8.9*   HCT 28.6* 26.9* 25.9*    391* 330     Recent Labs   Lab 08/20/19  1235 08/21/19  1347 08/22/19  0746    139 137   K 4.2 4.3 4.1    108 106   CO2 21* 22* 21*   BUN 7 9 9   CREATININE 0.8 0.8 0.8   GLU 82 83 101   CALCIUM 9.2 9.2 9.1     Recent Labs   Lab 08/20/19  1235 08/21/19  1347 08/22/19  0746   ALKPHOS 67 63 69   ALT 28 27 27   AST 31 33 34   ALBUMIN 3.6 3.6 3.7   PROT 7.1 7.0 7.4   BILITOT 2.7* 2.8* 2.4*       Scheduled Meds:   folic acid  1 mg Oral Daily    hydroxyurea  1,000 mg Oral Daily     morphine  45 mg Oral Q12H    polyethylene glycol  17 g Oral Daily    senna-docusate 8.6-50 mg  1 tablet Oral BID     Continuous Infusions:   dextrose 5 % and 0.45 % NaCl 125 mL/hr at 08/23/19 1620     As Needed:  albuterol sulfate, HYDROmorphone, naloxone, ondansetron, promethazine, sodium chloride 0.9%, sodium chloride 0.9%    Active Hospital Problems    Diagnosis  POA    *Vaso-occlusive sickle cell crisis [D57.00]  Yes    On hydroxyurea therapy [Z79.899]  Not Applicable    Body aches [R52]  Yes    Opioid dependence [F11.20]  Yes    Mild intermittent asthma without complication [J45.20]  Yes     Chronic    Avascular necrosis of bones of both hips [M87.051, M87.052]  Yes     Chronic    Sickle cell pain crisis [D57.00]  Yes      Resolved Hospital Problems   No resolved problems to display.       Overview of Hospital Course:  25 y/o M with PMH sickle cell anemia and AVN of bilateral hips s/p L hip replacement 2 years ago admitted for pain crisis.    Assessment and Plan:      Vasoocclusive sickle cell crisis:  Sickle Cell anemia:  -H&H on admission of 9.1/28.4 (baseline between 8-10)  -Retic count elevated to 14  -Supplemental oxygen  -D5 1/2 NS  -continue hydroxyurea and Folic acid  -pain management  -bowel regimen with Miralax and Pericolace while pt is receiving opioid medications.   -patient not responding to IV fluids as expected; consulted Heme/Onc. Agree with resumption of NSAIDs (patient previously refusing)  -patient without significant improvement; continuing IV fluids, home-dose MSContin, MSIR q4 prn, Dilaudid 1 mg IV q6 prn.  -patient not receiving oral MSIR as ordered; increasing IV Dilaudid dose to 2 mg. Plan to follow up with Heme/Onc on Monday if no improvement.  -No improvement, possibly worsening hemolysis. Plan to contact Heme/Onc in AM  -Patient appears to have high tolerance for opioids. Agree with Heme/Onc recommendations to increase IV Dilaudid: 4 mg Q 3 hours and resume  NSAIDs  -Patient continues to have crisis pain. Continue IV fluids and resumed IV Dilaudid: 4 mg Q 3 hours.  -Hgb-S assay similar as previous levels.  -Resume trial of NSAIDs.   -Decreased IV Dilaudid dose at patient's request. Agree with Heme/Onc recommendations to increase hydroxyurea now that pain is improving. Continuing folate.  -Increased hydroxyurea 8/22      Leukocytosis  - CXR, UA normal  - start antibiotics if patient becomes febrile or hemodynamically unstable     Avascular necrosis of bones of both hips  -S/p L hip replacement ~2 years ago  -MRI R hip (12/2018) at OSH: Avascular necrosis of the right femoral head without subchondral collapse. 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  -MRI on 7/17/19:Focal area of avascular necrosis involving at least 50% of the articular surface.  No imaging findings to suggest subchondral plate collapse.  2. Additional focal area of avascular necrosis within the right iliac crest and possibly within the right proximal femoral shaft, the latter which is not well evaluated secondary to partial visualization.  -Pt reports being followed by an Orthopedic surgeon in [place] with an appointment coming up. Currently no plans of surgery.      Mild intermittent asthma without complication  -Sats 98-99% on RA  -Controlled  -prn breathing treatments    High Risk Conditions  Patient is currently receiving parenteral controlled substances: Dilaudid     Diet:  Diet Adult Regular (IDDSI Level 7)  GI Prophylaxis: Not indicated  DVT Prophylaxis:     Anticoagulants   Medication Route Frequency     Lines/ Drains/ Airways: PIV  Urinary Catheter Indicated: Patient Does Not Have Urinary Catheter  Other Lines/Tubes/Drains:  Wounds:    Goals of Care: Treatment Decisions and Limited Social Support  Discharge plan:  Home or Self Care    Peace Gonzalez MD  Department of Hospital Medicine  [id number]

## 2019-08-23 NOTE — PLAN OF CARE
08/23/19 1550   Discharge Reassessment   Assessment Type Discharge Planning Reassessment   Discharge Plan A Home with family   Discharge Plan B Home Health   DME Needed Upon Discharge  none   Anticipated Discharge Disposition Home   Can the patient answer the patient profile reliably? Yes, cognitively intact   How does the patient rate their overall health at the present time? Fair   Describe the patient's ability to walk at the present time. No restrictions   How often would a person be available to care for the patient? Often   Number of comorbid conditions (as recorded on the chart) Five or more   Post-Acute Status   Post-Acute Authorization Other   Other Status No Post-Acute Service Needs   Discharge Delays (!) Other  (pt not medically stable for discharge)

## 2019-08-23 NOTE — PLAN OF CARE
08/23/19 0846   Post-Acute Status   Post-Acute Authorization Other   Other Status No Post-Acute Service Needs

## 2019-08-24 LAB
ALBUMIN SERPL BCP-MCNC: 3.5 G/DL (ref 3.5–5.2)
ALP SERPL-CCNC: 58 U/L (ref 55–135)
ALT SERPL W/O P-5'-P-CCNC: 24 U/L (ref 10–44)
ANION GAP SERPL CALC-SCNC: 11 MMOL/L (ref 8–16)
ANISOCYTOSIS BLD QL SMEAR: ABNORMAL
AST SERPL-CCNC: 38 U/L (ref 10–40)
BASO STIPL BLD QL SMEAR: ABNORMAL
BASOPHILS NFR BLD: 0 % (ref 0–1.9)
BILIRUB SERPL-MCNC: 2.2 MG/DL (ref 0.1–1)
BUN SERPL-MCNC: 6 MG/DL (ref 6–20)
BURR CELLS BLD QL SMEAR: ABNORMAL
CALCIUM SERPL-MCNC: 9.2 MG/DL (ref 8.7–10.5)
CHLORIDE SERPL-SCNC: 108 MMOL/L (ref 95–110)
CO2 SERPL-SCNC: 20 MMOL/L (ref 23–29)
CREAT SERPL-MCNC: 0.8 MG/DL (ref 0.5–1.4)
DACRYOCYTES BLD QL SMEAR: ABNORMAL
DIFFERENTIAL METHOD: ABNORMAL
EOSINOPHIL NFR BLD: 8 % (ref 0–8)
ERYTHROCYTE [DISTWIDTH] IN BLOOD BY AUTOMATED COUNT: 23.2 % (ref 11.5–14.5)
EST. GFR  (AFRICAN AMERICAN): >60 ML/MIN/1.73 M^2
EST. GFR  (NON AFRICAN AMERICAN): >60 ML/MIN/1.73 M^2
GLUCOSE SERPL-MCNC: 73 MG/DL (ref 70–110)
HCT VFR BLD AUTO: 27 % (ref 40–54)
HGB BLD-MCNC: 9 G/DL (ref 14–18)
HYPOCHROMIA BLD QL SMEAR: ABNORMAL
IMM GRANULOCYTES # BLD AUTO: ABNORMAL K/UL (ref 0–0.04)
IMM GRANULOCYTES NFR BLD AUTO: ABNORMAL % (ref 0–0.5)
LDH SERPL L TO P-CCNC: 488 U/L (ref 110–260)
LYMPHOCYTES NFR BLD: 31 % (ref 18–48)
MCH RBC QN AUTO: 28.9 PG (ref 27–31)
MCHC RBC AUTO-ENTMCNC: 33.3 G/DL (ref 32–36)
MCV RBC AUTO: 87 FL (ref 82–98)
METAMYELOCYTES NFR BLD MANUAL: 3 %
MONOCYTES NFR BLD: 9 % (ref 4–15)
MYELOCYTES NFR BLD MANUAL: 1 %
NEUTROPHILS NFR BLD: 47 % (ref 38–73)
NEUTS BAND NFR BLD MANUAL: 1 %
NRBC BLD-RTO: 2 /100 WBC
OVALOCYTES BLD QL SMEAR: ABNORMAL
PLATELET # BLD AUTO: 285 K/UL (ref 150–350)
PLATELET BLD QL SMEAR: ABNORMAL
PMV BLD AUTO: 10.1 FL (ref 9.2–12.9)
POIKILOCYTOSIS BLD QL SMEAR: SLIGHT
POLYCHROMASIA BLD QL SMEAR: ABNORMAL
POTASSIUM SERPL-SCNC: 4.4 MMOL/L (ref 3.5–5.1)
PROT SERPL-MCNC: 7 G/DL (ref 6–8.4)
RBC # BLD AUTO: 3.11 M/UL (ref 4.6–6.2)
RETICS/RBC NFR AUTO: 17.2 % (ref 0.4–2)
SCHISTOCYTES BLD QL SMEAR: ABNORMAL
SCHISTOCYTES BLD QL SMEAR: PRESENT
SICKLE CELLS BLD QL SMEAR: ABNORMAL
SODIUM SERPL-SCNC: 139 MMOL/L (ref 136–145)
SPHEROCYTES BLD QL SMEAR: ABNORMAL
TARGETS BLD QL SMEAR: ABNORMAL
WBC # BLD AUTO: 12.38 K/UL (ref 3.9–12.7)

## 2019-08-24 PROCEDURE — 25000003 PHARM REV CODE 250: Performed by: INTERNAL MEDICINE

## 2019-08-24 PROCEDURE — 63600175 PHARM REV CODE 636 W HCPCS: Performed by: INTERNAL MEDICINE

## 2019-08-24 PROCEDURE — 11000001 HC ACUTE MED/SURG PRIVATE ROOM

## 2019-08-24 PROCEDURE — 99232 SBSQ HOSP IP/OBS MODERATE 35: CPT | Mod: ,,, | Performed by: INTERNAL MEDICINE

## 2019-08-24 PROCEDURE — 85007 BL SMEAR W/DIFF WBC COUNT: CPT

## 2019-08-24 PROCEDURE — 99232 PR SUBSEQUENT HOSPITAL CARE,LEVL II: ICD-10-PCS | Mod: ,,, | Performed by: INTERNAL MEDICINE

## 2019-08-24 PROCEDURE — 85045 AUTOMATED RETICULOCYTE COUNT: CPT

## 2019-08-24 PROCEDURE — 83615 LACTATE (LD) (LDH) ENZYME: CPT

## 2019-08-24 PROCEDURE — 36415 COLL VENOUS BLD VENIPUNCTURE: CPT

## 2019-08-24 PROCEDURE — S5010 5% DEXTROSE AND 0.45% SALINE: HCPCS | Performed by: INTERNAL MEDICINE

## 2019-08-24 PROCEDURE — 85027 COMPLETE CBC AUTOMATED: CPT

## 2019-08-24 PROCEDURE — 25000003 PHARM REV CODE 250: Performed by: HOSPITALIST

## 2019-08-24 PROCEDURE — 80053 COMPREHEN METABOLIC PANEL: CPT

## 2019-08-24 RX ADMIN — HYDROMORPHONE HYDROCHLORIDE 3 MG: 1 INJECTION, SOLUTION INTRAMUSCULAR; INTRAVENOUS; SUBCUTANEOUS at 01:08

## 2019-08-24 RX ADMIN — HYDROMORPHONE HYDROCHLORIDE 3 MG: 1 INJECTION, SOLUTION INTRAMUSCULAR; INTRAVENOUS; SUBCUTANEOUS at 04:08

## 2019-08-24 RX ADMIN — HYDROMORPHONE HYDROCHLORIDE 3 MG: 1 INJECTION, SOLUTION INTRAMUSCULAR; INTRAVENOUS; SUBCUTANEOUS at 10:08

## 2019-08-24 RX ADMIN — DEXTROSE MONOHYDRATE AND SODIUM CHLORIDE: 5; .45 INJECTION, SOLUTION INTRAVENOUS at 01:08

## 2019-08-24 RX ADMIN — HYDROMORPHONE HYDROCHLORIDE 3 MG: 1 INJECTION, SOLUTION INTRAMUSCULAR; INTRAVENOUS; SUBCUTANEOUS at 08:08

## 2019-08-24 RX ADMIN — HYDROMORPHONE HYDROCHLORIDE 3 MG: 1 INJECTION, SOLUTION INTRAMUSCULAR; INTRAVENOUS; SUBCUTANEOUS at 07:08

## 2019-08-24 RX ADMIN — DEXTROSE MONOHYDRATE AND SODIUM CHLORIDE: 5; .45 INJECTION, SOLUTION INTRAVENOUS at 10:08

## 2019-08-24 RX ADMIN — DEXTROSE MONOHYDRATE AND SODIUM CHLORIDE: 5; .45 INJECTION, SOLUTION INTRAVENOUS at 05:08

## 2019-08-24 RX ADMIN — MORPHINE SULFATE 45 MG: 30 TABLET, EXTENDED RELEASE ORAL at 09:08

## 2019-08-24 RX ADMIN — HYDROXYUREA 1000 MG: 500 CAPSULE ORAL at 09:08

## 2019-08-24 RX ADMIN — FOLIC ACID 1 MG: 1 TABLET ORAL at 09:08

## 2019-08-24 NOTE — PROGRESS NOTES
Hospital Medicine  Progress Note    [place] [facility] [place] [place] Peace [place]  Admit Date: 8/5/2019  OWEN 8/26/2019  Length of Stay:  LOS: 18 days   Code status: Full Code    Principal Problem:  Vaso-occlusive sickle cell crisis    HPI:  23 y/o M with PMH sickle cell anemia and AVN of bilateral hips s/p L hip replacement 2 years ago presents c/o R hip, back, and thigh pain. [place] [facility] [place] sickle cell pain crisis and left AMA.      Interval history:   Patient with no new complaints. Feeling slightly better.    Review of Systems   Constitutional: Negative for fever.   Respiratory: Negative for shortness of breath.        Physical Exam  Temp:  [97.6 °F (36.4 °C)-98.4 °F (36.9 °C)]   Pulse:  [67-89]   Resp:  [16-20]   BP: (112-123)/(56-65)   SpO2:  [95 %-100 %]     Intake/Output Summary (Last 24 hours) at 8/24/2019 1712  Last data filed at 8/24/2019 1600  Gross per 24 hour   Intake 4312.08 ml   Output 3125 ml   Net 1187.08 ml     Physical Exam   Constitutional: He is well-developed, well-nourished, and in no distress.   Eyes: Conjunctivae are normal.   Cardiovascular: Normal rate and regular rhythm.   Pulmonary/Chest: Effort normal.   Abdominal: Soft. Normal appearance.   Musculoskeletal: He exhibits no edema.   Neurological: He is alert.     Recent Labs   Lab 08/21/19  1347 08/22/19  0746 08/24/19  0400   WBC 12.72* 14.83* 12.38   HGB 8.9* 8.9* 9.0*   HCT 26.9* 25.9* 27.0*   * 330 285     Recent Labs   Lab 08/21/19  1347 08/22/19  0746 08/24/19  0400    137 139   K 4.3 4.1 4.4    106 108   CO2 22* 21* 20*   BUN 9 9 6   CREATININE 0.8 0.8 0.8   GLU 83 101 73   CALCIUM 9.2 9.1 9.2     Recent Labs   Lab 08/21/19  1347 08/22/19  0746 08/24/19  0400   ALKPHOS 63 69 58   ALT 27 27 24   AST 33 34 38   ALBUMIN 3.6 3.7 3.5   PROT 7.0 7.4 7.0   BILITOT 2.8* 2.4* 2.2*       Scheduled Meds:   folic acid  1 mg Oral Daily    hydroxyurea  1,000 mg Oral Daily     morphine  45 mg Oral Q12H    polyethylene glycol  17 g Oral Daily    senna-docusate 8.6-50 mg  1 tablet Oral BID     Continuous Infusions:   dextrose 5 % and 0.45 % NaCl 125 mL/hr at 08/24/19 1600     As Needed:  albuterol sulfate, HYDROmorphone, naloxone, ondansetron, promethazine, sodium chloride 0.9%, sodium chloride 0.9%    Active Hospital Problems    Diagnosis  POA    *Vaso-occlusive sickle cell crisis [D57.00]  Yes    On hydroxyurea therapy [Z79.899]  Not Applicable    Body aches [R52]  Yes    Opioid dependence [F11.20]  Yes    Mild intermittent asthma without complication [J45.20]  Yes     Chronic    Avascular necrosis of bones of both hips [M87.051, M87.052]  Yes     Chronic    Sickle cell pain crisis [D57.00]  Yes      Resolved Hospital Problems   No resolved problems to display.       Overview of Hospital Course:  23 y/o M with PMH sickle cell anemia and AVN of bilateral hips s/p L hip replacement 2 years ago admitted for pain crisis.    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   -patient not responding to IV fluids as expected; consulted Heme/Onc. Agree with resumption of NSAIDs (patient previously refusing)  -patient without significant improvement; continuing IV fluids, home-dose MSContin, MSIR q4 prn, Dilaudid 1 mg IV q6 prn.  -patient not receiving oral MSIR as ordered; increasing IV Dilaudid dose to 2 mg. Plan to follow up with Heme/Onc on Monday if no improvement.  -No improvement, possibly worsening hemolysis. Plan to contact Heme/Onc in AM  -Patient appears to have high tolerance for opioids. Agree with Heme/Onc recommendations to increase IV Dilaudid: 4 mg Q 3 hours  and resume NSAIDs  -Patient continues to have crisis pain. Continue IV fluids and resumed IV Dilaudid: 4 mg Q 3 hours.  -Hgb-S assay similar as previous levels.  -Resume trial of NSAIDs.   -Decreased IV Dilaudid dose at patient's request. Agree with Heme/Onc recommendations to increase hydroxyurea now that pain is improving. Continuing folate.  -Increased hydroxyurea 8/22. Continuing current management.      Leukocytosis  - CXR, UA normal  - start antibiotics if patient becomes febrile or hemodynamically unstable     Avascular necrosis of bones of both hips  -S/p L hip replacement ~2 years ago  -MRI R hip (12/2018) at OSH: Avascular necrosis of the right femoral head without subchondral collapse. 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  -MRI on 7/17/19:Focal area of avascular necrosis involving at least 50% of the articular surface.  No imaging findings to suggest subchondral plate collapse.  2. Additional focal area of avascular necrosis within the right iliac crest and possibly within the right proximal femoral shaft, the latter which is not well evaluated secondary to partial visualization.  -Pt reports being followed by an Orthopedic surgeon in [place] with an appointment coming up. Currently no plans of surgery.      Mild intermittent asthma without complication  -Sats 98-99% on RA  -Controlled  -prn breathing treatments    High Risk Conditions  Patient is currently receiving parenteral controlled substances: Dilaudid     Diet:  Diet Adult Regular (IDDSI Level 7)  GI Prophylaxis: Not indicated  DVT Prophylaxis:     Anticoagulants   Medication Route Frequency     Lines/ Drains/ Airways: PIV  Urinary Catheter Indicated: Patient Does Not Have Urinary Catheter  Other Lines/Tubes/Drains:  Wounds:    Goals of Care: Treatment Decisions and Limited Social Support  Discharge plan:  Home or Self Care    Peace Gonzalez MD  Department of Hospital Medicine  [phone]

## 2019-08-24 NOTE — PLAN OF CARE
Problem: Adult Inpatient Plan of Care  Goal: Plan of Care Review  Outcome: Ongoing (interventions implemented as appropriate)  Pt remains free from falls and injury. Provided with PRN analgesic with minimal results. Bed low and locked, Call light within reach.

## 2019-08-25 LAB
ALBUMIN SERPL BCP-MCNC: 3.6 G/DL (ref 3.5–5.2)
ALP SERPL-CCNC: 64 U/L (ref 55–135)
ALT SERPL W/O P-5'-P-CCNC: 26 U/L (ref 10–44)
ANION GAP SERPL CALC-SCNC: 11 MMOL/L (ref 8–16)
ANISOCYTOSIS BLD QL SMEAR: ABNORMAL
AST SERPL-CCNC: 33 U/L (ref 10–40)
BASOPHILS # BLD AUTO: 0.09 K/UL (ref 0–0.2)
BASOPHILS NFR BLD: 0.7 % (ref 0–1.9)
BILIRUB SERPL-MCNC: 2.1 MG/DL (ref 0.1–1)
BUN SERPL-MCNC: 6 MG/DL (ref 6–20)
BURR CELLS BLD QL SMEAR: ABNORMAL
CALCIUM SERPL-MCNC: 9.6 MG/DL (ref 8.7–10.5)
CHLORIDE SERPL-SCNC: 108 MMOL/L (ref 95–110)
CO2 SERPL-SCNC: 21 MMOL/L (ref 23–29)
CREAT SERPL-MCNC: 0.8 MG/DL (ref 0.5–1.4)
DIFFERENTIAL METHOD: ABNORMAL
EOSINOPHIL # BLD AUTO: 1.1 K/UL (ref 0–0.5)
EOSINOPHIL NFR BLD: 8.1 % (ref 0–8)
ERYTHROCYTE [DISTWIDTH] IN BLOOD BY AUTOMATED COUNT: 23 % (ref 11.5–14.5)
EST. GFR  (AFRICAN AMERICAN): >60 ML/MIN/1.73 M^2
EST. GFR  (NON AFRICAN AMERICAN): >60 ML/MIN/1.73 M^2
GLUCOSE SERPL-MCNC: 106 MG/DL (ref 70–110)
HCT VFR BLD AUTO: 26.2 % (ref 40–54)
HGB BLD-MCNC: 9.1 G/DL (ref 14–18)
HOWELL-JOLLY BOD BLD QL SMEAR: ABNORMAL
HYPOCHROMIA BLD QL SMEAR: ABNORMAL
IMM GRANULOCYTES # BLD AUTO: 0.33 K/UL (ref 0–0.04)
IMM GRANULOCYTES NFR BLD AUTO: 2.6 % (ref 0–0.5)
LDH SERPL L TO P-CCNC: 397 U/L (ref 110–260)
LYMPHOCYTES # BLD AUTO: 3.7 K/UL (ref 1–4.8)
LYMPHOCYTES NFR BLD: 28.9 % (ref 18–48)
MCH RBC QN AUTO: 29.4 PG (ref 27–31)
MCHC RBC AUTO-ENTMCNC: 34.7 G/DL (ref 32–36)
MCV RBC AUTO: 85 FL (ref 82–98)
MONOCYTES # BLD AUTO: 1.6 K/UL (ref 0.3–1)
MONOCYTES NFR BLD: 12.2 % (ref 4–15)
NEUTROPHILS # BLD AUTO: 6.1 K/UL (ref 1.8–7.7)
NEUTROPHILS NFR BLD: 47.5 % (ref 38–73)
NRBC BLD-RTO: 2 /100 WBC
OVALOCYTES BLD QL SMEAR: ABNORMAL
PLATELET # BLD AUTO: 317 K/UL (ref 150–350)
PMV BLD AUTO: ABNORMAL FL (ref 9.2–12.9)
POIKILOCYTOSIS BLD QL SMEAR: SLIGHT
POLYCHROMASIA BLD QL SMEAR: ABNORMAL
POTASSIUM SERPL-SCNC: 4 MMOL/L (ref 3.5–5.1)
PROT SERPL-MCNC: 7.3 G/DL (ref 6–8.4)
RBC # BLD AUTO: 3.09 M/UL (ref 4.6–6.2)
RETICS/RBC NFR AUTO: 15.1 % (ref 0.4–2)
SCHISTOCYTES BLD QL SMEAR: PRESENT
SODIUM SERPL-SCNC: 140 MMOL/L (ref 136–145)
TARGETS BLD QL SMEAR: ABNORMAL
WBC # BLD AUTO: 12.9 K/UL (ref 3.9–12.7)

## 2019-08-25 PROCEDURE — 85045 AUTOMATED RETICULOCYTE COUNT: CPT

## 2019-08-25 PROCEDURE — S5010 5% DEXTROSE AND 0.45% SALINE: HCPCS | Performed by: INTERNAL MEDICINE

## 2019-08-25 PROCEDURE — 25000003 PHARM REV CODE 250: Performed by: HOSPITALIST

## 2019-08-25 PROCEDURE — 36415 COLL VENOUS BLD VENIPUNCTURE: CPT

## 2019-08-25 PROCEDURE — 80053 COMPREHEN METABOLIC PANEL: CPT

## 2019-08-25 PROCEDURE — 99231 SBSQ HOSP IP/OBS SF/LOW 25: CPT | Mod: ,,, | Performed by: INTERNAL MEDICINE

## 2019-08-25 PROCEDURE — 63600175 PHARM REV CODE 636 W HCPCS: Performed by: INTERNAL MEDICINE

## 2019-08-25 PROCEDURE — 25000003 PHARM REV CODE 250: Performed by: INTERNAL MEDICINE

## 2019-08-25 PROCEDURE — 99231 PR SUBSEQUENT HOSPITAL CARE,LEVL I: ICD-10-PCS | Mod: ,,, | Performed by: INTERNAL MEDICINE

## 2019-08-25 PROCEDURE — 85025 COMPLETE CBC W/AUTO DIFF WBC: CPT

## 2019-08-25 PROCEDURE — 11000001 HC ACUTE MED/SURG PRIVATE ROOM

## 2019-08-25 PROCEDURE — 83615 LACTATE (LD) (LDH) ENZYME: CPT

## 2019-08-25 RX ADMIN — DEXTROSE MONOHYDRATE AND SODIUM CHLORIDE: 5; .45 INJECTION, SOLUTION INTRAVENOUS at 01:08

## 2019-08-25 RX ADMIN — HYDROXYUREA 1000 MG: 500 CAPSULE ORAL at 09:08

## 2019-08-25 RX ADMIN — HYDROMORPHONE HYDROCHLORIDE 3 MG: 1 INJECTION, SOLUTION INTRAMUSCULAR; INTRAVENOUS; SUBCUTANEOUS at 12:08

## 2019-08-25 RX ADMIN — HYDROMORPHONE HYDROCHLORIDE 3 MG: 1 INJECTION, SOLUTION INTRAMUSCULAR; INTRAVENOUS; SUBCUTANEOUS at 08:08

## 2019-08-25 RX ADMIN — FOLIC ACID 1 MG: 1 TABLET ORAL at 09:08

## 2019-08-25 RX ADMIN — HYDROMORPHONE HYDROCHLORIDE 3 MG: 1 INJECTION, SOLUTION INTRAMUSCULAR; INTRAVENOUS; SUBCUTANEOUS at 07:08

## 2019-08-25 RX ADMIN — MORPHINE SULFATE 45 MG: 30 TABLET, EXTENDED RELEASE ORAL at 09:08

## 2019-08-25 RX ADMIN — HYDROMORPHONE HYDROCHLORIDE 3 MG: 1 INJECTION, SOLUTION INTRAMUSCULAR; INTRAVENOUS; SUBCUTANEOUS at 03:08

## 2019-08-25 RX ADMIN — HYDROMORPHONE HYDROCHLORIDE 3 MG: 1 INJECTION, SOLUTION INTRAMUSCULAR; INTRAVENOUS; SUBCUTANEOUS at 01:08

## 2019-08-25 RX ADMIN — DEXTROSE MONOHYDRATE AND SODIUM CHLORIDE: 5; .45 INJECTION, SOLUTION INTRAVENOUS at 08:08

## 2019-08-25 RX ADMIN — HYDROMORPHONE HYDROCHLORIDE 3 MG: 1 INJECTION, SOLUTION INTRAMUSCULAR; INTRAVENOUS; SUBCUTANEOUS at 10:08

## 2019-08-25 RX ADMIN — HYDROMORPHONE HYDROCHLORIDE 3 MG: 1 INJECTION, SOLUTION INTRAMUSCULAR; INTRAVENOUS; SUBCUTANEOUS at 05:08

## 2019-08-25 RX ADMIN — HYDROMORPHONE HYDROCHLORIDE 3 MG: 1 INJECTION, SOLUTION INTRAMUSCULAR; INTRAVENOUS; SUBCUTANEOUS at 11:08

## 2019-08-25 RX ADMIN — SENNOSIDES,DOCUSATE SODIUM 1 TABLET: 8.6; 5 TABLET, FILM COATED ORAL at 09:08

## 2019-08-25 RX ADMIN — POLYETHYLENE GLYCOL 3350 17 G: 17 POWDER, FOR SOLUTION ORAL at 09:08

## 2019-08-25 NOTE — PROGRESS NOTES
Hospital Medicine  Progress Note    Team: [facility] HOSP MED D Peace Gonzalez MD  Admit Date: 8/5/2019  OWEN 8/26/2019  Length of Stay:  LOS: 19 days   Code status: Full Code    Principal Problem:  Vaso-occlusive sickle cell crisis    HPI:  25 y/o M with PMH sickle cell anemia and AVN of bilateral hips s/p L hip replacement 2 years ago presents c/o R hip, back, and thigh pain. Patient was recently admitted to [facility] from 07/26 to 08/01/2019 for sickle cell pain crisis and left AMA.      Interval history:   Patient with no new complaints. Feeling slightly better and labs reflecting minor improvement.    Review of Systems   Constitutional: Negative for fever.   Respiratory: Negative for shortness of breath.        Physical Exam  Temp:  [97.9 °F (36.6 °C)-98.5 °F (36.9 °C)]   Pulse:  [74-86]   Resp:  [17-18]   BP: (114-120)/(52-65)   SpO2:  [94 %-98 %]     Intake/Output Summary (Last 24 hours) at 8/25/2019 1702  Last data filed at 8/25/2019 0500  Gross per 24 hour   Intake 462.92 ml   Output 550 ml   Net -87.08 ml     Physical Exam   Constitutional: He is well-developed, well-nourished, and in no distress.   Eyes: Conjunctivae are normal.   Cardiovascular: Normal rate and regular rhythm.   Pulmonary/Chest: Effort normal.   Abdominal: Soft. Normal appearance.   Musculoskeletal: He exhibits no edema.   Neurological: He is alert.     Recent Labs   Lab 08/22/19  0746 08/24/19  0400 08/25/19  1257   WBC 14.83* 12.38 12.90*   HGB 8.9* 9.0* 9.1*   HCT 25.9* 27.0* 26.2*    285 317     Recent Labs   Lab 08/22/19  0746 08/24/19  0400 08/25/19  0545    139 140   K 4.1 4.4 4.0    108 108   CO2 21* 20* 21*   BUN 9 6 6   CREATININE 0.8 0.8 0.8    73 106   CALCIUM 9.1 9.2 9.6     Recent Labs   Lab 08/22/19  0746 08/24/19  0400 08/25/19  0545   ALKPHOS 69 58 64   ALT 27 24 26   AST 34 38 33   ALBUMIN 3.7 3.5 3.6   PROT 7.4 7.0 7.3   BILITOT 2.4* 2.2* 2.1*       Scheduled Meds:   folic acid  1 mg Oral Daily     hydroxyurea  1,000 mg Oral Daily    morphine  45 mg Oral Q12H    polyethylene glycol  17 g Oral Daily    senna-docusate 8.6-50 mg  1 tablet Oral BID     Continuous Infusions:   dextrose 5 % and 0.45 % NaCl 125 mL/hr at 08/25/19 0500     As Needed:  albuterol sulfate, HYDROmorphone, naloxone, ondansetron, promethazine, sodium chloride 0.9%, sodium chloride 0.9%    Active Hospital Problems    Diagnosis  POA    *Vaso-occlusive sickle cell crisis [D57.00]  Yes    On hydroxyurea therapy [Z79.899]  Not Applicable    Body aches [R52]  Yes    Opioid dependence [F11.20]  Yes    Mild intermittent asthma without complication [J45.20]  Yes     Chronic    Avascular necrosis of bones of both hips [M87.051, M87.052]  Yes     Chronic    Sickle cell pain crisis [D57.00]  Yes      Resolved Hospital Problems   No resolved problems to display.       Overview of Hospital Course:  25 y/o M with PMH sickle cell anemia and AVN of bilateral hips s/p L hip replacement 2 years ago admitted for pain crisis.    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   -patient not responding to IV fluids as expected; consulted Heme/Onc. Agree with resumption of NSAIDs (patient previously refusing)  -patient without significant improvement; continuing IV fluids, home-dose MSContin, MSIR q4 prn, Dilaudid 1 mg IV q6 prn.  -patient not receiving oral MSIR as ordered; increasing IV Dilaudid dose to 2 mg. Plan to follow up with Heme/Onc on Monday if no improvement.  -No improvement, possibly worsening hemolysis. Plan to contact Heme/Onc in AM  -Patient appears to have high tolerance for opioids. Agree with Heme/Onc recommendations to  increase IV Dilaudid: 4 mg Q 3 hours and resume NSAIDs  -Patient continues to have crisis pain. Continue IV fluids and resumed IV Dilaudid: 4 mg Q 3 hours.  -Hgb-S assay similar as previous levels.  -Resume trial of NSAIDs.   -Decreased IV Dilaudid dose at patient's request. Agree with Heme/Onc recommendations to increase hydroxyurea now that pain is improving. Continuing folate.  -Increased hydroxyurea 8/22. Continuing current management.      Leukocytosis  - CXR, UA normal  - start antibiotics if patient becomes febrile or hemodynamically unstable     Avascular necrosis of bones of both hips  -S/p L hip replacement ~2 years ago  -MRI R hip (12/2018) at OSH: Avascular necrosis of the right femoral head without subchondral collapse. 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  -MRI on 7/17/19:Focal area of avascular necrosis involving at least 50% of the articular surface.  No imaging findings to suggest subchondral plate collapse.  2. Additional focal area of avascular necrosis within the right iliac crest and possibly within the right proximal femoral shaft, the latter which is not well evaluated secondary to partial visualization.  -Pt reports being followed by an Orthopedic surgeon in [place] with an appointment coming up. Currently no plans of surgery.      Mild intermittent asthma without complication  -Sats 98-99% on RA  -Controlled  -prn breathing treatments    High Risk Conditions  Patient is currently receiving parenteral controlled substances: Dilaudid     Diet:  Diet Adult Regular (IDDSI Level 7)  GI Prophylaxis: Not indicated  DVT Prophylaxis:     Anticoagulants   Medication Route Frequency     Lines/ Drains/ Airways: PIV  Urinary Catheter Indicated: Patient Does Not Have Urinary Catheter  Other Lines/Tubes/Drains:  Wounds:    Goals of Care: Treatment Decisions and Limited Social Support  Discharge plan:  Home or Self Care    Peace Gonzalez MD  Department of Hospital  Medicine  [phone]

## 2019-08-26 LAB
ALBUMIN SERPL BCP-MCNC: 3.8 G/DL (ref 3.5–5.2)
ALP SERPL-CCNC: 65 U/L (ref 55–135)
ALT SERPL W/O P-5'-P-CCNC: 22 U/L (ref 10–44)
ANION GAP SERPL CALC-SCNC: 12 MMOL/L (ref 8–16)
ANISOCYTOSIS BLD QL SMEAR: SLIGHT
AST SERPL-CCNC: 35 U/L (ref 10–40)
BASO STIPL BLD QL SMEAR: ABNORMAL
BASOPHILS # BLD AUTO: 0.09 K/UL (ref 0–0.2)
BASOPHILS NFR BLD: 0.7 % (ref 0–1.9)
BILIRUB SERPL-MCNC: 2.3 MG/DL (ref 0.1–1)
BUN SERPL-MCNC: 8 MG/DL (ref 6–20)
CALCIUM SERPL-MCNC: 9.1 MG/DL (ref 8.7–10.5)
CHLORIDE SERPL-SCNC: 105 MMOL/L (ref 95–110)
CO2 SERPL-SCNC: 18 MMOL/L (ref 23–29)
CREAT SERPL-MCNC: 0.8 MG/DL (ref 0.5–1.4)
DACRYOCYTES BLD QL SMEAR: ABNORMAL
DIFFERENTIAL METHOD: ABNORMAL
EOSINOPHIL # BLD AUTO: 1.2 K/UL (ref 0–0.5)
EOSINOPHIL NFR BLD: 9.1 % (ref 0–8)
ERYTHROCYTE [DISTWIDTH] IN BLOOD BY AUTOMATED COUNT: 23.7 % (ref 11.5–14.5)
EST. GFR  (AFRICAN AMERICAN): >60 ML/MIN/1.73 M^2
EST. GFR  (NON AFRICAN AMERICAN): >60 ML/MIN/1.73 M^2
GIANT PLATELETS BLD QL SMEAR: PRESENT
GLUCOSE SERPL-MCNC: 124 MG/DL (ref 70–110)
HCT VFR BLD AUTO: 26.2 % (ref 40–54)
HGB BLD-MCNC: 8.9 G/DL (ref 14–18)
HOWELL-JOLLY BOD BLD QL SMEAR: ABNORMAL
HYPOCHROMIA BLD QL SMEAR: ABNORMAL
IMM GRANULOCYTES # BLD AUTO: 0.35 K/UL (ref 0–0.04)
IMM GRANULOCYTES NFR BLD AUTO: 2.7 % (ref 0–0.5)
LDH SERPL L TO P-CCNC: 422 U/L (ref 110–260)
LYMPHOCYTES # BLD AUTO: 2.9 K/UL (ref 1–4.8)
LYMPHOCYTES NFR BLD: 22.5 % (ref 18–48)
MCH RBC QN AUTO: 29.7 PG (ref 27–31)
MCHC RBC AUTO-ENTMCNC: 34 G/DL (ref 32–36)
MCV RBC AUTO: 87 FL (ref 82–98)
MONOCYTES # BLD AUTO: 1.2 K/UL (ref 0.3–1)
MONOCYTES NFR BLD: 9.3 % (ref 4–15)
NEUTROPHILS # BLD AUTO: 7.2 K/UL (ref 1.8–7.7)
NEUTROPHILS NFR BLD: 55.7 % (ref 38–73)
NRBC BLD-RTO: 2 /100 WBC
OVALOCYTES BLD QL SMEAR: ABNORMAL
PLATELET # BLD AUTO: 404 K/UL (ref 150–350)
PLATELET BLD QL SMEAR: ABNORMAL
PMV BLD AUTO: 10.5 FL (ref 9.2–12.9)
POIKILOCYTOSIS BLD QL SMEAR: ABNORMAL
POLYCHROMASIA BLD QL SMEAR: ABNORMAL
POTASSIUM SERPL-SCNC: 4 MMOL/L (ref 3.5–5.1)
PROT SERPL-MCNC: 7.6 G/DL (ref 6–8.4)
RBC # BLD AUTO: 3 M/UL (ref 4.6–6.2)
RETICS/RBC NFR AUTO: 15.1 % (ref 0.4–2)
SCHISTOCYTES BLD QL SMEAR: PRESENT
SICKLE CELLS BLD QL SMEAR: ABNORMAL
SODIUM SERPL-SCNC: 135 MMOL/L (ref 136–145)
SPHEROCYTES BLD QL SMEAR: ABNORMAL
TARGETS BLD QL SMEAR: ABNORMAL
WBC # BLD AUTO: 12.98 K/UL (ref 3.9–12.7)

## 2019-08-26 PROCEDURE — 83615 LACTATE (LD) (LDH) ENZYME: CPT

## 2019-08-26 PROCEDURE — 25000003 PHARM REV CODE 250: Performed by: HOSPITALIST

## 2019-08-26 PROCEDURE — S5010 5% DEXTROSE AND 0.45% SALINE: HCPCS | Performed by: INTERNAL MEDICINE

## 2019-08-26 PROCEDURE — 80053 COMPREHEN METABOLIC PANEL: CPT

## 2019-08-26 PROCEDURE — 25000003 PHARM REV CODE 250: Performed by: INTERNAL MEDICINE

## 2019-08-26 PROCEDURE — 94761 N-INVAS EAR/PLS OXIMETRY MLT: CPT

## 2019-08-26 PROCEDURE — 99232 PR SUBSEQUENT HOSPITAL CARE,LEVL II: ICD-10-PCS | Mod: ,,, | Performed by: INTERNAL MEDICINE

## 2019-08-26 PROCEDURE — 99232 SBSQ HOSP IP/OBS MODERATE 35: CPT | Mod: ,,, | Performed by: INTERNAL MEDICINE

## 2019-08-26 PROCEDURE — 36415 COLL VENOUS BLD VENIPUNCTURE: CPT

## 2019-08-26 PROCEDURE — 11000001 HC ACUTE MED/SURG PRIVATE ROOM

## 2019-08-26 PROCEDURE — 63600175 PHARM REV CODE 636 W HCPCS: Performed by: INTERNAL MEDICINE

## 2019-08-26 PROCEDURE — 85045 AUTOMATED RETICULOCYTE COUNT: CPT

## 2019-08-26 PROCEDURE — 85025 COMPLETE CBC W/AUTO DIFF WBC: CPT

## 2019-08-26 RX ORDER — IBUPROFEN 400 MG/1
400 TABLET ORAL EVERY 6 HOURS
Status: DISCONTINUED | OUTPATIENT
Start: 2019-08-26 | End: 2019-08-29

## 2019-08-26 RX ADMIN — HYDROMORPHONE HYDROCHLORIDE 3 MG: 1 INJECTION, SOLUTION INTRAMUSCULAR; INTRAVENOUS; SUBCUTANEOUS at 11:08

## 2019-08-26 RX ADMIN — DEXTROSE MONOHYDRATE AND SODIUM CHLORIDE: 5; .45 INJECTION, SOLUTION INTRAVENOUS at 02:08

## 2019-08-26 RX ADMIN — HYDROMORPHONE HYDROCHLORIDE 3 MG: 1 INJECTION, SOLUTION INTRAMUSCULAR; INTRAVENOUS; SUBCUTANEOUS at 05:08

## 2019-08-26 RX ADMIN — FOLIC ACID 1 MG: 1 TABLET ORAL at 08:08

## 2019-08-26 RX ADMIN — IBUPROFEN 400 MG: 400 TABLET, FILM COATED ORAL at 11:08

## 2019-08-26 RX ADMIN — IBUPROFEN 400 MG: 400 TABLET, FILM COATED ORAL at 03:08

## 2019-08-26 RX ADMIN — SENNOSIDES,DOCUSATE SODIUM 1 TABLET: 8.6; 5 TABLET, FILM COATED ORAL at 08:08

## 2019-08-26 RX ADMIN — MORPHINE SULFATE 45 MG: 30 TABLET, EXTENDED RELEASE ORAL at 09:08

## 2019-08-26 RX ADMIN — HYDROMORPHONE HYDROCHLORIDE 3 MG: 1 INJECTION, SOLUTION INTRAMUSCULAR; INTRAVENOUS; SUBCUTANEOUS at 08:08

## 2019-08-26 RX ADMIN — HYDROMORPHONE HYDROCHLORIDE 3 MG: 1 INJECTION, SOLUTION INTRAMUSCULAR; INTRAVENOUS; SUBCUTANEOUS at 02:08

## 2019-08-26 RX ADMIN — HYDROXYUREA 1000 MG: 500 CAPSULE ORAL at 08:08

## 2019-08-26 RX ADMIN — DEXTROSE MONOHYDRATE AND SODIUM CHLORIDE: 5; .45 INJECTION, SOLUTION INTRAVENOUS at 09:08

## 2019-08-26 RX ADMIN — MORPHINE SULFATE 45 MG: 30 TABLET, EXTENDED RELEASE ORAL at 08:08

## 2019-08-26 RX ADMIN — HYDROMORPHONE HYDROCHLORIDE 3 MG: 1 INJECTION, SOLUTION INTRAMUSCULAR; INTRAVENOUS; SUBCUTANEOUS at 09:08

## 2019-08-26 RX ADMIN — HYDROMORPHONE HYDROCHLORIDE 3 MG: 1 INJECTION, SOLUTION INTRAMUSCULAR; INTRAVENOUS; SUBCUTANEOUS at 04:08

## 2019-08-26 NOTE — PLAN OF CARE
08/26/19 0842   Post-Acute Status   Post-Acute Authorization Other   Other Status No Post-Acute Service Needs

## 2019-08-26 NOTE — PROGRESS NOTES
Hospital Medicine  Progress Note    Team: [facility] HOSP MED D Peace Gonzalez MD  Admit Date: 8/5/2019  OWEN 8/26/2019  Length of Stay:  LOS: 20 days   Code status: Full Code    Principal Problem:  Vaso-occlusive sickle cell crisis    HPI:  25 y/o M with PMH sickle cell anemia and AVN of bilateral hips s/p L hip replacement 2 years ago presents c/o R hip, back, and thigh pain. Patient was recently admitted to [facility] from 07/26 to 08/01/2019 for sickle cell pain crisis and left AMA.      Interval history:   Patient with no new complaints. No improvement overnight. Patient eager to go home but doubts that he can remain hydrated enough to avoid worsening vaso-occlusion and re-admission.    Review of Systems   Constitutional: Negative for fever.   Respiratory: Negative for shortness of breath.        Physical Exam  Temp:  [97.6 °F (36.4 °C)-98.4 °F (36.9 °C)]   Pulse:  [74-77]   Resp:  [16-18]   BP: (117-119)/(58-64)   SpO2:  [94 %-99 %]     Intake/Output Summary (Last 24 hours) at 8/26/2019 1430  Last data filed at 8/26/2019 0847  Gross per 24 hour   Intake --   Output 500 ml   Net -500 ml     Physical Exam   Constitutional: He is well-developed, well-nourished, and in no distress.   Eyes: Conjunctivae are normal.   Cardiovascular: Normal rate and regular rhythm.   Pulmonary/Chest: Effort normal.   Abdominal: Soft. Normal appearance.   Musculoskeletal: He exhibits no edema.   Neurological: He is alert.     Recent Labs   Lab 08/24/19  0400 08/25/19  1257 08/26/19  0940   WBC 12.38 12.90* 12.98*   HGB 9.0* 9.1* 8.9*   HCT 27.0* 26.2* 26.2*    317 404*     Recent Labs   Lab 08/24/19  0400 08/25/19  0545 08/26/19  0940    140 135*   K 4.4 4.0 4.0    108 105   CO2 20* 21* 18*   BUN 6 6 8   CREATININE 0.8 0.8 0.8   GLU 73 106 124*   CALCIUM 9.2 9.6 9.1     Recent Labs   Lab 08/24/19  0400 08/25/19  0545 08/26/19  0940   ALKPHOS 58 64 65   ALT 24 26 22   AST 38 33 35   ALBUMIN 3.5 3.6 3.8   PROT 7.0 7.3 7.6    BILITOT 2.2* 2.1* 2.3*       Scheduled Meds:   folic acid  1 mg Oral Daily    hydroxyurea  1,000 mg Oral Daily    morphine  45 mg Oral Q12H    polyethylene glycol  17 g Oral Daily    senna-docusate 8.6-50 mg  1 tablet Oral BID     Continuous Infusions:   dextrose 5 % and 0.45 % NaCl 125 mL/hr at 08/26/19 1428     As Needed:  albuterol sulfate, HYDROmorphone, naloxone, ondansetron, promethazine, sodium chloride 0.9%, sodium chloride 0.9%    Active Hospital Problems    Diagnosis  POA    *Vaso-occlusive sickle cell crisis [D57.00]  Yes    On hydroxyurea therapy [Z79.899]  Not Applicable    Body aches [R52]  Yes    Opioid dependence [F11.20]  Yes    Mild intermittent asthma without complication [J45.20]  Yes     Chronic    Avascular necrosis of bones of both hips [M87.051, M87.052]  Yes     Chronic    Sickle cell pain crisis [D57.00]  Yes      Resolved Hospital Problems   No resolved problems to display.       Overview of Hospital Course:  25 y/o M with PMH sickle cell anemia and AVN of bilateral hips s/p L hip replacement 2 years ago admitted for pain crisis.    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   -patient not responding to IV fluids as expected; consulted Heme/Onc. Agree with resumption of NSAIDs (patient previously refusing)  -patient without significant improvement; continuing IV fluids, home-dose MSContin, MSIR q4 prn, Dilaudid 1 mg IV q6 prn.  -patient not receiving oral MSIR as ordered; increasing IV Dilaudid dose to 2 mg. Plan to follow up with Heme/Onc on Monday if no improvement.  -No improvement, possibly worsening hemolysis. Plan to contact Heme/Onc in AM  -Patient  appears to have high tolerance for opioids. Agree with Heme/Onc recommendations to increase IV Dilaudid: 4 mg Q 3 hours and resume NSAIDs  -Patient continues to have crisis pain. Continue IV fluids and resumed IV Dilaudid: 4 mg Q 3 hours.  -Hgb-S assay similar as previous levels.  -Resume trial of NSAIDs.   -Decreased IV Dilaudid dose at patient's request. Agree with Heme/Onc recommendations to increase hydroxyurea now that pain is improving. Continuing folate.  -Increased hydroxyurea 8/22. Continuing current management. Monitor liver enzymes. Patient needs close OP follow up since hydroxyurea has been increased.  -Trial of NSAIDs again starting 8/26      Leukocytosis  - CXR, UA normal  - start antibiotics if patient becomes febrile or hemodynamically unstable     Avascular necrosis of bones of both hips  -S/p L hip replacement ~2 years ago  -MRI R hip (12/2018) at OSH: Avascular necrosis of the right femoral head without subchondral collapse. 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  -MRI on 7/17/19:Focal area of avascular necrosis involving at least 50% of the articular surface.  No imaging findings to suggest subchondral plate collapse.  2. Additional focal area of avascular necrosis within the right iliac crest and possibly within the right proximal femoral shaft, the latter which is not well evaluated secondary to partial visualization.  -Pt reports being followed by an Orthopedic surgeon in [place] with an appointment coming up. Currently no plans of surgery.      Mild intermittent asthma without complication  -Sats 98-99% on RA  -Controlled  -prn breathing treatments    High Risk Conditions  Patient is currently receiving parenteral controlled substances: Dilaudid     Diet:  Diet Adult Regular (IDDSI Level 7)  GI Prophylaxis: Not indicated  DVT Prophylaxis:     Anticoagulants   Medication Route Frequency     Lines/ Drains/ Airways: PIV  Urinary Catheter Indicated: Patient Does Not Have  Urinary Catheter  Other Lines/Tubes/Drains:  Wounds:    Goals of Care: Treatment Decisions and Limited Social Support  Discharge plan:  Home or Self Care    Peace Gonzalez MD  Department of Hospital Medicine  [id number]

## 2019-08-26 NOTE — PLAN OF CARE
08/26/19 1749   Discharge Reassessment   Assessment Type Discharge Planning Reassessment   Provided patient/caregiver education on the expected discharge date and the discharge plan Yes   Do you have any problems affording any of your prescribed medications? No   Discharge Plan A Home with family   Discharge Plan B Home with family

## 2019-08-27 PROCEDURE — 99233 PR SUBSEQUENT HOSPITAL CARE,LEVL III: ICD-10-PCS | Mod: ,,, | Performed by: HOSPITALIST

## 2019-08-27 PROCEDURE — 25000003 PHARM REV CODE 250: Performed by: HOSPITALIST

## 2019-08-27 PROCEDURE — 11000001 HC ACUTE MED/SURG PRIVATE ROOM

## 2019-08-27 PROCEDURE — S5010 5% DEXTROSE AND 0.45% SALINE: HCPCS | Performed by: INTERNAL MEDICINE

## 2019-08-27 PROCEDURE — 25000003 PHARM REV CODE 250: Performed by: INTERNAL MEDICINE

## 2019-08-27 PROCEDURE — 99233 SBSQ HOSP IP/OBS HIGH 50: CPT | Mod: ,,, | Performed by: HOSPITALIST

## 2019-08-27 PROCEDURE — 63600175 PHARM REV CODE 636 W HCPCS: Performed by: INTERNAL MEDICINE

## 2019-08-27 RX ORDER — OXYCODONE HYDROCHLORIDE 10 MG/1
20 TABLET ORAL EVERY 4 HOURS PRN
Status: DISCONTINUED | OUTPATIENT
Start: 2019-08-27 | End: 2019-08-30 | Stop reason: HOSPADM

## 2019-08-27 RX ORDER — MORPHINE SULFATE 15 MG/1
15 TABLET, FILM COATED, EXTENDED RELEASE ORAL ONCE
Status: COMPLETED | OUTPATIENT
Start: 2019-08-27 | End: 2019-08-27

## 2019-08-27 RX ORDER — HYDROMORPHONE HYDROCHLORIDE 1 MG/ML
3 INJECTION, SOLUTION INTRAMUSCULAR; INTRAVENOUS; SUBCUTANEOUS ONCE
Status: DISCONTINUED | OUTPATIENT
Start: 2019-08-27 | End: 2019-08-30 | Stop reason: HOSPADM

## 2019-08-27 RX ORDER — MORPHINE SULFATE 30 MG/1
60 TABLET, FILM COATED, EXTENDED RELEASE ORAL EVERY 12 HOURS
Status: DISCONTINUED | OUTPATIENT
Start: 2019-08-27 | End: 2019-08-29

## 2019-08-27 RX ADMIN — HYDROMORPHONE HYDROCHLORIDE 3 MG: 1 INJECTION, SOLUTION INTRAMUSCULAR; INTRAVENOUS; SUBCUTANEOUS at 04:08

## 2019-08-27 RX ADMIN — SENNOSIDES,DOCUSATE SODIUM 1 TABLET: 8.6; 5 TABLET, FILM COATED ORAL at 09:08

## 2019-08-27 RX ADMIN — MORPHINE SULFATE 15 MG: 15 TABLET, EXTENDED RELEASE ORAL at 10:08

## 2019-08-27 RX ADMIN — DEXTROSE MONOHYDRATE AND SODIUM CHLORIDE: 5; .45 INJECTION, SOLUTION INTRAVENOUS at 08:08

## 2019-08-27 RX ADMIN — MORPHINE SULFATE 45 MG: 30 TABLET, EXTENDED RELEASE ORAL at 09:08

## 2019-08-27 RX ADMIN — HYDROXYUREA 1000 MG: 500 CAPSULE ORAL at 09:08

## 2019-08-27 RX ADMIN — FOLIC ACID 1 MG: 1 TABLET ORAL at 09:08

## 2019-08-27 RX ADMIN — HYDROMORPHONE HYDROCHLORIDE 3 MG: 1 INJECTION, SOLUTION INTRAMUSCULAR; INTRAVENOUS; SUBCUTANEOUS at 07:08

## 2019-08-27 RX ADMIN — HYDROMORPHONE HYDROCHLORIDE 3 MG: 1 INJECTION, SOLUTION INTRAMUSCULAR; INTRAVENOUS; SUBCUTANEOUS at 01:08

## 2019-08-27 RX ADMIN — MORPHINE SULFATE 60 MG: 30 TABLET, EXTENDED RELEASE ORAL at 09:08

## 2019-08-27 RX ADMIN — IBUPROFEN 400 MG: 400 TABLET, FILM COATED ORAL at 08:08

## 2019-08-27 RX ADMIN — IBUPROFEN 400 MG: 400 TABLET, FILM COATED ORAL at 05:08

## 2019-08-27 RX ADMIN — HYDROMORPHONE HYDROCHLORIDE 3 MG: 1 INJECTION, SOLUTION INTRAMUSCULAR; INTRAVENOUS; SUBCUTANEOUS at 10:08

## 2019-08-27 RX ADMIN — OXYCODONE HYDROCHLORIDE 20 MG: 10 TABLET ORAL at 05:08

## 2019-08-27 RX ADMIN — DEXTROSE MONOHYDRATE AND SODIUM CHLORIDE: 5; .45 INJECTION, SOLUTION INTRAVENOUS at 04:08

## 2019-08-27 NOTE — PLAN OF CARE
Problem: Pain Acute  Goal: Optimal Pain Control  Outcome: Ongoing (interventions implemented as appropriate)  Intervention: Develop Pain Management Plan     08/24/19 2005   Prevent or Manage Pain   Pain Management Interventions care clustered;diversional activity provided;pain management plan reviewed with patient/caregiver;position adjusted;quiet environment facilitated     Intervention: Prevent or Manage Pain     08/14/19 0810 08/24/19 2005   Prevent or Manage Pain   Complementary Therapy other (see comments)  (heat packs provided)  --    Sensory Stimulation Regulation  --  care clustered;lighting decreased;music/television provided for stimulation   Sleep/Rest Enhancement  --  regular sleep/rest pattern promoted     Intervention: Optimize Psychosocial Wellbeing     08/24/19 2005   Promote Anxiety Reduction   Supportive Measures positive reinforcement provided;verbalization of feelings encouraged   Prevent and Minimize Fear   Diversional Activities smartphone;television

## 2019-08-28 PROCEDURE — 11000001 HC ACUTE MED/SURG PRIVATE ROOM

## 2019-08-28 PROCEDURE — 76937 US GUIDE VASCULAR ACCESS: CPT

## 2019-08-28 PROCEDURE — 25000003 PHARM REV CODE 250: Performed by: INTERNAL MEDICINE

## 2019-08-28 PROCEDURE — 36410 VNPNXR 3YR/> PHY/QHP DX/THER: CPT

## 2019-08-28 PROCEDURE — 25000003 PHARM REV CODE 250: Performed by: HOSPITALIST

## 2019-08-28 PROCEDURE — 99233 PR SUBSEQUENT HOSPITAL CARE,LEVL III: ICD-10-PCS | Mod: ,,, | Performed by: HOSPITALIST

## 2019-08-28 PROCEDURE — C1751 CATH, INF, PER/CENT/MIDLINE: HCPCS

## 2019-08-28 PROCEDURE — 63600175 PHARM REV CODE 636 W HCPCS: Performed by: INTERNAL MEDICINE

## 2019-08-28 PROCEDURE — 99233 SBSQ HOSP IP/OBS HIGH 50: CPT | Mod: ,,, | Performed by: HOSPITALIST

## 2019-08-28 PROCEDURE — 63600175 PHARM REV CODE 636 W HCPCS: Performed by: HOSPITALIST

## 2019-08-28 RX ORDER — HYDROMORPHONE HYDROCHLORIDE 2 MG/1
4 TABLET ORAL
Status: DISCONTINUED | OUTPATIENT
Start: 2019-08-28 | End: 2019-08-30 | Stop reason: HOSPADM

## 2019-08-28 RX ORDER — SODIUM CHLORIDE 9 MG/ML
INJECTION, SOLUTION INTRAVENOUS CONTINUOUS
Status: DISCONTINUED | OUTPATIENT
Start: 2019-08-28 | End: 2019-08-30 | Stop reason: HOSPADM

## 2019-08-28 RX ADMIN — IBUPROFEN 400 MG: 400 TABLET, FILM COATED ORAL at 05:08

## 2019-08-28 RX ADMIN — MORPHINE SULFATE 60 MG: 30 TABLET, EXTENDED RELEASE ORAL at 08:08

## 2019-08-28 RX ADMIN — FOLIC ACID 1 MG: 1 TABLET ORAL at 08:08

## 2019-08-28 RX ADMIN — HYDROXYUREA 1000 MG: 500 CAPSULE ORAL at 08:08

## 2019-08-28 RX ADMIN — HYDROMORPHONE HYDROCHLORIDE 3 MG: 1 INJECTION, SOLUTION INTRAMUSCULAR; INTRAVENOUS; SUBCUTANEOUS at 11:08

## 2019-08-28 RX ADMIN — SODIUM CHLORIDE: 0.9 INJECTION, SOLUTION INTRAVENOUS at 12:08

## 2019-08-28 RX ADMIN — HYDROMORPHONE HYDROCHLORIDE 3 MG: 1 INJECTION, SOLUTION INTRAMUSCULAR; INTRAVENOUS; SUBCUTANEOUS at 08:08

## 2019-08-28 RX ADMIN — SENNOSIDES,DOCUSATE SODIUM 1 TABLET: 8.6; 5 TABLET, FILM COATED ORAL at 08:08

## 2019-08-28 RX ADMIN — HYDROMORPHONE HYDROCHLORIDE 3 MG: 1 INJECTION, SOLUTION INTRAMUSCULAR; INTRAVENOUS; SUBCUTANEOUS at 05:08

## 2019-08-28 RX ADMIN — SODIUM CHLORIDE: 0.9 INJECTION, SOLUTION INTRAVENOUS at 10:08

## 2019-08-28 RX ADMIN — MORPHINE SULFATE 60 MG: 30 TABLET, EXTENDED RELEASE ORAL at 10:08

## 2019-08-28 RX ADMIN — IBUPROFEN 400 MG: 400 TABLET, FILM COATED ORAL at 12:08

## 2019-08-28 RX ADMIN — HYDROMORPHONE HYDROCHLORIDE 3 MG: 1 INJECTION, SOLUTION INTRAMUSCULAR; INTRAVENOUS; SUBCUTANEOUS at 02:08

## 2019-08-28 NOTE — NURSING
2000 Pt midline accidentally came out. Multiple attempts by different nurses made to restart IV, but unsuccessful. Pt complains of pain 10/10 refused p.o meds  Ibuprofen and Prn oxycodone. Stated p.o meds doesn't relieve his pain. Oncall for team R kiara LEE and made aware of situation.

## 2019-08-28 NOTE — PROGRESS NOTES
Progress Note   Hospital Medicine         Patient Name: Paddy Stevenson  MRN:  [id number]  [facility] Medicine Team: [facility]OSP MED R Hernan Valdez MD  Date of Admission:  8/5/2019     Length of Stay:  LOS: 21 days   Expected Discharge Date: 8/28/2019  Principal Problem:  Vaso-occlusive sickle cell crisis       Subjective:     Interval History/Overnight Events:  Patient is in writing pain upon evaluation today; he is struggling in his bed and pain is diffuse and 9/10 on pain scale; increased long acting pain medication to 60 mg PO BID and adding oxycodone 20 mg for breakthrough and monitor over night; if it continues will get pain management involved;     Review of Systems   Constitutional: Negative for chills, fatigue, fever.   HENT: Negative for sore throat, trouble swallowing.    Eyes: Negative for photophobia, visual disturbance.   Respiratory: Negative for cough, shortness of breath.    Cardiovascular: Negative for chest pain, palpitations, leg swelling.   Gastrointestinal: Negative for abdominal pain, constipation, diarrhea, nausea, vomiting.   Endocrine: Negative for cold intolerance, heat intolerance.   Genitourinary: Negative for dysuria, frequency.   Musculoskeletal: Negative for arthralgias, myalgias.   Skin: Negative for rash, wound, erythema   Neurological: Negative for dizziness, syncope, weakness, light-headedness.   Psychiatric/Behavioral: Negative for confusion, hallucinations, anxiety  All other systems reviewed and are negative.    Objective:     Temp:  [96.4 °F (35.8 °C)-98 °F (36.7 °C)]   Pulse:  [64-89]   Resp:  [14-20]   BP: (118-134)/(60-70)   SpO2:  [96 %-99 %]       Physical Exam:  Constitutional: Appears well-developed and well-nourished.   Head: Normocephalic and atraumatic.   Mouth/Throat: Oropharynx is clear and moist.   Eyes: EOM are normal. Pupils are equal, round, and reactive to light. No scleral icterus.   Neck: Normal range of motion. Neck supple.   Cardiovascular: Normal rate and  regular rhythm.  No murmur heard.  Pulmonary/Chest: Effort normal and breath sounds normal. No respiratory distress. No wheezes, rales, or rhonchi  Abdominal: Soft. Bowel sounds are normal.  No distension or tenderness  Musculoskeletal: Normal range of motion. No edema.   Neurological: Alert and oriented to person, place, and time.   Skin: Skin is warm and dry.   Psychiatric: Normal mood and affect. Behavior is normal.     Recent Labs   Lab 08/21/19  1347 08/22/19  0746 08/24/19  0400 08/25/19  1257 08/26/19  0940   WBC 12.72* 14.83* 12.38 12.90* 12.98*   HGB 8.9* 8.9* 9.0* 9.1* 8.9*   HCT 26.9* 25.9* 27.0* 26.2* 26.2*   * 330 285 317 404*     Recent Labs   Lab 08/24/19  0400 08/25/19  0545 08/26/19  0940    140 135*   K 4.4 4.0 4.0    108 105   CO2 20* 21* 18*   BUN 6 6 8   CREATININE 0.8 0.8 0.8   GLU 73 106 124*   CALCIUM 9.2 9.6 9.1     Recent Labs   Lab 08/24/19  0400 08/25/19  0545 08/26/19  0940   ALKPHOS 58 64 65   ALT 24 26 22   AST 38 33 35   ALBUMIN 3.5 3.6 3.8   PROT 7.0 7.3 7.6   BILITOT 2.2* 2.1* 2.3*     No results for input(s): POCTGLUCOSE in the last 168 hours.     folic acid  1 mg Oral Daily    HYDROmorphone  3 mg Intravenous Once    hydroxyurea  1,000 mg Oral Daily    ibuprofen  400 mg Oral Q6H    morphine  60 mg Oral Q12H    polyethylene glycol  17 g Oral Daily    senna-docusate 8.6-50 mg  1 tablet Oral BID       Assessment and Plan     Mr. Paddy Stevenson is a 24 y.o. male who presented to [facility] on 8/5/2019 with     Hospital Course:    Mr. Paddy Stevenson was admitted to Hospital Medicine for management of     Active Hospital Problems    Diagnosis  POA    *Vaso-occlusive sickle cell crisis [D57.00]  Yes    On hydroxyurea therapy [Z79.899]  Not Applicable    Body aches [R52]  Yes    Opioid dependence [F11.20]  Yes    Mild intermittent asthma without complication [J45.20]  Yes     Chronic    Avascular necrosis of bones of both hips [M87.051, M87.052]  Yes      Chronic    Sickle cell pain crisis [D57.00]  Yes      Resolved Hospital Problems   No resolved problems to display.       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   -patient not responding to IV fluids as expected; consulted Heme/Onc. Agree with resumption of NSAIDs (patient previously refusing)  -patient without significant improvement; continuing IV fluids, home-dose MSContin, MSIR q4 prn, Dilaudid 1 mg IV q6 prn.  -patient not receiving oral MSIR as ordered; increasing IV Dilaudid dose to 2 mg. Plan to follow up with Heme/Onc on Monday if no improvement.  -No improvement, possibly worsening hemolysis. Plan to contact Heme/Onc in AM  -Patient appears to have high tolerance for opioids. Agree with Heme/Onc recommendations to increase IV Dilaudid: 4 mg Q 3 hours and resume NSAIDs  -Patient continues to have crisis pain. Continue IV fluids and resumed IV Dilaudid: 4 mg Q 3 hours.  -Hgb-S assay similar as previous levels.  -Resume trial of NSAIDs.   - Agree with Heme/Onc recommendations to increase hydroxyurea now that pain is improving. Continuing folate.  -Increased hydroxyurea 8/22. Continuing current management. Monitor liver enzymes. Patient needs close OP follow up since hydroxyurea has been increased.  -Trial of NSAIDs again starting 8/26  - increasing long acting pain medication and adding oxycodone 20 mg for breakthrough       Leukocytosis  - CXR, UA normal  - start antibiotics if patient becomes febrile or hemodynamically unstable     Avascular necrosis of bones of both hips  -S/p L hip replacement ~2 years ago  -MRI R hip (12/2018) at OSH: Avascular necrosis of the right femoral head without subchondral  collapse. 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  -MRI on 7/17/19:Focal area of avascular necrosis involving at least 50% of the articular surface.  No imaging findings to suggest subchondral plate collapse.  2. Additional focal area of avascular necrosis within the right iliac crest and possibly within the right proximal femoral shaft, the latter which is not well evaluated secondary to partial visualization.  -Pt reports being followed by an Orthopedic surgeon in [place] with an appointment coming up. Currently no plans of surgery.      Mild intermittent asthma without complication  -Sats 98-99% on RA  -Controlled  -prn breathing treatments       Disposition:  Hopefully in the next day or two    Hernan Valdez MD  Medical Director [facility]  Spectra:  [phone]  Pager: [phone]

## 2019-08-28 NOTE — CONSULTS
Placed 18g, 8 cm Midline in right brachial vein using u/s guidance.  Max dwell date 9/26/2019.  Lot # [id number].

## 2019-08-28 NOTE — PLAN OF CARE
Problem: Adult Inpatient Plan of Care  Goal: Plan of Care Review  Outcome: Ongoing (interventions implemented as appropriate)     08/28/19 0635   Plan of Care Review   Plan of Care Reviewed With patient     POC reviewed. VSS. Pt midline accidentally pulled out, see previous note. Pt refused po meds for pain. Pt refused 4am vitals. Picc team consulted in the a.m. Safety maintained. Will continue to monitor.

## 2019-08-29 LAB
ALBUMIN SERPL BCP-MCNC: 3.5 G/DL (ref 3.5–5.2)
ALP SERPL-CCNC: 61 U/L (ref 55–135)
ALT SERPL W/O P-5'-P-CCNC: 21 U/L (ref 10–44)
ANION GAP SERPL CALC-SCNC: 8 MMOL/L (ref 8–16)
AST SERPL-CCNC: 29 U/L (ref 10–40)
BASOPHILS # BLD AUTO: 0.08 K/UL (ref 0–0.2)
BASOPHILS NFR BLD: 0.7 % (ref 0–1.9)
BILIRUB SERPL-MCNC: 1.9 MG/DL (ref 0.1–1)
BUN SERPL-MCNC: 8 MG/DL (ref 6–20)
CALCIUM SERPL-MCNC: 8.6 MG/DL (ref 8.7–10.5)
CHLORIDE SERPL-SCNC: 112 MMOL/L (ref 95–110)
CO2 SERPL-SCNC: 20 MMOL/L (ref 23–29)
CREAT SERPL-MCNC: 0.8 MG/DL (ref 0.5–1.4)
DIFFERENTIAL METHOD: ABNORMAL
EOSINOPHIL # BLD AUTO: 1 K/UL (ref 0–0.5)
EOSINOPHIL NFR BLD: 8.9 % (ref 0–8)
ERYTHROCYTE [DISTWIDTH] IN BLOOD BY AUTOMATED COUNT: 24.4 % (ref 11.5–14.5)
EST. GFR  (AFRICAN AMERICAN): >60 ML/MIN/1.73 M^2
EST. GFR  (NON AFRICAN AMERICAN): >60 ML/MIN/1.73 M^2
GLUCOSE SERPL-MCNC: 78 MG/DL (ref 70–110)
HCT VFR BLD AUTO: 24.4 % (ref 40–54)
HGB BLD-MCNC: 8.3 G/DL (ref 14–18)
IMM GRANULOCYTES # BLD AUTO: 0.36 K/UL (ref 0–0.04)
IMM GRANULOCYTES NFR BLD AUTO: 3.2 % (ref 0–0.5)
LDH SERPL L TO P-CCNC: 436 U/L (ref 110–260)
LYMPHOCYTES # BLD AUTO: 3.8 K/UL (ref 1–4.8)
LYMPHOCYTES NFR BLD: 34.4 % (ref 18–48)
MCH RBC QN AUTO: 30.1 PG (ref 27–31)
MCHC RBC AUTO-ENTMCNC: 34 G/DL (ref 32–36)
MCV RBC AUTO: 88 FL (ref 82–98)
MONOCYTES # BLD AUTO: 1.1 K/UL (ref 0.3–1)
MONOCYTES NFR BLD: 9.9 % (ref 4–15)
NEUTROPHILS # BLD AUTO: 4.8 K/UL (ref 1.8–7.7)
NEUTROPHILS NFR BLD: 42.9 % (ref 38–73)
NRBC BLD-RTO: 2 /100 WBC
PLATELET # BLD AUTO: 362 K/UL (ref 150–350)
PMV BLD AUTO: 10.4 FL (ref 9.2–12.9)
POTASSIUM SERPL-SCNC: 4.2 MMOL/L (ref 3.5–5.1)
PROT SERPL-MCNC: 6.5 G/DL (ref 6–8.4)
RBC # BLD AUTO: 2.76 M/UL (ref 4.6–6.2)
RETICS/RBC NFR AUTO: 17.5 % (ref 0.4–2)
SODIUM SERPL-SCNC: 140 MMOL/L (ref 136–145)
WBC # BLD AUTO: 11.13 K/UL (ref 3.9–12.7)

## 2019-08-29 PROCEDURE — 99233 SBSQ HOSP IP/OBS HIGH 50: CPT | Mod: ,,, | Performed by: HOSPITALIST

## 2019-08-29 PROCEDURE — 80053 COMPREHEN METABOLIC PANEL: CPT

## 2019-08-29 PROCEDURE — 25000003 PHARM REV CODE 250: Performed by: HOSPITALIST

## 2019-08-29 PROCEDURE — 83615 LACTATE (LD) (LDH) ENZYME: CPT

## 2019-08-29 PROCEDURE — 99233 PR SUBSEQUENT HOSPITAL CARE,LEVL III: ICD-10-PCS | Mod: ,,, | Performed by: HOSPITALIST

## 2019-08-29 PROCEDURE — 36415 COLL VENOUS BLD VENIPUNCTURE: CPT

## 2019-08-29 PROCEDURE — 63600175 PHARM REV CODE 636 W HCPCS: Performed by: HOSPITALIST

## 2019-08-29 PROCEDURE — 11000001 HC ACUTE MED/SURG PRIVATE ROOM

## 2019-08-29 PROCEDURE — 63600175 PHARM REV CODE 636 W HCPCS: Performed by: INTERNAL MEDICINE

## 2019-08-29 PROCEDURE — 85025 COMPLETE CBC W/AUTO DIFF WBC: CPT

## 2019-08-29 PROCEDURE — 85045 AUTOMATED RETICULOCYTE COUNT: CPT

## 2019-08-29 PROCEDURE — 25000003 PHARM REV CODE 250: Performed by: INTERNAL MEDICINE

## 2019-08-29 RX ORDER — MORPHINE SULFATE 30 MG/1
90 TABLET, FILM COATED, EXTENDED RELEASE ORAL EVERY 12 HOURS
Status: DISCONTINUED | OUTPATIENT
Start: 2019-08-29 | End: 2019-08-30 | Stop reason: HOSPADM

## 2019-08-29 RX ORDER — MORPHINE SULFATE 30 MG/1
30 TABLET, FILM COATED, EXTENDED RELEASE ORAL ONCE
Status: COMPLETED | OUTPATIENT
Start: 2019-08-29 | End: 2019-08-29

## 2019-08-29 RX ADMIN — HYDROMORPHONE HYDROCHLORIDE 3 MG: 1 INJECTION, SOLUTION INTRAMUSCULAR; INTRAVENOUS; SUBCUTANEOUS at 06:08

## 2019-08-29 RX ADMIN — HYDROMORPHONE HYDROCHLORIDE 3 MG: 1 INJECTION, SOLUTION INTRAMUSCULAR; INTRAVENOUS; SUBCUTANEOUS at 11:08

## 2019-08-29 RX ADMIN — HYDROMORPHONE HYDROCHLORIDE 3 MG: 1 INJECTION, SOLUTION INTRAMUSCULAR; INTRAVENOUS; SUBCUTANEOUS at 02:08

## 2019-08-29 RX ADMIN — SODIUM CHLORIDE: 0.9 INJECTION, SOLUTION INTRAVENOUS at 03:08

## 2019-08-29 RX ADMIN — HYDROMORPHONE HYDROCHLORIDE 3 MG: 1 INJECTION, SOLUTION INTRAMUSCULAR; INTRAVENOUS; SUBCUTANEOUS at 09:08

## 2019-08-29 RX ADMIN — MORPHINE SULFATE 60 MG: 30 TABLET, EXTENDED RELEASE ORAL at 08:08

## 2019-08-29 RX ADMIN — HYDROXYUREA 1000 MG: 500 CAPSULE ORAL at 08:08

## 2019-08-29 RX ADMIN — HYDROMORPHONE HYDROCHLORIDE 3 MG: 1 INJECTION, SOLUTION INTRAMUSCULAR; INTRAVENOUS; SUBCUTANEOUS at 03:08

## 2019-08-29 RX ADMIN — IBUPROFEN 400 MG: 400 TABLET, FILM COATED ORAL at 12:08

## 2019-08-29 RX ADMIN — HYDROMORPHONE HYDROCHLORIDE 3 MG: 1 INJECTION, SOLUTION INTRAMUSCULAR; INTRAVENOUS; SUBCUTANEOUS at 08:08

## 2019-08-29 RX ADMIN — SODIUM CHLORIDE: 0.9 INJECTION, SOLUTION INTRAVENOUS at 06:08

## 2019-08-29 RX ADMIN — SODIUM CHLORIDE: 0.9 INJECTION, SOLUTION INTRAVENOUS at 08:08

## 2019-08-29 RX ADMIN — SENNOSIDES,DOCUSATE SODIUM 1 TABLET: 8.6; 5 TABLET, FILM COATED ORAL at 09:08

## 2019-08-29 RX ADMIN — HYDROMORPHONE HYDROCHLORIDE 3 MG: 1 INJECTION, SOLUTION INTRAMUSCULAR; INTRAVENOUS; SUBCUTANEOUS at 12:08

## 2019-08-29 RX ADMIN — MORPHINE SULFATE 90 MG: 30 TABLET, EXTENDED RELEASE ORAL at 09:08

## 2019-08-29 RX ADMIN — SODIUM CHLORIDE: 0.9 INJECTION, SOLUTION INTRAVENOUS at 11:08

## 2019-08-29 RX ADMIN — MORPHINE SULFATE 30 MG: 30 TABLET, EXTENDED RELEASE ORAL at 10:08

## 2019-08-29 RX ADMIN — FOLIC ACID 1 MG: 1 TABLET ORAL at 08:08

## 2019-08-29 NOTE — PROGRESS NOTES
Progress Note   Hospital Medicine         Patient Name: Paddy Stevenson  MRN:  [id number]  [facility] Medicine Team: [facility] MED R Hernan Valdez MD  Date of Admission:  8/5/2019     Length of Stay:  LOS: 22 days   Expected Discharge Date: 8/28/2019  Principal Problem:  Vaso-occlusive sickle cell crisis       Subjective:     Interval History/Overnight Events:  Patient states his IV came out last night, however today he is having a better today; I offered getting pain management involved, however he states he like how things are right now and does not want to complicate things; increasing IVFs and will re-evaluate in the AM     Review of Systems   Constitutional: Negative for chills, fatigue, fever.   HENT: Negative for sore throat, trouble swallowing.    Eyes: Negative for photophobia, visual disturbance.   Respiratory: Negative for cough, shortness of breath.    Cardiovascular: Negative for chest pain, palpitations, leg swelling.   Gastrointestinal: Negative for abdominal pain, constipation, diarrhea, nausea, vomiting.   Endocrine: Negative for cold intolerance, heat intolerance.   Genitourinary: Negative for dysuria, frequency.   Musculoskeletal: Negative for arthralgias, myalgias.   Skin: Negative for rash, wound, erythema   Neurological: Negative for dizziness, syncope, weakness, light-headedness.   Psychiatric/Behavioral: Negative for confusion, hallucinations, anxiety  All other systems reviewed and are negative.    Objective:     Temp:  [97.8 °F (36.6 °C)-98.7 °F (37.1 °C)]   Pulse:  []   Resp:  [18-20]   BP: (111-134)/(51-72)   SpO2:  [94 %-100 %]       Physical Exam:  Constitutional: Appears well-developed and well-nourished.   Head: Normocephalic and atraumatic.   Mouth/Throat: Oropharynx is clear and moist.   Eyes: EOM are normal. Pupils are equal, round, and reactive to light. No scleral icterus.   Neck: Normal range of motion. Neck supple.   Cardiovascular: Normal rate and regular rhythm.  No murmur  heard.  Pulmonary/Chest: Effort normal and breath sounds normal. No respiratory distress. No wheezes, rales, or rhonchi  Abdominal: Soft. Bowel sounds are normal.  No distension or tenderness  Musculoskeletal: Normal range of motion. No edema.   Neurological: Alert and oriented to person, place, and time.   Skin: Skin is warm and dry.   Psychiatric: Normal mood and affect. Behavior is normal.     Recent Labs   Lab 08/22/19  0746 08/24/19  0400 08/25/19  1257 08/26/19  0940   WBC 14.83* 12.38 12.90* 12.98*   HGB 8.9* 9.0* 9.1* 8.9*   HCT 25.9* 27.0* 26.2* 26.2*    285 317 404*     Recent Labs   Lab 08/24/19  0400 08/25/19  0545 08/26/19  0940    140 135*   K 4.4 4.0 4.0    108 105   CO2 20* 21* 18*   BUN 6 6 8   CREATININE 0.8 0.8 0.8   GLU 73 106 124*   CALCIUM 9.2 9.6 9.1     Recent Labs   Lab 08/24/19  0400 08/25/19  0545 08/26/19  0940   ALKPHOS 58 64 65   ALT 24 26 22   AST 38 33 35   ALBUMIN 3.5 3.6 3.8   PROT 7.0 7.3 7.6   BILITOT 2.2* 2.1* 2.3*     No results for input(s): POCTGLUCOSE in the last 168 hours.     folic acid  1 mg Oral Daily    HYDROmorphone  3 mg Intravenous Once    hydroxyurea  1,000 mg Oral Daily    ibuprofen  400 mg Oral Q6H    morphine  60 mg Oral Q12H    polyethylene glycol  17 g Oral Daily    senna-docusate 8.6-50 mg  1 tablet Oral BID       Assessment and Plan     Mr. Paddy Stevenson is a 24 y.o. male who presented to [facility] on 8/5/2019 with     Hospital Course:    Mr. Paddy Stevenson was admitted to Hospital Medicine for management of     Active Hospital Problems    Diagnosis  POA    *Vaso-occlusive sickle cell crisis [D57.00]  Yes    On hydroxyurea therapy [Z79.899]  Not Applicable    Body aches [R52]  Yes    Opioid dependence [F11.20]  Yes    Mild intermittent asthma without complication [J45.20]  Yes     Chronic    Avascular necrosis of bones of both hips [M87.051, M87.052]  Yes     Chronic    Sickle cell pain crisis [D57.00]  Yes      Resolved  Hospital Problems   No resolved problems to display.       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   -patient not responding to IV fluids as expected; consulted Heme/Onc. Agree with resumption of NSAIDs (patient previously refusing)  -patient without significant improvement; continuing IV fluids, home-dose MSContin, MSIR q4 prn, Dilaudid 1 mg IV q6 prn.  -patient not receiving oral MSIR as ordered; increasing IV Dilaudid dose to 2 mg. Plan to follow up with Heme/Onc on Monday if no improvement.  -No improvement, possibly worsening hemolysis. Plan to contact Heme/Onc in AM  -Patient appears to have high tolerance for opioids. Agree with Heme/Onc recommendations to increase IV Dilaudid: 4 mg Q 3 hours and resume NSAIDs  -Patient continues to have crisis pain. Continue IV fluids and resumed IV Dilaudid: 4 mg Q 3 hours.  -Hgb-S assay similar as previous levels.  -Resume trial of NSAIDs.   - Agree with Heme/Onc recommendations to increase hydroxyurea now that pain is improving. Continuing folate.  -Increased hydroxyurea 8/22. Continuing current management. Monitor liver enzymes. Patient needs close OP follow up since hydroxyurea has been increased.  -Trial of NSAIDs again starting 8/26  - increasing long acting pain medication and adding oxycodone 20 mg for breakthrough       Leukocytosis  - CXR, UA normal  - start antibiotics if patient becomes febrile or hemodynamically unstable     Avascular necrosis of bones of both hips  -S/p L hip replacement ~2 years ago  -MRI R hip (12/2018) at OSH: Avascular necrosis of the right femoral head without subchondral collapse. Findings of osteonecrosis involving the right  acetabulum.  -MRI on 7/17/19:Focal area of avascular necrosis involving at least 50% of the articular surface.  No imaging findings to suggest subchondral plate collapse.  2. Additional focal area of avascular necrosis within the right iliac crest and possibly within the right proximal femoral shaft, the latter which is not well evaluated secondary to partial visualization.  -Pt reports being followed by an Orthopedic surgeon in [place] with an appointment coming up. Currently no plans of surgery.      Mild intermittent asthma without complication  -Sats 98-99% on RA  -Controlled  -prn breathing treatments       Disposition:  Hopefully in the next day or two    Hernan Valdez MD  Medical Director [facility]  Spectra:  [phone]  Pager: [phone]

## 2019-08-29 NOTE — PLAN OF CARE
Problem: Adult Inpatient Plan of Care  Goal: Plan of Care Review  Outcome: Ongoing (interventions implemented as appropriate)     08/29/19 0405   Plan of Care Review   Plan of Care Reviewed With patient   Progress no change     POC reviewed. VSS. No acute events overnight. Prn med given for pain Q3hr. Pt refused morning labs, will reschedule for 8am. Safety maintained. Will continue to monitor.

## 2019-08-29 NOTE — PROGRESS NOTES
Progress Note   Hospital Medicine         Patient Name: Paddy Stevenson  MRN:  [id number]  [facility] Medicine Team: [facility] R Hernan Valdez MD  Date of Admission:  8/5/2019     Length of Stay:  LOS: 23 days   Expected Discharge Date: 9/1/2019  Principal Problem:  Vaso-occlusive sickle cell crisis       Subjective:     Interval History/Overnight Events:  Patient slowly starting to get better; increasing long acting medication to 90 mg PO BID today;     Review of Systems   Constitutional: Negative for chills, fatigue, fever.   HENT: Negative for sore throat, trouble swallowing.    Eyes: Negative for photophobia, visual disturbance.   Respiratory: Negative for cough, shortness of breath.    Cardiovascular: Negative for chest pain, palpitations, leg swelling.   Gastrointestinal: Negative for abdominal pain, constipation, diarrhea, nausea, vomiting.   Endocrine: Negative for cold intolerance, heat intolerance.   Genitourinary: Negative for dysuria, frequency.   Musculoskeletal: Negative for arthralgias, myalgias.   Skin: Negative for rash, wound, erythema   Neurological: Negative for dizziness, syncope, weakness, light-headedness.   Psychiatric/Behavioral: Negative for confusion, hallucinations, anxiety  All other systems reviewed and are negative.    Objective:     Temp:  [96.9 °F (36.1 °C)-98.7 °F (37.1 °C)]   Pulse:  [71-93]   Resp:  [18-20]   BP: (109-119)/(56-76)   SpO2:  [94 %-99 %]       Physical Exam:  Constitutional: Appears well-developed and well-nourished.   Head: Normocephalic and atraumatic.   Mouth/Throat: Oropharynx is clear and moist.   Eyes: EOM are normal. Pupils are equal, round, and reactive to light. No scleral icterus.   Neck: Normal range of motion. Neck supple.   Cardiovascular: Normal rate and regular rhythm.  No murmur heard.  Pulmonary/Chest: Effort normal and breath sounds normal. No respiratory distress. No wheezes, rales, or rhonchi  Abdominal: Soft. Bowel sounds are normal.  No  distension or tenderness  Musculoskeletal: Normal range of motion. No edema.   Neurological: Alert and oriented to person, place, and time.   Skin: Skin is warm and dry.   Psychiatric: Normal mood and affect. Behavior is normal.     Recent Labs   Lab 08/24/19  0400 08/25/19  1257 08/26/19  0940 08/29/19  0816   WBC 12.38 12.90* 12.98* 11.13   HGB 9.0* 9.1* 8.9* 8.3*   HCT 27.0* 26.2* 26.2* 24.4*    317 404* 362*     Recent Labs   Lab 08/25/19  0545 08/26/19  0940 08/29/19  0816    135* 140   K 4.0 4.0 4.2    105 112*   CO2 21* 18* 20*   BUN 6 8 8   CREATININE 0.8 0.8 0.8    124* 78   CALCIUM 9.6 9.1 8.6*     Recent Labs   Lab 08/25/19  0545 08/26/19  0940 08/29/19  0816   ALKPHOS 64 65 61   ALT 26 22 21   AST 33 35 29   ALBUMIN 3.6 3.8 3.5   PROT 7.3 7.6 6.5   BILITOT 2.1* 2.3* 1.9*     No results for input(s): POCTGLUCOSE in the last 168 hours.     folic acid  1 mg Oral Daily    HYDROmorphone  3 mg Intravenous Once    hydroxyurea  1,000 mg Oral Daily    ibuprofen  400 mg Oral Q6H    morphine  90 mg Oral Q12H    polyethylene glycol  17 g Oral Daily    senna-docusate 8.6-50 mg  1 tablet Oral BID       Assessment and Plan     Mr. Paddy Stevenson is a 24 y.o. male who presented to [facility] on 8/5/2019 with     Hospital Course:    Mr. Paddy Stevenson was admitted to Hospital Medicine for management of     Active Hospital Problems    Diagnosis  POA    *Vaso-occlusive sickle cell crisis [D57.00]  Yes    On hydroxyurea therapy [Z79.899]  Not Applicable    Body aches [R52]  Yes    Opioid dependence [F11.20]  Yes    Mild intermittent asthma without complication [J45.20]  Yes     Chronic    Avascular necrosis of bones of both hips [M87.051, M87.052]  Yes     Chronic    Sickle cell pain crisis [D57.00]  Yes      Resolved Hospital Problems   No resolved problems to display.       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   -patient not responding to IV fluids as expected; consulted Heme/Onc. Agree with resumption of NSAIDs (patient previously refusing)  -patient without significant improvement; continuing IV fluids, home-dose MSContin, MSIR q4 prn, Dilaudid 1 mg IV q6 prn.  -patient not receiving oral MSIR as ordered; increasing IV Dilaudid dose to 2 mg. Plan to follow up with Heme/Onc on Monday if no improvement.  -No improvement, possibly worsening hemolysis. Plan to contact Heme/Onc in AM  -Patient appears to have high tolerance for opioids. Agree with Heme/Onc recommendations to increase IV Dilaudid: 4 mg Q 3 hours and resume NSAIDs  -Patient continues to have crisis pain. Continue IV fluids and resumed IV Dilaudid: 4 mg Q 3 hours.  -Hgb-S assay similar as previous levels.  -Resume trial of NSAIDs.   - Agree with Heme/Onc recommendations to increase hydroxyurea now that pain is improving. Continuing folate.  -Increased hydroxyurea 8/22. Continuing current management. Monitor liver enzymes. Patient needs close OP follow up since hydroxyurea has been increased.  - increasing long acting pain medication cont oxycodone 20 mg for breakthrough       Leukocytosis  - CXR, UA normal  - start antibiotics if patient becomes febrile or hemodynamically unstable     Avascular necrosis of bones of both hips  -S/p L hip replacement ~2 years ago  -MRI R hip (12/2018) at OSH: Avascular necrosis of the right femoral head without subchondral collapse. 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  -MRI on 7/17/19:Focal area of avascular necrosis involving at least 50% of the articular surface.  No imaging findings to suggest subchondral plate collapse.  2. Additional focal area of avascular necrosis  within the right iliac crest and possibly within the right proximal femoral shaft, the latter which is not well evaluated secondary to partial visualization.  -Pt reports being followed by an Orthopedic surgeon in [place] with an appointment coming up. Currently no plans of surgery.      Mild intermittent asthma without complication  -Sats 98-99% on RA  -Controlled  -prn breathing treatments       Disposition:  Hopefully in the next day or two    Hernan Valdez MD  Medical Director [facility] Medicine  Spectra:  [phone]  Pager: [phone]

## 2019-08-30 VITALS
OXYGEN SATURATION: 95 % | HEIGHT: 69 IN | HEART RATE: 71 BPM | SYSTOLIC BLOOD PRESSURE: 119 MMHG | RESPIRATION RATE: 18 BRPM | TEMPERATURE: 98 F | DIASTOLIC BLOOD PRESSURE: 68 MMHG | WEIGHT: 180.75 LBS | BODY MASS INDEX: 26.77 KG/M2

## 2019-08-30 LAB
ALBUMIN SERPL BCP-MCNC: 3.7 G/DL (ref 3.5–5.2)
ALP SERPL-CCNC: 59 U/L (ref 55–135)
ALT SERPL W/O P-5'-P-CCNC: 18 U/L (ref 10–44)
ANION GAP SERPL CALC-SCNC: 8 MMOL/L (ref 8–16)
ANISOCYTOSIS BLD QL SMEAR: SLIGHT
AST SERPL-CCNC: 28 U/L (ref 10–40)
BASO STIPL BLD QL SMEAR: ABNORMAL
BASOPHILS # BLD AUTO: 0.07 K/UL (ref 0–0.2)
BASOPHILS NFR BLD: 0.6 % (ref 0–1.9)
BILIRUB SERPL-MCNC: 2.1 MG/DL (ref 0.1–1)
BUN SERPL-MCNC: 6 MG/DL (ref 6–20)
CALCIUM SERPL-MCNC: 8.8 MG/DL (ref 8.7–10.5)
CHLORIDE SERPL-SCNC: 111 MMOL/L (ref 95–110)
CO2 SERPL-SCNC: 22 MMOL/L (ref 23–29)
CREAT SERPL-MCNC: 0.8 MG/DL (ref 0.5–1.4)
DIFFERENTIAL METHOD: ABNORMAL
EOSINOPHIL # BLD AUTO: 1.2 K/UL (ref 0–0.5)
EOSINOPHIL NFR BLD: 9.7 % (ref 0–8)
ERYTHROCYTE [DISTWIDTH] IN BLOOD BY AUTOMATED COUNT: 24.2 % (ref 11.5–14.5)
EST. GFR  (AFRICAN AMERICAN): >60 ML/MIN/1.73 M^2
EST. GFR  (NON AFRICAN AMERICAN): >60 ML/MIN/1.73 M^2
GIANT PLATELETS BLD QL SMEAR: PRESENT
GLUCOSE SERPL-MCNC: 75 MG/DL (ref 70–110)
HCT VFR BLD AUTO: 26.1 % (ref 40–54)
HGB BLD-MCNC: 8.8 G/DL (ref 14–18)
HOWELL-JOLLY BOD BLD QL SMEAR: ABNORMAL
HYPOCHROMIA BLD QL SMEAR: ABNORMAL
IMM GRANULOCYTES # BLD AUTO: 0.27 K/UL (ref 0–0.04)
IMM GRANULOCYTES NFR BLD AUTO: 2.2 % (ref 0–0.5)
LDH SERPL L TO P-CCNC: 412 U/L (ref 110–260)
LYMPHOCYTES # BLD AUTO: 4.7 K/UL (ref 1–4.8)
LYMPHOCYTES NFR BLD: 37.2 % (ref 18–48)
MCH RBC QN AUTO: 30.2 PG (ref 27–31)
MCHC RBC AUTO-ENTMCNC: 33.7 G/DL (ref 32–36)
MCV RBC AUTO: 90 FL (ref 82–98)
MONOCYTES # BLD AUTO: 1.3 K/UL (ref 0.3–1)
MONOCYTES NFR BLD: 10.2 % (ref 4–15)
NEUTROPHILS # BLD AUTO: 5 K/UL (ref 1.8–7.7)
NEUTROPHILS NFR BLD: 40.1 % (ref 38–73)
NRBC BLD-RTO: 2 /100 WBC
OVALOCYTES BLD QL SMEAR: ABNORMAL
PLATELET # BLD AUTO: 362 K/UL (ref 150–350)
PLATELET BLD QL SMEAR: ABNORMAL
PMV BLD AUTO: 10 FL (ref 9.2–12.9)
POIKILOCYTOSIS BLD QL SMEAR: ABNORMAL
POLYCHROMASIA BLD QL SMEAR: ABNORMAL
POTASSIUM SERPL-SCNC: 4.2 MMOL/L (ref 3.5–5.1)
PROT SERPL-MCNC: 6.8 G/DL (ref 6–8.4)
RBC # BLD AUTO: 2.91 M/UL (ref 4.6–6.2)
RETICS/RBC NFR AUTO: 16.1 % (ref 0.4–2)
SICKLE CELLS BLD QL SMEAR: ABNORMAL
SODIUM SERPL-SCNC: 141 MMOL/L (ref 136–145)
TARGETS BLD QL SMEAR: ABNORMAL
WBC # BLD AUTO: 12.51 K/UL (ref 3.9–12.7)

## 2019-08-30 PROCEDURE — 83615 LACTATE (LD) (LDH) ENZYME: CPT

## 2019-08-30 PROCEDURE — 99239 PR HOSPITAL DISCHARGE DAY,>30 MIN: ICD-10-PCS | Mod: ,,, | Performed by: HOSPITALIST

## 2019-08-30 PROCEDURE — 80053 COMPREHEN METABOLIC PANEL: CPT

## 2019-08-30 PROCEDURE — 25000003 PHARM REV CODE 250: Performed by: INTERNAL MEDICINE

## 2019-08-30 PROCEDURE — 25000003 PHARM REV CODE 250: Performed by: HOSPITALIST

## 2019-08-30 PROCEDURE — 63600175 PHARM REV CODE 636 W HCPCS: Performed by: INTERNAL MEDICINE

## 2019-08-30 PROCEDURE — 63600175 PHARM REV CODE 636 W HCPCS: Performed by: HOSPITALIST

## 2019-08-30 PROCEDURE — 36415 COLL VENOUS BLD VENIPUNCTURE: CPT

## 2019-08-30 PROCEDURE — 85045 AUTOMATED RETICULOCYTE COUNT: CPT

## 2019-08-30 PROCEDURE — 97803 MED NUTRITION INDIV SUBSEQ: CPT

## 2019-08-30 PROCEDURE — 99239 HOSP IP/OBS DSCHRG MGMT >30: CPT | Mod: ,,, | Performed by: HOSPITALIST

## 2019-08-30 PROCEDURE — 85025 COMPLETE CBC W/AUTO DIFF WBC: CPT

## 2019-08-30 RX ORDER — MORPHINE SULFATE 30 MG/1
90 TABLET, FILM COATED, EXTENDED RELEASE ORAL EVERY 12 HOURS
Qty: 14 TABLET | Refills: 0 | Status: SHIPPED | OUTPATIENT
Start: 2019-08-30 | End: 2019-09-06

## 2019-08-30 RX ORDER — AMOXICILLIN 250 MG
1 CAPSULE ORAL 2 TIMES DAILY
COMMUNITY
Start: 2019-08-30

## 2019-08-30 RX ORDER — OXYCODONE HYDROCHLORIDE 10 MG/1
10 TABLET ORAL EVERY 4 HOURS PRN
Qty: 25 TABLET | Refills: 0 | Status: SHIPPED | OUTPATIENT
Start: 2019-08-30 | End: 2019-09-06

## 2019-08-30 RX ORDER — HYDROXYUREA 500 MG/1
1000 CAPSULE ORAL DAILY
Qty: 60 CAPSULE | Refills: 0 | Status: SHIPPED | OUTPATIENT
Start: 2019-08-31 | End: 2019-09-30

## 2019-08-30 RX ADMIN — MORPHINE SULFATE 90 MG: 30 TABLET, EXTENDED RELEASE ORAL at 09:08

## 2019-08-30 RX ADMIN — HYDROXYUREA 1000 MG: 500 CAPSULE ORAL at 09:08

## 2019-08-30 RX ADMIN — HYDROMORPHONE HYDROCHLORIDE 3 MG: 1 INJECTION, SOLUTION INTRAMUSCULAR; INTRAVENOUS; SUBCUTANEOUS at 03:08

## 2019-08-30 RX ADMIN — POLYETHYLENE GLYCOL 3350 17 G: 17 POWDER, FOR SOLUTION ORAL at 09:08

## 2019-08-30 RX ADMIN — HYDROMORPHONE HYDROCHLORIDE 3 MG: 1 INJECTION, SOLUTION INTRAMUSCULAR; INTRAVENOUS; SUBCUTANEOUS at 12:08

## 2019-08-30 RX ADMIN — SODIUM CHLORIDE: 0.9 INJECTION, SOLUTION INTRAVENOUS at 03:08

## 2019-08-30 RX ADMIN — HYDROMORPHONE HYDROCHLORIDE 3 MG: 1 INJECTION, SOLUTION INTRAMUSCULAR; INTRAVENOUS; SUBCUTANEOUS at 09:08

## 2019-08-30 RX ADMIN — HYDROMORPHONE HYDROCHLORIDE 3 MG: 1 INJECTION, SOLUTION INTRAMUSCULAR; INTRAVENOUS; SUBCUTANEOUS at 06:08

## 2019-08-30 RX ADMIN — FOLIC ACID 1 MG: 1 TABLET ORAL at 09:08

## 2019-08-30 NOTE — DISCHARGE SUMMARY
Discharge Summary  Hospital Medicine    Patient Name: Paddy Stevenson  MRN:  [id number]  Hospital Medicine Team: [facility] R Hernan Valdez MD  Date of Admission:  2019     Date of Discharge:  2019  Length of Stay:  LOS: 24 days   Principal Problem:  Vaso-occlusive sickle cell crisis     Active Hospital Problems    Diagnosis  POA    *Vaso-occlusive sickle cell crisis [D57.00]  Yes    On hydroxyurea therapy [Z79.899]  Not Applicable    Body aches [R52]  Yes    Opioid dependence [F11.20]  Yes    Mild intermittent asthma without complication [J45.20]  Yes     Chronic    Avascular necrosis of bones of both hips [M87.051, M87.052]  Yes     Chronic    Sickle cell pain crisis [D57.00]  Yes      Resolved Hospital Problems   No resolved problems to display.       History of Present Illness:      Paddy Stevenson is a 23 y/o M with PMH sickle cell anemia and AVN of bilateral hips s/p L hip replacement 2 years ago presents c/o R hip, back, and thigh pain. Patient was recently admitted to [facility] from  to 2019 for sickle cell pain crisis and left AMA once his Dilaudid dose was decreased.  Patient stated that he had to leave last admission because his grandmother passed and he had to attend the .  During this time, patient took his home extended release morphine but did not find any pain relief.  He reports his symptoms of diffuse generalized pain worsened which prompted him to seek further medical attention in the ED.  Patient denies any chest pain, cough, sputum production, nausea, vomiting, diarrhea, constipation, blood in stools, urinary urgency, frequency, dysuria, hematuria.  While in the ED, patient is noted to be afebrile, hemodynamically stable, saturating 100% on room air.  H&H of 9.1/28.4 which is above his baseline.  Occasional sickle cells noted. 14.9 retic count.       Hospital Course of Principle Problem Addressed:       Vasoocclusive sickle cell crisis:  Sickle Cell anemia, > 90%  Hemoglobin S disease  -H&H on admission of 9.1/28.4 (baseline between 8-10)  -Retic count elevated to 14  -Supplemental oxygen  -D5 1/2 NS  -continue hydroxyurea and Folic acid  -pain management  -bowel regimen with Miralax and Pericolace while pt is receiving opioid medications.   -patient not responding to IV fluids as expected; consulted Heme/Onc. Agree with resumption of NSAIDs (patient previously refusing)  -patient without significant improvement; continuing IV fluids, home-dose MSContin, MSIR q4 prn, Dilaudid 1 mg IV q6 prn.  -patient not receiving oral MSIR as ordered; increasing IV Dilaudid dose to 2 mg. Plan to follow up with Heme/Onc on Monday if no improvement.  -No improvement, possibly worsening hemolysis. Plan to contact Heme/Onc in AM  -Patient appears to have high tolerance for opioids. Agree with Heme/Onc recommendations to increase IV Dilaudid: 4 mg Q 3 hours and resume NSAIDs  -Patient continues to have crisis pain. Continue IV fluids and resumed IV Dilaudid: 4 mg Q 3 hours.  -Hgb-S assay similar as previous levels.  -Resume trial of NSAIDs.   - Agree with Heme/Onc recommendations to increase hydroxyurea now that pain is improving. Continuing folate.  -Increased hydroxyurea 8/22. Continuing current management. Monitor liver enzymes. Patient needs close OP follow up since hydroxyurea has been increased.  - increasing long acting pain medication cont oxycodone 10 mg for breakthrough       Leukocytosis  - CXR, UA normal  - start antibiotics if patient becomes febrile or hemodynamically unstable     Avascular necrosis of bones of both hips  -S/p L hip replacement ~2 years ago  -MRI R hip (12/2018) at OSH: Avascular necrosis of the right femoral head without subchondral collapse. 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  -MRI on 7/17/19:Focal area of avascular necrosis involving at least 50% of the articular surface.  No imaging findings to suggest subchondral plate collapse.  2.  Additional focal area of avascular necrosis within the right iliac crest and possibly within the right proximal femoral shaft, the latter which is not well evaluated secondary to partial visualization.  -Pt reports being followed by an Orthopedic surgeon in [place] with an appointment coming up. Currently no plans of surgery.      Mild intermittent asthma without complication  -Sats 98-99% on RA  -Controlled  -prn breathing treatments        Disposition:  D/c home today with follow up at [facility] heme/onc next week     Hernan Valdez MD  Medical Director [facility] Medicine  Spectra:  [phone]  Pager: [phone]       Other Medical Problems Addressed in the Hospital:         Significant Diagnostic Tests/Imaging:     Recent Labs   Lab 08/24/19  0400 08/25/19  1257 08/26/19  0940 08/29/19  0816 08/30/19  0848   WBC 12.38 12.90* 12.98* 11.13 12.51   HGB 9.0* 9.1* 8.9* 8.3* 8.8*   HCT 27.0* 26.2* 26.2* 24.4* 26.1*    317 404* 362* 362*     Recent Labs   Lab 08/24/19  0400 08/25/19  0545 08/26/19  0940 08/29/19  0816 08/30/19  0848    140 135* 140 141   K 4.4 4.0 4.0 4.2 4.2    108 105 112* 111*   CO2 20* 21* 18* 20* 22*   BUN 6 6 8 8 6   CREATININE 0.8 0.8 0.8 0.8 0.8   CALCIUM 9.2 9.6 9.1 8.6* 8.8   PROT 7.0 7.3 7.6 6.5 6.8   BILITOT 2.2* 2.1* 2.3* 1.9* 2.1*   ALKPHOS 58 64 65 61 59   ALT 24 26 22 21 18   AST 38 33 35 29 28         Special Treatments/Procedures:         Discharge Medications:      Current Discharge Medication List      START taking these medications    Details   oxyCODONE (ROXICODONE) 10 mg Tab immediate release tablet Take 1 tablet (10 mg total) by mouth every 4 (four) hours as needed for Pain.  Qty: 25 tablet, Refills: 0    Comments: Quantity prescribed more than 7 day supply? Yes, quantity medically necessary      senna-docusate 8.6-50 mg (PERICOLACE) 8.6-50 mg per tablet Take 1 tablet by mouth 2 (two) times daily.         CONTINUE these medications which have CHANGED     Details   hydroxyurea (HYDREA) 500 mg Cap Take 2 capsules (1,000 mg total) by mouth once daily.  Qty: 60 capsule, Refills: 0      morphine (MS CONTIN) 30 MG 12 hr tablet Take 3 tablets (90 mg total) by mouth every 12 (twelve) hours. for 7 days  Qty: 14 tablet, Refills: 0    Comments: Quantity prescribed more than 7 day supply? Yes, quantity medically necessary         CONTINUE these medications which have NOT CHANGED    Details   folic acid (FOLVITE) 1 MG tablet Take 1 mg by mouth once daily.             Discharge Diet:regular diet    Activity: activity as tolerated    Discharge Condition: Good    Disposition: Home or Self Care    Time spent on the discharge of the patient including review of hospital course with the patient. reviewing discharge medications and arranging follow-up care 35 minutes.  Patient was seen and examined on the date of discharge and determined to be suitable for discharge.    No future appointments.    Hernan Valdez MD  Medical Director [facility] Medicine  Spectra:  [phone]  Pager: [phone]

## 2019-08-30 NOTE — PLAN OF CARE
Problem: Adult Inpatient Plan of Care  Goal: Plan of Care Review  Recommendations     Recommendation/Intervention: 1.) Continue regular diet. 2.) If PO intakes <50% of meals, suggest Boost Plus BID. 3.) Daily weights  Goals: 1.) Pt to consume/tolerate >75% EEN and EPN by follow up.  Nutrition Goal Status: progressing towards goal  Communication of RD Recs: (POC)    Assessment and Plan     No nutritional diagnosis at this time.

## 2019-08-30 NOTE — PLAN OF CARE
Problem: Adult Inpatient Plan of Care  Goal: Plan of Care Review  Outcome: Ongoing (interventions implemented as appropriate)  Alert and orient times 4.Complain of generalize pain level 9 every 3 hours when pain med is due. IV continue to infuse at 200 ML/HR. VSS. No falls .

## 2019-08-30 NOTE — PROGRESS NOTES
"[facility]  Adult Nutrition  Progress Note    SUMMARY       Recommendations    Recommendation/Intervention: 1.) Continue regular diet. 2.) If PO intakes <50% of meals, suggest Boost Plus BID. 3.) Daily weights  Goals: 1.) Pt to consume/tolerate >75% EEN and EPN by follow up.  Nutrition Goal Status: progressing towards goal  Communication of RD Recs: (POC)    Reason for Assessment    Reason For Assessment: RD follow-up  Diagnosis: (sickle cell crisis)  Relevant Medical History: Sickle cell; stroke  Interdisciplinary Rounds: did not attend  General Information Comments: Pt laying in bed reports feeling ok. Pt states appetite varies daily. He states he is consuming % of meals. He denies GI distress. NFPE completed  and continues to appear well nourished. Noted 20# wt gain since last RD note. Wt updated since beginning of month. Unsure if wt change is accurate. Will monitor.   Nutrition Discharge Planning: d/c on regular diet     Nutrition Risk Screen    Nutrition Risk Screen: no indicators present    Nutrition/Diet History    Spiritual, Cultural Beliefs, Restorationist Practices, Values that Affect Care: no  Factors Affecting Nutritional Intake: decreased appetite    Anthropometrics    Temp: 97.5 °F (36.4 °C)  Height Method: Stated  Height: 5' 9" (175.3 cm)  Height (inches): 69 in  Weight Method: Bed Scale  Weight: 82 kg (180 lb 12.4 oz)  Weight (lb): 180.78 lb  Ideal Body Weight (IBW), Male: 160 lb  % Ideal Body Weight, Male (lb): 106.25 lb  BMI (Calculated): 25.2  BMI Grade: 25 - 29.9 - overweight  Usual Body Weight (UBW), k.18 kg  % Usual Body Weight: 0       Lab/Procedures/Meds    Pertinent Labs Reviewed: reviewed  Pertinent Labs Comments: H/H 8.3/24.4, Chl 112, Ca 8.6  Pertinent Medications Reviewed: reviewed  Pertinent Medications Comments: folic acid, morphine, senna-docusate    Estimated/Assessed Needs    Weight Used For Calorie Calculations: 72.9 kg (160 lb 11.5 oz)  Energy " Calorie Requirements (kcal): 2044 kcal/d  Energy Need Method: [place]-St Jeor(x1.2)  Protein Requirements: 73-87 g/kg   Weight Used For Protein Calculations: 72.9 kg (160 lb 11.5 oz)(1.0-1.2 g/kg)  Fluid Requirements (mL): 1 mL/kcal or per MD     RDA Method (mL): 2044       Nutrition Prescription Ordered    Current Diet Order: regular    Evaluation of Received Nutrient/Fluid Intake    I/O: -4.8L since admit  Energy Calories Required: not meeting needs  Protein Required: not meeting needs  Fluid Required: meeting needs  Comments: LBM 8/29  Tolerance: tolerating  % Intake of Estimated Energy Needs: 50 - 75 %  % Meal Intake: 75 - 100 %    Nutrition Risk    Level of Risk/Frequency of Follow-up: low     Assessment and Plan    No nutritional diagnosis at this time.      Monitor and Evaluation    Food and Nutrient Intake: energy intake, food and beverage intake  Food and Nutrient Adminstration: diet order  Knowledge/Beliefs/Attitudes: food and nutrition knowledge/skill  Physical Activity and Function: nutrition-related ADLs and IADLs  Anthropometric Measurements: weight, weight change, body mass index  Biochemical Data, Medical Tests and Procedures: electrolyte and renal panel, gastrointestinal profile, glucose/endocrine profile  Nutrition-Focused Physical Findings: overall appearance     Malnutrition Assessment                 Orbital Region (Subcutaneous Fat Loss): well nourished  Upper Arm Region (Subcutaneous Fat Loss): well nourished   Webb Region (Muscle Loss): well nourished  Clavicle Bone Region (Muscle Loss): well nourished  Clavicle and Acromion Bone Region (Muscle Loss): well nourished  Dorsal Hand (Muscle Loss): well nourished  Anterior Thigh Region (Muscle Loss): well nourished  Posterior Calf Region (Muscle Loss): well nourished                 Nutrition Follow-Up    RD Follow-up?: Yes

## 2019-08-30 NOTE — PROGRESS NOTES
Progress Note   Hospital Medicine         Patient Name: Paddy Stevenson  MRN:  [id number]  [facility]: [facility] [place] Hernan Valdez MD  Date of Admission:  8/5/2019     Length of Stay:  LOS: 24 days   Expected Discharge Date: 8/30/2019  Principal Problem:  Vaso-occlusive sickle cell crisis       Subjective:     Interval History/Overnight Events:  Patient states he is ready to go home and is feeling as good as he has felt in a long time; plan to d/c home on no pain and hydroxyurea regimen;    Review of Systems   Constitutional: Negative for chills, fatigue, fever.   HENT: Negative for sore throat, trouble swallowing.    Eyes: Negative for photophobia, visual disturbance.   Respiratory: Negative for cough, shortness of breath.    Cardiovascular: Negative for chest pain, palpitations, leg swelling.   Gastrointestinal: Negative for abdominal pain, constipation, diarrhea, nausea, vomiting.   Endocrine: Negative for cold intolerance, heat intolerance.   Genitourinary: Negative for dysuria, frequency.   Musculoskeletal: Negative for arthralgias, myalgias.   Skin: Negative for rash, wound, erythema   Neurological: Negative for dizziness, syncope, weakness, light-headedness.   Psychiatric/Behavioral: Negative for confusion, hallucinations, anxiety  All other systems reviewed and are negative.    Objective:     Temp:  [97.5 °F (36.4 °C)-98.1 °F (36.7 °C)]   Pulse:  [65-78]   Resp:  [16-18]   BP: ()/(55-68)   SpO2:  [95 %-98 %]       Physical Exam:  Constitutional: Appears well-developed and well-nourished.   Head: Normocephalic and atraumatic.   Mouth/Throat: Oropharynx is clear and moist.   Eyes: EOM are normal. Pupils are equal, round, and reactive to light. No scleral icterus.   Neck: Normal range of motion. Neck supple.   Cardiovascular: Normal rate and regular rhythm.  No murmur heard.  Pulmonary/Chest: Effort normal and breath sounds normal. No respiratory distress. No wheezes, rales, or  rhonchi  Abdominal: Soft. Bowel sounds are normal.  No distension or tenderness  Musculoskeletal: Normal range of motion. No edema.   Neurological: Alert and oriented to person, place, and time.   Skin: Skin is warm and dry.   Psychiatric: Normal mood and affect. Behavior is normal.     Recent Labs   Lab 08/24/19  0400 08/25/19  1257 08/26/19  0940 08/29/19  0816 08/30/19  0848   WBC 12.38 12.90* 12.98* 11.13 12.51   HGB 9.0* 9.1* 8.9* 8.3* 8.8*   HCT 27.0* 26.2* 26.2* 24.4* 26.1*    317 404* 362* 362*     Recent Labs   Lab 08/26/19  0940 08/29/19  0816 08/30/19  0848   * 140 141   K 4.0 4.2 4.2    112* 111*   CO2 18* 20* 22*   BUN 8 8 6   CREATININE 0.8 0.8 0.8   * 78 75   CALCIUM 9.1 8.6* 8.8     Recent Labs   Lab 08/26/19  0940 08/29/19  0816 08/30/19  0848   ALKPHOS 65 61 59   ALT 22 21 18   AST 35 29 28   ALBUMIN 3.8 3.5 3.7   PROT 7.6 6.5 6.8   BILITOT 2.3* 1.9* 2.1*     No results for input(s): POCTGLUCOSE in the last 168 hours.     folic acid  1 mg Oral Daily    HYDROmorphone  3 mg Intravenous Once    hydroxyurea  1,000 mg Oral Daily    morphine  90 mg Oral Q12H    polyethylene glycol  17 g Oral Daily    senna-docusate 8.6-50 mg  1 tablet Oral BID       Assessment and Plan     Mr. Paddy Stevenson is a 24 y.o. male who presented to [facility] on 8/5/2019 with     Hospital Course:    Mr. Paddy Stevenson was admitted to Hospital Medicine for management of     Active Hospital Problems    Diagnosis  POA    *Vaso-occlusive sickle cell crisis [D57.00]  Yes    On hydroxyurea therapy [Z79.899]  Not Applicable    Body aches [R52]  Yes    Opioid dependence [F11.20]  Yes    Mild intermittent asthma without complication [J45.20]  Yes     Chronic    Avascular necrosis of bones of both hips [M87.051, M87.052]  Yes     Chronic    Sickle cell pain crisis [D57.00]  Yes      Resolved Hospital Problems   No resolved problems to display.       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   -patient not responding to IV fluids as expected; consulted Heme/Onc. Agree with resumption of NSAIDs (patient previously refusing)  -patient without significant improvement; continuing IV fluids, home-dose MSContin, MSIR q4 prn, Dilaudid 1 mg IV q6 prn.  -patient not receiving oral MSIR as ordered; increasing IV Dilaudid dose to 2 mg. Plan to follow up with Heme/Onc on Monday if no improvement.  -No improvement, possibly worsening hemolysis. Plan to contact Heme/Onc in AM  -Patient appears to have high tolerance for opioids. Agree with Heme/Onc recommendations to increase IV Dilaudid: 4 mg Q 3 hours and resume NSAIDs  -Patient continues to have crisis pain. Continue IV fluids and resumed IV Dilaudid: 4 mg Q 3 hours.  -Hgb-S assay similar as previous levels.  -Resume trial of NSAIDs.   - Agree with Heme/Onc recommendations to increase hydroxyurea now that pain is improving. Continuing folate.  -Increased hydroxyurea 8/22. Continuing current management. Monitor liver enzymes. Patient needs close OP follow up since hydroxyurea has been increased.  - increasing long acting pain medication cont oxycodone 10 mg for breakthrough       Leukocytosis  - CXR, UA normal  - start antibiotics if patient becomes febrile or hemodynamically unstable     Avascular necrosis of bones of both hips  -S/p L hip replacement ~2 years ago  -MRI R hip (12/2018) at OSH: Avascular necrosis of the right femoral head without subchondral collapse. 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  -MRI on 7/17/19:Focal area of avascular necrosis involving at least 50% of the articular surface.  No imaging findings to suggest subchondral plate  collapse.  2. Additional focal area of avascular necrosis within the right iliac crest and possibly within the right proximal femoral shaft, the latter which is not well evaluated secondary to partial visualization.  -Pt reports being followed by an Orthopedic surgeon in [place] with an appointment coming up. Currently no plans of surgery.      Mild intermittent asthma without complication  -Sats 98-99% on RA  -Controlled  -prn breathing treatments       Disposition:  D/c home today with follow up at [facility] heme/onc next week    Hernan Valdez MD  Medical Director [facility] Medicine  Spectra:  [phone]  Pager: [phone]

## 2019-09-02 ENCOUNTER — PATIENT OUTREACH (OUTPATIENT)
Dept: ADMINISTRATIVE | Facility: CLINIC | Age: 24
End: 2019-09-02

## 2019-09-02 NOTE — PROGRESS NOTES
C3 nurse attempted to contact patient. No answer.  C3 nurse attempted to contact Paddy Stevenson for a TCC post hospital discharge follow up call. No answer at phone number listed and no voicemail available. The patient does not have a scheduled HOSFU appointment within 7-14 days post hospital discharge date 08/30/2019. Non [facility] PCP.

## 2019-09-03 NOTE — PROGRESS NOTES
C3 nurse attempted to contact patient. No answer.  C3 nurse attempted to contact Paddy Stevenson for a TCC post hospital discharge follow up call. No answer at phone number listed and no voicemail available. The patient does not have a scheduled HOSFU appointment within 7-14 days post hospital discharge date 08/30/19. Non-[facility] PCP.

## 2020-04-24 NOTE — ASSESSMENT & PLAN NOTE
"-- DO NOT REPLY / DO NOT REPLY ALL --  -- Message is from the [address] not available     General Patient Message      Reason for Call: [facility] is calling in on behalf of the patient in regard to a referral to [facility] for a special bed. They state that they are looking for a referral for a full bed. Please follow up with the patient for more information     Caller Information       Type Contact Phone    04/24/2020 02:59 PM Phone (Incoming) Darlene  (Payor) [phone]     [facility]          Alternative phone number: none     Turnaround time given to caller: ""This message will be sent to [facility] Provider's name]. The clinical team will fulfill your request as soon as they review your message. \""    " See above, does appear patient in mild crisis  Drug screen ordered: presumptive positive for opioids  ER visits July 2019 7/1: [facility]  7/2: [facility]  7/3: [facility]  7/7: [facility]  7/7: [facility]  7/13: [facility]  7/15: [facility]  7/16: [facility]

## 2022-04-21 NOTE — SUBJECTIVE & OBJECTIVE
Problem: Adult Inpatient Plan of Care  Goal: Plan of Care Review  Outcome: Ongoing, Progressing  Goal: Patient-Specific Goal (Individualized)  Outcome: Ongoing, Progressing  Goal: Absence of Hospital-Acquired Illness or Injury  Outcome: Ongoing, Progressing  Goal: Optimal Comfort and Wellbeing  Outcome: Ongoing, Progressing  Goal: Readiness for Transition of Care  Outcome: Ongoing, Progressing     Problem: Infection  Goal: Absence of Infection Signs and Symptoms  Outcome: Ongoing, Progressing     Problem: Adjustment to Illness (Heart Failure)  Goal: Optimal Coping  Outcome: Ongoing, Progressing     Problem: Cardiac Output Decreased (Heart Failure)  Goal: Optimal Cardiac Output  Outcome: Ongoing, Progressing     Problem: Dysrhythmia (Heart Failure)  Goal: Stable Heart Rate and Rhythm  Outcome: Ongoing, Progressing     Problem: Fluid Imbalance (Heart Failure)  Goal: Fluid Balance  Outcome: Ongoing, Progressing     Problem: Functional Ability Impaired (Heart Failure)  Goal: Optimal Functional Ability  Outcome: Ongoing, Progressing     Problem: Oral Intake Inadequate (Heart Failure)  Goal: Optimal Nutrition Intake  Outcome: Ongoing, Progressing     Problem: Respiratory Compromise (Heart Failure)  Goal: Effective Oxygenation and Ventilation  Outcome: Ongoing, Progressing     Problem: Sleep Disordered Breathing (Heart Failure)  Goal: Effective Breathing Pattern During Sleep  Outcome: Ongoing, Progressing     Problem: Cardiac Output Decreased  Goal: Effective Cardiac Output  Outcome: Ongoing, Progressing     Problem: Fluid and Electrolyte Imbalance (Acute Kidney Injury/Impairment)  Goal: Fluid and Electrolyte Balance  Outcome: Ongoing, Progressing     Problem: Oral Intake Inadequate (Acute Kidney Injury/Impairment)  Goal: Optimal Nutrition Intake  Outcome: Ongoing, Progressing     Problem: Renal Function Impairment (Acute Kidney Injury/Impairment)  Goal: Effective Renal Function  Outcome: Ongoing, Progressing    Interval History:  no acute events overnight, no new complaints.  Dr. Canchola at bedside today.  Discussed with pt we will schedule MS Contin and continue dilaudid 1 mg q 3 hrs prn with plan to wean IV dosing tomorrow.    Review of Systems   Constitutional: Positive for activity change. Negative for chills, fatigue, fever and unexpected weight change.   HENT: Negative for congestion, drooling, sore throat and trouble swallowing.    Respiratory: Negative for cough, chest tightness, shortness of breath and wheezing.    Cardiovascular: Negative for chest pain, palpitations and leg swelling.   Gastrointestinal: Negative for abdominal distention, abdominal pain, constipation, diarrhea, nausea and vomiting.   Genitourinary: Negative for difficulty urinating, dysuria, frequency and hematuria.   Musculoskeletal: Positive for arthralgias, back pain, gait problem and myalgias. Negative for joint swelling and neck pain.   Skin: Negative for color change, pallor, rash and wound.   Neurological: Negative for dizziness, syncope, speech difficulty and light-headedness.   Psychiatric/Behavioral: Negative for agitation, confusion and decreased concentration. The patient is not nervous/anxious.      Objective:     Vital Signs (Most Recent):  Temp: 98 °F (36.7 °C) (07/18/19 1139)  Pulse: 79 (07/18/19 1139)  Resp: 18 (07/18/19 1139)  BP: (!) 116/58 (07/18/19 1139)  SpO2: 100 % (07/18/19 1139) Vital Signs (24h Range):  Temp:  [96.5 °F (35.8 °C)-98 °F (36.7 °C)] 98 °F (36.7 °C)  Pulse:  [60-89] 79  Resp:  [18-20] 18  SpO2:  [97 %-100 %] 100 %  BP: (106-132)/(54-73) 116/58        There is no height or weight on file to calculate BMI.    Intake/Output Summary (Last 24 hours) at 7/18/2019 1433  Last data filed at 7/18/2019 1205  Gross per 24 hour   Intake 1940 ml   Output 1950 ml   Net -10 ml      Physical Exam   Constitutional: He is oriented to person, place, and time. He appears well-developed and well-nourished. No distress.   HENT:    Head: Normocephalic and atraumatic.   Right Ear: External ear normal.   Left Ear: External ear normal.   Eyes: Pupils are equal, round, and reactive to light. Conjunctivae and EOM are normal.   Neck: Normal range of motion. Neck supple.   Cardiovascular: Normal rate and regular rhythm.   Pulmonary/Chest: Effort normal and breath sounds normal. He has no wheezes.   Abdominal: Soft. Bowel sounds are normal. He exhibits no distension. There is no tenderness.   Musculoskeletal: He exhibits tenderness. He exhibits no edema.   Significant right-sided lower back pain with waist flexion  Unable to fully access ROM and strength of RLE due to pain on exam  ROM of LLE limited due to pain, pain elicited with hip flexion   Full ROM and strength of b/l RUE     Neurological: He is alert and oriented to person, place, and time. No cranial nerve deficit.   Skin: Skin is warm and dry. He is not diaphoretic. No erythema.   Psychiatric: He has a normal mood and affect. His behavior is normal. Thought content normal.       Significant Labs: All pertinent labs within the past 24 hours have been reviewed.    Significant Imaging: I have reviewed all pertinent imaging results/findings within the past 24 hours.     Problem: Fall Injury Risk  Goal: Absence of Fall and Fall-Related Injury  Outcome: Ongoing, Progressing